# Patient Record
Sex: FEMALE | Race: WHITE | NOT HISPANIC OR LATINO | Employment: FULL TIME | URBAN - METROPOLITAN AREA
[De-identification: names, ages, dates, MRNs, and addresses within clinical notes are randomized per-mention and may not be internally consistent; named-entity substitution may affect disease eponyms.]

---

## 2017-01-27 ENCOUNTER — HOSPITAL ENCOUNTER (EMERGENCY)
Facility: MEDICAL CENTER | Age: 32
End: 2017-01-27
Attending: EMERGENCY MEDICINE
Payer: COMMERCIAL

## 2017-01-27 VITALS
WEIGHT: 190.7 LBS | HEART RATE: 80 BPM | BODY MASS INDEX: 28.9 KG/M2 | DIASTOLIC BLOOD PRESSURE: 112 MMHG | OXYGEN SATURATION: 97 % | HEIGHT: 68 IN | SYSTOLIC BLOOD PRESSURE: 157 MMHG | TEMPERATURE: 97 F | RESPIRATION RATE: 16 BRPM

## 2017-01-27 DIAGNOSIS — F98.8 ADD (ATTENTION DEFICIT DISORDER): ICD-10-CM

## 2017-01-27 DIAGNOSIS — R11.2 NON-INTRACTABLE VOMITING WITH NAUSEA, UNSPECIFIED VOMITING TYPE: ICD-10-CM

## 2017-01-27 LAB
ALBUMIN SERPL BCP-MCNC: 4.7 G/DL (ref 3.2–4.9)
ALBUMIN/GLOB SERPL: 1.2 G/DL
ALP SERPL-CCNC: 91 U/L (ref 30–99)
ALT SERPL-CCNC: 23 U/L (ref 2–50)
AMORPH CRY #/AREA URNS HPF: PRESENT /HPF
AMPHET UR QL SCN: POSITIVE
ANION GAP SERPL CALC-SCNC: 10 MMOL/L (ref 0–11.9)
APPEARANCE UR: ABNORMAL
AST SERPL-CCNC: 23 U/L (ref 12–45)
BACTERIA #/AREA URNS HPF: ABNORMAL /HPF
BARBITURATES UR QL SCN: NEGATIVE
BASOPHILS # BLD AUTO: 0.5 % (ref 0–1.8)
BASOPHILS # BLD: 0.04 K/UL (ref 0–0.12)
BENZODIAZ UR QL SCN: NEGATIVE
BILIRUB SERPL-MCNC: 0.4 MG/DL (ref 0.1–1.5)
BILIRUB UR QL STRIP.AUTO: NEGATIVE
BUN SERPL-MCNC: 13 MG/DL (ref 8–22)
BZE UR QL SCN: NEGATIVE
CALCIUM SERPL-MCNC: 10.9 MG/DL (ref 8.5–10.5)
CANNABINOIDS UR QL SCN: NEGATIVE
CHLORIDE SERPL-SCNC: 101 MMOL/L (ref 96–112)
CO2 SERPL-SCNC: 25 MMOL/L (ref 20–33)
COLOR UR: YELLOW
CREAT SERPL-MCNC: 0.82 MG/DL (ref 0.5–1.4)
CULTURE IF INDICATED INDCX: YES UA CULTURE
EOSINOPHIL # BLD AUTO: 0.05 K/UL (ref 0–0.51)
EOSINOPHIL NFR BLD: 0.6 % (ref 0–6.9)
EPI CELLS #/AREA URNS HPF: ABNORMAL /HPF
ERYTHROCYTE [DISTWIDTH] IN BLOOD BY AUTOMATED COUNT: 40.5 FL (ref 35.9–50)
GFR SERPL CREATININE-BSD FRML MDRD: >60 ML/MIN/1.73 M 2
GLOBULIN SER CALC-MCNC: 3.9 G/DL (ref 1.9–3.5)
GLUCOSE SERPL-MCNC: 101 MG/DL (ref 65–99)
GLUCOSE UR STRIP.AUTO-MCNC: NEGATIVE MG/DL
GRAN CASTS #/AREA URNS LPF: ABNORMAL /LPF
HCG SERPL QL: NEGATIVE
HCT VFR BLD AUTO: 38.1 % (ref 37–47)
HGB BLD-MCNC: 12.9 G/DL (ref 12–16)
KETONES UR STRIP.AUTO-MCNC: NEGATIVE MG/DL
LEUKOCYTE ESTERASE UR QL STRIP.AUTO: ABNORMAL
LIPASE SERPL-CCNC: 17 U/L (ref 11–82)
LYMPHOCYTES # BLD AUTO: 2.63 K/UL (ref 1–4.8)
LYMPHOCYTES NFR BLD: 30.8 % (ref 22–41)
MCH RBC QN AUTO: 26.2 PG (ref 27–33)
MCHC RBC AUTO-ENTMCNC: 33.9 G/DL (ref 33.6–35)
MCV RBC AUTO: 77.3 FL (ref 81.4–97.8)
MDMA UR QL SCN: NEGATIVE
METHADONE UR QL SCN: NEGATIVE
MICRO URNS: ABNORMAL
MONOCYTES # BLD AUTO: 0.77 K/UL (ref 0–0.85)
MONOCYTES NFR BLD AUTO: 9 % (ref 0–13.4)
MUCOUS THREADS #/AREA URNS HPF: ABNORMAL /HPF
NEUTROPHILS # BLD AUTO: 5.05 K/UL (ref 2–7.15)
NEUTROPHILS NFR BLD: 59.1 % (ref 44–72)
NITRITE UR QL STRIP.AUTO: NEGATIVE
OPIATES UR QL SCN: NEGATIVE
OXYCODONE UR QL SCN: NEGATIVE
PCP UR QL SCN: NEGATIVE
PH UR STRIP.AUTO: 7 [PH]
PLATELET # BLD AUTO: 318 K/UL (ref 164–446)
PMV BLD AUTO: 11.2 FL (ref 9–12.9)
POTASSIUM SERPL-SCNC: 3.8 MMOL/L (ref 3.6–5.5)
PROPOXYPH UR QL SCN: NEGATIVE
PROT SERPL-MCNC: 8.6 G/DL (ref 6–8.2)
PROT UR QL STRIP: NEGATIVE MG/DL
RBC # BLD AUTO: 4.93 M/UL (ref 4.2–5.4)
RBC # URNS HPF: ABNORMAL /HPF
RBC UR QL AUTO: ABNORMAL
SODIUM SERPL-SCNC: 136 MMOL/L (ref 135–145)
SP GR UR STRIP.AUTO: 1.02
WBC # BLD AUTO: 8.5 K/UL (ref 4.8–10.8)
WBC #/AREA URNS HPF: ABNORMAL /HPF

## 2017-01-27 PROCEDURE — 80053 COMPREHEN METABOLIC PANEL: CPT

## 2017-01-27 PROCEDURE — 700105 HCHG RX REV CODE 258: Performed by: EMERGENCY MEDICINE

## 2017-01-27 PROCEDURE — 700111 HCHG RX REV CODE 636 W/ 250 OVERRIDE (IP): Performed by: EMERGENCY MEDICINE

## 2017-01-27 PROCEDURE — 36415 COLL VENOUS BLD VENIPUNCTURE: CPT

## 2017-01-27 PROCEDURE — 99285 EMERGENCY DEPT VISIT HI MDM: CPT

## 2017-01-27 PROCEDURE — 85025 COMPLETE CBC W/AUTO DIFF WBC: CPT

## 2017-01-27 PROCEDURE — 80307 DRUG TEST PRSMV CHEM ANLYZR: CPT

## 2017-01-27 PROCEDURE — 83690 ASSAY OF LIPASE: CPT

## 2017-01-27 PROCEDURE — 96374 THER/PROPH/DIAG INJ IV PUSH: CPT

## 2017-01-27 PROCEDURE — 96361 HYDRATE IV INFUSION ADD-ON: CPT

## 2017-01-27 PROCEDURE — 81001 URINALYSIS AUTO W/SCOPE: CPT

## 2017-01-27 PROCEDURE — 87086 URINE CULTURE/COLONY COUNT: CPT

## 2017-01-27 PROCEDURE — 84703 CHORIONIC GONADOTROPIN ASSAY: CPT

## 2017-01-27 RX ORDER — ONDANSETRON 2 MG/ML
4 INJECTION INTRAMUSCULAR; INTRAVENOUS ONCE
Status: COMPLETED | OUTPATIENT
Start: 2017-01-27 | End: 2017-01-27

## 2017-01-27 RX ORDER — ONDANSETRON 4 MG/1
4 TABLET, FILM COATED ORAL EVERY 4 HOURS PRN
Qty: 10 TAB | Refills: 0 | Status: SHIPPED | OUTPATIENT
Start: 2017-01-27 | End: 2017-05-14

## 2017-01-27 RX ORDER — SODIUM CHLORIDE 9 MG/ML
1000 INJECTION, SOLUTION INTRAVENOUS ONCE
Status: COMPLETED | OUTPATIENT
Start: 2017-01-27 | End: 2017-01-27

## 2017-01-27 RX ADMIN — ONDANSETRON 4 MG: 2 INJECTION, SOLUTION INTRAMUSCULAR; INTRAVENOUS at 03:06

## 2017-01-27 RX ADMIN — SODIUM CHLORIDE 1000 ML: 9 INJECTION, SOLUTION INTRAVENOUS at 03:08

## 2017-01-27 ASSESSMENT — LIFESTYLE VARIABLES: DO YOU DRINK ALCOHOL: NO

## 2017-01-27 NOTE — ED PROVIDER NOTES
ED Provider Note    Scribed for Maude Romero M.D. by Basia Cool. 1/27/2017, 2:50 AM.    Primary care provider: DELMA Robles  Means of arrival: Walk-in  History obtained from: Patient  History limited by: None    CHIEF COMPLAINT  Chief Complaint   Patient presents with   • N/V     x2 days. multiple episodes of emesis   • Dizziness   • RUQ Pain     PCP is concerned about Gallstones.       HPI  Katerina Owen is a 31 y.o. female who presents to the Emergency Department with nausea and vomiting onset two days ago. The patient tolerated similar pain and symptoms for a year and a half to two years until the vomiting became more persistent and she again experienced vomiting. The patient reports that greasy foods exacerbate the vomiting. She also endorses abdominal pain that radiates into her back. She adds that she has been experiencing mild swelling in her legs. Her current physician is concerned for gall stones. The patient notes that several tests including ultrasounds and CT scans have been done before. She denies fever and chest pain. She is currently scheduled to undergo a HIDA scan, however due to radioactive tracer shortage this has been postponed.    REVIEW OF SYSTEMS  Pertinent positives include nausea, vomiting, chronic leg swelling unchanged today. Pertinent negatives include no fever, chest pain.  All other systems reviewed and negative.    PAST MEDICAL HISTORY   has a past medical history of Migraine; HTN (hypertension); Pre-eclampsia; Chlamydia (5/11/2011); and depression.    SURGICAL HISTORY   has past surgical history that includes septoturbinoplasty (4/21/2009); primary c section (Aug 7, 2007); repeat c section w tubal ligation (9/7/2011); lumpectomy; and breast biopsy (Left, 2/26/2016).    SOCIAL HISTORY  Social History   Substance Use Topics   • Smoking status: Former Smoker     Quit date: 12/26/2014   • Smokeless tobacco: Former User     Quit date: 10/30/2014      Comment:  "quit 8 yrs ago   • Alcohol Use: No      Comment: rarely      History   Drug Use No       FAMILY HISTORY  Family History   Problem Relation Age of Onset   • Heart Disease Father      heart failure smoker    • Hypertension Father    • Cancer Maternal Grandfather      pancreastic   • Heart Disease Paternal Grandfather      heart attack       CURRENT MEDICATIONS    No current facility-administered medications on file prior to encounter.     Current Outpatient Prescriptions on File Prior to Encounter   Medication Sig Dispense Refill   • omeprazole (PRILOSEC) 20 MG delayed-release capsule Take 1 Cap by mouth every day. Before breakfast 30 Cap 1   • amphetamine-dextroamphetamine (ADDERALL) 10 MG Tab Take 1 Tab by mouth 2 times a day. 56 Tab 0   • amphetamine-dextroamphetamine (ADDERALL) 10 MG Tab Take 1 Tab by mouth 2 times a day. 56 Tab 0   • amphetamine-dextroamphetamine (ADDERALL) 10 MG Tab Take 1 Tab by mouth 2 times a day. 56 Tab 0   • phentermine (ADIPEX-P) 37.5 MG tablet Take 1 Tab by mouth every morning before breakfast. 30 Tab 0         ALLERGIES  Allergies   Allergen Reactions   • Morphine Nausea     \"feel sick for a whole day after being given morphine\"  RXN=2/2016     • Vicodin [Hydrocodone-Acetaminophen] Vomiting and Nausea     BXP=3637       PHYSICAL EXAM  Vital Signs: /112 mmHg  Pulse 124  Temp(Src) 36.1 °C (97 °F)  Resp 16  Ht 1.727 m (5' 8\")  Wt 86.5 kg (190 lb 11.2 oz)  BMI 29.00 kg/m2  SpO2 99%  Constitutional: Alert, no acute distress  HENT: Normocephalic, atraumatic, moist mucus membranes  Eyes: Pupils equal and reactive, normal conjunctiva, non-icteric  Neck: Supple, normal range of motion, no stridor  Cardiovascular: Tachycardic, Normal peripheral perfusion, no cyanosis, Normal cardiac auscultation  Pulmonary: No respiratory distress, normal work of breathing, no accessory muscle usage, Clear to auscultation bilaterally  Abdomen: Mild epigastric discomfort on palpation. Soft, no " peritoneal signs, bowel sounds are present.   Skin: Warm, dry, no rashes or lesions  Back: No pain with active range of motion  Musculoskeletal: Normal range of motion in all extremities, no swelling or deformity noted  Neurologic: Alert, oriented, normal motor function, no speech deficits  Psychiatric: Normal and appropriate mood and affect      DIAGNOSTIC STUDIES/PROCEDURES:    LABS  Labs Reviewed   CBC WITH DIFFERENTIAL - Abnormal; Notable for the following:     MCV 77.3 (*)     MCH 26.2 (*)     All other components within normal limits   COMP METABOLIC PANEL - Abnormal; Notable for the following:     Glucose 101 (*)     Calcium 10.9 (*)     Total Protein 8.6 (*)     Globulin 3.9 (*)     All other components within normal limits   URINALYSIS,CULTURE IF INDICATED - Abnormal; Notable for the following:     Character Sl Cloudy (*)     Leukocyte Esterase Large (*)     Occult Blood Small (*)     All other components within normal limits   URINE DRUG SCREEN - Abnormal; Notable for the following:     Amphetamines Urine Positive (*)     All other components within normal limits   URINE MICROSCOPIC (W/UA) - Abnormal; Notable for the following:     WBC 10-20 (*)     RBC 5-10 (*)     Bacteria Few (*)     Epithelial Cells Moderate (*)     All other components within normal limits   LIPASE   HCG QUAL SERUM   ESTIMATED GFR   URINE CULTURE(NEW)     All labs reviewed by me.    Radiology results revealed:   No orders to display        COURSE & MEDICAL DECISION MAKING  Pertinent Labs & Imaging studies reviewed. (See chart for details)    Review of old medical records for continuity of care. 2/13/16 normal noncontrasted CT abdomen and pelvis, normal appendix visualized. 12/13/16 right upper quadrant ultrasound demonstrates normal gallbladder, no evidence of cholelithiasis, no pericholecystic fluid. Echogenic liver consistent with fatty infiltration present.    Differential diagnoses include but are not limited to: Chronic abdominal  pain, pancreatitis, gastroenteritis, peptic ulcer disease, cholecystitis, cholelithiasis    2:55 AM - Patient seen and examined at bedside. Patient will be treated with IV fluids, Zofran 4mg IV. Ordered CBC, CMP, Lipase, Urinalysis, HCG Qual Serum, Urine drug screen to evaluate her symptoms.     Decision Making:  This is a 31 y.o. year old female who presents with acute exacerbation of her chronic abdominal pain and vomiting. She has had no episodes of vomiting in the emergency department. Treated with Zofran and fluid bolus. She was tachycardic to heart rate of 124 which resolved with rest and fluids, on reassessment heart rate is 80. She remained normotensive without episodes of hypotension.     On laboratory evaluation urinalysis is negative for evidence of infection. Urine drug screen is positive for amphetamines, however she is taking ADHD medications that are likely the cause. Normal white blood count, no left shift. No electrolyte abnormalities, normal total bilirubin, normal liver enzymes without evidence of bile duct obstruction. Normal lipase without evidence of pancreatitis. HCG is negative ruling out pregnancy related complications.    Patient remains without any episodes of vomiting in the emergency department. Suspect this is an exacerbation of her chronic abdominal pain for which she is currently undergoing evaluation. While she is waiting for her HIDA scan, I have provided follow-up information for gastroenterology should she want a 2nd opinion. Return precautions given including worsening pain, fevers, inability to tolerate fluids, or any new or worsening symptoms.    Discharge home in stable condition    FINAL IMPRESSION  1. Non-intractable vomiting with nausea, unspecified vomiting type    2. ADD (attention deficit disorder)          Basia UNGER (Oh), am scribing for, and in the presence of, Maude Romero M.D..    Electronically signed by: Basia Cool (Oh), 1/27/2017    Maude UNGER  DONALD Romero. personally performed the services described in this documentation, as scribed by Basia Cool in my presence, and it is both accurate and complete.    The note accurately reflects work and decisions made by me.  Maude Romero  1/27/2017  6:24 AM

## 2017-01-27 NOTE — ED NOTES
Chief Complaint   Patient presents with   • N/V     x2 days. multiple episodes of emesis   • Dizziness   • RUQ Pain     PCP is concerned about Gallstones.   Pt ambulatory to triage with above complaint. Pt returned to lobby, educated on triage process, and to inform staff of any changes or concerns.

## 2017-01-27 NOTE — DISCHARGE INSTRUCTIONS
Please follow up with your primary care physician for complete recheck. You may also schedule an appointment with the gastroenterologist listed for follow-up. Please return to the emergency department if he develop any new or worsening symptoms including worsening abdominal pain, nausea, vomiting, or any further concerns.      Nausea and Vomiting  Nausea is a sick feeling that often comes before throwing up (vomiting). Vomiting is a reflex where stomach contents come out of your mouth. Vomiting can cause severe loss of body fluids (dehydration). Children and elderly adults can become dehydrated quickly, especially if they also have diarrhea. Nausea and vomiting are symptoms of a condition or disease. It is important to find the cause of your symptoms.  CAUSES   · Direct irritation of the stomach lining. This irritation can result from increased acid production (gastroesophageal reflux disease), infection, food poisoning, taking certain medicines (such as nonsteroidal anti-inflammatory drugs), alcohol use, or tobacco use.  · Signals from the brain. These signals could be caused by a headache, heat exposure, an inner ear disturbance, increased pressure in the brain from injury, infection, a tumor, or a concussion, pain, emotional stimulus, or metabolic problems.  · An obstruction in the gastrointestinal tract (bowel obstruction).  · Illnesses such as diabetes, hepatitis, gallbladder problems, appendicitis, kidney problems, cancer, sepsis, atypical symptoms of a heart attack, or eating disorders.  · Medical treatments such as chemotherapy and radiation.  · Receiving medicine that makes you sleep (general anesthetic) during surgery.  DIAGNOSIS  Your caregiver may ask for tests to be done if the problems do not improve after a few days. Tests may also be done if symptoms are severe or if the reason for the nausea and vomiting is not clear. Tests may include:  · Urine tests.  · Blood tests.  · Stool tests.  · Cultures (to  look for evidence of infection).  · X-rays or other imaging studies.  Test results can help your caregiver make decisions about treatment or the need for additional tests.  TREATMENT  You need to stay well hydrated. Drink frequently but in small amounts. You may wish to drink water, sports drinks, clear broth, or eat frozen ice pops or gelatin dessert to help stay hydrated. When you eat, eating slowly may help prevent nausea. There are also some antinausea medicines that may help prevent nausea.  HOME CARE INSTRUCTIONS   · Take all medicine as directed by your caregiver.  · If you do not have an appetite, do not force yourself to eat. However, you must continue to drink fluids.  · If you have an appetite, eat a normal diet unless your caregiver tells you differently.  ¨ Eat a variety of complex carbohydrates (rice, wheat, potatoes, bread), lean meats, yogurt, fruits, and vegetables.  ¨ Avoid high-fat foods because they are more difficult to digest.  · Drink enough water and fluids to keep your urine clear or pale yellow.  · If you are dehydrated, ask your caregiver for specific rehydration instructions. Signs of dehydration may include:  ¨ Severe thirst.  ¨ Dry lips and mouth.  ¨ Dizziness.  ¨ Dark urine.  ¨ Decreasing urine frequency and amount.  ¨ Confusion.  ¨ Rapid breathing or pulse.  SEEK IMMEDIATE MEDICAL CARE IF:   · You have blood or brown flecks (like coffee grounds) in your vomit.  · You have black or bloody stools.  · You have a severe headache or stiff neck.  · You are confused.  · You have severe abdominal pain.  · You have chest pain or trouble breathing.  · You do not urinate at least once every 8 hours.  · You develop cold or clammy skin.  · You continue to vomit for longer than 24 to 48 hours.  · You have a fever.  MAKE SURE YOU:   · Understand these instructions.  · Will watch your condition.  · Will get help right away if you are not doing well or get worse.     This information is not intended  to replace advice given to you by your health care provider. Make sure you discuss any questions you have with your health care provider.     Document Released: 12/18/2006 Document Revised: 03/11/2013 Document Reviewed: 05/16/2012  [employer] Interactive Patient Education ©2016 [employer]

## 2017-01-27 NOTE — ED AVS SNAPSHOT
1/27/2017          Katerina Murry [address] [place] [address]    Dear Katerina:    [facility] wants to ensure your discharge home is safe and you or your loved ones have had all your questions answered regarding your care after you leave the hospital.    You may receive a telephone call within two days of your discharge.  This call is to make certain you understand your discharge instructions as well as ensure we provided you with the best care possible during your stay with us.     The call will only last approximately 3-5 minutes and will be done by a nurse.    Once again, we want to ensure your discharge home is safe and that you have a clear understanding of any next steps in your care.  If you have any questions or concerns, please do not hesitate to contact us, we are here for you.  Thank you for choosing [facility] for your healthcare needs.    Sincerely,    Dwight Villarreal    [facility]

## 2017-01-27 NOTE — ED AVS SNAPSHOT
[employer] Access Code: Activation code not generated  Current [employer] Status: Active    [employer]  A secure, online tool to manage your health information     [employer]’s [employer]® is a secure, online tool that connects you to your personalized health information from the privacy of your home -- day or night - making it very easy for you to manage your healthcare. Once the activation process is completed, you can even access your medical information using the [employer] vadim, which is available for free in the Apple Vadim store or Google Play store.     [employer] provides the following levels of access (as shown below):   My Chart Features   [facility] Primary Care Doctor [facility]  Specialists [facility]  Urgent  Care Non-[facility]  Primary Care  Doctor   Email your healthcare team securely and privately 24/7 X X X X   Manage appointments: schedule your next appointment; view details of past/upcoming appointments X      Request prescription refills. X      View recent personal medical records, including lab and immunizations X X X X   View health record, including health history, allergies, medications X X X X   Read reports about your outpatient visits, procedures, consult and ER notes X X X X   See your discharge summary, which is a recap of your hospital and/or ER visit that includes your diagnosis, lab results, and care plan. X X       How to register for [employer]:  1. Go to  [email or web address].  2. Click on the Sign Up Now box, which takes you to the New Member Sign Up page. You will need to provide the following information:  a. Enter your [employer] Access Code exactly as it appears at the top of this page. (You will not need to use this code after you’ve completed the sign-up process. If you do not sign up before the expiration date, you must request a new code.)   b. Enter your date of birth.   c. Enter your home email address.   d. Click Submit, and follow the next screen’s instructions.  3. Create a [employer] ID. This will  be your [employer] login ID and cannot be changed, so think of one that is secure and easy to remember.  4. Create a [employer] password. You can change your password at any time.  5. Enter your Password Reset Question and Answer. This can be used at a later time if you forget your password.   6. Enter your e-mail address. This allows you to receive e-mail notifications when new information is available in [employer].  7. Click Sign Up. You can now view your health information.    For assistance activating your [employer] account, call [phone]

## 2017-01-27 NOTE — ED AVS SNAPSHOT
After Visit Summary                                                                                                                Katerina Owen   MRN: [id number]    Department:  [facility], Emergency Dept   Date of Visit:  1/27/2017            [facility], Emergency Dept    [address] [address]    Phone:  [phone]      You were seen by     Maude Romero M.D.      Your Diagnosis Was     Non-intractable vomiting with nausea, unspecified vomiting type     R11.2       These are the medications you received during your hospitalization from 01/27/2017 0143 to 01/27/2017 0536     Date/Time Order Dose Route Action    01/27/2017 0308 NS infusion 1,000 mL 1,000 mL Intravenous New Bag    01/27/2017 0306 ondansetron (ZOFRAN) syringe/vial injection 4 mg 4 mg Intravenous Given      Follow-up Information     1. Schedule an appointment as soon as possible for a visit with [facility].    Contact information    [facility] [address]  [phone]          2. Go to [facility], Emergency Dept.    Specialty:  Emergency Medicine    Why:  If symptoms worsen    Contact information    [address] [place] [id number] [phone]      Medication Information     Review all of your home medications and newly ordered medications with your primary doctor and/or pharmacist as soon as possible. Follow medication instructions as directed by your doctor and/or pharmacist.     Please keep your complete medication list with you and share with your physician. Update the information when medications are discontinued, doses are changed, or new medications (including over-the-counter products) are added; and carry medication information at all times in the event of emergency situations.               Medication List      START taking these medications        Instructions    ondansetron 4 MG Tabs tablet   Commonly known as:  ZOFRAN    Take 1 Tab  by mouth every four hours as needed for Nausea/Vomiting.   Dose:  4 mg         ASK your doctor about these medications        Instructions    * amphetamine-dextroamphetamine 10 MG Tabs   Commonly known as:  ADDERALL    Doctor's comments:  May fill 2/14/2017   Take 1 Tab by mouth 2 times a day.   Dose:  10 mg       * amphetamine-dextroamphetamine 10 MG Tabs   Commonly known as:  ADDERALL    Doctor's comments:  May fill 1/17/2017   Take 1 Tab by mouth 2 times a day.   Dose:  10 mg       * amphetamine-dextroamphetamine 10 MG Tabs   Commonly known as:  ADDERALL    Doctor's comments:  May fill 12/20/2016   Take 1 Tab by mouth 2 times a day.   Dose:  10 mg       omeprazole 20 MG delayed-release capsule   Commonly known as:  PRILOSEC    Take 1 Cap by mouth every day. Before breakfast   Dose:  20 mg       phentermine 37.5 MG tablet   Commonly known as:  ADIPEX-P    Take 1 Tab by mouth every morning before breakfast.   Dose:  37.5 mg       * Notice:  This list has 3 medication(s) that are the same as other medications prescribed for you. Read the directions carefully, and ask your doctor or other care provider to review them with you.            Procedures and tests performed during your visit     CBC WITH DIFFERENTIAL    COMP METABOLIC PANEL    ESTIMATED GFR    HCG QUAL SERUM    LIPASE    URINALYSIS CULTURE, IF INDICATED    URINE CULTURE(NEW)    URINE DRUG SCREEN    URINE MICROSCOPIC (W/UA)        Discharge Instructions       Please follow up with your primary care physician for complete recheck. You may also schedule an appointment with the gastroenterologist listed for follow-up. Please return to the emergency department if he develop any new or worsening symptoms including worsening abdominal pain, nausea, vomiting, or any further concerns.      Nausea and Vomiting  Nausea is a sick feeling that often comes before throwing up (vomiting). Vomiting is a reflex where stomach contents come out of your mouth. Vomiting can  cause severe loss of body fluids (dehydration). Children and elderly adults can become dehydrated quickly, especially if they also have diarrhea. Nausea and vomiting are symptoms of a condition or disease. It is important to find the cause of your symptoms.  CAUSES   · Direct irritation of the stomach lining. This irritation can result from increased acid production (gastroesophageal reflux disease), infection, food poisoning, taking certain medicines (such as nonsteroidal anti-inflammatory drugs), alcohol use, or tobacco use.  · Signals from the brain. These signals could be caused by a headache, heat exposure, an inner ear disturbance, increased pressure in the brain from injury, infection, a tumor, or a concussion, pain, emotional stimulus, or metabolic problems.  · An obstruction in the gastrointestinal tract (bowel obstruction).  · Illnesses such as diabetes, hepatitis, gallbladder problems, appendicitis, kidney problems, cancer, sepsis, atypical symptoms of a heart attack, or eating disorders.  · Medical treatments such as chemotherapy and radiation.  · Receiving medicine that makes you sleep (general anesthetic) during surgery.  DIAGNOSIS  Your caregiver may ask for tests to be done if the problems do not improve after a few days. Tests may also be done if symptoms are severe or if the reason for the nausea and vomiting is not clear. Tests may include:  · Urine tests.  · Blood tests.  · Stool tests.  · Cultures (to look for evidence of infection).  · X-rays or other imaging studies.  Test results can help your caregiver make decisions about treatment or the need for additional tests.  TREATMENT  You need to stay well hydrated. Drink frequently but in small amounts. You may wish to drink water, sports drinks, clear broth, or eat frozen ice pops or gelatin dessert to help stay hydrated. When you eat, eating slowly may help prevent nausea. There are also some antinausea medicines that may help prevent  nausea.  HOME CARE INSTRUCTIONS   · Take all medicine as directed by your caregiver.  · If you do not have an appetite, do not force yourself to eat. However, you must continue to drink fluids.  · If you have an appetite, eat a normal diet unless your caregiver tells you differently.  ¨ Eat a variety of complex carbohydrates (rice, wheat, potatoes, bread), lean meats, yogurt, fruits, and vegetables.  ¨ Avoid high-fat foods because they are more difficult to digest.  · Drink enough water and fluids to keep your urine clear or pale yellow.  · If you are dehydrated, ask your caregiver for specific rehydration instructions. Signs of dehydration may include:  ¨ Severe thirst.  ¨ Dry lips and mouth.  ¨ Dizziness.  ¨ Dark urine.  ¨ Decreasing urine frequency and amount.  ¨ Confusion.  ¨ Rapid breathing or pulse.  SEEK IMMEDIATE MEDICAL CARE IF:   · You have blood or brown flecks (like coffee grounds) in your vomit.  · You have black or bloody stools.  · You have a severe headache or stiff neck.  · You are confused.  · You have severe abdominal pain.  · You have chest pain or trouble breathing.  · You do not urinate at least once every 8 hours.  · You develop cold or clammy skin.  · You continue to vomit for longer than 24 to 48 hours.  · You have a fever.  MAKE SURE YOU:   · Understand these instructions.  · Will watch your condition.  · Will get help right away if you are not doing well or get worse.     This information is not intended to replace advice given to you by your health care provider. Make sure you discuss any questions you have with your health care provider.     Document Released: 12/18/2006 Document Revised: 03/11/2013 Document Reviewed: 05/16/2012  [employer] Interactive Patient Education ©2016 [employer]            Patient Information     Patient Information    Following emergency treatment: all patient requiring follow-up care must return either to a private physician or a clinic if your condition  worsens before you are able to obtain further medical attention, please return to the emergency room.     Billing Information    At [facility], we work to make the billing process streamlined for our patients.  Our Representatives are here to answer any questions you may have regarding your hospital bill.  If you have insurance coverage and have supplied your insurance information to us, we will submit a claim to your insurer on your behalf.  Should you have any questions regarding your bill, we can be reached online or by phone as follows:  Online: You are able pay your bills online or live chat with our representatives about any billing questions you may have. We are here to help Monday - Friday from 8:00am to 7:30pm and 9:00am - 12:00pm on Saturdays.  Please visit [email or web address]  for more information.   Phone:  [phone] or [phone]    Please note that your emergency physician, surgeon, pathologist, radiologist, anesthesiologist, and other specialists are not employed by [facility] and will therefore bill separately for their services.  Please contact them directly for any questions concerning their bills at the numbers below:     Emergency Physician Services:  [phone]  [facility]:  [phone]  [facility]:  [phone]  [facility]:  [phone]    1. Your final bill may vary from the amount quoted upon discharge if all procedures are not complete at that time, or if your doctor has additional procedures of which we are not aware. You will receive an additional bill if you return to the Emergency Department at [facility] for suture removal regardless of the facility of which the sutures were placed.     2. Please arrange for settlement of this account at the emergency registration.    3. All self-pay accounts are due in full at the time of treatment.  If you are unable to meet this obligation then payment  is expected within 4-5 days.     4. If you have had radiology studies (CT, X-ray, Ultrasound, MRI), you have received a preliminary result during your emergency department visit. Please contact the radiology department [phone] to receive a copy of your final result. Please discuss the Final result with your primary physician or with the follow up physician provided.     Crisis Hotline:  [employer] Crisis Hotline:  9-409-IDUTUBP or 1-995.115.1066  [place] Crisis Hotline:    1-981.809.4611 or 936-645-0803         ED Discharge Follow Up Questions    1. In order to provide you with very good care, we would like to follow up with a phone call in the next few days.  May we have your permission to contact you?     YES /  NO    2. What is the best phone number to call you? (       )_____-__________    3. What is the best time to call you?      Morning  /  Afternoon  /  Evening                   Patient Signature:  ____________________________________________________________    Date:  ____________________________________________________________

## 2017-01-29 LAB
BACTERIA UR CULT: NORMAL
SIGNIFICANT IND 70042: NORMAL
SITE SITE: NORMAL
SOURCE SOURCE: NORMAL

## 2017-03-15 ENCOUNTER — OFFICE VISIT (OUTPATIENT)
Dept: MEDICAL GROUP | Facility: LAB | Age: 32
End: 2017-03-15
Payer: COMMERCIAL

## 2017-03-15 VITALS
DIASTOLIC BLOOD PRESSURE: 80 MMHG | OXYGEN SATURATION: 97 % | WEIGHT: 194.67 LBS | SYSTOLIC BLOOD PRESSURE: 118 MMHG | HEIGHT: 68 IN | TEMPERATURE: 97.1 F | BODY MASS INDEX: 29.5 KG/M2 | HEART RATE: 100 BPM

## 2017-03-15 DIAGNOSIS — G89.29 CHRONIC ABDOMINAL PAIN: ICD-10-CM

## 2017-03-15 DIAGNOSIS — F41.9 ANXIETY: ICD-10-CM

## 2017-03-15 DIAGNOSIS — R11.2 NON-INTRACTABLE VOMITING WITH NAUSEA, UNSPECIFIED VOMITING TYPE: ICD-10-CM

## 2017-03-15 DIAGNOSIS — G47.09 OTHER INSOMNIA: ICD-10-CM

## 2017-03-15 DIAGNOSIS — R10.9 CHRONIC ABDOMINAL PAIN: ICD-10-CM

## 2017-03-15 DIAGNOSIS — F90.8 ATTENTION-DEFICIT HYPERACTIVITY DISORDER, OTHER TYPE: ICD-10-CM

## 2017-03-15 DIAGNOSIS — G25.81 RLS (RESTLESS LEGS SYNDROME): ICD-10-CM

## 2017-03-15 PROCEDURE — 99214 OFFICE O/P EST MOD 30 MIN: CPT | Performed by: NURSE PRACTITIONER

## 2017-03-15 RX ORDER — DEXTROAMPHETAMINE SACCHARATE, AMPHETAMINE ASPARTATE, DEXTROAMPHETAMINE SULFATE AND AMPHETAMINE SULFATE 2.5; 2.5; 2.5; 2.5 MG/1; MG/1; MG/1; MG/1
10 TABLET ORAL 2 TIMES DAILY
Qty: 56 TAB | Refills: 0 | Status: SHIPPED | OUTPATIENT
Start: 2017-03-15 | End: 2017-03-22

## 2017-03-15 RX ORDER — DEXTROAMPHETAMINE SACCHARATE, AMPHETAMINE ASPARTATE, DEXTROAMPHETAMINE SULFATE AND AMPHETAMINE SULFATE 2.5; 2.5; 2.5; 2.5 MG/1; MG/1; MG/1; MG/1
10 TABLET ORAL 2 TIMES DAILY
Qty: 56 TAB | Refills: 0 | Status: SHIPPED | OUTPATIENT
Start: 2017-03-15 | End: 2017-06-01 | Stop reason: SDUPTHER

## 2017-03-15 RX ORDER — TEMAZEPAM 7.5 MG/1
7.5 CAPSULE ORAL NIGHTLY PRN
Qty: 30 CAP | Refills: 1 | Status: SHIPPED
Start: 2017-03-15 | End: 2017-05-14

## 2017-03-15 ASSESSMENT — PATIENT HEALTH QUESTIONNAIRE - PHQ9: CLINICAL INTERPRETATION OF PHQ2 SCORE: 0

## 2017-03-15 ASSESSMENT — PAIN SCALES - GENERAL: PAINLEVEL: NO PAIN

## 2017-03-15 NOTE — MR AVS SNAPSHOT
"        Katerina Murry Brayden   3/15/2017 1:40 PM   Office Visit   MRN: [id number]    Department:  [facility]   Dept Phone:  [phone]    Description:  Female : 1985   Provider:  DELMA Robles           Allergies as of 3/15/2017     Allergen Noted Reactions    Morphine 2016   Nausea    \"feel sick for a whole day after being given morphine\"  RXN=2016      Vicodin [Hydrocodone-Acetaminophen] 2016   Vomiting, Nausea    TRT=0844      You were diagnosed with     Attention-deficit hyperactivity disorder, other type   [6973035]       RLS (restless legs syndrome)   [113321]       Anxiety   [828156]       Other insomnia   [412891]       Chronic abdominal pain   [370215]       Non-intractable vomiting with nausea, unspecified vomiting type   [2205118]         Vital Signs     Blood Pressure Pulse Temperature Height Weight Body Mass Index    118/80 mmHg 100 36.2 °C (97.1 °F) 1.727 m (5' 7.99\") 88.3 kg (194 lb 10.7 oz) 29.61 kg/m2    Oxygen Saturation Last Menstrual Period Breastfeeding? Smoking Status          97% 2017 No Former Smoker        Basic Information     Date Of Birth Sex Race Ethnicity Preferred Language    1985 Female White Non- English      Your appointments     Immanuel 15, 2017 10:40 AM   Established Patient with DELMA Robles   [facility] (--)    [address] [address] [id number] [phone]           You will be receiving a confirmation call a few days before your appointment from our automated call confirmation system.              Problem List              ICD-10-CM Priority Class Noted - Resolved    Migraine    Unknown - Present    HTN (hypertension) I10   Unknown - Present    Anxiety F41.9   2014 - Present    Depression F32.9   2014 - Present    PTSD (post-traumatic stress disorder) F43.10   2015 - Present    ADD (attention deficit disorder) F98.8   2016 - Present      "   Health Maintenance        Date Due Completion Dates    PAP SMEAR 5/5/2014 5/5/2011    IMM INFLUENZA (1) 9/1/2016 ---    IMM DTaP/Tdap/Td Vaccine (2 - Td) 9/9/2021 9/9/2011            Current Immunizations     Tdap Vaccine 9/9/2011  6:40 AM      Below and/or attached are the medications your provider expects you to take. Review all of your home medications and newly ordered medications with your provider and/or pharmacist. Follow medication instructions as directed by your provider and/or pharmacist. Please keep your medication list with you and share with your provider. Update the information when medications are discontinued, doses are changed, or new medications (including over-the-counter products) are added; and carry medication information at all times in the event of emergency situations     Allergies:  MORPHINE - Nausea     VICODIN - Vomiting,Nausea               Medications  Valid as of: March 15, 2017 -  2:27 PM    Generic Name Brand Name Tablet Size Instructions for use    Amphetamine-Dextroamphetamine (Tab) ADDERALL 10 MG Take 1 Tab by mouth 2 times a day.        Amphetamine-Dextroamphetamine (Tab) ADDERALL 10 MG Take 1 Tab by mouth 2 times a day.        Amphetamine-Dextroamphetamine (Tab) ADDERALL 10 MG Take 1 Tab by mouth 2 times a day.        Ondansetron HCl (Tab) ZOFRAN 4 MG Take 1 Tab by mouth every four hours as needed for Nausea/Vomiting.        Temazepam (Cap) RESTORIL 7.5 MG Take 1 Cap by mouth at bedtime as needed for Sleep.        .                 Medicines prescribed today were sent to:     [facility] [address] [place] [address] [place] - [address] [facility] [place] [address]    Phone: [phone] Fax: [phone]    Open 24 Hours?: No      Medication refill instructions:       If your prescription bottle indicates you have medication refills left, it is not necessary to call your provider’s office. Please contact your pharmacy and they will refill your medication.    If your prescription  bottle indicates you do not have any refills left, you may request refills at any time through one of the following ways: The online [employer] system (except Urgent Care), by calling your provider’s office, or by asking your pharmacy to contact your provider’s office with a refill request. Medication refills are processed only during regular business hours and may not be available until the next business day. Your provider may request additional information or to have a follow-up visit with you prior to refilling your medication.   *Please Note: Medication refills are assigned a new Rx number when refilled electronically. Your pharmacy may indicate that no refills were authorized even though a new prescription for the same medication is available at the pharmacy. Please request the medicine by name with the pharmacy before contacting your provider for a refill.        Other Notes About Your Plan     Seeing Diamond Chung - therapist           [employer] Access Code: Activation code not generated  Current [employer] Status: Active

## 2017-03-15 NOTE — PROGRESS NOTES
CC  f/u    HPI  31-year-old established female here with her  to follow-up on a few issues:  #1-attention deficit disorder: Stable. Doing well with Adderall 10 mg twice daily. Denies any negative side effects of Adderall. Feels that she is much more focused at home and job searching.  #2-anxiety: Chronic issue. Does not feel that Adderall has worsened her anxiety and in fact feels that her anxiety has improved very as she feels much less anxious and she is able to focus and accomplish activities of daily living in a timely manner. She is not taking any daily medications  #3-restless leg syndrome: She reports that almost every evening she has leg cramps and a sensation that she needs to constantly move her legs. She has never taken any medication for this at the exception of over-the-counter supplements and natural herbs. She has tried stretching, exercise and warm baths. She has a sensation that her legs need to constantly move and typically she has a charley horse in one of her calves. She's had her symptoms for several years.  #4-insomnia: Chronic issue. has difficulty falling and staying asleep. Her  reports that she moves around constantly at night. She reports that when she does wake up and hears any type of noise that she is unable to fall back asleep. She's had trouble sleeping for years and has tried natural routes such as melatonin, valerian root and certain types of tea without improvement.  #5-chronic abdominal pain with vomiting. She reports that she is continuously having issues with abdominal pain and vomiting after she eats any type of fatty food. She's had a normal gallbladder ultrasound but her HIDA scan did induce cramping and pain. She has not made a follow-up appointment with her surgeon, Dr. Delcid yet.    Past medical, surgical, family, and social history is reviewed and updated in Epic chart by me today.   Medications and allergies reviewed and updated in Epic chart by me today.  "    ROS:   As documented in history of present illness above    Exam:  Blood pressure 118/80, pulse 100, temperature 36.2 °C (97.1 °F), height 1.727 m (5' 7.99\"), weight 88.3 kg (194 lb 10.7 oz), last menstrual period 03/06/2017, SpO2 97 %, not currently breastfeeding.  Constitutional: Alert, no distress, plus 3 vital signs  Skin:  Warm, dry, no rashes invisible areas  Eye: Equal, round and reactive, conjunctiva clear  ENMT: Lips without lesions, good dentition, oropharynx clear    Neck: Trachea midline  Respiratory: Unlabored respiration, lungs clear to auscultation, no wheezes, no rhonchi  Cardiovascular: Normal rate and rhythm, no murmur, no edema  Psych: Alert, pleasant, well-groomed, normal affect    A/P:  1. Attention-deficit hyperactivity disorder, other type  -Symptoms improved with Adderall. Urine drug screen and contract are up-to-date. She'll follow up here in 3 months.  - amphetamine-dextroamphetamine (ADDERALL) 10 MG Tab; Take 1 Tab by mouth 2 times a day.  Dispense: 56 Tab; Refill: 0  - amphetamine-dextroamphetamine (ADDERALL) 10 MG Tab; Take 1 Tab by mouth 2 times a day.  Dispense: 56 Tab; Refill: 0  - amphetamine-dextroamphetamine (ADDERALL) 10 MG Tab; Take 1 Tab by mouth 2 times a day.  Dispense: 56 Tab; Refill: 0    2. RLS (restless legs syndrome)  -Discussed treatment options such as Requip, gabapentin, lorazepam. We'll try temazepam because of her multiple other issues such as anxiety, insomnia and her restless leg syndrome. Discussed potential chemical dependency nature of temazepam with her and she verbalized understanding. She understands that she should not drive a car take any alcohol within 6 hours of taking temazepam. Instructed her that she needs to allow at least 8 hours for sleep when taking temazepam. She will email me if this is not helpful. Discussed iron and magnesium supplementation.  - temazepam (RESTORIL) 7.5 MG capsule; Take 1 Cap by mouth at bedtime as needed for Sleep.  " Dispense: 30 Cap; Refill: 1    3. Anxiety  -Trial of nighttime temazepam to ease her anxiety when she tries to fall asleep.  - temazepam (RESTORIL) 7.5 MG capsule; Take 1 Cap by mouth at bedtime as needed for Sleep.  Dispense: 30 Cap; Refill: 1    4. Other insomnia  -As above.    5.  Chronic abdominal pain with vomiting: Strongly encouraged her to follow-up with Dr. Delcid because of her persistent symptoms and she states that she will.  Reviewed her ultrasound, HIDA scan and recent ER visit with her.

## 2017-03-22 ENCOUNTER — APPOINTMENT (OUTPATIENT)
Dept: RADIOLOGY | Facility: MEDICAL CENTER | Age: 32
End: 2017-03-22
Attending: EMERGENCY MEDICINE
Payer: COMMERCIAL

## 2017-03-22 ENCOUNTER — HOSPITAL ENCOUNTER (EMERGENCY)
Facility: MEDICAL CENTER | Age: 32
End: 2017-03-22
Attending: EMERGENCY MEDICINE
Payer: COMMERCIAL

## 2017-03-22 VITALS
BODY MASS INDEX: 29.74 KG/M2 | SYSTOLIC BLOOD PRESSURE: 167 MMHG | OXYGEN SATURATION: 94 % | DIASTOLIC BLOOD PRESSURE: 93 MMHG | HEART RATE: 88 BPM | WEIGHT: 196.21 LBS | HEIGHT: 68 IN | TEMPERATURE: 99.1 F | RESPIRATION RATE: 14 BRPM

## 2017-03-22 DIAGNOSIS — R51.9 NONINTRACTABLE HEADACHE, UNSPECIFIED CHRONICITY PATTERN, UNSPECIFIED HEADACHE TYPE: ICD-10-CM

## 2017-03-22 PROCEDURE — 96375 TX/PRO/DX INJ NEW DRUG ADDON: CPT

## 2017-03-22 PROCEDURE — 70450 CT HEAD/BRAIN W/O DYE: CPT

## 2017-03-22 PROCEDURE — 700111 HCHG RX REV CODE 636 W/ 250 OVERRIDE (IP): Performed by: EMERGENCY MEDICINE

## 2017-03-22 PROCEDURE — 700105 HCHG RX REV CODE 258: Performed by: EMERGENCY MEDICINE

## 2017-03-22 PROCEDURE — 99285 EMERGENCY DEPT VISIT HI MDM: CPT

## 2017-03-22 PROCEDURE — 96374 THER/PROPH/DIAG INJ IV PUSH: CPT

## 2017-03-22 RX ORDER — DEXAMETHASONE SODIUM PHOSPHATE 4 MG/ML
10 INJECTION, SOLUTION INTRA-ARTICULAR; INTRALESIONAL; INTRAMUSCULAR; INTRAVENOUS; SOFT TISSUE ONCE
Status: COMPLETED | OUTPATIENT
Start: 2017-03-22 | End: 2017-03-22

## 2017-03-22 RX ORDER — DIPHENHYDRAMINE HYDROCHLORIDE 50 MG/ML
25 INJECTION INTRAMUSCULAR; INTRAVENOUS ONCE
Status: COMPLETED | OUTPATIENT
Start: 2017-03-22 | End: 2017-03-22

## 2017-03-22 RX ORDER — SODIUM CHLORIDE 9 MG/ML
1000 INJECTION, SOLUTION INTRAVENOUS ONCE
Status: COMPLETED | OUTPATIENT
Start: 2017-03-22 | End: 2017-03-22

## 2017-03-22 RX ORDER — KETOROLAC TROMETHAMINE 30 MG/ML
15 INJECTION, SOLUTION INTRAMUSCULAR; INTRAVENOUS ONCE
Status: COMPLETED | OUTPATIENT
Start: 2017-03-22 | End: 2017-03-22

## 2017-03-22 RX ORDER — METOCLOPRAMIDE HYDROCHLORIDE 5 MG/ML
10 INJECTION INTRAMUSCULAR; INTRAVENOUS ONCE
Status: COMPLETED | OUTPATIENT
Start: 2017-03-22 | End: 2017-03-22

## 2017-03-22 RX ORDER — BUTALBITAL, ASPIRIN, AND CAFFEINE 325; 50; 40 MG/1; MG/1; MG/1
1 CAPSULE ORAL EVERY 4 HOURS PRN
Qty: 30 CAP | Refills: 0 | Status: SHIPPED | OUTPATIENT
Start: 2017-03-22 | End: 2017-12-19

## 2017-03-22 RX ADMIN — SODIUM CHLORIDE 1000 ML: 9 INJECTION, SOLUTION INTRAVENOUS at 21:17

## 2017-03-22 RX ADMIN — DIPHENHYDRAMINE HYDROCHLORIDE 25 MG: 50 INJECTION, SOLUTION INTRAMUSCULAR; INTRAVENOUS at 21:24

## 2017-03-22 RX ADMIN — DEXAMETHASONE SODIUM PHOSPHATE 10 MG: 4 INJECTION, SOLUTION INTRAMUSCULAR; INTRAVENOUS at 21:21

## 2017-03-22 RX ADMIN — METOCLOPRAMIDE 10 MG: 5 INJECTION, SOLUTION INTRAMUSCULAR; INTRAVENOUS at 21:18

## 2017-03-22 RX ADMIN — KETOROLAC TROMETHAMINE 15 MG: 30 INJECTION, SOLUTION INTRAMUSCULAR; INTRAVENOUS at 21:19

## 2017-03-22 ASSESSMENT — PAIN SCALES - GENERAL
PAINLEVEL_OUTOF10: 0
PAINLEVEL_OUTOF10: 7
PAINLEVEL_OUTOF10: 8

## 2017-03-22 NOTE — ED AVS SNAPSHOT
[employer] Access Code: Activation code not generated  Current [employer] Status: Active    VOY[employer]hart  A secure, online tool to manage your health information     [employer]’s [employer]® is a secure, online tool that connects you to your personalized health information from the privacy of your home -- day or night - making it very easy for you to manage your healthcare. Once the activation process is completed, you can even access your medical information using the [employer]ix vadim, which is available for free in the Apple Vadim store or Google Play store.     [employer]ix provides the following levels of access (as shown below):   My Chart Features   [facility] Primary Care Doctor [facility]  Specialists [facility]  Urgent  Care Non-[facility]  Primary Care  Doctor   Email your healthcare team securely and privately 24/7 X X X X   Manage appointments: schedule your next appointment; view details of past/upcoming appointments X      Request prescription refills. X      View recent personal medical records, including lab and immunizations X X X X   View health record, including health history, allergies, medications X X X X   Read reports about your outpatient visits, procedures, consult and ER notes X X X X   See your discharge summary, which is a recap of your hospital and/or ER visit that includes your diagnosis, lab results, and care plan. X X       How to register for [employer]:  1. Go to  [email or web address].  2. Click on the Sign Up Now box, which takes you to the New Member Sign Up page. You will need to provide the following information:  a. Enter your [employer] Access Code exactly as it appears at the top of this page. (You will not need to use this code after you’ve completed the sign-up process. If you do not sign up before the expiration date, you must request a new code.)   b. Enter your date of birth.   c. Enter your home email address.   d. Click Submit, and follow the next screen’s instructions.  3. Create a [employer]ix ID. This will  be your Dot [employer]N login ID and cannot be changed, so think of one that is secure and easy to remember.  4. Create a [employer]N password. You can change your password at any time.  5. Enter your Password Reset Question and Answer. This can be used at a later time if you forget your password.   6. Enter your e-mail address. This allows you to receive e-mail notifications when new information is available in Dot VN.  7. Click Sign Up. You can now view your health information.    For assistance activating your [employer] account, call [phone]

## 2017-03-22 NOTE — ED AVS SNAPSHOT
3/22/2017          Katerina Murry [address] [place] [address]    Dear Katerina:    [facility] wants to ensure your discharge home is safe and you or your loved ones have had all your questions answered regarding your care after you leave the hospital.    You may receive a telephone call within two days of your discharge.  This call is to make certain you understand your discharge instructions as well as ensure we provided you with the best care possible during your stay with us.     The call will only last approximately 3-5 minutes and will be done by a nurse.    Once again, we want to ensure your discharge home is safe and that you have a clear understanding of any next steps in your care.  If you have any questions or concerns, please do not hesitate to contact us, we are here for you.  Thank you for choosing [facility] for your healthcare needs.    Sincerely,    Dwight Villarreal    [facility]

## 2017-03-22 NOTE — ED AVS SNAPSHOT
Home Care Instructions                                                                                                                Katerina Owen   MRN: [id number]    Department:  [facility], Emergency Dept   Date of Visit:  3/22/2017            [facility], Emergency Dept    [address]    [place] [address]    Phone:  [phone]      You were seen by     Jeovanny Kerr M.D.      Your Diagnosis Was     Nonintractable headache, unspecified chronicity pattern, unspecified headache type     R51       These are the medications you received during your hospitalization from 03/22/2017 1948 to 03/22/2017 2233     Date/Time Order Dose Route Action    03/22/2017 2117 NS infusion 1,000 mL 1,000 mL Intravenous New Bag    03/22/2017 2118 metoclopramide (REGLAN) injection 10 mg 10 mg Intravenous Given    03/22/2017 2121 dexamethasone (DECADRON) injection 10 mg 10 mg Intravenous Given    03/22/2017 2119 ketorolac (TORADOL) injection 15 mg 15 mg Intravenous Given    03/22/2017 2124 diphenhydrAMINE (BENADRYL) injection 25 mg 25 mg Intravenous Given      Follow-up Information     1. Follow up with DELMA Robles In 3 days.    Specialty:  Family Medicine    Contact information    [id number] [place] [address]  [place] [address] [phone]        Medication Information     Review all of your home medications and newly ordered medications with your primary doctor and/or pharmacist as soon as possible. Follow medication instructions as directed by your doctor and/or pharmacist.     Please keep your complete medication list with you and share with your physician. Update the information when medications are discontinued, doses are changed, or new medications (including over-the-counter products) are added; and carry medication information at all times in the event of emergency situations.               Medication List      START taking these medications         Instructions    Morning Afternoon Evening Bedtime    ASPIRIN-CAFFEINE-BUTALBITAL -40 MG Caps   Commonly known as:  FIORINAL        Take 1 Cap by mouth every four hours as needed.   Dose:  1 Cap                          ASK your doctor about these medications        Instructions    Morning Afternoon Evening Bedtime    amphetamine-dextroamphetamine 10 MG Tabs   Commonly known as:  ADDERALL        Doctor's comments:  May fill 3/19/2017   Take 1 Tab by mouth 2 times a day.   Dose:  10 mg                        ondansetron 4 MG Tabs tablet   Commonly known as:  ZOFRAN        Take 1 Tab by mouth every four hours as needed for Nausea/Vomiting.   Dose:  4 mg                        temazepam 7.5 MG capsule   Commonly known as:  RESTORIL        Take 1 Cap by mouth at bedtime as needed for Sleep.   Dose:  7.5 mg                             Where to Get Your Medications      You can get these medications from any pharmacy     Bring a paper prescription for each of these medications    - ASPIRIN-CAFFEINE-BUTALBITAL -40 MG Caps            Procedures and tests performed during your visit     CT-HEAD W/O    IV Saline Lock        Discharge Instructions       Migraine Headache  A migraine headache is an intense, throbbing pain on one or both sides of your head. A migraine can last for 30 minutes to several hours.  CAUSES   The exact cause of a migraine headache is not always known. However, a migraine may be caused when nerves in the brain become irritated and release chemicals that cause inflammation. This causes pain.  Certain things may also trigger migraines, such as:  · Alcohol.  · Smoking.  · Stress.  · Menstruation.  · Aged cheeses.  · Foods or drinks that contain nitrates, glutamate, aspartame, or tyramine.  · Lack of sleep.  · Chocolate.  · Caffeine.  · Hunger.  · Physical exertion.  · Fatigue.  · Medicines used to treat chest pain (nitroglycerine), birth control pills, estrogen, and some blood pressure  medicines.  SIGNS AND SYMPTOMS  · Pain on one or both sides of your head.  · Pulsating or throbbing pain.  · Severe pain that prevents daily activities.  · Pain that is aggravated by any physical activity.  · Nausea, vomiting, or both.  · Dizziness.  · Pain with exposure to bright lights, loud noises, or activity.  · General sensitivity to bright lights, loud noises, or smells.  Before you get a migraine, you may get warning signs that a migraine is coming (aura). An aura may include:  · Seeing flashing lights.  · Seeing bright spots, halos, or zigzag lines.  · Having tunnel vision or blurred vision.  · Having feelings of numbness or tingling.  · Having trouble talking.  · Having muscle weakness.  DIAGNOSIS   A migraine headache is often diagnosed based on:  · Symptoms.  · Physical exam.  · A CT scan or MRI of your head. These imaging tests cannot diagnose migraines, but they can help rule out other causes of headaches.  TREATMENT  Medicines may be given for pain and nausea. Medicines can also be given to help prevent recurrent migraines.   HOME CARE INSTRUCTIONS  · Only take over-the-counter or prescription medicines for pain or discomfort as directed by your health care provider. The use of long-term narcotics is not recommended.  · Lie down in a dark, quiet room when you have a migraine.  · Keep a journal to find out what may trigger your migraine headaches. For example, write down:  ¨ What you eat and drink.  ¨ How much sleep you get.  ¨ Any change to your diet or medicines.  · Limit alcohol consumption.  · Quit smoking if you smoke.  · Get 7-9 hours of sleep, or as recommended by your health care provider.  · Limit stress.  · Keep lights dim if bright lights bother you and make your migraines worse.  SEEK IMMEDIATE MEDICAL CARE IF:   · Your migraine becomes severe.  · You have a fever.  · You have a stiff neck.  · You have vision loss.  · You have muscular weakness or loss of muscle control.  · You start losing  your balance or have trouble walking.  · You feel faint or pass out.  · You have severe symptoms that are different from your first symptoms.  MAKE SURE YOU:   · Understand these instructions.  · Will watch your condition.  · Will get help right away if you are not doing well or get worse.     This information is not intended to replace advice given to you by your health care provider. Make sure you discuss any questions you have with your health care provider.     Document Released: 12/18/2006 Document Revised: 01/08/2016 Document Reviewed: 08/25/2014  [employer] Interactive Patient Education ©2016 [employer]            Patient Information     Patient Information    Following emergency treatment: all patient requiring follow-up care must return either to a private physician or a clinic if your condition worsens before you are able to obtain further medical attention, please return to the emergency room.     Billing Information    At [facility], we work to make the billing process streamlined for our patients.  Our Representatives are here to answer any questions you may have regarding your hospital bill.  If you have insurance coverage and have supplied your insurance information to us, we will submit a claim to your insurer on your behalf.  Should you have any questions regarding your bill, we can be reached online or by phone as follows:  Online: You are able pay your bills online or live chat with our representatives about any billing questions you may have. We are here to help Monday - Friday from 8:00am to 7:30pm and 9:00am - 12:00pm on Saturdays.  Please visit [email or web address]  for more information.   Phone:  [phone] or [phone]    Please note that your emergency physician, surgeon, pathologist, radiologist, anesthesiologist, and other specialists are not employed by [facility] and will therefore bill separately for their services.  Please contact them directly  for any questions concerning their bills at the numbers below:     Emergency Physician Services:  [phone]  [facility]:  [phone]  [facility]:  [phone]  [facility]:  [phone]    1. Your final bill may vary from the amount quoted upon discharge if all procedures are not complete at that time, or if your doctor has additional procedures of which we are not aware. You will receive an additional bill if you return to the Emergency Department at [facility] for suture removal regardless of the facility of which the sutures were placed.     2. Please arrange for settlement of this account at the emergency registration.    3. All self-pay accounts are due in full at the time of treatment.  If you are unable to meet this obligation then payment is expected within 4-5 days.     4. If you have had radiology studies (CT, X-ray, Ultrasound, MRI), you have received a preliminary result during your emergency department visit. Please contact the radiology department [phone] to receive a copy of your final result. Please discuss the Final result with your primary physician or with the follow up physician provided.     Crisis Hotline:  [place] Crisis Hotline:  0-896-WTZUIIN or 1-871.459.7961  [place] Crisis Hotline:    1-568.461.2646 or 168-069-7891         ED Discharge Follow Up Questions    1. In order to provide you with very good care, we would like to follow up with a phone call in the next few days.  May we have your permission to contact you?     YES /  NO    2. What is the best phone number to call you? (       )_____-__________    3. What is the best time to call you?      Morning  /  Afternoon  /  Evening                   Patient Signature:  ____________________________________________________________    Date:  ____________________________________________________________      Your appointments     Immanuel 15, 2017 10:40 AM   Established Patient with  MARTITA Robles.   [facility] [place] [facility] (--)    [address] [place] [id number] [phone]           You will be receiving a confirmation call a few days before your appointment from our automated call confirmation system.

## 2017-03-23 ENCOUNTER — PATIENT OUTREACH (OUTPATIENT)
Dept: HEALTH INFORMATION MANAGEMENT | Facility: OTHER | Age: 32
End: 2017-03-23

## 2017-03-23 NOTE — ED NOTES
Pt amb to triage c/o migraine for 2 days which progressively got worse. Sensitivity to light and noise. C/o dizziness. Pt took imitrex without relief. Denies trauma to head. C/o blurred vision of L eye.

## 2017-03-23 NOTE — DISCHARGE INSTRUCTIONS
Migraine Headache  A migraine headache is an intense, throbbing pain on one or both sides of your head. A migraine can last for 30 minutes to several hours.  CAUSES   The exact cause of a migraine headache is not always known. However, a migraine may be caused when nerves in the brain become irritated and release chemicals that cause inflammation. This causes pain.  Certain things may also trigger migraines, such as:  · Alcohol.  · Smoking.  · Stress.  · Menstruation.  · Aged cheeses.  · Foods or drinks that contain nitrates, glutamate, aspartame, or tyramine.  · Lack of sleep.  · Chocolate.  · Caffeine.  · Hunger.  · Physical exertion.  · Fatigue.  · Medicines used to treat chest pain (nitroglycerine), birth control pills, estrogen, and some blood pressure medicines.  SIGNS AND SYMPTOMS  · Pain on one or both sides of your head.  · Pulsating or throbbing pain.  · Severe pain that prevents daily activities.  · Pain that is aggravated by any physical activity.  · Nausea, vomiting, or both.  · Dizziness.  · Pain with exposure to bright lights, loud noises, or activity.  · General sensitivity to bright lights, loud noises, or smells.  Before you get a migraine, you may get warning signs that a migraine is coming (aura). An aura may include:  · Seeing flashing lights.  · Seeing bright spots, halos, or zigzag lines.  · Having tunnel vision or blurred vision.  · Having feelings of numbness or tingling.  · Having trouble talking.  · Having muscle weakness.  DIAGNOSIS   A migraine headache is often diagnosed based on:  · Symptoms.  · Physical exam.  · A CT scan or MRI of your head. These imaging tests cannot diagnose migraines, but they can help rule out other causes of headaches.  TREATMENT  Medicines may be given for pain and nausea. Medicines can also be given to help prevent recurrent migraines.   HOME CARE INSTRUCTIONS  · Only take over-the-counter or prescription medicines for pain or discomfort as directed by your  health care provider. The use of long-term narcotics is not recommended.  · Lie down in a dark, quiet room when you have a migraine.  · Keep a journal to find out what may trigger your migraine headaches. For example, write down:  ¨ What you eat and drink.  ¨ How much sleep you get.  ¨ Any change to your diet or medicines.  · Limit alcohol consumption.  · Quit smoking if you smoke.  · Get 7-9 hours of sleep, or as recommended by your health care provider.  · Limit stress.  · Keep lights dim if bright lights bother you and make your migraines worse.  SEEK IMMEDIATE MEDICAL CARE IF:   · Your migraine becomes severe.  · You have a fever.  · You have a stiff neck.  · You have vision loss.  · You have muscular weakness or loss of muscle control.  · You start losing your balance or have trouble walking.  · You feel faint or pass out.  · You have severe symptoms that are different from your first symptoms.  MAKE SURE YOU:   · Understand these instructions.  · Will watch your condition.  · Will get help right away if you are not doing well or get worse.     This information is not intended to replace advice given to you by your health care provider. Make sure you discuss any questions you have with your health care provider.     Document Released: 12/18/2006 Document Revised: 01/08/2016 Document Reviewed: 08/25/2014  [employer] Interactive Patient Education ©2016 [employer]

## 2017-03-23 NOTE — PROGRESS NOTES
· Placed discharge outreach phone call to patient s/p ER discharge 3/22/17.  Left voicemail with my contact information and instructions to return my phone call.    · 3/26/17 at 9:09 AM--Second attempt at discharge outreach phone call to patient s/p ER discharge 3/22/17.  Left voicemail with my contact information and instructions to return my phone call.    · 3/27/17 at 9:49 AM--Third attempt at discharge outreach, spoke with pt, discharge outreach completed.

## 2017-03-23 NOTE — ED PROVIDER NOTES
"ED Provider Note    CHIEF COMPLAINT  Chief Complaint   Patient presents with   • Migraine       HPI  Katerina Owen is a 31 y.o. female who presents with headache. Patient has history of migraines although has not had one in a while and this does feel similar. It began 2 hours ago not 2 days is noted in triage, she tried her home Imitrex with no relief. She endorses some mild nausea but no vomiting. Some blurriness to her left eye, no other visual symptoms or eye pain. No focal weakness numbness or tingling. No neck pain or rash. No fevers or chills.    REVIEW OF SYSTEMS  See HPI for further details. All other systems are negative.     PAST MEDICAL HISTORY   has a past medical history of Migraine; HTN (hypertension); Pre-eclampsia; Chlamydia (5/11/2011); and depression.    SOCIAL HISTORY  Social History     Social History Main Topics   • Smoking status: Current Some Day Smoker     Last Attempt to Quit: 12/26/2014   • Smokeless tobacco: Former User     Quit date: 10/30/2014      Comment: quit 8 yrs ago   • Alcohol Use: No      Comment: rarely   • Drug Use: No   • Sexual Activity:     Partners: Male     Birth Control/ Protection: Surgical      Comment: ; three kids; wk: not working       SURGICAL HISTORY   has past surgical history that includes septoturbinoplasty (4/21/2009); lumpectomy; breast biopsy (Left, 2/26/2016); primary c section (Aug 7, 2007); and repeat c section w tubal ligation (9/7/2011).    CURRENT MEDICATIONS  Home Medications     Reviewed by Tonia Gomez R.N. (Registered Nurse) on 03/22/17 at 2019  Med List Status: Complete    Medication Last Dose Status    amphetamine-dextroamphetamine (ADDERALL) 10 MG Tab 3/22/2017 Active    ondansetron (ZOFRAN) 4 MG Tab tablet prn Active    temazepam (RESTORIL) 7.5 MG capsule 3/21/2017 Active                ALLERGIES  Allergies   Allergen Reactions   • Morphine Nausea     \"feel sick for a whole day after being given morphine\"  RXN=2/2016   " "  • Vicodin [Hydrocodone-Acetaminophen] Vomiting and Nausea     ISO=8811       PHYSICAL EXAM  VITAL SIGNS: /93 mmHg  Pulse 83  Temp(Src) 37.3 °C (99.1 °F)  Resp 14  Ht 1.727 m (5' 8\")  Wt 89 kg (196 lb 3.4 oz)  BMI 29.84 kg/m2  SpO2 97%  LMP 03/08/2017 (Approximate)  Pulse ox interpretation: I interpret this pulse ox as normal.  Constitutional: Alert in no apparent distress.  HENT: No signs of trauma, Bilateral external ears normal, Nose normal. Temples and sinuses nontender  Eyes: Pupils are equal and reactive, Conjunctiva normal, Non-icteric.   Neck: Normal range of motion, No tenderness, Supple, No stridor.   Lymphatic: No lymphadenopathy noted.   Cardiovascular: Regular rate and rhythm, no murmurs.   Thorax & Lungs: Normal breath sounds, No respiratory distress, No wheezing, No chest tenderness.   Abdomen: Bowel sounds normal, Soft, No tenderness, No masses, No pulsatile masses. No peritoneal signs.  Skin: Warm, Dry, No erythema, No rash.   Back: No bony tenderness, No CVA tenderness.   Extremities: Intact distal pulses, No edema, No tenderness, No cyanosis, Negative Mesha's sign.  Musculoskeletal: Good range of motion in all major joints. No tenderness to palpation or major deformities noted.   Neurologic: Alert, cranial nerves intact, speech is appropriate or not slurred, upper extremities bilaterally exhibit no drift, no dysmetria, 5 out of 5 strength with bilateral bicep/tricep/, sensation intact to light touch throughout upper extremities. Lower extremities strength 5 out of 5 thigh extension/flexion/abduction/adduction, knee extension/flexion, dorsiflexion plantar flexion. No clonus.  2+ patella reflexes.  sensation intact to light touch.  No focal deficits noted. Ambulates with steady gait, steady tandem gait  Psychiatric: Affect normal, Judgment normal, Mood normal.         DIAGNOSTIC STUDIES / PROCEDURES        RADIOLOGY  CT-HEAD W/O   Final Result         1.  No acute intracranial " abnormality.              COURSE & MEDICAL DECISION MAKING  Pertinent Labs & Imaging studies reviewed. (See chart for details)  Evaluated at bedside, planned for migraine cocktail, CT    9:50 PM  Patient reevaluated, headache has resolved. She is well appearing, visual symptoms as well as resolved.    10:17 PM  Reevaluated, still pain-free we will plan for discharge    31 y.o. female presenting with headache, likely migraine given exact similarity to past as well as  the lack of rapid onset and severity of the headache, in addition to the well appearance of the pt makes the dx of subarchnoid hemorrhage less likely. The normal vital signs, lack of a temperature and lack of meningeal signs (supple neck without pain with rom) makes the dx of meningitis less likely.   The patient has no neurologic signs, no fever, and is immunocompetent therefore the time course would be inconsistent with abscess and no CT findings. Toxic and metabolic causes of headache are also unlikely given no other signs or symptoms of such a disorder.  Other diagnoses I also considered but consider unlikely are temporal arteritis (no temple pain, no persistent vision change), glaucoma (no persistent vision change, perrla on exam), sinusitis (no sinus tenderness), pseudotumor cerebri, dural venous thrombosis, and cluster headache (headache duration and character are inconsistent with this diagnosis).    The patient is improved with normal vitals, a normal neurologic exam, normal gait, and is discharged home with pcp follow-up and return precautions.      . The patient will return for worsening symptoms and is stable at the time of discharge. The patient verbalizes understanding and will comply.    The patient is referred to a primary physician for blood pressure management, diabetic screening, and for all other preventative health concerns.     FINAL IMPRESSION  1. Nonintractable headache, unspecified chronicity pattern, unspecified headache type          Electronically signed by: Jeovanny Kerr, 3/22/2017 8:21 PM

## 2017-05-10 ENCOUNTER — TELEPHONE (OUTPATIENT)
Dept: MEDICAL GROUP | Facility: LAB | Age: 32
End: 2017-05-10

## 2017-05-10 NOTE — TELEPHONE ENCOUNTER
Patients pharmacy to let us know they are releasing patients Adderall two days early on 5/12/17 as on her last script they shorted her 2 tabs and only gave her 52 tabs for a 26 day supply.

## 2017-05-14 ENCOUNTER — HOSPITAL ENCOUNTER (EMERGENCY)
Facility: MEDICAL CENTER | Age: 32
End: 2017-05-14
Attending: EMERGENCY MEDICINE
Payer: COMMERCIAL

## 2017-05-14 VITALS
OXYGEN SATURATION: 98 % | HEIGHT: 68 IN | DIASTOLIC BLOOD PRESSURE: 98 MMHG | HEART RATE: 96 BPM | TEMPERATURE: 98 F | WEIGHT: 200.62 LBS | RESPIRATION RATE: 18 BRPM | BODY MASS INDEX: 30.41 KG/M2 | SYSTOLIC BLOOD PRESSURE: 143 MMHG
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
## 2017-05-14 DIAGNOSIS — S60.461A: ICD-10-CM

## 2017-05-14 PROCEDURE — 99283 EMERGENCY DEPT VISIT LOW MDM: CPT

## 2017-05-14 RX ORDER — CEPHALEXIN 500 MG/1
500 CAPSULE ORAL 4 TIMES DAILY
Qty: 40 CAP | Refills: 0 | Status: SHIPPED | OUTPATIENT
Start: 2017-05-14 | End: 2017-06-01

## 2017-05-14 RX ORDER — SULFAMETHOXAZOLE AND TRIMETHOPRIM 800; 160 MG/1; MG/1
1 TABLET ORAL 2 TIMES DAILY
Qty: 20 TAB | Refills: 0 | Status: SHIPPED | OUTPATIENT
Start: 2017-05-14 | End: 2017-06-01

## 2017-05-14 ASSESSMENT — PAIN SCALES - GENERAL: PAINLEVEL_OUTOF10: 5

## 2017-05-14 NOTE — ED AVS SNAPSHOT
Home Care Instructions                                                                                                                Katerina Owen   MRN: [id number]    Department:  [facility], Emergency Dept   Date of Visit:  5/14/2017            [facility], Emergency Dept    [address] [place] [address]    Phone:  [phone]      You were seen by     Maverick Bonilla D.O.      Your Diagnosis Was     Insect bite of left index finger     S60.461A, W57.XXXA       Follow-up Information     1. Follow up with COLIN Robles Call in 1 day.    Specialty:  Family Medicine    Contact information    [address]  [place] [address] [phone]          2. Follow up with [facility], Emergency Dept.    Specialty:  Emergency Medicine    Why:  If symptoms worsen    Contact information    [address] [place] [address] [phone]      Medication Information     Review all of your home medications and newly ordered medications with your primary doctor and/or pharmacist as soon as possible. Follow medication instructions as directed by your doctor and/or pharmacist.     Please keep your complete medication list with you and share with your physician. Update the information when medications are discontinued, doses are changed, or new medications (including over-the-counter products) are added; and carry medication information at all times in the event of emergency situations.               Medication List      START taking these medications        Instructions    Morning Afternoon Evening Bedtime    cephALEXin 500 MG Caps   Commonly known as:  KEFLEX        Take 1 Cap by mouth 4 times a day.   Dose:  500 mg                        sulfamethoxazole-trimethoprim 800-160 MG tablet   Commonly known as:  BACTRIM DS        Take 1 Tab by mouth 2 times a day.   Dose:  1 Tab                          ASK  your doctor about these medications        Instructions    Morning Afternoon Evening Bedtime    amphetamine-dextroamphetamine 10 MG Tabs   Commonly known as:  ADDERALL        Doctor's comments:  May fill 3/19/2017   Take 1 Tab by mouth 2 times a day.   Dose:  10 mg                        ASPIRIN-CAFFEINE-BUTALBITAL -40 MG Caps   Commonly known as:  FIORINAL        Take 1 Cap by mouth every four hours as needed.   Dose:  1 Cap                             Where to Get Your Medications      You can get these medications from any pharmacy     Bring a paper prescription for each of these medications    - cephALEXin 500 MG Caps  - sulfamethoxazole-trimethoprim 800-160 MG tablet              Discharge Instructions       Spider Bite  Spider bites are not common. Most spider bites do not cause serious problems. The elderly, very young children, and people with certain existing medical conditions are more likely to experience significant symptoms.  SYMPTOMS   Spider bites may not cause any pain at first. Within 1 or 2 days of the bite, there may be swelling, redness, and pain in the bite area. However, some spider bites can cause pain within the first hour.  TREATMENT   Your caregiver may prescribe antibiotic medicine if a bacterial infection develops in the bite. However, not all spider bites require antibiotics or prescription medicines.   HOME CARE INSTRUCTIONS  · Do not scratch the bite area.  · Keep the bite area clean and dry. Wash the area with soap and water as directed.  · Put ice or cool compresses on the bite area.  ¨ Put ice in a plastic bag.  ¨ Place a towel between your skin and the bag.  ¨ Leave the ice on for 20 minutes, 4 times a day for the first 2 to 3 days, or as directed.  · Keep the bite area elevated above the level of your heart. This helps reduce redness and swelling.  · Only take over-the-counter or prescription medicines as directed by your caregiver.  · If you are given antibiotics, take them  as directed. Finish them even if you start to feel better.  You may need a tetanus shot if:  · You cannot remember when you had your last tetanus shot.  · You have never had a tetanus shot.  · The injury broke your skin.  If you get a tetanus shot, your arm may swell, get red, and feel warm to the touch. This is common and not a problem. If you need a tetanus shot and you choose not to have one, there is a rare chance of getting tetanus. Sickness from tetanus can be serious.  SEEK MEDICAL CARE IF:  Your bite is not better after 3 days of treatment.  SEEK IMMEDIATE MEDICAL CARE IF:  · Your bite turns purple or develops increased swelling, pain, or redness.  · You develop shortness of breath or chest pain.  · You have muscle cramps or painful muscle spasms.  · You develop abdominal pain, nausea, or vomiting.  · You feel unusually tired or sleepy.  MAKE SURE YOU:  · Understand these instructions.  · Will watch your condition.  · Will get help right away if you are not doing well or get worse.     This information is not intended to replace advice given to you by your health care provider. Make sure you discuss any questions you have with your health care provider.     Document Released: 01/25/2006 Document Revised: 03/11/2013 Document Reviewed: 07/18/2012  [employer] Interactive Patient Education ©2016 [employer]      Discharge References/Attachments     ABSCESS (ENGLISH)    CELLULITIS (ENGLISH)            Patient Information     Patient Information    Following emergency treatment: all patient requiring follow-up care must return either to a private physician or a clinic if your condition worsens before you are able to obtain further medical attention, please return to the emergency room.     Billing Information    At [facility], we work to make the billing process streamlined for our patients.  Our Representatives are here to answer any questions you may have regarding your hospital bill.  If you have insurance  coverage and have supplied your insurance information to us, we will submit a claim to your insurer on your behalf.  Should you have any questions regarding your bill, we can be reached online or by phone as follows:  Online: You are able pay your bills online or live chat with our representatives about any billing questions you may have. We are here to help Monday - Friday from 8:00am to 7:30pm and 9:00am - 12:00pm on Saturdays.  Please visit [email or web address]  for more information.   Phone:  [phone] or [phone]    Please note that your emergency physician, surgeon, pathologist, radiologist, anesthesiologist, and other specialists are not employed by [facility] and will therefore bill separately for their services.  Please contact them directly for any questions concerning their bills at the numbers below:     Emergency Physician Services:  [phone]  [facility]:  [phone]  [facility]:  [phone]  [facility]:  [phone]    1. Your final bill may vary from the amount quoted upon discharge if all procedures are not complete at that time, or if your doctor has additional procedures of which we are not aware. You will receive an additional bill if you return to the Emergency Department at [facility] for suture removal regardless of the facility of which the sutures were placed.     2. Please arrange for settlement of this account at the emergency registration.    3. All self-pay accounts are due in full at the time of treatment.  If you are unable to meet this obligation then payment is expected within 4-5 days.     4. If you have had radiology studies (CT, X-ray, Ultrasound, MRI), you have received a preliminary result during your emergency department visit. Please contact the radiology department [phone] to receive a copy of your final result. Please discuss the Final result with your primary  physician or with the follow up physician provided.     Crisis Hotline:  [place] Crisis Hotline:  5-791-RJTYQUU or 1-622.331.2848  [place] Crisis Hotline:    1-671.700.7375 or 149-824-0085         ED Discharge Follow Up Questions    1. In order to provide you with very good care, we would like to follow up with a phone call in the next few days.  May we have your permission to contact you?     YES /  NO    2. What is the best phone number to call you? (       )_____-__________    3. What is the best time to call you?      Morning  /  Afternoon  /  Evening                   Patient Signature:  ____________________________________________________________    Date:  ____________________________________________________________      Your appointments     Immanuel 15, 2017 10:40 AM   Established Patient with DELMA Rboles   [facility] [place] (--)    [address] [address]  [place] [id number] [phone]           You will be receiving a confirmation call a few days before your appointment from our automated call confirmation system.

## 2017-05-14 NOTE — ED AVS SNAPSHOT
5/14/2017    Katerina Murry [address] [place] [address]    Dear Katerina:    [facility] wants to ensure your discharge home is safe and you or your loved ones have had all of your questions answered regarding your care after you leave the hospital.    Below is a list of resources and contact information should you have any questions regarding your hospital stay, follow-up instructions, or active medical symptoms.    Questions or Concerns Regarding… Contact   Medical Questions Related to Your Discharge  (7 days a week, 8am-5pm) Contact a Nurse Care Coordinator   [phone]   Medical Questions Not Related to Your Discharge  (24 hours a day / 7 days a week)  Contact the Nurse Health Line   [phone]    Medications or Discharge Instructions Refer to your discharge packet   or contact your [facility] Primary Care Provider   [phone]   Follow-up Appointment(s) Schedule your appointment via Vaybee   or contact Scheduling [phone]   Billing Review your statement via Vaybee  or contact Billing [phone]   Medical Records Review your records via Vaybee   or contact Medical Records [phone]     You may receive a telephone call within two days of discharge. This call is to make certain you understand your discharge instructions and have the opportunity to have any questions answered. You can also easily access your medical information, test results and upcoming appointments via the Vaybee free online health management tool. You can learn more and sign up at [employer]/Vaybee. For assistance setting up your Vaybee account, please call [phone].    Once again, we want to ensure your discharge home is safe and that you have a clear understanding of any next steps in your care. If you have any questions or concerns, please do not hesitate to contact us, we are here for you. Thank you for choosing [facility] for your healthcare needs.    Sincerely,    Your [facility] Healthcare Team

## 2017-05-14 NOTE — ED AVS SNAPSHOT
TurboHeads Access Code: Activation code not generated  Current TurboHeads Status: Active    [place]  A secure, online tool to manage your health information     [employer]’s TurboHeads® is a secure, online tool that connects you to your personalized health information from the privacy of your home -- day or night - making it very easy for you to manage your healthcare. Once the activation process is completed, you can even access your medical information using the TurboHeads vadim, which is available for free in the Apple Vadim store or Google Play store.     TurboHeads provides the following levels of access (as shown below):   My Chart Features   [facility] Primary Care Doctor [facility]  Specialists [facility]  Urgent  Care Non-[facility]  Primary Care  Doctor   Email your healthcare team securely and privately 24/7 X X X X   Manage appointments: schedule your next appointment; view details of past/upcoming appointments X      Request prescription refills. X      View recent personal medical records, including lab and immunizations X X X X   View health record, including health history, allergies, medications X X X X   Read reports about your outpatient visits, procedures, consult and ER notes X X X X   See your discharge summary, which is a recap of your hospital and/or ER visit that includes your diagnosis, lab results, and care plan. X X       How to register for TurboHeads:  1. Go to  [email or web address].  2. Click on the Sign Up Now box, which takes you to the New Member Sign Up page. You will need to provide the following information:  a. Enter your TurboHeads Access Code exactly as it appears at the top of this page. (You will not need to use this code after you’ve completed the sign-up process. If you do not sign up before the expiration date, you must request a new code.)   b. Enter your date of birth.   c. Enter your home email address.   d. Click Submit, and follow the next screen’s instructions.  3. Create a TurboHeads ID. This will  be your Spotie login ID and cannot be changed, so think of one that is secure and easy to remember.  4. Create a Spotie password. You can change your password at any time.  5. Enter your Password Reset Question and Answer. This can be used at a later time if you forget your password.   6. Enter your e-mail address. This allows you to receive e-mail notifications when new information is available in Spotie.  7. Click Sign Up. You can now view your health information.    For assistance activating your Spotie account, call [phone]

## 2017-05-15 NOTE — ED NOTES
"   Chief Complaint   Patient presents with   • Spider Bite     Left pointer finger, happened around 1500 today.     Pt states \"I popped it, yellow/purulent drainage.\"     /105 mmHg  Pulse 106  Temp(Src) 36.5 °C (97.7 °F)  Resp 18  Ht 1.727 m (5' 8\")  Wt 91 kg (200 lb 9.9 oz)  BMI 30.51 kg/m2  SpO2 98%    "

## 2017-05-15 NOTE — ED PROVIDER NOTES
"ED Provider Note    CHIEF COMPLAINT  Chief Complaint   Patient presents with   • Spider Bite     Left pointer finger, happened around 1500 today.        [facility]    Primary care provider: DELMA Robles   History obtained from: Patient  History limited by: None     Katerina Owen is a 31 y.o. female who presents to the ED complaining of a spider bite to the left index finger around 3 or 4 this afternoon while at work. Patient states that she saw a black spider on her left finger and noticed the swelling shortly afterward. She thinks it was a black  spider. She got the lesion to pop and it has been draining since then. She reports that otherwise \"I feel fine\" and did not want to come here but her work made her come here to get evaluated. She denies any possibility of pregnancy with a history of bilateral tubal ligation. She denies any fever/chills/shortness of breath/difficulty breathing/chest pain/abdominal pain/nausea/vomiting. She denies rash anywhere else.    REVIEW OF SYSTEMS  Please see HPI for pertinent positives/negatives.     PAST MEDICAL HISTORY  Past Medical History   Diagnosis Date   • Migraine    • HTN (hypertension)      related to pregnancy   • Pre-eclampsia          • Chlamydia 2011   • depression         SURGICAL HISTORY  Past Surgical History   Procedure Laterality Date   • Septoturbinoplasty  2009     Performed by CHRISTIAN YEN at SURGERY SAME DAY [facility]   • Lumpectomy       both breasts - Dr. Garsia - [facility] 10/2014   • Breast biopsy Left 2016     Procedure: BREAST BIOPSY Excisional;  Surgeon: Gisele Delcid M.D.;  Location: SURGERY [facility];  Service:    • Primary c section  Aug 7, 2007      preeclampsia   • Repeat c section w tubal ligation  2011     Performed by AYAAN MERCHANT at LABOR AND DELIVERY        SOCIAL HISTORY  Social History     Social History Main Topics   • Smoking status: Current Some Day Smoker     Last Attempt " "to Quit: 12/26/2014   • Smokeless tobacco: Former User     Quit date: 10/30/2014      Comment: quit 8 yrs ago   • Alcohol Use: No      Comment: vape   • Drug Use: No   • Sexual Activity:     Partners: Male     Birth Control/ Protection: Surgical      Comment: ; three kids; wk: not working        FAMILY HISTORY  Family History   Problem Relation Age of Onset   • Heart Disease Father      heart failure smoker    • Hypertension Father    • Cancer Maternal Grandfather      pancreastic   • Heart Disease Paternal Grandfather      heart attack        CURRENT MEDICATIONS  Home Medications     Reviewed by Fidelina Carreon R.N. (Registered Nurse) on 05/14/17 at 2104  Med List Status: Complete    Medication Last Dose Status    amphetamine-dextroamphetamine (ADDERALL) 10 MG Tab 5/14/2017 Active    ASPIRIN-CAFFEINE-BUTALBITAL (FIORINAL) -40 MG Cap  Active                 ALLERGIES  Allergies   Allergen Reactions   • Morphine Nausea     \"feel sick for a whole day after being given morphine\"  RXN=2/2016     • Vicodin [Hydrocodone-Acetaminophen] Vomiting and Nausea     EBU=7156        PHYSICAL EXAM  VITAL SIGNS: /105 mmHg  Pulse 106  Temp(Src) 36.5 °C (97.7 °F)  Resp 18  Ht 1.727 m (5' 8\")  Wt 91 kg (200 lb 9.9 oz)  BMI 30.51 kg/m2  SpO2 98%  LMP 05/01/2017 @HAMZAH[[id number]::@     Pulse ox interpretation: 98% I interpret this pulse ox as normal     Constitutional: Well developed, well nourished, alert in no apparent distress, nontoxic appearance    HENT: No external signs of trauma, normocephalic, bilateral external ears normal, oropharynx moist and clear, nose normal    Eyes: PERRL, conjunctiva without erythema, no discharge, no icterus    Neck: Soft and supple, trachea midline, no stridor, no tenderness, no LAD, no JVD, good ROM    Cardiovascular: Regular rate and rhythm, no murmurs/rubs/gallops, strong distal pulses and good perfusion    Thorax & Lungs: No respiratory distress, CTAB, no chest tenderness "    Abdomen: Soft, nontender, nondistended, no guarding, no rebound, normal BS    Back: No CVAT    Extremities: No clubbing, no cyanosis, no gross deformity, good ROM, approximately 1 cm circular papular lesion purplish in color with a central opening with a small amount of bloody drainage, minimal tenderness to palpation, no streaking/warmth/crepitus/fluctuance/petechiae/purpura/vesicles, intact distal pulses with brisk cap refill    Skin: Warm, dry, no pallor/cyanosis, no rash noted except as above on left index finger   Lymphatic: No lymphadenopathy noted    Neuro: A/O times 3, no focal deficits noted    Psychiatric: Cooperative, normal mood and affect, normal judgement, appropriate for clinical situation          DIAGNOSTIC STUDIES / PROCEDURES        LABS  All labs reviewed by me.     Results for orders placed or performed during the hospital encounter of 01/27/17   CBC WITH DIFFERENTIAL   Result Value Ref Range    WBC 8.5 4.8 - 10.8 K/uL    RBC 4.93 4.20 - 5.40 M/uL    Hemoglobin 12.9 12.0 - 16.0 g/dL    Hematocrit 38.1 37.0 - 47.0 %    MCV 77.3 (L) 81.4 - 97.8 fL    MCH 26.2 (L) 27.0 - 33.0 pg    MCHC 33.9 33.6 - 35.0 g/dL    RDW 40.5 35.9 - 50.0 fL    Platelet Count 318 164 - 446 K/uL    MPV 11.2 9.0 - 12.9 fL    Neutrophils-Polys 59.10 44.00 - 72.00 %    Lymphocytes 30.80 22.00 - 41.00 %    Monocytes 9.00 0.00 - 13.40 %    Eosinophils 0.60 0.00 - 6.90 %    Basophils 0.50 0.00 - 1.80 %    Neutrophils (Absolute) 5.05 2.00 - 7.15 K/uL    Lymphs (Absolute) 2.63 1.00 - 4.80 K/uL    Monos (Absolute) 0.77 0.00 - 0.85 K/uL    Eos (Absolute) 0.05 0.00 - 0.51 K/uL    Baso (Absolute) 0.04 0.00 - 0.12 K/uL   COMP METABOLIC PANEL   Result Value Ref Range    Sodium 136 135 - 145 mmol/L    Potassium 3.8 3.6 - 5.5 mmol/L    Chloride 101 96 - 112 mmol/L    Co2 25 20 - 33 mmol/L    Anion Gap 10.0 0.0 - 11.9    Glucose 101 (H) 65 - 99 mg/dL    Bun 13 8 - 22 mg/dL    Creatinine 0.82 0.50 - 1.40 mg/dL    Calcium 10.9 (H) 8.5 -  10.5 mg/dL    AST(SGOT) 23 12 - 45 U/L    ALT(SGPT) 23 2 - 50 U/L    Alkaline Phosphatase 91 30 - 99 U/L    Total Bilirubin 0.4 0.1 - 1.5 mg/dL    Albumin 4.7 3.2 - 4.9 g/dL    Total Protein 8.6 (H) 6.0 - 8.2 g/dL    Globulin 3.9 (H) 1.9 - 3.5 g/dL    A-G Ratio 1.2 g/dL   LIPASE   Result Value Ref Range    Lipase 17 11 - 82 U/L   URINALYSIS CULTURE, IF INDICATED   Result Value Ref Range    Micro Urine Req Microscopic     Color Yellow     Character Sl Cloudy (A)     Specific Gravity 1.016 <1.035    Ph 7.0 5.0-8.0    Glucose Negative Negative mg/dL    Ketones Negative Negative mg/dL    Protein Negative Negative mg/dL    Bilirubin Negative Negative    Nitrite Negative Negative    Leukocyte Esterase Large (A) Negative    Occult Blood Small (A) Negative    Culture Indicated Yes UA Culture   HCG QUAL SERUM   Result Value Ref Range    Beta-Hcg Qualitative Serum Negative Negative   URINE DRUG SCREEN   Result Value Ref Range    Amphetamines Urine Positive (A) Negative    Barbiturates Negative Negative    Benzodiazepines Negative Negative    Cocaine Metabolite Negative Negative    Methadone Negative Negative    Ecstasy Negative Negative    Opiates Negative Negative    Oxycodone Negative Negative    Phencyclidine -Pcp Negative Negative    Propoxyphene Negative Negative    Cannabinoid Metab Negative Negative   ESTIMATED GFR   Result Value Ref Range    GFR If African American >60 >60 mL/min/1.73 m 2    GFR If Non African American >60 >60 mL/min/1.73 m 2   URINE MICROSCOPIC (W/UA)   Result Value Ref Range    WBC 10-20 (A) /hpf    RBC 5-10 (A) /hpf    Bacteria Few (A) None /hpf    Epithelial Cells Moderate (A) /hpf    Mucous Threads Few /hpf    Amorphous Crystal Present /hpf    Granular Casts 0-2 /lpf   URINE CULTURE(NEW)   Result Value Ref Range    Significant Indicator NEG     Source UR     Site      Urine Culture Mixed skin suze ,000 cfu/mL         RADIOLOGY  The radiologist's interpretation of all radiological studies  have been reviewed by me.     No orders to display          COURSE & MEDICAL DECISION MAKING  Nursing notes, VS, PMSFHx reviewed in chart.     Differential diagnoses considered include but are not limited to: Allergic reaction, urticaria, erythema multiforme, drug rash, contact dermatitis, abscess, cellulitis, impetigo, eczema, psoriasis, insect bite/sting, meningococcemia, toxic shock syndrome, andrade mountain spotted fever, lyme disease, disseminated gonococcemia, syphilis, scarlet fever, chicken pox, herpes zoster, viral exanthem, pityriasis rosea, scabies     Patient presents to the ED with above complaint. She was noted to have a small circular lesion on the middle finger phalanx of the left index finger with a central opening that is draining a small amount of blood. Patient reports that she saw a black spider by her finger. She reports that otherwise she feels just fine. Patient will be prescribed Bactrim and Keflex for a possible infection. Patient will also like to have a note for work which was given. Discussed with patient monitoring for infection as well as systemic signs/symptoms of black  bite. Discussed with patient return to ED precautions. Patient verbalized understanding and agrees with Dr. Guan further questions or concerns.      The patient is referred to a primary physician for blood pressure management, diabetic screening, and for all other preventative health concerns.       FINAL IMPRESSION  1. Insect bite of left index finger           DISPOSITION  Patient will be discharged home in stable condition.       FOLLOW UP  DELMA Robles  [address] [address] [phone]    Call in 1 day      [facility] Emergency Dept  [address]  [facility] [address] [phone]    If symptoms worsen         OUTPATIENT MEDICATIONS  New Prescriptions    CEPHALEXIN (KEFLEX) 500 MG CAP    Take 1 Cap by mouth 4 times a day.     SULFAMETHOXAZOLE-TRIMETHOPRIM (BACTRIM DS) 800-160 MG TABLET    Take 1 Tab by mouth 2 times a day.          Electronically signed by: Maverick Bonilla, 5/14/2017 9:59 PM      Portions of this record were made with voice recognition software.  Despite my review, spelling/grammar/context errors may still remain.  Interpretation of this chart should be taken in this context.

## 2017-05-15 NOTE — DISCHARGE INSTRUCTIONS
Spider Bite  Spider bites are not common. Most spider bites do not cause serious problems. The elderly, very young children, and people with certain existing medical conditions are more likely to experience significant symptoms.  SYMPTOMS   Spider bites may not cause any pain at first. Within 1 or 2 days of the bite, there may be swelling, redness, and pain in the bite area. However, some spider bites can cause pain within the first hour.  TREATMENT   Your caregiver may prescribe antibiotic medicine if a bacterial infection develops in the bite. However, not all spider bites require antibiotics or prescription medicines.   HOME CARE INSTRUCTIONS  · Do not scratch the bite area.  · Keep the bite area clean and dry. Wash the area with soap and water as directed.  · Put ice or cool compresses on the bite area.  ¨ Put ice in a plastic bag.  ¨ Place a towel between your skin and the bag.  ¨ Leave the ice on for 20 minutes, 4 times a day for the first 2 to 3 days, or as directed.  · Keep the bite area elevated above the level of your heart. This helps reduce redness and swelling.  · Only take over-the-counter or prescription medicines as directed by your caregiver.  · If you are given antibiotics, take them as directed. Finish them even if you start to feel better.  You may need a tetanus shot if:  · You cannot remember when you had your last tetanus shot.  · You have never had a tetanus shot.  · The injury broke your skin.  If you get a tetanus shot, your arm may swell, get red, and feel warm to the touch. This is common and not a problem. If you need a tetanus shot and you choose not to have one, there is a rare chance of getting tetanus. Sickness from tetanus can be serious.  SEEK MEDICAL CARE IF:  Your bite is not better after 3 days of treatment.  SEEK IMMEDIATE MEDICAL CARE IF:  · Your bite turns purple or develops increased swelling, pain, or redness.  · You develop shortness of breath or chest pain.  · You have  muscle cramps or painful muscle spasms.  · You develop abdominal pain, nausea, or vomiting.  · You feel unusually tired or sleepy.  MAKE SURE YOU:  · Understand these instructions.  · Will watch your condition.  · Will get help right away if you are not doing well or get worse.     This information is not intended to replace advice given to you by your health care provider. Make sure you discuss any questions you have with your health care provider.     Document Released: 01/25/2006 Document Revised: 03/11/2013 Document Reviewed: 07/18/2012  [employer] Interactive Patient Education ©2016 [employer]

## 2017-06-01 ENCOUNTER — OFFICE VISIT (OUTPATIENT)
Dept: MEDICAL GROUP | Facility: LAB | Age: 32
End: 2017-06-01
Payer: COMMERCIAL

## 2017-06-01 VITALS
TEMPERATURE: 98.9 F | HEART RATE: 78 BPM | OXYGEN SATURATION: 98 % | DIASTOLIC BLOOD PRESSURE: 88 MMHG | HEIGHT: 68 IN | RESPIRATION RATE: 12 BRPM | SYSTOLIC BLOOD PRESSURE: 108 MMHG

## 2017-06-01 DIAGNOSIS — F98.8 ADD (ATTENTION DEFICIT DISORDER): ICD-10-CM

## 2017-06-01 PROCEDURE — 99213 OFFICE O/P EST LOW 20 MIN: CPT | Performed by: NURSE PRACTITIONER

## 2017-06-01 RX ORDER — DEXTROAMPHETAMINE SACCHARATE, AMPHETAMINE ASPARTATE, DEXTROAMPHETAMINE SULFATE AND AMPHETAMINE SULFATE 3.75; 3.75; 3.75; 3.75 MG/1; MG/1; MG/1; MG/1
15 TABLET ORAL 2 TIMES DAILY
Qty: 60 TAB | Refills: 0 | Status: SHIPPED | OUTPATIENT
Start: 2017-06-01 | End: 2017-07-07 | Stop reason: SDUPTHER

## 2017-06-01 NOTE — MR AVS SNAPSHOT
"        Katerina Murry Brayden   2017 7:40 AM   Office Visit   MRN: [id number]    [facility]   Dept Phone:  [phone]    Description:  Female : 1985   Provider:  DELMA Robles           Reason for Visit     Medication Problem pt states meds are not working as well       Allergies as of 2017     Allergen Noted Reactions    Morphine 2016   Nausea    \"feel sick for a whole day after being given morphine\"  RXN=2016      Vicodin [Hydrocodone-Acetaminophen] 2016   Vomiting, Nausea    DKE=5011      You were diagnosed with     ADD (attention deficit disorder)   [085325]         Vital Signs     Blood Pressure Pulse Temperature Respirations Height Oxygen Saturation    108/88 mmHg 78 37.2 °C (98.9 °F) 12 1.727 m (5' 7.99\") 98%    Last Menstrual Period Smoking Status                2017 Current Some Day Smoker          Basic Information     Date Of Birth Sex Race Ethnicity Preferred Language    1985 Female White Non- English      Your appointments     2017  9:00 AM   Non Provider 1 with [address] [facility]   [facility] (--)    [address] [facility] [address]  [place] [place] [address]   [phone]           You will be receiving a confirmation call a few days before your appointment from our automated call confirmation system.            2017  9:15 AM   Provider Consultation with [address] [facility]   [facility] [employer] [facility] (--)    [address] [facility] [address]  [place] [address]   [phone]            Immanuel 15, 2017 10:40 AM   Established Patient with DELMA Robles [facility] (--)    [address] [facility] [facility] [address] [facility] [address]  [place] [id number] [phone]           You will be receiving a confirmation call a few days before your appointment from our automated call confirmation system.            2017  8:40 AM   Established Patient with Lashonda Kermode, A.P.N.   [facility] [place] [facility] (--)    [address]  [place] [place] [address]   [phone]           You will be receiving a confirmation call a few days before your appointment from our automated call confirmation system.              Problem List              ICD-10-CM Priority Class Noted - Resolved    Migraine    Unknown - Present    HTN (hypertension) I10   Unknown - Present    Anxiety F41.9   1/16/2014 - Present    Depression F32.9   1/16/2014 - Present    PTSD (post-traumatic stress disorder) F43.10   11/30/2015 - Present    ADD (attention deficit disorder) F98.8   12/7/2016 - Present      Health Maintenance        Date Due Completion Dates    PAP SMEAR 5/5/2014 5/5/2011    IMM DTaP/Tdap/Td Vaccine (2 - Td) 9/9/2021 9/9/2011            Current Immunizations     Tdap Vaccine 9/9/2011  6:40 AM      Below and/or attached are the medications your provider expects you to take. Review all of your home medications and newly ordered medications with your provider and/or pharmacist. Follow medication instructions as directed by your provider and/or pharmacist. Please keep your medication list with you and share with your provider. Update the information when medications are discontinued, doses are changed, or new medications (including over-the-counter products) are added; and carry medication information at all times in the event of emergency situations     Allergies:  MORPHINE - Nausea     VICODIN - Vomiting,Nausea               Medications  Valid as of: June 01, 2017 -  7:53 AM    Generic Name Brand Name Tablet Size Instructions for use    Amphetamine-Dextroamphetamine (Tab) ADDERALL 15 MG Take 1 Tab by mouth 2 times a day.        Butalbital-Aspirin-Caffeine (Cap) FIORINAL -40 MG Take 1 Cap by mouth every four hours as needed.        .                 Medicines prescribed today were sent to:     [facility] - [place], [place] - [address]    [address] [place] [address]    Phone: [phone] Fax:  [phone]    Open 24 Hours?: No      Medication refill instructions:       If your prescription bottle indicates you have medication refills left, it is not necessary to call your provider’s office. Please contact your pharmacy and they will refill your medication.    If your prescription bottle indicates you do not have any refills left, you may request refills at any time through one of the following ways: The online [employer] system (except Urgent Care), by calling your provider’s office, or by asking your pharmacy to contact your provider’s office with a refill request. Medication refills are processed only during regular business hours and may not be available until the next business day. Your provider may request additional information or to have a follow-up visit with you prior to refilling your medication.   *Please Note: Medication refills are assigned a new Rx number when refilled electronically. Your pharmacy may indicate that no refills were authorized even though a new prescription for the same medication is available at the pharmacy. Please request the medicine by name with the pharmacy before contacting your provider for a refill.        Other Notes About Your Plan     Seeing Diamond Chung - therapist           MyChart Access Code: Activation code not generated  Current [employer] Status: Active          Quit Tobacco Information     Do you want to quit using tobacco?    Quitting tobacco decreases risks of cancer, heart and lung disease, increases life expectancy, improves sense of taste and smell, and increases spending money, among other benefits.    If you are thinking about quitting, we can help.  • [facility] Quit Tobacco Program: 831.164.6625  o Program occurs weekly for four weeks and includes pharmacist consultation on products to support quitting smoking or chewing tobacco. A provider referral is needed for pharmacist consultation.  • Tobacco Users Help Hotline: 4-764-QUIT-NOW (149-1812) or  https://[place].quitlogix.org/  o Free, confidential telephone and online coaching for [place] residents. Sessions are designed on a schedule that is convenient for you. Eligible clients receive free nicotine replacement therapy.  • Nationally: www.smokefree.gov  o Information and professional assistance to support both immediate and long-term needs as you become, and remain, a non-smoker. Smokefree.gov allows you to choose the help that best fits your needs.

## 2017-06-01 NOTE — PROGRESS NOTES
"Chief Complaint   Patient presents with   • Medication Problem     pt states meds are not working as well        HPI  Katerina is a 31-year-old established female here to follow-up on attention deficit disorder. Does not feel that 10 mg adderall bid is providing as much relief in her attention deficiet as it did in the beginning.  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  Thoughts are wondering.  She finds herself interrupting people frequently. Loosing items easier.  Remembering important events well.  Sleeping fine.  Appetite is good. Denies any negative side effects of Adderall. Urine drug screen is up to date from December.      Past medical, surgical, family, and social history is reviewed and updated in Epic chart by me today.   Medications and allergies reviewed and updated in Epic chart by me today.     ROS:   As documented in history of present illness above    Exam:  Blood pressure 108/88, pulse 78, temperature 37.2 °C (98.9 °F), resp. rate 12, height 1.727 m (5' 7.99\"), last menstrual period 05/01/2017, SpO2 98 %. Weight 195 lbs  Gen. appears healthy in no distress   Skin appropriate for sex and age   HEENT unremarkable   Neck no adenopathy, no nodules or masses palpable   Lungs clear   Heart regular   Extremities no edema   Neuro gait and station normal   Psych appropriate, calm and interactive. She does interrupts frequently and has difficulty sitting still on the exam table.      A/P:  1. ADD (attention deficit disorder)  -Discussed XL Adderall which she declined a trial of. States that she prefers to have more control over the Adderall by being able to take it twice daily. Recommend a follow-up in one month. Urine drug screen is up-to-date from last December.  - amphetamine-dextroamphetamine (ADDERALL) 15 MG tablet; Take 1 Tab by mouth 2 times a day.  Dispense: 60 Tab; Refill: 0    Following up here for a Pap in one month and to assess how she's doing on 15 mg Adderall " twice a day.

## 2017-06-09 ENCOUNTER — NON-PROVIDER VISIT (OUTPATIENT)
Dept: URGENT CARE | Facility: CLINIC | Age: 32
End: 2017-06-09

## 2017-06-09 ENCOUNTER — OFFICE VISIT (OUTPATIENT)
Dept: URGENT CARE | Facility: CLINIC | Age: 32
End: 2017-06-09

## 2017-06-09 DIAGNOSIS — Z29.89 NEED FOR ISOLATION: ICD-10-CM

## 2017-06-09 DIAGNOSIS — Z01.89 RESPIRATORY CLEARANCE EXAMINATION, ENCOUNTER FOR: ICD-10-CM

## 2017-06-09 PROCEDURE — 94010 BREATHING CAPACITY TEST: CPT | Performed by: NURSE PRACTITIONER

## 2017-06-09 PROCEDURE — 94375 RESPIRATORY FLOW VOLUME LOOP: CPT | Performed by: NURSE PRACTITIONER

## 2017-06-10 NOTE — PROGRESS NOTES
Katerina Owen is a 31 y.o. female here for a non-provider visit for Mask Fit/ Respiratory Clearance/ Spirometry    If abnormal was an in office provider notified today (if so, indicate provider)? Yes  Routed to PCP? No

## 2017-07-06 ENCOUNTER — APPOINTMENT (OUTPATIENT)
Dept: MEDICAL GROUP | Facility: LAB | Age: 32
End: 2017-07-06

## 2017-07-07 DIAGNOSIS — F98.8 ADD (ATTENTION DEFICIT DISORDER): ICD-10-CM

## 2017-07-07 RX ORDER — DEXTROAMPHETAMINE SACCHARATE, AMPHETAMINE ASPARTATE, DEXTROAMPHETAMINE SULFATE AND AMPHETAMINE SULFATE 3.75; 3.75; 3.75; 3.75 MG/1; MG/1; MG/1; MG/1
15 TABLET ORAL 2 TIMES DAILY
Qty: 60 TAB | Refills: 0 | Status: SHIPPED | OUTPATIENT
Start: 2017-07-07 | End: 2017-08-03 | Stop reason: SDUPTHER

## 2017-07-07 NOTE — TELEPHONE ENCOUNTER
Was the patient seen in the last year in this department? Yes     Does patient have an active prescription for medications requested? No     Received Request Via: Pharmacy     Last visit:6/1/17    Last  fill:6/5/17

## 2017-07-17 ENCOUNTER — APPOINTMENT (OUTPATIENT)
Dept: RADIOLOGY | Facility: MEDICAL CENTER | Age: 32
End: 2017-07-17
Attending: EMERGENCY MEDICINE
Payer: COMMERCIAL

## 2017-07-17 ENCOUNTER — HOSPITAL ENCOUNTER (EMERGENCY)
Facility: MEDICAL CENTER | Age: 32
End: 2017-07-17
Attending: EMERGENCY MEDICINE
Payer: COMMERCIAL

## 2017-07-17 VITALS
TEMPERATURE: 98.6 F | HEIGHT: 68 IN | HEART RATE: 68 BPM | BODY MASS INDEX: 28.9 KG/M2 | OXYGEN SATURATION: 98 % | WEIGHT: 190.7 LBS | RESPIRATION RATE: 19 BRPM

## 2017-07-17 DIAGNOSIS — R42 VERTIGO: ICD-10-CM

## 2017-07-17 LAB
ALBUMIN SERPL BCP-MCNC: 4.3 G/DL (ref 3.2–4.9)
ALBUMIN/GLOB SERPL: 1.4 G/DL
ALP SERPL-CCNC: 82 U/L (ref 30–99)
ALT SERPL-CCNC: 33 U/L (ref 2–50)
ANION GAP SERPL CALC-SCNC: 9 MMOL/L (ref 0–11.9)
APPEARANCE UR: CLEAR
AST SERPL-CCNC: 28 U/L (ref 12–45)
BASOPHILS # BLD AUTO: 0.7 % (ref 0–1.8)
BASOPHILS # BLD: 0.05 K/UL (ref 0–0.12)
BILIRUB SERPL-MCNC: 0.4 MG/DL (ref 0.1–1.5)
BILIRUB UR QL STRIP.AUTO: NEGATIVE
BUN SERPL-MCNC: 11 MG/DL (ref 8–22)
CALCIUM SERPL-MCNC: 9 MG/DL (ref 8.4–10.2)
CHLORIDE SERPL-SCNC: 103 MMOL/L (ref 96–112)
CO2 SERPL-SCNC: 26 MMOL/L (ref 20–33)
COLOR UR: YELLOW
CREAT SERPL-MCNC: 0.75 MG/DL (ref 0.5–1.4)
CULTURE IF INDICATED INDCX: NO UA CULTURE
EOSINOPHIL # BLD AUTO: 0.07 K/UL (ref 0–0.51)
EOSINOPHIL NFR BLD: 1 % (ref 0–6.9)
EPI CELLS #/AREA URNS HPF: ABNORMAL /HPF
ERYTHROCYTE [DISTWIDTH] IN BLOOD BY AUTOMATED COUNT: 43.6 FL (ref 35.9–50)
GFR SERPL CREATININE-BSD FRML MDRD: >60 ML/MIN/1.73 M 2
GLOBULIN SER CALC-MCNC: 3 G/DL (ref 1.9–3.5)
GLUCOSE SERPL-MCNC: 82 MG/DL (ref 65–99)
GLUCOSE UR STRIP.AUTO-MCNC: NEGATIVE MG/DL
HCT VFR BLD AUTO: 39.9 % (ref 37–47)
HGB BLD-MCNC: 13 G/DL (ref 12–16)
IMM GRANULOCYTES # BLD AUTO: 0.01 K/UL (ref 0–0.11)
IMM GRANULOCYTES NFR BLD AUTO: 0.1 % (ref 0–0.9)
KETONES UR STRIP.AUTO-MCNC: NEGATIVE MG/DL
LEUKOCYTE ESTERASE UR QL STRIP.AUTO: NEGATIVE
LYMPHOCYTES # BLD AUTO: 2.97 K/UL (ref 1–4.8)
LYMPHOCYTES NFR BLD: 41.1 % (ref 22–41)
MCH RBC QN AUTO: 26.9 PG (ref 27–33)
MCHC RBC AUTO-ENTMCNC: 32.6 G/DL (ref 33.6–35)
MCV RBC AUTO: 82.6 FL (ref 81.4–97.8)
MICRO URNS: ABNORMAL
MONOCYTES # BLD AUTO: 0.75 K/UL (ref 0–0.85)
MONOCYTES NFR BLD AUTO: 10.4 % (ref 0–13.4)
MUCOUS THREADS #/AREA URNS HPF: ABNORMAL /HPF
NEUTROPHILS # BLD AUTO: 3.38 K/UL (ref 2–7.15)
NEUTROPHILS NFR BLD: 46.7 % (ref 44–72)
NITRITE UR QL STRIP.AUTO: NEGATIVE
NRBC # BLD AUTO: 0 K/UL
NRBC BLD AUTO-RTO: 0 /100 WBC
PH UR STRIP.AUTO: 7 [PH]
PLATELET # BLD AUTO: 290 K/UL (ref 164–446)
PMV BLD AUTO: 10.8 FL (ref 9–12.9)
POTASSIUM SERPL-SCNC: 3.9 MMOL/L (ref 3.6–5.5)
PROT SERPL-MCNC: 7.3 G/DL (ref 6–8.2)
PROT UR QL STRIP: NEGATIVE MG/DL
RBC # BLD AUTO: 4.83 M/UL (ref 4.2–5.4)
RBC # URNS HPF: ABNORMAL /HPF
RBC UR QL AUTO: ABNORMAL
SODIUM SERPL-SCNC: 138 MMOL/L (ref 135–145)
SP GR UR STRIP.AUTO: 1.01
T4 FREE SERPL-MCNC: 1.08 NG/DL (ref 0.58–1.64)
TROPONIN I SERPL-MCNC: <0.02 NG/ML (ref 0–0.04)
TSH SERPL DL<=0.005 MIU/L-ACNC: 0.78 UIU/ML (ref 0.35–5.5)
WBC # BLD AUTO: 7.2 K/UL (ref 4.8–10.8)
WBC #/AREA URNS HPF: ABNORMAL /HPF

## 2017-07-17 PROCEDURE — 84484 ASSAY OF TROPONIN QUANT: CPT

## 2017-07-17 PROCEDURE — 81001 URINALYSIS AUTO W/SCOPE: CPT

## 2017-07-17 PROCEDURE — 80053 COMPREHEN METABOLIC PANEL: CPT

## 2017-07-17 PROCEDURE — 84439 ASSAY OF FREE THYROXINE: CPT

## 2017-07-17 PROCEDURE — 99283 EMERGENCY DEPT VISIT LOW MDM: CPT

## 2017-07-17 PROCEDURE — 84443 ASSAY THYROID STIM HORMONE: CPT

## 2017-07-17 PROCEDURE — 700117 HCHG RX CONTRAST REV CODE 255: Performed by: EMERGENCY MEDICINE

## 2017-07-17 PROCEDURE — 36415 COLL VENOUS BLD VENIPUNCTURE: CPT

## 2017-07-17 PROCEDURE — 70450 CT HEAD/BRAIN W/O DYE: CPT

## 2017-07-17 PROCEDURE — 70496 CT ANGIOGRAPHY HEAD: CPT

## 2017-07-17 PROCEDURE — 85025 COMPLETE CBC W/AUTO DIFF WBC: CPT

## 2017-07-17 RX ORDER — MECLIZINE HYDROCHLORIDE 25 MG/1
25 TABLET ORAL 3 TIMES DAILY PRN
Qty: 21 TAB | Refills: 0 | Status: SHIPPED | OUTPATIENT
Start: 2017-07-17 | End: 2017-10-28

## 2017-07-17 RX ADMIN — IOHEXOL 100 ML: 350 INJECTION, SOLUTION INTRAVENOUS at 21:45

## 2017-07-17 ASSESSMENT — PAIN SCALES - GENERAL: PAINLEVEL_OUTOF10: 2

## 2017-07-17 NOTE — ED AVS SNAPSHOT
ascentify Access Code: Activation code not generated  Current ascentify Status: Active    [employer]  A secure, online tool to manage your health information     [employer]s ascentify® is a secure, online tool that connects you to your personalized health information from the privacy of your home -- day or night - making it very easy for you to manage your healthcare. Once the activation process is completed, you can even access your medical information using the ascentify vadim, which is available for free in the Apple Vadim store or Google Play store.     ascentify provides the following levels of access (as shown below):   My Chart Features   [facility] Primary Care Doctor [facility]  Specialists [facility]  Urgent  Care Non-[facility]  Primary Care  Doctor   Email your healthcare team securely and privately 24/7 X X X X   Manage appointments: schedule your next appointment; view details of past/upcoming appointments X      Request prescription refills. X      View recent personal medical records, including lab and immunizations X X X X   View health record, including health history, allergies, medications X X X X   Read reports about your outpatient visits, procedures, consult and ER notes X X X X   See your discharge summary, which is a recap of your hospital and/or ER visit that includes your diagnosis, lab results, and care plan. X X       How to register for ascentify:  1. Go to  [email or web address].  2. Click on the Sign Up Now box, which takes you to the New Member Sign Up page. You will need to provide the following information:  a. Enter your ascentify Access Code exactly as it appears at the top of this page. (You will not need to use this code after you’ve completed the sign-up process. If you do not sign up before the expiration date, you must request a new code.)   b. Enter your date of birth.   c. Enter your home email address.   d. Click Submit, and follow the next screen’s instructions.  3. Create a ascentify ID. This will  be your [employer] login ID and cannot be changed, so think of one that is secure and easy to remember.  4. Create a [employer] password. You can change your password at any time.  5. Enter your Password Reset Question and Answer. This can be used at a later time if you forget your password.   6. Enter your e-mail address. This allows you to receive e-mail notifications when new information is available in [employer].  7. Click Sign Up. You can now view your health information.    For assistance activating your [employer] account, call [phone]

## 2017-07-17 NOTE — ED AVS SNAPSHOT
7/17/2017    Katerina Murry [address] [place] [address]    Dear Katerina:    [facility] wants to ensure your discharge home is safe and you or your loved ones have had all of your questions answered regarding your care after you leave the hospital.    Below is a list of resources and contact information should you have any questions regarding your hospital stay, follow-up instructions, or active medical symptoms.    Questions or Concerns Regarding… Contact   Medical Questions Related to Your Discharge  (7 days a week, 8am-5pm) Contact a Nurse Care Coordinator   [phone]   Medical Questions Not Related to Your Discharge  (24 hours a day / 7 days a week)  Contact the Nurse Health Line   [phone]    Medications or Discharge Instructions Refer to your discharge packet   or contact your [facility] Primary Care Provider   [phone]   Follow-up Appointment(s) Schedule your appointment via [employer]   or contact Scheduling [phone]   Billing Review your statement via [employer]  or contact Billing [phone]   Medical Records Review your records via [employer]   or contact Medical Records [phone]     You may receive a telephone call within two days of discharge. This call is to make certain you understand your discharge instructions and have the opportunity to have any questions answered. You can also easily access your medical information, test results and upcoming appointments via the [employer] free online health management tool. You can learn more and sign up at FFWD/Media[employer]rix. For assistance setting up your [employer] account, please call [phone].    Once again, we want to ensure your discharge home is safe and that you have a clear understanding of any next steps in your care. If you have any questions or concerns, please do not hesitate to contact us, we are here for you. Thank you for choosing [facility] for your healthcare needs.    Sincerely,    Your [facility] Healthcare Team

## 2017-07-17 NOTE — ED AVS SNAPSHOT
Home Care Instructions                                                                                                                Katerina Owen   MRN: [id number]    Department:  [facility], Emergency Dept   Date of Visit:  7/17/2017            [facility], Emergency Dept    [address]    [place] [address]    Phone:  [phone]      You were seen by     Jr Lang M.D.      Your Diagnosis Was     Vertigo     R42       These are the medications you received during your hospitalization from 07/17/2017 1707 to 07/17/2017 2240     Date/Time Order Dose Route Action    07/17/2017 2145 iohexol (OMNIPAQUE) 350 mg/mL 100 mL Intravenous Given      Follow-up Information     1. Follow up with DELMA Robles In 1 day.    Specialty:  Family Medicine    Contact information    [address] [place] [address] [address]  [place] [address] [phone]        Medication Information     Review all of your home medications and newly ordered medications with your primary doctor and/or pharmacist as soon as possible. Follow medication instructions as directed by your doctor and/or pharmacist.     Please keep your complete medication list with you and share with your physician. Update the information when medications are discontinued, doses are changed, or new medications (including over-the-counter products) are added; and carry medication information at all times in the event of emergency situations.               Medication List      START taking these medications        Instructions    Morning Afternoon Evening Bedtime    meclizine 25 MG Tabs   Commonly known as:  ANTIVERT        Take 1 Tab by mouth 3 times a day as needed.   Dose:  25 mg                          ASK your doctor about these medications        Instructions    Morning Afternoon Evening Bedtime    amphetamine-dextroamphetamine 15 MG tablet   Commonly known as:  ADDERALL        Doctor's comments:   May fill 7/7/2017   Take 1 Tab by mouth 2 times a day.   Dose:  15 mg                        ASPIRIN-CAFFEINE-BUTALBITAL -40 MG Caps   Commonly known as:  FIORINAL        Take 1 Cap by mouth every four hours as needed.   Dose:  1 Cap                             Where to Get Your Medications      You can get these medications from any pharmacy     Bring a paper prescription for each of these medications    - meclizine 25 MG Tabs            Procedures and tests performed during your visit     CBC WITH DIFFERENTIAL    COMP METABOLIC PANEL    CONSENT FOR CONTRAST INJECTION    CT-CTA HEAD WITH & W/O-POST PROCESS    CT-HEAD W/O    ESTIMATED GFR    FREE THYROXINE    TROPONIN    TSH    URINALYSIS CULTURE, IF INDICATED    URINE MICROSCOPIC (W/UA)        Discharge Instructions       Dizziness  Dizziness is a common problem. It is a feeling of unsteadiness or light-headedness. You may feel like you are about to faint. Dizziness can lead to injury if you stumble or fall. Anyone can become dizzy, but dizziness is more common in older adults. This condition can be caused by a number of things, including medicines, dehydration, or illness.  HOME CARE INSTRUCTIONS  Taking these steps may help with your condition:  Eating and Drinking  · Drink enough fluid to keep your urine clear or pale yellow. This helps to keep you from becoming dehydrated. Try to drink more clear fluids, such as water.  · Do not drink alcohol.  · Limit your caffeine intake if directed by your health care provider.  · Limit your salt intake if directed by your health care provider.  Activity  · Avoid making quick movements.  ¨ Rise slowly from chairs and steady yourself until you feel okay.  ¨ In the morning, first sit up on the side of the bed. When you feel okay, stand slowly while you hold onto something until you know that your balance is fine.  · Move your legs often if you need to  one place for a long time. Tighten and relax your muscles in  your legs while you are standing.  · Do not drive or operate heavy machinery if you feel dizzy.  · Avoid bending down if you feel dizzy. Place items in your home so that they are easy for you to reach without leaning over.  Lifestyle  · Do not use any tobacco products, including cigarettes, chewing tobacco, or electronic cigarettes. If you need help quitting, ask your health care provider.  · Try to reduce your stress level, such as with yoga or meditation. Talk with your health care provider if you need help.  General Instructions  · Watch your dizziness for any changes.  · Take medicines only as directed by your health care provider. Talk with your health care provider if you think that your dizziness is caused by a medicine that you are taking.  · Tell a friend or a family member that you are feeling dizzy. If he or she notices any changes in your behavior, have this person call your health care provider.  · Keep all follow-up visits as directed by your health care provider. This is important.  SEEK MEDICAL CARE IF:  · Your dizziness does not go away.  · Your dizziness or light-headedness gets worse.  · You feel nauseous.  · You have reduced hearing.  · You have new symptoms.  · You are unsteady on your feet or you feel like the room is spinning.  SEEK IMMEDIATE MEDICAL CARE IF:  · You vomit or have diarrhea and are unable to eat or drink anything.  · You have problems talking, walking, swallowing, or using your arms, hands, or legs.  · You feel generally weak.  · You are not thinking clearly or you have trouble forming sentences. It may take a friend or family member to notice this.  · You have chest pain, abdominal pain, shortness of breath, or sweating.  · Your vision changes.  · You notice any bleeding.  · You have a headache.  · You have neck pain or a stiff neck.  · You have a fever.     This information is not intended to replace advice given to you by your health care provider. Make sure you discuss any  questions you have with your health care provider.     Document Released: 06/13/2002 Document Revised: 05/03/2016 Document Reviewed: 12/14/2015  [employer]app[employer] Interactive Patient Education ©2016 [employer]            Patient Information     Patient Information    Following emergency treatment: all patient requiring follow-up care must return either to a private physician or a clinic if your condition worsens before you are able to obtain further medical attention, please return to the emergency room.     Billing Information    At [facility], we work to make the billing process streamlined for our patients.  Our Representatives are here to answer any questions you may have regarding your hospital bill.  If you have insurance coverage and have supplied your insurance information to us, we will submit a claim to your insurer on your behalf.  Should you have any questions regarding your bill, we can be reached online or by phone as follows:  Online: You are able pay your bills online or live chat with our representatives about any billing questions you may have. We are here to help Monday - Friday from 8:00am to 7:30pm and 9:00am - 12:00pm on Saturdays.  Please visit [email or web address]  for more information.   Phone:  [phone] or [phone]    Please note that your emergency physician, surgeon, pathologist, radiologist, anesthesiologist, and other specialists are not employed by [facility] and will therefore bill separately for their services.  Please contact them directly for any questions concerning their bills at the numbers below:     Emergency Physician Services:  [phone]  [facility]:  [phone]  [facility]:  [phone]  [facility]:  [phone]    1. Your final bill may vary from the amount quoted upon discharge if all procedures are not complete at that time, or if your doctor has additional procedures of which  we are not aware. You will receive an additional bill if you return to the Emergency Department at [facility] for suture removal regardless of the facility of which the sutures were placed.     2. Please arrange for settlement of this account at the emergency registration.    3. All self-pay accounts are due in full at the time of treatment.  If you are unable to meet this obligation then payment is expected within 4-5 days.     4. If you have had radiology studies (CT, X-ray, Ultrasound, MRI), you have received a preliminary result during your emergency department visit. Please contact the radiology department [phone] to receive a copy of your final result. Please discuss the Final result with your primary physician or with the follow up physician provided.     Crisis Hotline:  [place] Crisis Hotline:  6-134-CEANXXG or 1-128.905.8353  [place] Crisis Hotline:    1-641.853.9122 or 090-184-3413         ED Discharge Follow Up Questions    1. In order to provide you with very good care, we would like to follow up with a phone call in the next few days.  May we have your permission to contact you?     YES /  NO    2. What is the best phone number to call you? (       )_____-__________    3. What is the best time to call you?      Morning  /  Afternoon  /  Evening                   Patient Signature:  ____________________________________________________________    Date:  ____________________________________________________________      Your appointments     Aug 03, 2017  9:00 AM   ANNUAL EXAM PREVENTATIVE with DELMA Robles   [facility] (--)    [address] [facility] [address]  [facility] [id number] [phone]

## 2017-07-18 NOTE — ED NOTES
Dc instructions, prescription and note for work provided per pt request. To f/u with pcp, return for worsening s/s

## 2017-07-18 NOTE — DISCHARGE INSTRUCTIONS
Dizziness  Dizziness is a common problem. It is a feeling of unsteadiness or light-headedness. You may feel like you are about to faint. Dizziness can lead to injury if you stumble or fall. Anyone can become dizzy, but dizziness is more common in older adults. This condition can be caused by a number of things, including medicines, dehydration, or illness.  HOME CARE INSTRUCTIONS  Taking these steps may help with your condition:  Eating and Drinking  · Drink enough fluid to keep your urine clear or pale yellow. This helps to keep you from becoming dehydrated. Try to drink more clear fluids, such as water.  · Do not drink alcohol.  · Limit your caffeine intake if directed by your health care provider.  · Limit your salt intake if directed by your health care provider.  Activity  · Avoid making quick movements.  ¨ Rise slowly from chairs and steady yourself until you feel okay.  ¨ In the morning, first sit up on the side of the bed. When you feel okay, stand slowly while you hold onto something until you know that your balance is fine.  · Move your legs often if you need to  one place for a long time. Tighten and relax your muscles in your legs while you are standing.  · Do not drive or operate heavy machinery if you feel dizzy.  · Avoid bending down if you feel dizzy. Place items in your home so that they are easy for you to reach without leaning over.  Lifestyle  · Do not use any tobacco products, including cigarettes, chewing tobacco, or electronic cigarettes. If you need help quitting, ask your health care provider.  · Try to reduce your stress level, such as with yoga or meditation. Talk with your health care provider if you need help.  General Instructions  · Watch your dizziness for any changes.  · Take medicines only as directed by your health care provider. Talk with your health care provider if you think that your dizziness is caused by a medicine that you are taking.  · Tell a friend or a family  member that you are feeling dizzy. If he or she notices any changes in your behavior, have this person call your health care provider.  · Keep all follow-up visits as directed by your health care provider. This is important.  SEEK MEDICAL CARE IF:  · Your dizziness does not go away.  · Your dizziness or light-headedness gets worse.  · You feel nauseous.  · You have reduced hearing.  · You have new symptoms.  · You are unsteady on your feet or you feel like the room is spinning.  SEEK IMMEDIATE MEDICAL CARE IF:  · You vomit or have diarrhea and are unable to eat or drink anything.  · You have problems talking, walking, swallowing, or using your arms, hands, or legs.  · You feel generally weak.  · You are not thinking clearly or you have trouble forming sentences. It may take a friend or family member to notice this.  · You have chest pain, abdominal pain, shortness of breath, or sweating.  · Your vision changes.  · You notice any bleeding.  · You have a headache.  · You have neck pain or a stiff neck.  · You have a fever.     This information is not intended to replace advice given to you by your health care provider. Make sure you discuss any questions you have with your health care provider.     Document Released: 06/13/2002 Document Revised: 05/03/2016 Document Reviewed: 12/14/2015  [employer] Interactive Patient Education ©2016 [employer]

## 2017-07-18 NOTE — ED PROVIDER NOTES
ED Provider Note    CHIEF COMPLAINT  Chief Complaint   Patient presents with   • Dizziness       HPI  Katerina Owen is a 31 y.o. female who presents dizziness for 3 days. The patient states that she developed sort of a dizzy feeling and just not quite right for 3 days.. Feels off balance since then. Also at times feeling like she can't concentrate very well. She had some sweating initially went on and is feeling tired in general. Nothing really makes her that her except holding still. Moving around makes the dizziness somewhat worse. She had an increase in her Adderall couple of months ago but now just takes it once a day. No other medication changes. No over-the-counter medications. No trauma.  Also very mild headache. Patient states she had this a couple of times before. About every 6 months she gets up and gets better. She's not seen a doctor in the past for this.      REVIEW OF SYSTEMS  CONSTITUTIONAL:  Denies fever, chills, weight gain or weight loss. She does feels generally weak and then off balance.  EYES:  Denies photophobia or discharge   ENT:  Denies sore throat, nose or ear pain  CARDIOVASCULAR:  Denies chest pain, palpitations or swelling  RESPIRATORY:  Denies cough or shortness of breath or difficulty breathing  GI:  Denies abdominal pain,vomiting, or diarrhea. Positive nausea  MUSCULOSKELETAL:  Denies weakness joint swelling or back pain  SKIN:  No rash  ALLERGIC: No itchy rashes.  NEUROLOGIC: Slight headache right side. But no focal weakness or numbness certainly not the worst headache of her life or different than usual. She does get frequent headaches.  ENDOCRINE:  Denies polyuria or polydypsia  LYMPHATIC:  Denies swollen lymph nodes  PSYCHIATRIC:  Denies depression    PAST MEDICAL HISTORY  Past Medical History   Diagnosis Date   • Migraine    • HTN (hypertension)      related to pregnancy   • Pre-eclampsia      2007    • Chlamydia 5/11/2011   • depression        FAMILY HISTORY  Family  "History   Problem Relation Age of Onset   • Heart Disease Father      heart failure smoker    • Hypertension Father    • Cancer Maternal Grandfather      pancreastic   • Heart Disease Paternal Grandfather      heart attack       SOCIAL HISTORY   reports that she has been smoking.  She quit smokeless tobacco use about 2 years ago. She reports that she does not drink alcohol or use illicit drugs.    SURGICAL HISTORY  Past Surgical History   Procedure Laterality Date   • Septoturbinoplasty  2009     Performed by CHRISTIAN YEN at SURGERY SAME DAY [facility] ORS   • Lumpectomy       both breasts - Dr. Garsia - [facility] 10/2014   • Breast biopsy Left 2016     Procedure: BREAST BIOPSY Excisional;  Surgeon: Gisele Delcid M.D.;  Location: SURGERY [facility];  Service:    • Primary c section  Aug 7, 2007      preeclampsia   • Repeat c section w tubal ligation  2011     Performed by AYAAN MERCHANT at LABOR AND DELIVERY       CURRENT MEDICATIONS  No current facility-administered medications for this encounter.    Current outpatient prescriptions:   •  meclizine (ANTIVERT) 25 MG Tab, Take 1 Tab by mouth 3 times a day as needed., Disp: 21 Tab, Rfl: 0  •  amphetamine-dextroamphetamine (ADDERALL) 15 MG tablet, Take 1 Tab by mouth 2 times a day., Disp: 60 Tab, Rfl: 0  •  ASPIRIN-CAFFEINE-BUTALBITAL (FIORINAL) -40 MG Cap, Take 1 Cap by mouth every four hours as needed., Disp: 30 Cap, Rfl: 0      ALLERGIES  Allergies   Allergen Reactions   • Morphine Nausea     \"feel sick for a whole day after being given morphine\"  RXN=2016     • Vicodin [Hydrocodone-Acetaminophen] Vomiting and Nausea     GGB=[id number]       PHYSICAL EXAM  VITAL SIGNS: Pulse 77  Temp(Src) 37 °C (98.6 °F)  Resp 19  Ht 1.727 m (5' 8\")  Wt 86.5 kg (190 lb 11.2 oz)  BMI 29.00 kg/m2  SpO2 96%     Constitutional: Patient is awake alert person place and time. No acute respiratory distress Well developed, Well nourished, Non-toxic " appearance.   HENT: Normocephalic, Atraumatic,  Bilateral external ears normal, Oropharynx pink and dry with no exudates, Nose patent.   Eyes: PERRLA, EOMI, Sclera and conjunctiva clear, No discharge. With having her look laterally to the right she seemed to get more vertigo. Looking through the left lateral leg she has some nystagmus.  Neck:  Supple no nuchal rigidity, no thyromegaly or mass. Non-tender  Lymphatic: No supraclavicular,   Cardiovascular: Heart is regular rate and rhythm no murmur, rub or thrill.   Thorax & Lungs: Chest is symmetrical, with good breath sounds. No wheezing or crackles. No respiratory distress, No chest tenderness.   Abdomen:  Soft, No tenderness no hepatosplenomegaly there is no guarding or rebound, No masses, No pulsatile masses. Bowel sounds are present   Skin: Warm, Dry, no petechia, purpura or rash.   Back: Non tender with palpation, No CVA tenderness.   Extremities: No  edema.  Non tender.   Musculoskeletal: Good range of motion wrists, elbows, shoulders, hips, knees, ankles pulses 2+ radially and femorally. No gross deformities noted.   Neurologic: Alert & oriented to person, place, and time.  Strength is 5 over 5 , bicep triceps, quadriceps, plantar flexion and extension. Sensory is intact to light touch face, arms and legs. DTRs are symmetrical biceps brachioradialis, patella and Achilles.   INH stroke scale 0    Psychiatric:  Cooperative friendly      LABS:  Lab Results   Component Value Date    WBC 7.2 07/17/2017    HEMOGLOBIN 13.0 07/17/2017    HEMATOCRIT 39.9 07/17/2017    PLATELETCT 290 07/17/2017    SODIUM 138 07/17/2017    POTASSIUM 3.9 07/17/2017    CHLORIDE 103 07/17/2017    CO2 26 07/17/2017    BUN 11 07/17/2017    GLUCOSE 82 07/17/2017    ALTSGPT 33 07/17/2017    ASTSGOT 28 07/17/2017    HBA1C 5.6 01/08/2016       RADIOLOGY/PROCEDURES  CT-CTA HEAD WITH & W/O-POST PROCESS   Final Result      No intracranial aneurysm, focal stenosis or abrupt large vessel cut off.       CT-HEAD W/O   Final Result      No acute intracranial abnormality.            COURSE & MEDICAL DECISION MAKING  Pertinent Labs & Imaging studies reviewed. (See chart for details)  Differential diagnosis includes but not limited to stroke, atypical migraine, cardiac dysrhythmia, likely imbalances, medication side effects, dehydration, infections.    2225. Recheck. Resting comfortably.    Patient has had 3 days of vertiginous symptoms for mild headache being off balance the little sluggish tired. Certainly there is a family history of aneurysms. I did a CAT scan to rule out a stroke which did not show. Also did a CTA of her brain that showed no aneurysms. Her blood work all as normal. This period of time with a normal CT scan and normal CTA with symptoms for 3 days and an INH stroke scale of 0I feel a place her on Antivert and have her follow-up with her primary care doctors at outpatient. Turns out she's had this at least a couple times before she thinks every 6 months but never seen a doctor for before. I explained to her importance for her to get close follow-up for continued workup and evaluation by her primary care doctors for these symptoms.    Stable for discharge    FINAL IMPRESSION  1. Vertigo  2. Cephalgia       PLAN  1. Follow up with her primary care doctor tomorrow  2. Antivert  3. Vertigo information sheets  4. Return to the emergency department for increased pains, fevers, vomiting or change in condition.  Electronically signed by: Jr Lang, 7/17/2017 8:23 PM

## 2017-08-03 ENCOUNTER — HOSPITAL ENCOUNTER (OUTPATIENT)
Facility: MEDICAL CENTER | Age: 32
End: 2017-08-03
Attending: NURSE PRACTITIONER
Payer: COMMERCIAL

## 2017-08-03 ENCOUNTER — OFFICE VISIT (OUTPATIENT)
Dept: MEDICAL GROUP | Facility: LAB | Age: 32
End: 2017-08-03
Payer: COMMERCIAL

## 2017-08-03 VITALS
RESPIRATION RATE: 12 BRPM | OXYGEN SATURATION: 97 % | BODY MASS INDEX: 28.98 KG/M2 | DIASTOLIC BLOOD PRESSURE: 98 MMHG | TEMPERATURE: 97.3 F | WEIGHT: 191.2 LBS | HEART RATE: 87 BPM | SYSTOLIC BLOOD PRESSURE: 130 MMHG | HEIGHT: 68 IN

## 2017-08-03 DIAGNOSIS — Z12.4 SCREENING FOR MALIGNANT NEOPLASM OF CERVIX: ICD-10-CM

## 2017-08-03 DIAGNOSIS — G43.009 MIGRAINE WITHOUT AURA AND WITHOUT STATUS MIGRAINOSUS, NOT INTRACTABLE: ICD-10-CM

## 2017-08-03 DIAGNOSIS — Z98.890 STATUS POST LEFT BREAST LUMPECTOMY: ICD-10-CM

## 2017-08-03 DIAGNOSIS — F98.8 ADD (ATTENTION DEFICIT DISORDER): ICD-10-CM

## 2017-08-03 DIAGNOSIS — Z01.419 ENCOUNTER FOR GYNECOLOGICAL EXAMINATION: ICD-10-CM

## 2017-08-03 DIAGNOSIS — K40.90 RIGHT INGUINAL HERNIA: ICD-10-CM

## 2017-08-03 LAB — CYTOLOGY REG CYTOL: NORMAL

## 2017-08-03 PROCEDURE — 88175 CYTOPATH C/V AUTO FLUID REDO: CPT

## 2017-08-03 PROCEDURE — 99395 PREV VISIT EST AGE 18-39: CPT | Performed by: NURSE PRACTITIONER

## 2017-08-03 RX ORDER — SUMATRIPTAN 50 MG/1
50 TABLET, FILM COATED ORAL
Qty: 9 TAB | Refills: 3 | Status: SHIPPED | OUTPATIENT
Start: 2017-08-03 | End: 2017-12-19

## 2017-08-03 RX ORDER — DEXTROAMPHETAMINE SACCHARATE, AMPHETAMINE ASPARTATE, DEXTROAMPHETAMINE SULFATE AND AMPHETAMINE SULFATE 3.75; 3.75; 3.75; 3.75 MG/1; MG/1; MG/1; MG/1
15 TABLET ORAL 2 TIMES DAILY
Qty: 56 TAB | Refills: 0 | Status: SHIPPED | OUTPATIENT
Start: 2017-08-03 | End: 2017-10-24 | Stop reason: SDUPTHER

## 2017-08-03 RX ORDER — DEXTROAMPHETAMINE SACCHARATE, AMPHETAMINE ASPARTATE, DEXTROAMPHETAMINE SULFATE AND AMPHETAMINE SULFATE 3.75; 3.75; 3.75; 3.75 MG/1; MG/1; MG/1; MG/1
15 TABLET ORAL 2 TIMES DAILY
Qty: 56 TAB | Refills: 0 | Status: SHIPPED | OUTPATIENT
Start: 2017-08-03 | End: 2017-10-23 | Stop reason: SDUPTHER

## 2017-08-03 NOTE — PROGRESS NOTES
CC  Gynecological exam    HPI:  Katerina is a 31 y.o.  female who presents for annual exam. Generally the patient is feeling good. She has no complaints or concerns. She is doing well with her current Adderall dose, taking 15 mg twice daily. Denies any negative side effects of Adderall. She did have an ER visit for atypical migraine about a week ago, migraine has resolved.   Regarding her health maintenance:   Last pap: 4-5 years  Abnormal Pap hx: none  Periods: monthly  Contraception:  Tubal ligation  Last Mammo:1 yr ago  Last colonoscopy:not applicable   Bone density test:N\A   Last Lab: just done in ER - went for migraine - resolved.   Last Td:current   Influenza vaccination:current   Pneumococcal vaccination:not applicable   Zostavax:not applicable   Hx STD''s: no   Regular exercise: yes    meds:   Current Outpatient Prescriptions   Medication Sig Dispense Refill   • meclizine (ANTIVERT) 25 MG Tab Take 1 Tab by mouth 3 times a day as needed. 21 Tab 0   • amphetamine-dextroamphetamine (ADDERALL) 15 MG tablet Take 1 Tab by mouth 2 times a day. 60 Tab 0   • ASPIRIN-CAFFEINE-BUTALBITAL (FIORINAL) -40 MG Cap Take 1 Cap by mouth every four hours as needed. 30 Cap 0     No current facility-administered medications for this visit.       Allergies: No Known Allergies    family:   Family History   Problem Relation Age of Onset   • Heart Disease Father      heart failure smoker    • Hypertension Father    • Cancer Maternal Grandfather      pancreastic   • Heart Disease Paternal Grandfather      heart attack       social hx:   Social History     Social History   • Marital Status:      Spouse Name: N/A   • Number of Children: N/A   • Years of Education: N/A     Occupational History   • Not on file.     Social History Main Topics   • Smoking status: Current Some Day Smoker     Last Attempt to Quit: 12/26/2014   • Smokeless tobacco: Former User     Quit date: 10/30/2014      Comment: quit 8 yrs ago   • Alcohol Use:  "No      Comment: vape   • Drug Use: No   • Sexual Activity:     Partners: Male     Birth Control/ Protection: Surgical      Comment: ; three kids; wk: not working     Other Topics Concern   • Not on file     Social History Narrative       ROS:  No fever, chills, sweats.   No polydipsia, polyuria, temperature intolerance, significant weight changes   No visual changes, blurred vision.  No chest pain, palpitations, peripheral swelling   No chronic cough, shortness of breath, dyspnea with exertion.   No dysphagia, odynophagia, black or bloody stools.   No abdominal pain, nausea, persistent diarrhea, constipation   No dysuria, hematuria, incontinence. Denies nocturia  No rash, pruritis, pigment changes.   No focal weakness, syncope, headache, confusion, persistent numbness.   All other systems are reviewed and negative.    PHYSICAL EXAMINATION:  Blood pressure 130/98, pulse 87, temperature 36.3 °C (97.3 °F), resp. rate 12, height 1.727 m (5' 8\"), weight 86.728 kg (191 lb 3.2 oz), SpO2 97 %.  General appearance:healthy, well developed, well nourished  Psych: alert, no distress, cooperative  Eyes: EOM's normal, pupils equal, round, reactive to light  ENT: Ears: external ears normal to inspection and palpation, TM's clear, Nose/Sinuses: nose shows no deformity, asymmetry, or inflammation  Neck: no asymmetry, masses, or scars, no adenopathy, thyroid normal to palpation  Lungs:chest symmetric with normal A/P diameter, no chest deformities noted, normal respiratory rate and rhythm  Cardiovascular:regular rate and rhythm, S1 normal  Breasts: Patient declines breast exam, stating that she has a follow-up coming with Dr. Delcid in the near future and breast exams have become somewhat traumatic/stressful for her since she had difficulty healing from a biopsy last year.  Abdomen: umbilicus normal, no masses palpable, no organomegaly. She may have a right inguinal hernia, this area is tender and it is difficult because of " her weight  Musculoskeletal:ROM of all joints is normal, no evidence of joint instability  Lymphatic: None significantly enlarged  Skin: no rash, no edema  Neuro: mental status intact, cranial nerves 2-12 intact  Pelvic: external genitalia normal, cervix normal in appearance, bimanual exam reveals normal uterus, adnexa without masses or tenderness, vaginal mucosa normal      ASSESSMENT/PLAN:  1.annual physical exam: HCM:  Pap done.  Encourage monthly self breast exam  Encourage daily exercise for at least 30 minutes  Recommend yearly f/u with Dr. Delcid, referral was placed  Recommend 1500 mg Calcium with 600 units vit d daily.    Follow up every 2-3 years for annual PAP  2.  ADD- refilled meds. Stable .  3.  Migraines:  Retrial of imitrex.  F/u as needed for more than 1 migraine per month

## 2017-08-03 NOTE — MR AVS SNAPSHOT
"        Katerina Vandaelaine Brayden   8/3/2017 9:00 AM   Office Visit   MRN: [id number]    Department:  [facility]   Dept Phone:  [phone]    Description:  Female : 1985   Provider:  DELMA Robles           Reason for Visit     Gynecologic Exam           Allergies as of 8/3/2017     Allergen Noted Reactions    Morphine 2016   Nausea    \"feel sick for a whole day after being given morphine\"  RXN=2016      Vicodin [Hydrocodone-Acetaminophen] 2016   Vomiting, Nausea    WAZ=1122      You were diagnosed with     Encounter for gynecological examination   [0346712]       Screening for malignant neoplasm of cervix   [002216]       Migraine without aura and without status migrainosus, not intractable   [557726]       ADD (attention deficit disorder)   [652452]       Status post left breast lumpectomy   [416562]       Right inguinal hernia   [006519]         Vital Signs     Blood Pressure Pulse Temperature Respirations Height Weight    130/98 mmHg 87 36.3 °C (97.3 °F) 12 1.727 m (5' 8\") 86.728 kg (191 lb 3.2 oz)    Body Mass Index Oxygen Saturation Smoking Status             29.08 kg/m2 97% Current Some Day Smoker         Basic Information     Date Of Birth Sex Race Ethnicity Preferred Language    1985 Female White Non- English      Problem List              ICD-10-CM Priority Class Noted - Resolved    HTN (hypertension) I10   Unknown - Present    Anxiety F41.9   2014 - Present    Depression F32.9   2014 - Present    PTSD (post-traumatic stress disorder) F43.10   2015 - Present    ADD (attention deficit disorder) F98.8   2016 - Present      Health Maintenance        Date Due Completion Dates    PAP SMEAR 2014    IMM INFLUENZA (1) 2017 ---    IMM DTaP/Tdap/Td Vaccine (2 - Td) 2021            Current Immunizations     Tdap Vaccine 2011  6:40 AM      Below and/or attached are the medications your provider expects you to " take. Review all of your home medications and newly ordered medications with your provider and/or pharmacist. Follow medication instructions as directed by your provider and/or pharmacist. Please keep your medication list with you and share with your provider. Update the information when medications are discontinued, doses are changed, or new medications (including over-the-counter products) are added; and carry medication information at all times in the event of emergency situations     Allergies:  MORPHINE - Nausea     VICODIN - Vomiting,Nausea               Medications  Valid as of: August 03, 2017 - 10:13 AM    Generic Name Brand Name Tablet Size Instructions for use    Amphetamine-Dextroamphetamine (Tab) ADDERALL 15 MG Take 1 Tab by mouth 2 times a day.        Amphetamine-Dextroamphetamine (Tab) ADDERALL 15 MG Take 1 Tab by mouth 2 times a day.        Amphetamine-Dextroamphetamine (Tab) ADDERALL 15 MG Take 1 Tab by mouth 2 times a day.        Butalbital-Aspirin-Caffeine (Cap) FIORINAL -40 MG Take 1 Cap by mouth every four hours as needed.        Meclizine HCl (Tab) ANTIVERT 25 MG Take 1 Tab by mouth 3 times a day as needed.        SUMAtriptan Succinate (Tab) IMITREX 50 MG Take 1 Tab by mouth Once PRN for Migraine for up to 1 dose.        .                 Medicines prescribed today were sent to:     [facility] [address] [place] [address] [address]     [address] [place] [address]    Phone: [phone] Fax: [phone]    Open 24 Hours?: No      Medication refill instructions:       If your prescription bottle indicates you have medication refills left, it is not necessary to call your provider’s office. Please contact your pharmacy and they will refill your medication.    If your prescription bottle indicates you do not have any refills left, you may request refills at any time through one of the following ways: The online [employer] system (except Urgent Care), by calling your provider’s office, or by  asking your pharmacy to contact your provider’s office with a refill request. Medication refills are processed only during regular business hours and may not be available until the next business day. Your provider may request additional information or to have a follow-up visit with you prior to refilling your medication.   *Please Note: Medication refills are assigned a new Rx number when refilled electronically. Your pharmacy may indicate that no refills were authorized even though a new prescription for the same medication is available at the pharmacy. Please request the medicine by name with the pharmacy before contacting your provider for a refill.        Your To Do List     Future Labs/Procedures Complete By Expires    THINPREP PAP,REFLEX HPV ON ASC-US ONLY  As directed 8/4/2018      Referral     A referral request has been sent to our patient care coordination department. Please allow 3-5 business days for us to process this request and contact you either by phone or mail. If you do not hear from us by the 5th business day, please call us at [phone].        Other Notes About Your Plan     Seeing Diamond Chung - therapist           MyChart Access Code: Activation code not generated  Current MyChart Status: Active          Quit Tobacco Information     Do you want to quit using tobacco?    Quitting tobacco decreases risks of cancer, heart and lung disease, increases life expectancy, improves sense of taste and smell, and increases spending money, among other benefits.    If you are thinking about quitting, we can help.  • [facility] Quit Tobacco Program: 142.477.6672  o Program occurs weekly for four weeks and includes pharmacist consultation on products to support quitting smoking or chewing tobacco. A provider referral is needed for pharmacist consultation.  • Tobacco Users Help Hotline: 6-800-QUIT-NOW (098-8528) or https://[place].quitlogix.org/  o Free, confidential telephone and online coaching for [place]  residents. Sessions are designed on a schedule that is convenient for you. Eligible clients receive free nicotine replacement therapy.  • Nationally: www.smokefree.gov  o Information and professional assistance to support both immediate and long-term needs as you become, and remain, a non-smoker. Smokefree.gov allows you to choose the help that best fits your needs.

## 2017-08-23 ENCOUNTER — APPOINTMENT (OUTPATIENT)
Dept: RADIOLOGY | Facility: MEDICAL CENTER | Age: 32
End: 2017-08-23
Attending: EMERGENCY MEDICINE
Payer: COMMERCIAL

## 2017-08-23 ENCOUNTER — HOSPITAL ENCOUNTER (EMERGENCY)
Facility: MEDICAL CENTER | Age: 32
End: 2017-08-23
Attending: EMERGENCY MEDICINE
Payer: COMMERCIAL

## 2017-08-23 VITALS
OXYGEN SATURATION: 98 % | SYSTOLIC BLOOD PRESSURE: 168 MMHG | DIASTOLIC BLOOD PRESSURE: 120 MMHG | RESPIRATION RATE: 18 BRPM | HEART RATE: 84 BPM | TEMPERATURE: 97.7 F | WEIGHT: 187.39 LBS | HEIGHT: 68 IN | BODY MASS INDEX: 28.4 KG/M2

## 2017-08-23 DIAGNOSIS — S90.32XA CONTUSION OF LEFT FOOT, INITIAL ENCOUNTER: Primary | ICD-10-CM

## 2017-08-23 PROCEDURE — 73630 X-RAY EXAM OF FOOT: CPT | Mod: LT

## 2017-08-23 PROCEDURE — 99283 EMERGENCY DEPT VISIT LOW MDM: CPT

## 2017-08-23 ASSESSMENT — ENCOUNTER SYMPTOMS
ROS SKIN COMMENTS: NO WOUNDS
TINGLING: 1

## 2017-08-23 NOTE — LETTER
"  FORM C-4:  EMPLOYEE’S CLAIM FOR COMPENSATION/ REPORT OF INITIAL TREATMENT  EMPLOYEE’S CLAIM - PROVIDE ALL INFORMATION REQUESTED   First Name  Katerina Last Name  Brayden Birthdate             Age  1985 31 y.o. Sex  female Claim Number   Home Employee Address  [address]  [facility]                                     Zip  [address] Height  1.727 m (5' 7.99\") Weight  85 kg (187 lb 6.3 oz) [facility]  [id number]   Mailing Employee Address                           [address]   [place] [facility] [facility]               Zip  [address] Telephone  [phone] (home)  Primary Language Spoken  ENGLISH   Insurer  *** Third Party   N/A Employee's Occupation (Job Title) When Injury or Occupational Disease Occurred     Employer's Name   Telephone      Employer Address   City   State   Zip     Date of Injury  8/22/2017       Hour of Injury  10:30 PM Date Employer Notified  8/22/2017 Last Day of Work after Injury or Occupational Disease  8/22/2017 Supervisor to Whom Injury Reported  Tristin Alex   Address or Location of Accident (if applicable)     What were you doing at the time of accident? (if applicable)  Pulling Pallet Garry    How did this injury or occupational disease occur? Be specific and answer in detail. Use additional sheet if necessary)  I was walking backwards kalen a pallet Garry when I tripped over 2 empty pallets, then the pallet hit my left ankle.   If you believe that you have an occupational disease, when did you first have knowledge of the disability and it relationship to your employment?  na Witnesses to the Accident  na     Nature of Injury or Occupational Disease  Contusion  Part(s) of Body Injured or Affected  Foot (L), N/A, N/A    I certify that the above is true and correct to the best of my knowledge and that I have provided this information in order to obtain the benefits of [place] " inclusive or [place]).  I hereby authorize any physician, chiropractor, surgeon, practitioner, or other person, any hospital, including [facility], any medical service organization, any insurance company, or other institution or organization to release to each other, any medical or other information, including benefits paid or payable, pertinent to this injury or disease, except information relative to diagnosis, treatment and/or counseling for AIDS, psychological conditions, alcohol or controlled substances, for which I must give specific authorization.  A Photostat of this authorization shall be as valid as the original.   Date Place   Employee’s Signature   THIS REPORT MUST BE COMPLETED AND MAILED WITHIN 3 WORKING DAYS OF TREATMENT   Place  [facility], EMERGENCY DEPT  Name of Facility   [facility]   Date  8/23/2017 Diagnosis  (S90.32XA) Contusion of left foot, initial encounter  (primary encounter diagnosis) Is there evidence the injured employee was under the influence of alcohol and/or another controlled substance at the time of accident?   Hour  4:24 AM Description of Injury or Disease  Contusion of left foot, initial encounter No   Treatment  Xray, ice  Have you advised the patient to remain off work five days or more?         No   X-Ray Findings  Negative   If Yes   From Date    To Date      From information given by the employee, together with medical evidence, can you directly connect this injury or occupational disease as job incurred?  Yes If No, is the employee capable of: Full Duty  Yes Modified Duty      Is additional medical care by a physician indicated?  No If Modified Duty, Specify any Limitations / Restrictions        Do you know of any previous injury or disease contributing to this condition or occupational disease?  No   Date  8/23/2017 Print Doctor’s Name  Artemio Cuenca Ii I certify the  "employer’s copy of this form was mailed on:   Address  [address] [place] [id number] [phone] Insurer’s Use Only   [facility] Zip  [address]    Provider’s Tax ID Number  [id number] Telephone  Dept: [phone]    Doctor’s Signature  e-SERGEY Galvez II, M.D. Degree       Original - TREATING PHYSICIAN OR CHIROPRACTOR   Pg 2-Insurer/TPA   Pg 3-Employer   Pg 4-Employee                                                                                                  Form C-4 (rev01/03)     BRIEF DESCRIPTION OF RIGHTS AND BENEFITS  ([place]ursuant to [place] 616[place])    Notice of Injury or Occupational Disease (Incident Report Form C-1): If an injury or occupational disease (OD) arises out of and in the course of employment, you must provide written notice to your employer as soon as practicable, but no later than 7 days after the accident or OD. Your employer shall maintain a sufficient supply of the required forms.    Claim for Compensation (Form C-4): If medical treatment is sought, the form C-4 is available at the place of initial treatment. A completed \"Claim for Compensation\" (Form C-4) must be filed within 90 days after an accident or OD. The treating physician or chiropractor must, within 3 working days after treatment, complete and mail to the employer, the employer's insurer and third-party , the Claim for Compensation.    Medical Treatment: If you require medical treatment for your on-the-job injury or OD, you may be required to select a physician or chiropractor from a list provided by your workers’ compensation insurer, if it has contracted with an Organization for Managed Care (MCO) or Preferred Provider Organization (PPO) or providers of health care. If your employer has not entered into a contract with an MCO or PPO, you may select a physician or chiropractor from the Panel of Physicians and Chiropractors. Any medical costs related to your industrial injury or OD " will be paid by your insurer.    Temporary Total Disability (TTD): If your doctor has certified that you are unable to work for a period of at least 5 consecutive days, or 5 cumulative days in a 20-day period, or places restrictions on you that your employer does not accommodate, you may be entitled to TTD compensation.    Temporary Partial Disability (TPD): If the wage you receive upon reemployment is less than the compensation for TTD to which you are entitled, the insurer may be required to pay you TPD compensation to make up the difference. TPD can only be paid for a maximum of 24 months.    Permanent Partial Disability (PPD): When your medical condition is stable and there is an indication of a PPD as a result of your injury or OD, within 30 days, your insurer must arrange for an evaluation by a rating physician or chiropractor to determine the degree of your PPD. The amount of your PPD award depends on the date of injury, the results of the PPD evaluation and your age and wage.    Permanent Total Disability (PTD): If you are medically certified by a treating physician or chiropractor as permanently and totally disabled and have been granted a PTD status by your insurer, you are entitled to receive monthly benefits not to exceed 66 2/3% of your average monthly wage. The amount of your PTD payments is subject to reduction if you previously received a PPD award.    Vocational Rehabilitation Services: You may be eligible for vocational rehabilitation services if you are unable to return to the job due to a permanent physical impairment or permanent restrictions as a result of your injury or occupational disease.    Transportation and Per Mack Reimbursement: You may be eligible for travel expenses and per mack associated with medical treatment.  Reopening: You may be able to reopen your claim if your condition worsens after claim closure.    Appeal Process: If you disagree with a written determination issued by the  insurer or the insurer does not respond to your request, you may appeal to the Department of Administration, , by following the instructions contained in your determination letter. You must appeal the determination within 70 days from the date of the determination letter at [address], [address], [place], [place] [address], or [address], [place], [place] [address]. If you disagree with the  decision, you may appeal to the Department of Administration, . You must file your appeal within 30 days from the date of the  decision letter at [address], [address], [place], [place] [address], or [address], [place], [place] [address]. If you disagree with a decision of an , you may file a petition for judicial review with the District Court. You must do so within 30 days of the Appeal Officer’s decision. You may be represented by an  at your own expense or you may contact the [facility] for possible representation.    [place]  for Injured Workers (NAIW): If you disagree with a  decision, you may request that NAIW represent you without charge at an  Hearing. For information regarding denial of benefits, you may contact the [facility] at: [address], [address], [place] [address], [phone], or [address], [place] [address], [phone]    To File a Complaint with the Division: If you wish to file a complaint with the  of the Division of Industrial Relations (DIR), please contact the Workers’ Compensation Section, [address], [address], [place], [place] [address], telephone [phone], or [address], [place] [address], telephone [phone].    For assistance with Workers’ Compensation Issues: you may contact the Office of the Governor Consumer Health  Assistance, [address], [address], [place], [place] [address], Toll Free 1-438.513.3323, Web site: [email or web address] license of [email or web address]., E-mail [email or web address] of [email or web address].                                                                                                                                                                               __________________________________________________________________                                    _________________            Employee Name / Signature                                                                                                                            Date                                       D-2 (rev. 10/07)

## 2017-08-23 NOTE — ED AVS SNAPSHOT
Home Care Instructions                                                                                                                Katerina Owen   MRN: [id number]    Department:  [facility], Emergency Dept   Date of Visit:  8/23/2017            [facility], Emergency Dept    [address] [place] [address]    Phone:  [phone]      You were seen by     Artemio Cuenca II, M.D.      Your Diagnosis Was     Contusion of left foot, initial encounter     S90.32XA       Follow-up Information     1. Follow up with COLIN Robles Call in 1 day.    Specialty:  Family Medicine    Why:  For follow up from ER visit, if persistent pain over 1 week     Contact information    [address]  [place] [address] [phone]          2. Go to [facility], Emergency Dept.    Specialty:  Emergency Medicine    Why:  color changes to feet, weakness, worsening pain    Contact information    [address] [place] [address] [phone]      Medication Information     Review all of your home medications and newly ordered medications with your primary doctor and/or pharmacist as soon as possible. Follow medication instructions as directed by your doctor and/or pharmacist.     Please keep your complete medication list with you and share with your physician. Update the information when medications are discontinued, doses are changed, or new medications (including over-the-counter products) are added; and carry medication information at all times in the event of emergency situations.               Medication List      ASK your doctor about these medications        Instructions    Morning Afternoon Evening Bedtime    * amphetamine-dextroamphetamine 15 MG tablet   Commonly known as:  ADDERALL        Doctor's comments:  May fill 8/6/2017   Take 1 Tab by mouth 2 times a day.   Dose:  15 mg                        * amphetamine-dextroamphetamine 15 MG tablet   Commonly known as:  ADDERALL        Doctor's comments:  May fill 9/3/2017   Take 1 Tab by mouth 2 times a day.   Dose:  15 mg                        * amphetamine-dextroamphetamine 15 MG tablet   Commonly known as:  ADDERALL        Doctor's comments:  May fill 10/1/2017   Take 1 Tab by mouth 2 times a day.   Dose:  15 mg                        ASPIRIN-CAFFEINE-BUTALBITAL -40 MG Caps   Commonly known as:  FIORINAL        Take 1 Cap by mouth every four hours as needed.   Dose:  1 Cap                        meclizine 25 MG Tabs   Commonly known as:  ANTIVERT        Take 1 Tab by mouth 3 times a day as needed.   Dose:  25 mg                        sumatriptan 50 MG Tabs   Commonly known as:  IMITREX        Take 1 Tab by mouth Once PRN for Migraine for up to 1 dose.   Dose:  50 mg                        * Notice:  This list has 3 medication(s) that are the same as other medications prescribed for you. Read the directions carefully, and ask your doctor or other care provider to review them with you.            Procedures and tests performed during your visit     DX-FOOT-COMPLETE 3+ LEFT        Discharge Instructions       Contusion  A contusion is a deep bruise. Contusions are the result of an injury that caused bleeding under the skin. The contusion may turn blue, purple, or yellow. Minor injuries will give you a painless contusion, but more severe contusions may stay painful and swollen for a few weeks.   CAUSES   A contusion is usually caused by a blow, trauma, or direct force to an area of the body.  SYMPTOMS   · Swelling and redness of the injured area.  · Bruising of the injured area.  · Tenderness and soreness of the injured area.  · Pain.  DIAGNOSIS   The diagnosis can be made by taking a history and physical exam. An X-ray, CT scan, or MRI may be needed to determine if there were any associated injuries, such as fractures.  TREATMENT   Specific treatment  will depend on what area of the body was injured. In general, the best treatment for a contusion is resting, icing, elevating, and applying cold compresses to the injured area. Over-the-counter medicines may also be recommended for pain control. Ask your caregiver what the best treatment is for your contusion.  HOME CARE INSTRUCTIONS   · Put ice on the injured area.  ¨ Put ice in a plastic bag.  ¨ Place a towel between your skin and the bag.  ¨ Leave the ice on for 15-20 minutes, 3-4 times a day, or as directed by your health care provider.  · Only take over-the-counter or prescription medicines for pain, discomfort, or fever as directed by your caregiver. Your caregiver may recommend avoiding anti-inflammatory medicines (aspirin, ibuprofen, and naproxen) for 48 hours because these medicines may increase bruising.  · Rest the injured area.  · If possible, elevate the injured area to reduce swelling.  SEEK IMMEDIATE MEDICAL CARE IF:   · You have increased bruising or swelling.  · You have pain that is getting worse.  · Your swelling or pain is not relieved with medicines.  MAKE SURE YOU:   · Understand these instructions.  · Will watch your condition.  · Will get help right away if you are not doing well or get worse.     This information is not intended to replace advice given to you by your health care provider. Make sure you discuss any questions you have with your health care provider.     Document Released: 09/27/2006 Document Revised: 12/23/2014 Document Reviewed: 10/22/2012  [employer] Interactive Patient Education ©2016 [employer]    Foot Contusion  A foot contusion is a deep bruise to the foot. Contusions are the result of an injury that caused bleeding under the skin. The contusion may turn blue, purple, or yellow. Minor injuries will give you a painless contusion, but more severe contusions may stay painful and swollen for a few weeks.  CAUSES   A foot contusion comes from a direct blow to that area, such  as a heavy object falling on the foot.  SYMPTOMS   · Swelling of the foot.  · Discoloration of the foot.  · Tenderness or soreness of the foot.  DIAGNOSIS   You will have a physical exam and will be asked about your history. You may need an X-ray of your foot to look for a broken bone (fracture).   TREATMENT   An elastic wrap may be recommended to support your foot. Resting, elevating, and applying cold compresses to your foot are often the best treatments for a foot contusion. Over-the-counter medicines may also be recommended for pain control.  HOME CARE INSTRUCTIONS   · Put ice on the injured area.  ¨ Put ice in a plastic bag.  ¨ Place a towel between your skin and the bag.  ¨ Leave the ice on for 15-20 minutes, 03-04 times a day.  · Only take over-the-counter or prescription medicines for pain, discomfort, or fever as directed by your caregiver.  · If told, use an elastic wrap as directed. This can help reduce swelling. You may remove the wrap for sleeping, showering, and bathing. If your toes become numb, cold, or blue, take the wrap off and reapply it more loosely.  · Elevate your foot with pillows to reduce swelling.  · Try to avoid standing or walking while the foot is painful. Do not resume use until instructed by your caregiver. Then, begin use gradually. If pain develops, decrease use. Gradually increase activities that do not cause discomfort until you have normal use of your foot.  · See your caregiver as directed. It is very important to keep all follow-up appointments in order to avoid any lasting problems with your foot, including long-term (chronic) pain.  SEEK IMMEDIATE MEDICAL CARE IF:   · You have increased redness, swelling, or pain in your foot.  · Your swelling or pain is not relieved with medicines.  · You have loss of feeling in your foot or are unable to move your toes.  · Your foot turns cold or blue.  · You have pain when you move your toes.  · Your foot becomes warm to the touch.  · Your  contusion does not improve in 2 days.  MAKE SURE YOU:   · Understand these instructions.  · Will watch your condition.  · Will get help right away if you are not doing well or get worse.     This information is not intended to replace advice given to you by your health care provider. Make sure you discuss any questions you have with your health care provider.     Document Released: 10/09/2007 Document Revised: 06/18/2013 Document Reviewed: 11/20/2012  [employer] Interactive Patient Education ©2016 [employer]            Patient Information     Patient Information    Following emergency treatment: all patient requiring follow-up care must return either to a private physician or a clinic if your condition worsens before you are able to obtain further medical attention, please return to the emergency room.     Billing Information    At [facility], we work to make the billing process streamlined for our patients.  Our Representatives are here to answer any questions you may have regarding your hospital bill.  If you have insurance coverage and have supplied your insurance information to us, we will submit a claim to your insurer on your behalf.  Should you have any questions regarding your bill, we can be reached online or by phone as follows:  Online: You are able pay your bills online or live chat with our representatives about any billing questions you may have. We are here to help Monday - Friday from 8:00am to 7:30pm and 9:00am - 12:00pm on Saturdays.  Please visit [email or web address]  for more information.   Phone:  [phone] or [phone]    Please note that your emergency physician, surgeon, pathologist, radiologist, anesthesiologist, and other specialists are not employed by [facility] and will therefore bill separately for their services.  Please contact them directly for any questions concerning their bills at the numbers below:     Emergency Physician Services:   [phone]  [facility]:  [phone]  [facility]:  [phone]  [facility]:  [phone]    1. Your final bill may vary from the amount quoted upon discharge if all procedures are not complete at that time, or if your doctor has additional procedures of which we are not aware. You will receive an additional bill if you return to the Emergency Department at [facility] for suture removal regardless of the facility of which the sutures were placed.     2. Please arrange for settlement of this account at the emergency registration.    3. All self-pay accounts are due in full at the time of treatment.  If you are unable to meet this obligation then payment is expected within 4-5 days.     4. If you have had radiology studies (CT, X-ray, Ultrasound, MRI), you have received a preliminary result during your emergency department visit. Please contact the radiology department [phone] to receive a copy of your final result. Please discuss the Final result with your primary physician or with the follow up physician provided.     Crisis Hotline:  [place] Crisis Hotline:  4-273-VVOKRXT or 1-928.619.3983  [place] Crisis Hotline:    1-441.834.1698 or 688-296-8367         ED Discharge Follow Up Questions    1. In order to provide you with very good care, we would like to follow up with a phone call in the next few days.  May we have your permission to contact you?     YES /  NO    2. What is the best phone number to call you? (       )_____-__________    3. What is the best time to call you?      Morning  /  Afternoon  /  Evening                   Patient Signature:  ____________________________________________________________    Date:  ____________________________________________________________

## 2017-08-23 NOTE — ED PROVIDER NOTES
"ED Provider Note    CHIEF COMPLAINT  No chief complaint on file.  Left foot pain    HPI  Katerina Owen is a 31 y.o. female who presents with left foot pain. She says she was at work when a 700 pound object fell directly onto her left foot. This happened approximately 5 hours ago. She has been ambulatory. She reports hearing cracking sound. Pain is throbbing and not improving over the past few hours. No ankle pain. No prior injuries to the foot. Worse with walking. She has not taken anything for the pain.    REVIEW OF SYSTEMS  Review of Systems   Musculoskeletal:        See hpi   Skin:        No wounds   Neurological: Positive for tingling (around area of injury).   All other systems reviewed and are negative.    See HPI for further details. All other systems are negative.     PAST MEDICAL HISTORY   has a past medical history of Migraine; HTN (hypertension); Pre-eclampsia; Chlamydia (5/11/2011); and depression.    SOCIAL HISTORY  Social History     Social History Main Topics   • Smoking status: Current Some Day Smoker     Last Attempt to Quit: 12/26/2014   • Smokeless tobacco: Former User     Quit date: 10/30/2014      Comment: quit 8 yrs ago   • Alcohol Use: No      Comment: vape   • Drug Use: No   • Sexual Activity:     Partners: Male     Birth Control/ Protection: Surgical      Comment: ; three kids; wk: not working       SURGICAL HISTORY   has past surgical history that includes septoturbinoplasty (4/21/2009); lumpectomy; breast biopsy (Left, 2/26/2016); primary c section (Aug 7, 2007); and repeat c section w tubal ligation (9/7/2011).    CURRENT MEDICATIONS  Home Medications     **Home medications have not yet been reviewed for this encounter**          ALLERGIES  Allergies   Allergen Reactions   • Morphine Nausea     \"feel sick for a whole day after being given morphine\"  RXN=2/2016     • Vicodin [Hydrocodone-Acetaminophen] Vomiting and Nausea     GBP=1773       PHYSICAL EXAM  VITAL SIGNS: BP " "168/120 mmHg  Pulse 84  Temp(Src) 36.5 °C (97.7 °F)  Resp 18  Ht 1.727 m (5' 7.99\")  Wt 85 kg (187 lb 6.3 oz)  BMI 28.50 kg/m2  SpO2 98%   Pulse ox interpretation: I interpret this pulse ox as normal.  Constitutional: Alert in no apparent distress.  HENT: Normocephalic, Atraumatic, Bilateral external ears normal. Nose normal.   Eyes: Pupils are equal and reactive. Conjunctiva normal, non-icteric.   Heart: Regular rate. Intact DP pulse. Brisk capillary refill in left foot toes.   Lungs: Regular rate.   Skin: Warm, Dry, No erythema, No rash.   Extremity:  Left foot with faint ecchymosis after proximal dorsal surface of toe.  No ankle tenderness.   Neurologic: Alert, Grossly non-focal. There is subjective numbness to right great toe. 5/5 strength at all toes.   Psychiatric: Affect normal, Judgment normal, Mood normal, Appears appropriate and not intoxicated.       COURSE & MEDICAL DECISION MAKING  Pertinent Labs & Imaging studies reviewed. (See chart for details)    This is an emergent evaluation of a 31-year-old woman presenting to the ED with left foot pain after 700 pound object fell onto her foot. She is able ambulate. Differential diagnosis includes crush injury, contusion, fracture, dislocation.  X-ray was done that did not reveal any bony abnormalities. Her foot is well perfused. There is some subjective numbness around the great toe but strength is normal. Recommended she elevate foot, ice, take NSAIDs. If any worsening severe pain to return. If any significant color changes to foot to return.    The patient will not drink alcohol nor drive with prescribed medications. The patient will return for worsening symptoms and is stable at the time of discharge. The patient verbalizes understanding and will comply.    FINAL IMPRESSION  1. Contusion to left foot.  2.   3.         Electronically signed by: Artemio Cuenca II, 8/23/2017 3:55 AM      "

## 2017-08-23 NOTE — LETTER
"  FORM C-4:  EMPLOYEE’S CLAIM FOR COMPENSATION/ REPORT OF INITIAL TREATMENT  EMPLOYEE’S CLAIM - PROVIDE ALL INFORMATION REQUESTED   First Name  Katerina Last Name  Brayden Birthdate             Age  1985 31 y.o. 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 (if applicable)  Pulling Pallet Garry    How did this injury or occupational disease occur? Be specific and answer in detail. Use additional sheet if necessary)  I was walking backwards kalen a pallet Garry when I tripped over 2 empty pallets, then the pallet hit my left ankle.   If you believe that you have an occupational disease, when did you first have knowledge of the disability and it relationship to your employment?  na Witnesses to the Accident  na     Nature of Injury or Occupational Disease  Contusion  Part(s) of Body Injured or Affected  Foot (L), N/A, N/A    I certify that the above is true and correct to the best of my knowledge and that I have provided this " information in order to obtain the benefits of [place]  I hereby authorize any physician, chiropractor, surgeon, practitioner, or other person, any hospital, including [facility] or [facility], any medical service organization, any insurance company, or other institution or organization to release to each other, any medical or other information, including benefits paid or payable, pertinent to this injury or disease, except information relative to diagnosis, treatment and/or counseling for AIDS, psychological conditions, alcohol or controlled substances, for which I must give specific authorization.  A Photostat of this authorization shall be as valid as the original.   Date 08/23/2017 Place  [facility] Employee’s Signature   THIS REPORT MUST BE COMPLETED AND MAILED WITHIN 3 WORKING DAYS OF TREATMENT   Place  [facility], EMERGENCY DEPT  Name of Facility   [facility]   Date  8/23/2017 Diagnosis  (S90.32XA) Contusion of left foot, initial encounter  (primary encounter diagnosis) Is there evidence the injured employee was under the influence of alcohol and/or another controlled substance at the time of accident?   Hour  4:25 AM Description of Injury or Disease  Contusion of left foot, initial encounter No   Treatment  Xray, ice  Have you advised the patient to remain off work five days or more?         No   X-Ray Findings  Negative   If Yes   From Date    To Date      From information given by the employee, together with medical evidence, can you directly connect this injury or occupational disease as job incurred?  Yes If No, is the employee capable of: Full Duty  Yes Modified Duty      Is additional medical care by a physician indicated?  No If Modified Duty, Specify any Limitations / Restrictions        Do you know of any previous  "injury or disease contributing to this condition or occupational disease?  No   Date  8/23/2017 Print Doctor’s Name  Artemio Cuenca Ii certify the employer’s copy of this form was mailed on:   Address  [address] [address] [phone] Insurer’s Use Only   [facility]  [address]    Provider’s Tax ID Number  [id number] Telephone  Dept: [phone]    Doctor’s Signature  e-ARTEMIO Galvez II, M.D. Degree   MD    Original - TREATING PHYSICIAN OR CHIROPRACTOR   Pg 2-Insurer/TPA   Pg 3-Employer   Pg 4-Employee                                                                                                  Form C-4 (rev01/03)     BRIEF DESCRIPTION OF RIGHTS AND BENEFITS  (Pursuant to [place] 616C.[place])    Notice of Injury or Occupational Disease (Incident Report Form C-1): If an injury or occupational disease (OD) arises out of and in the course of employment, you must provide written notice to your employer as soon as practicable, but no later than 7 days after the accident or OD. Your employer shall maintain a sufficient supply of the required forms.    Claim for Compensation (Form C-4): If medical treatment is sought, the form C-4 is available at the place of initial treatment. A completed \"Claim for Compensation\" (Form C-4) must be filed within 90 days after an accident or OD. The treating physician or chiropractor must, within 3 working days after treatment, complete and mail to the employer, the employer's insurer and third-party , the Claim for Compensation.    Medical Treatment: If you require medical treatment for your on-the-job injury or OD, you may be required to select a physician or chiropractor from a list provided by your workers’ compensation insurer, if it has contracted with an Organization for Managed Care (MCO) or Preferred Provider Organization (PPO) or providers of health care. If your employer has not entered into a contract with an MCO " or PPO, you may select a physician or chiropractor from the Panel of Physicians and Chiropractors. Any medical costs related to your industrial injury or OD will be paid by your insurer.    Temporary Total Disability (TTD): If your doctor has certified that you are unable to work for a period of at least 5 consecutive days, or 5 cumulative days in a 20-day period, or places restrictions on you that your employer does not accommodate, you may be entitled to TTD compensation.    Temporary Partial Disability (TPD): If the wage you receive upon reemployment is less than the compensation for TTD to which you are entitled, the insurer may be required to pay you TPD compensation to make up the difference. TPD can only be paid for a maximum of 24 months.    Permanent Partial Disability (PPD): When your medical condition is stable and there is an indication of a PPD as a result of your injury or OD, within 30 days, your insurer must arrange for an evaluation by a rating physician or chiropractor to determine the degree of your PPD. The amount of your PPD award depends on the date of injury, the results of the PPD evaluation and your age and wage.    Permanent Total Disability (PTD): If you are medically certified by a treating physician or chiropractor as permanently and totally disabled and have been granted a PTD status by your insurer, you are entitled to receive monthly benefits not to exceed 66 2/3% of your average monthly wage. The amount of your PTD payments is subject to reduction if you previously received a PPD award.    Vocational Rehabilitation Services: You may be eligible for vocational rehabilitation services if you are unable to return to the job due to a permanent physical impairment or permanent restrictions as a result of your injury or occupational disease.    Transportation and Per Mack Reimbursement: You may be eligible for travel expenses and per mack associated with medical treatment.  Reopening: You  may be able to reopen your claim if your condition worsens after claim closure.    Appeal Process: If you disagree with a written determination issued by the insurer or the insurer does not respond to your request, you may appeal to the Department of Administration, , by following the instructions contained in your determination letter. You must appeal the determination within 70 days from the date of the determination letter at [address], [address], [place], [place] [address], or [address], [place], [place] [address]. If you disagree with the  decision, you may appeal to the Department of Administration, . You must file your appeal within 30 days from the date of the  decision letter at [address], [address], [place], [place] [address], or [address], [address], [place], [place] [address]. If you disagree with a decision of an , you may file a petition for judicial review with the District Court. You must do so within 30 days of the Appeal Officer’s decision. You may be represented by an  at your own expense or you may contact the [facility] for possible representation.    [place]  for Injured Workers (NAIW): If you disagree with a  decision, you may request that NAIW represent you without charge at an  Hearing. For information regarding denial of benefits, you may contact the [facility] at: [address], [address], [place] [address], [phone], or [address], [place] [address], [phone]    To File a Complaint with the Division: If you wish to file a complaint with the  of the Division of Industrial Relations (DIR), please contact the Workers’ Compensation Section, [address], [address], [place], [place] [address], telephone [phone], or [address] [facility],  [place], [place] [address], telephone [phone].    For assistance with Workers’ Compensation Issues: you may contact the Office of the Governor Consumer Health Assistance, [address] [place] [address], [facility] [address], [place], [place] [address], Toll Free 1-977.735.2514, Web site: http://[employer].[facility].nv., E-mail [email or web address].                                                                                                                                                                                         08/23/2017            __________________________________________________________________                                    _________________            Employee Name / Signature                                                                                                                            Date                                       D-2 (rev. 10/07)

## 2017-08-23 NOTE — ED AVS SNAPSHOT
8/23/2017    Katerina Murry [address] [place] [address]    Dear Katerina:    [facility] wants to ensure your discharge home is safe and you or your loved ones have had all of your questions answered regarding your care after you leave the hospital.    Below is a list of resources and contact information should you have any questions regarding your hospital stay, follow-up instructions, or active medical symptoms.    Questions or Concerns Regarding… Contact   Medical Questions Related to Your Discharge  (7 days a week, 8am-5pm) Contact a Nurse Care Coordinator   [phone]   Medical Questions Not Related to Your Discharge  (24 hours a day / 7 days a week)  Contact the Nurse Health Line   [phone]    Medications or Discharge Instructions Refer to your discharge packet   or contact your [facility] [place] [facility] Primary Care Provider   [phone]   Follow-up Appointment(s) Schedule your appointment via [employer]   or contact Scheduling [phone]   Billing Review your statement via [employer]  or contact Billing [phone]   Medical Records Review your records via [employer]   or contact Medical Records [phone]     You may receive a telephone call within two days of discharge. This call is to make certain you understand your discharge instructions and have the opportunity to have any questions answered. You can also easily access your medical information, test results and upcoming appointments via the [employer] free online health management tool. You can learn more and sign up at [email or web address]. For assistance setting up your [employer] account, please call [phone].    Once again, we want to ensure your discharge home is safe and that you have a clear understanding of any next steps in your care. If you have any questions or concerns, please do not hesitate to contact us, we are here for you. Thank you for choosing [facility] [place] [facility] for your healthcare needs.    Sincerely,    Your [facility] [place] [facility] Healthcare Team

## 2017-08-23 NOTE — DISCHARGE INSTRUCTIONS
Contusion  A contusion is a deep bruise. Contusions are the result of an injury that caused bleeding under the skin. The contusion may turn blue, purple, or yellow. Minor injuries will give you a painless contusion, but more severe contusions may stay painful and swollen for a few weeks.   CAUSES   A contusion is usually caused by a blow, trauma, or direct force to an area of the body.  SYMPTOMS   · Swelling and redness of the injured area.  · Bruising of the injured area.  · Tenderness and soreness of the injured area.  · Pain.  DIAGNOSIS   The diagnosis can be made by taking a history and physical exam. An X-ray, CT scan, or MRI may be needed to determine if there were any associated injuries, such as fractures.  TREATMENT   Specific treatment will depend on what area of the body was injured. In general, the best treatment for a contusion is resting, icing, elevating, and applying cold compresses to the injured area. Over-the-counter medicines may also be recommended for pain control. Ask your caregiver what the best treatment is for your contusion.  HOME CARE INSTRUCTIONS   · Put ice on the injured area.  ¨ Put ice in a plastic bag.  ¨ Place a towel between your skin and the bag.  ¨ Leave the ice on for 15-20 minutes, 3-4 times a day, or as directed by your health care provider.  · Only take over-the-counter or prescription medicines for pain, discomfort, or fever as directed by your caregiver. Your caregiver may recommend avoiding anti-inflammatory medicines (aspirin, ibuprofen, and naproxen) for 48 hours because these medicines may increase bruising.  · Rest the injured area.  · If possible, elevate the injured area to reduce swelling.  SEEK IMMEDIATE MEDICAL CARE IF:   · You have increased bruising or swelling.  · You have pain that is getting worse.  · Your swelling or pain is not relieved with medicines.  MAKE SURE YOU:   · Understand these instructions.  · Will watch your condition.  · Will get help right  away if you are not doing well or get worse.     This information is not intended to replace advice given to you by your health care provider. Make sure you discuss any questions you have with your health care provider.     Document Released: 09/27/2006 Document Revised: 12/23/2014 Document Reviewed: 10/22/2012  [employer] Interactive Patient Education ©2016 [employer]    Foot Contusion  A foot contusion is a deep bruise to the foot. Contusions are the result of an injury that caused bleeding under the skin. The contusion may turn blue, purple, or yellow. Minor injuries will give you a painless contusion, but more severe contusions may stay painful and swollen for a few weeks.  CAUSES   A foot contusion comes from a direct blow to that area, such as a heavy object falling on the foot.  SYMPTOMS   · Swelling of the foot.  · Discoloration of the foot.  · Tenderness or soreness of the foot.  DIAGNOSIS   You will have a physical exam and will be asked about your history. You may need an X-ray of your foot to look for a broken bone (fracture).   TREATMENT   An elastic wrap may be recommended to support your foot. Resting, elevating, and applying cold compresses to your foot are often the best treatments for a foot contusion. Over-the-counter medicines may also be recommended for pain control.  HOME CARE INSTRUCTIONS   · Put ice on the injured area.  ¨ Put ice in a plastic bag.  ¨ Place a towel between your skin and the bag.  ¨ Leave the ice on for 15-20 minutes, 03-04 times a day.  · Only take over-the-counter or prescription medicines for pain, discomfort, or fever as directed by your caregiver.  · If told, use an elastic wrap as directed. This can help reduce swelling. You may remove the wrap for sleeping, showering, and bathing. If your toes become numb, cold, or blue, take the wrap off and reapply it more loosely.  · Elevate your foot with pillows to reduce swelling.  · Try to avoid standing or walking while the  foot is painful. Do not resume use until instructed by your caregiver. Then, begin use gradually. If pain develops, decrease use. Gradually increase activities that do not cause discomfort until you have normal use of your foot.  · See your caregiver as directed. It is very important to keep all follow-up appointments in order to avoid any lasting problems with your foot, including long-term (chronic) pain.  SEEK IMMEDIATE MEDICAL CARE IF:   · You have increased redness, swelling, or pain in your foot.  · Your swelling or pain is not relieved with medicines.  · You have loss of feeling in your foot or are unable to move your toes.  · Your foot turns cold or blue.  · You have pain when you move your toes.  · Your foot becomes warm to the touch.  · Your contusion does not improve in 2 days.  MAKE SURE YOU:   · Understand these instructions.  · Will watch your condition.  · Will get help right away if you are not doing well or get worse.     This information is not intended to replace advice given to you by your health care provider. Make sure you discuss any questions you have with your health care provider.     Document Released: 10/09/2007 Document Revised: 06/18/2013 Document Reviewed: 11/20/2012  [employer] Interactive Patient Education ©2016 [employer]

## 2017-08-23 NOTE — ED AVS SNAPSHOT
Aegis Access Code: Activation code not generated  Current Aegis Status: Active    ClearRiskhart  A secure, online tool to manage your health information     [employer]’s Aegis® is a secure, online tool that connects you to your personalized health information from the privacy of your home -- day or night - making it very easy for you to manage your healthcare. Once the activation process is completed, you can even access your medical information using the Aegis vadim, which is available for free in the Apple Vadim store or Google Play store.     Aegis provides the following levels of access (as shown below):   My Chart Features   [facility] Primary Care Doctor [facility]  Specialists [facility]  Urgent  Care Non-[facility]  Primary Care  Doctor   Email your healthcare team securely and privately 24/7 X X X X   Manage appointments: schedule your next appointment; view details of past/upcoming appointments X      Request prescription refills. X      View recent personal medical records, including lab and immunizations X X X X   View health record, including health history, allergies, medications X X X X   Read reports about your outpatient visits, procedures, consult and ER notes X X X X   See your discharge summary, which is a recap of your hospital and/or ER visit that includes your diagnosis, lab results, and care plan. X X       How to register for Aegis:  1. Go to  [email or web address].  2. Click on the Sign Up Now box, which takes you to the New Member Sign Up page. You will need to provide the following information:  a. Enter your Aegis Access Code exactly as it appears at the top of this page. (You will not need to use this code after you’ve completed the sign-up process. If you do not sign up before the expiration date, you must request a new code.)   b. Enter your date of birth.   c. Enter your home email address.   d. Click Submit, and follow the next screen’s instructions.  3. Create a Aegis ID. This will  be your [employer] login ID and cannot be changed, so think of one that is secure and easy to remember.  4. Create a [employer]less password. You can change your password at any time.  5. Enter your Password Reset Question and Answer. This can be used at a later time if you forget your password.   6. Enter your e-mail address. This allows you to receive e-mail notifications when new information is available in Xercise[employer]less.  7. Click Sign Up. You can now view your health information.    For assistance activating your [employer] account, call [phone]

## 2017-08-31 ENCOUNTER — TELEPHONE (OUTPATIENT)
Dept: MEDICAL GROUP | Facility: LAB | Age: 32
End: 2017-08-31

## 2017-08-31 NOTE — TELEPHONE ENCOUNTER
Patient is going out of town on tomorrow and her adderall is due for refill on the 3rd patient will not be back till the middle of next week. Patient would like to see if it would be alright to fill on the 1st

## 2017-10-02 DIAGNOSIS — R10.31 INTERMITTENT RIGHT LOWER QUADRANT ABDOMINAL PAIN: ICD-10-CM

## 2017-10-20 ENCOUNTER — HOSPITAL ENCOUNTER (OUTPATIENT)
Dept: RADIOLOGY | Facility: MEDICAL CENTER | Age: 32
End: 2017-10-20
Attending: NURSE PRACTITIONER
Payer: COMMERCIAL

## 2017-10-20 DIAGNOSIS — R10.31 INTERMITTENT RIGHT LOWER QUADRANT ABDOMINAL PAIN: ICD-10-CM

## 2017-10-20 PROCEDURE — 76857 US EXAM PELVIC LIMITED: CPT

## 2017-10-24 DIAGNOSIS — F90.2 ATTENTION DEFICIT HYPERACTIVITY DISORDER (ADHD), COMBINED TYPE: ICD-10-CM

## 2017-10-24 DIAGNOSIS — F98.8 ATTENTION DEFICIT DISORDER, UNSPECIFIED HYPERACTIVITY PRESENCE: ICD-10-CM

## 2017-10-24 RX ORDER — DEXTROAMPHETAMINE SACCHARATE, AMPHETAMINE ASPARTATE, DEXTROAMPHETAMINE SULFATE AND AMPHETAMINE SULFATE 3.75; 3.75; 3.75; 3.75 MG/1; MG/1; MG/1; MG/1
15 TABLET ORAL 2 TIMES DAILY
Qty: 56 TAB | Refills: 0 | Status: SHIPPED | OUTPATIENT
Start: 2017-10-24 | End: 2017-12-19

## 2017-10-25 NOTE — TELEPHONE ENCOUNTER
Was the patient seen in the last year in this department? Yes  refill 10/1/17    Does patient have an active prescription for medications requested? No     Received Request Via: Patient

## 2017-10-28 ENCOUNTER — APPOINTMENT (OUTPATIENT)
Dept: RADIOLOGY | Facility: MEDICAL CENTER | Age: 32
End: 2017-10-28
Attending: EMERGENCY MEDICINE
Payer: COMMERCIAL

## 2017-10-28 ENCOUNTER — HOSPITAL ENCOUNTER (EMERGENCY)
Facility: MEDICAL CENTER | Age: 32
End: 2017-10-29
Attending: EMERGENCY MEDICINE
Payer: COMMERCIAL

## 2017-10-28 ENCOUNTER — NON-PROVIDER VISIT (OUTPATIENT)
Dept: OCCUPATIONAL MEDICINE | Facility: CLINIC | Age: 32
End: 2017-10-28

## 2017-10-28 DIAGNOSIS — R10.31 RIGHT LOWER QUADRANT ABDOMINAL PAIN: ICD-10-CM

## 2017-10-28 DIAGNOSIS — K76.0 FATTY LIVER: ICD-10-CM

## 2017-10-28 DIAGNOSIS — Z02.1 PRE-EMPLOYMENT DRUG SCREENING: ICD-10-CM

## 2017-10-28 DIAGNOSIS — Z02.83 ENCOUNTER FOR DRUG SCREENING: ICD-10-CM

## 2017-10-28 LAB
ALBUMIN SERPL BCP-MCNC: 4.2 G/DL (ref 3.2–4.9)
ALBUMIN/GLOB SERPL: 1.3 G/DL
ALP SERPL-CCNC: 80 U/L (ref 30–99)
ALT SERPL-CCNC: 32 U/L (ref 2–50)
ANION GAP SERPL CALC-SCNC: 9 MMOL/L (ref 0–11.9)
APPEARANCE UR: CLEAR
AST SERPL-CCNC: 26 U/L (ref 12–45)
BASOPHILS # BLD AUTO: 0.4 % (ref 0–1.8)
BASOPHILS # BLD: 0.05 K/UL (ref 0–0.12)
BILIRUB SERPL-MCNC: 0.6 MG/DL (ref 0.1–1.5)
BILIRUB UR QL STRIP.AUTO: NEGATIVE
BUN SERPL-MCNC: 8 MG/DL (ref 8–22)
CALCIUM SERPL-MCNC: 8.9 MG/DL (ref 8.4–10.2)
CHLORIDE SERPL-SCNC: 100 MMOL/L (ref 96–112)
CO2 SERPL-SCNC: 26 MMOL/L (ref 20–33)
COLOR UR: YELLOW
CREAT SERPL-MCNC: 0.66 MG/DL (ref 0.5–1.4)
CULTURE IF INDICATED INDCX: NO UA CULTURE
EOSINOPHIL # BLD AUTO: 0.12 K/UL (ref 0–0.51)
EOSINOPHIL NFR BLD: 1.1 % (ref 0–6.9)
EPI CELLS #/AREA URNS HPF: NORMAL /HPF
ERYTHROCYTE [DISTWIDTH] IN BLOOD BY AUTOMATED COUNT: 42.2 FL (ref 35.9–50)
GFR SERPL CREATININE-BSD FRML MDRD: >60 ML/MIN/1.73 M 2
GLOBULIN SER CALC-MCNC: 3.3 G/DL (ref 1.9–3.5)
GLUCOSE SERPL-MCNC: 85 MG/DL (ref 65–99)
GLUCOSE UR STRIP.AUTO-MCNC: NEGATIVE MG/DL
HCG UR QL: NEGATIVE
HCT VFR BLD AUTO: 39.9 % (ref 37–47)
HGB BLD-MCNC: 13.1 G/DL (ref 12–16)
IMM GRANULOCYTES # BLD AUTO: 0.03 K/UL (ref 0–0.11)
IMM GRANULOCYTES NFR BLD AUTO: 0.3 % (ref 0–0.9)
KETONES UR STRIP.AUTO-MCNC: NEGATIVE MG/DL
LEUKOCYTE ESTERASE UR QL STRIP.AUTO: NEGATIVE
LYMPHOCYTES # BLD AUTO: 3.5 K/UL (ref 1–4.8)
LYMPHOCYTES NFR BLD: 31.5 % (ref 22–41)
MCH RBC QN AUTO: 27.1 PG (ref 27–33)
MCHC RBC AUTO-ENTMCNC: 32.8 G/DL (ref 33.6–35)
MCV RBC AUTO: 82.4 FL (ref 81.4–97.8)
MICRO URNS: ABNORMAL
MONOCYTES # BLD AUTO: 0.79 K/UL (ref 0–0.85)
MONOCYTES NFR BLD AUTO: 7.1 % (ref 0–13.4)
NEUTROPHILS # BLD AUTO: 6.63 K/UL (ref 2–7.15)
NEUTROPHILS NFR BLD: 59.6 % (ref 44–72)
NITRITE UR QL STRIP.AUTO: NEGATIVE
NRBC # BLD AUTO: 0 K/UL
NRBC BLD AUTO-RTO: 0 /100 WBC
PH UR STRIP.AUTO: 6 [PH]
PLATELET # BLD AUTO: 325 K/UL (ref 164–446)
PMV BLD AUTO: 10 FL (ref 9–12.9)
POTASSIUM SERPL-SCNC: 3.3 MMOL/L (ref 3.6–5.5)
PROT SERPL-MCNC: 7.5 G/DL (ref 6–8.2)
PROT UR QL STRIP: NEGATIVE MG/DL
RBC # BLD AUTO: 4.84 M/UL (ref 4.2–5.4)
RBC # URNS HPF: NORMAL /HPF
RBC UR QL AUTO: ABNORMAL
SODIUM SERPL-SCNC: 135 MMOL/L (ref 135–145)
SP GR UR STRIP.AUTO: 1.01
WBC # BLD AUTO: 11.1 K/UL (ref 4.8–10.8)
WBC #/AREA URNS HPF: NORMAL /HPF

## 2017-10-28 PROCEDURE — 36415 COLL VENOUS BLD VENIPUNCTURE: CPT

## 2017-10-28 PROCEDURE — 80305 DRUG TEST PRSMV DIR OPT OBS: CPT | Performed by: INTERNAL MEDICINE

## 2017-10-28 PROCEDURE — 82075 ASSAY OF BREATH ETHANOL: CPT | Performed by: INTERNAL MEDICINE

## 2017-10-28 PROCEDURE — 80053 COMPREHEN METABOLIC PANEL: CPT

## 2017-10-28 PROCEDURE — 8911 PR MRO FEE: Performed by: INTERNAL MEDICINE

## 2017-10-28 PROCEDURE — A9270 NON-COVERED ITEM OR SERVICE: HCPCS | Performed by: EMERGENCY MEDICINE

## 2017-10-28 PROCEDURE — 81025 URINE PREGNANCY TEST: CPT

## 2017-10-28 PROCEDURE — 81001 URINALYSIS AUTO W/SCOPE: CPT

## 2017-10-28 PROCEDURE — 85025 COMPLETE CBC W/AUTO DIFF WBC: CPT

## 2017-10-28 PROCEDURE — 99284 EMERGENCY DEPT VISIT MOD MDM: CPT

## 2017-10-28 PROCEDURE — 74177 CT ABD & PELVIS W/CONTRAST: CPT

## 2017-10-28 PROCEDURE — 700102 HCHG RX REV CODE 250 W/ 637 OVERRIDE(OP): Performed by: EMERGENCY MEDICINE

## 2017-10-28 PROCEDURE — 8899 PR URINE 11 PANEL - AFTER HOURS: Performed by: INTERNAL MEDICINE

## 2017-10-28 RX ORDER — ACETAMINOPHEN 325 MG/1
650 TABLET ORAL ONCE
Status: COMPLETED | OUTPATIENT
Start: 2017-10-28 | End: 2017-10-28

## 2017-10-28 RX ADMIN — ACETAMINOPHEN 650 MG: 325 TABLET, FILM COATED ORAL at 23:28

## 2017-10-28 ASSESSMENT — PAIN SCALES - GENERAL
PAINLEVEL_OUTOF10: 5
PAINLEVEL_OUTOF10: 5

## 2017-10-28 NOTE — LETTER
"  FORM C-4:  EMPLOYEE’S CLAIM FOR COMPENSATION/ REPORT OF INITIAL TREATMENT  EMPLOYEE’S CLAIM - PROVIDE ALL INFORMATION REQUESTED   First Name  Katerina Last Name  Brayden Birthdate             Age  1985 32 y.o. Sex  female Claim Number   Home Employee Address  [address]  [facility]                                     Zip  [address] Height  1.727 m (5' 8\") Weight  86 kg (189 lb 9.5 oz) [facility]     Mailing Employee Address                           [address]   [facility]               Zip  [address] Telephone  [phone] (home)  Primary Language Spoken  ENGLISH   Insurer  [employer]   [employer] Employee's Occupation (Job Title) When Injury or Occupational Disease Occurred      Employer's Name  [employer] Telephone  [phone]    Employer Address  [address] [place] [address]] Zip  [address]   Date of Injury  10/28/2017       Hour of Injury  8:05 PM Date Employer Notified  10/28/2017 Last Day of Work after Injury or Occupational Disease  10/28/2017 Supervisor to Whom Injury Reported  Tristin Alex / Mauro Payne   Address or Location of Accident (if applicable)  [address]   What were you doing at the time of accident? (if applicable)  Lifting    How did this injury or occupational disease occur? Be specific and answer in detail. Use additional sheet if necessary)  I was lifting a 50LB Sack of Powder when i felt a Pop in my left side. It stated to burn as well   If you believe that you have an occupational disease, when did you first have knowledge of the disability and it relationship to your employment?   Witnesses to the Fannie Short     Nature of Injury or Occupational Disease  Workers' Compensation  Part(s) of Body Injured or Affected  Abdomen Including Groin, N/A, N/A    I certify that the above is true and correct to the best of my " knowledge and that I have provided this information in order to obtain the benefits of [place] or [place]).  I hereby authorize any physician, chiropractor, surgeon, practitioner, or other person, any hospital, including [facility], any medical service organization, any insurance company, or other institution or organization to release to each other, any medical or other information, including benefits paid or payable, pertinent to this injury or disease, except information relative to diagnosis, treatment and/or counseling for AIDS, psychological conditions, alcohol or controlled substances, for which I must give specific authorization.  A Photostat of this authorization shall be as valid as the original.   Date 10/29/2017 [facility]  Employee’s Signature   THIS REPORT MUST BE COMPLETED AND MAILED WITHIN 3 WORKING DAYS OF TREATMENT   Place  [facility], EMERGENCY DEPT  Name of Facility   [facility]   Date  10/28/2017 Diagnosis  (R10.31) Right lower quadrant abdominal pain  (K76.0) Fatty liver Is there evidence the injured employee was under the influence of alcohol and/or another controlled substance at the time of accident?   Hour  12:38 AM Description of Injury or Disease  Right lower quadrant abdominal pain  Fatty liver No   Treatment  CT, labs, pain medication  Have you advised the patient to remain off work five days or more?         No   X-Ray Findings  Negative   If Yes   From Date    To Date      From information given by the employee, together with medical evidence, can you directly connect this injury or occupational disease as job incurred?  No If No, is the employee capable of: Full Duty  No Modified Duty  Yes   Is additional medical care by a physician indicated?  Yes If Modified Duty, Specify any Limitations /  "Restrictions  No lifting over 10 lbs     Do you know of any previous injury or disease contributing to this condition or occupational disease?  Yes  Comments:Patient has a history of    Date  10/29/2017 Print Doctor’s Name  Kay Osborne certify the employer’s copy of this form was mailed on:   Address  [address] [id number] [phone] Insurer’s Use Only   [facility]  [address]    Provider’s Tax ID Number  [id number] Telephone  Dept: [phone]    Doctor’s Signature  e-SignKAY OSBORNE D.O. Degree  D.O.    Original - TREATING PHYSICIAN OR CHIROPRACTOR   Pg 2-Insurer/TPA   Pg 3-Employer   Pg 4-Employee                                                                                                  Form C-4 (rev)     BRIEF DESCRIPTION OF RIGHTS AND BENEFITS  (Pursuant to [place]RS 616C.050)    Notice of Injury or Occupational Disease (Incident Report Form C-1): If an injury or occupational disease (OD) arises out of and in the course of employment, you must provide written notice to your employer as soon as practicable, but no later than 7 days after the accident or OD. Your employer shall maintain a sufficient supply of the required forms.    Claim for Compensation (Form C-4): If medical treatment is sought, the form C-4 is available at the place of initial treatment. A completed \"Claim for Compensation\" (Form C-4) must be filed within 90 days after an accident or OD. The treating physician or chiropractor must, within 3 working days after treatment, complete and mail to the employer, the employer's insurer and third-party , the Claim for Compensation.    Medical Treatment: If you require medical treatment for your on-the-job injury or OD, you may be required to select a physician or chiropractor from a list provided by your workers’ compensation insurer, if it has contracted with an Organization for Managed Care (MCO) or Preferred Provider Organization " (PPO) or providers of health care. If your employer has not entered into a contract with an MCO or PPO, you may select a physician or chiropractor from the Panel of Physicians and Chiropractors. Any medical costs related to your industrial injury or OD will be paid by your insurer.    Temporary Total Disability (TTD): If your doctor has certified that you are unable to work for a period of at least 5 consecutive days, or 5 cumulative days in a 20-day period, or places restrictions on you that your employer does not accommodate, you may be entitled to TTD compensation.    Temporary Partial Disability (TPD): If the wage you receive upon reemployment is less than the compensation for TTD to which you are entitled, the insurer may be required to pay you TPD compensation to make up the difference. TPD can only be paid for a maximum of 24 months.    Permanent Partial Disability (PPD): When your medical condition is stable and there is an indication of a PPD as a result of your injury or OD, within 30 days, your insurer must arrange for an evaluation by a rating physician or chiropractor to determine the degree of your PPD. The amount of your PPD award depends on the date of injury, the results of the PPD evaluation and your age and wage.    Permanent Total Disability (PTD): If you are medically certified by a treating physician or chiropractor as permanently and totally disabled and have been granted a PTD status by your insurer, you are entitled to receive monthly benefits not to exceed 66 2/3% of your average monthly wage. The amount of your PTD payments is subject to reduction if you previously received a PPD award.    Vocational Rehabilitation Services: You may be eligible for vocational rehabilitation services if you are unable to return to the job due to a permanent physical impairment or permanent restrictions as a result of your injury or occupational disease.    Transportation and Per Odalys Reimbursement: You may  be eligible for travel expenses and per mack associated with medical treatment.  Reopening: You may be able to reopen your claim if your condition worsens after claim closure.    Appeal Process: If you disagree with a written determination issued by the insurer or the insurer does not respond to your request, you may appeal to the Department of Administration, , by following the instructions contained in your determination letter. You must appeal the determination within 70 days from the date of the determination letter at [address], [address], [place], [place] [address], or [address], [place], [place] [address]. If you disagree with the  decision, you may appeal to the Department of Administration, . You must file your appeal within 30 days from the date of the  decision letter at [address], [address], [place], [place] [address], or [address], [place], [place] [address]. If you disagree with a decision of an , you may file a petition for judicial review with the District Court. You must do so within 30 days of the Appeal Officer’s decision. You may be represented by an  at your own expense or you may contact the [facility] for possible representation.    [place]  for Injured Workers (NAIW): If you disagree with a  decision, you may request that NAIW represent you without charge at an  Hearing. For information regarding denial of benefits, you may contact the [facility] at: [address], [address], [place] [address], [phone], or [address], [address], [place] [address], [phone]    To File a Complaint with the Division: If you wish to file a complaint with the  of the Division of Industrial Relations (DIR), please contact the Workers’ Compensation Section, [address], [place], [place] [address], telephone [phone], or [address], [address], [place], [place] [address], telephone [phone].    For assistance with Workers’ Compensation Issues: you may contact the Office of the Governor Consumer Health Assistance, [address] [place] [address], [address], [place], [place] [address], Toll Free 1-982.810.2814, Web site: [email or web address].[email or web address]., E-mail [email or web address].                                                                                                                                                                               __________________________________________________________________                                    10/28/2017            Employee Name / Signature                                                                                                                            Date                                       D-2 (rev. 10/07)

## 2017-10-29 VITALS
DIASTOLIC BLOOD PRESSURE: 91 MMHG | HEART RATE: 87 BPM | RESPIRATION RATE: 16 BRPM | WEIGHT: 189.6 LBS | BODY MASS INDEX: 28.73 KG/M2 | OXYGEN SATURATION: 96 % | TEMPERATURE: 98.6 F | HEIGHT: 68 IN | SYSTOLIC BLOOD PRESSURE: 120 MMHG

## 2017-10-29 PROCEDURE — 700117 HCHG RX CONTRAST REV CODE 255: Performed by: EMERGENCY MEDICINE

## 2017-10-29 RX ADMIN — IOHEXOL 100 ML: 350 INJECTION, SOLUTION INTRAVENOUS at 00:00

## 2017-10-29 NOTE — ED NOTES
"Pt presents with a Hx of inguinal hernia.  She C/O acute onset of abdominal pain tonight while lifting an heavy \"sac at work.\"   "

## 2017-10-29 NOTE — ED NOTES
Pt given written and oral discharge instructions. Pt verbalized understanding of all instructions given. All questions answered. Pt VSS. Pt ambulating to lobby independently.

## 2017-10-29 NOTE — ED PROVIDER NOTES
CHIEF COMPLAINT  Abdominal pain    HPI  Katerina Owen is a 32 y.o. female who presents to the emergency department for evaluation of abdominal pain. The patient states that she has a history of incisional hernia from her C-sections in the past. She states that she was at work today and lifting a 50 pound sack when she had the sudden onset of burning pain in her right lower quadrant near the old  incision. She states that she is unsure if she can feel a bulge or not. She states that the pain is constant since that time. It does occasionally radiate to the left lower quadrant. She has not had any vomiting or diarrhea. She has not had any fever with this. She states that she was planning on getting the hernia fixed at some point. She denies any urinary symptoms. She states that she has otherwise been well with no recent illnesses.    REVIEW OF SYSTEMS  See HPI for further details. All other systems are negative.     PAST MEDICAL HISTORY   has a past medical history of Chlamydia (2011); depression; HTN (hypertension); Migraine; and Pre-eclampsia.    SOCIAL HISTORY  Social History     Social History Main Topics   • Smoking status: Current Some Day Smoker     Last attempt to quit: 2014   • Smokeless tobacco: Former User     Quit date: 10/30/2014      Comment: quit 8 yrs ago   • Alcohol use No   • Drug use: No   • Sexual activity: Not Currently     Partners: Male     Birth control/ protection: Surgical      Comment: ; three kids; wk: not working       SURGICAL HISTORY   has a past surgical history that includes septoturbinoplasty (2009); lumpectomy; breast biopsy (Left, 2016); primary c section (Aug 7, 2007); and repeat c section w tubal ligation (2011).    CURRENT MEDICATIONS  Home Medications     Reviewed by Harmeet Kellogg R.N. (Registered Nurse) on 10/28/17 at 7929  Med List Status: Complete   Medication Last Dose Status   amphetamine-dextroamphetamine (ADDERALL) 15 MG  "tablet 10/28/2017 Active   amphetamine-dextroamphetamine (ADDERALL) 15 MG tablet  Active   amphetamine-dextroamphetamine (ADDERALL) 15 MG tablet  Active   ASPIRIN-CAFFEINE-BUTALBITAL (FIORINAL) -40 MG Cap  Active   sumatriptan (IMITREX) 50 MG Tab  Active                ALLERGIES  Allergies   Allergen Reactions   • Morphine Nausea     \"feel sick for a whole day after being given morphine\"  RXN=2016     • Vicodin [Hydrocodone-Acetaminophen] Vomiting and Nausea     EAF=8278       PHYSICAL EXAM  VITAL SIGNS: /95   Pulse 84   Temp 37 °C (98.6 °F)   Resp 18   Ht 1.727 m (5' 8\") Comment: Stated  Wt 86 kg (189 lb 9.5 oz)   LMP 10/28/2017   SpO2 98%   BMI 28.83 kg/m²    Constitutional: Alert and in no apparent distress. Obese female  HENT: Normocephalic atraumatic. Bilateral external ears normal. Nose normal. Mucous membranes are moist.  Eyes: Pupils are equal and reactive. Conjunctiva normal. Non-icteric sclera.   Neck: Normal range of motion without tenderness. Supple. No meningeal signs.  Cardiovascular: Regular rate and rhythm. No murmurs, gallops or rubs.  Thorax & Lungs: Breath sounds are clear to auscultation bilaterally. No wheezing, rhonchi or rales.  Abdomen: Abdominal exam is somewhat limited secondary to obese abdomen. A well-healed old  scar is noted. No obvious defects or masses are noted to palpation. There is mild tenderness to palpation in the right lower quadrant.  Skin: Warm and dry. No rashes are noted.  Back: No bony tenderness, No CVA tenderness.   Extremities: 2+ peripheral pulses. Cap refill is less than 2 seconds. No edema, cyanosis, or clubbing.  Musculoskeletal: Good range of motion in all major joints. No tenderness to palpation or major deformities noted.   Neurologic: Alert and oriented ×3. The patient moves all 4 extremities and follows commands.  Psychiatric: Affect is normal. Judgment appears to be intact.      DIAGNOSTIC STUDIES / PROCEDURES    LABS  Results " for orders placed or performed during the hospital encounter of 10/28/17   CBC WITH DIFFERENTIAL   Result Value Ref Range    WBC 11.1 (H) 4.8 - 10.8 K/uL    RBC 4.84 4.20 - 5.40 M/uL    Hemoglobin 13.1 12.0 - 16.0 g/dL    Hematocrit 39.9 37.0 - 47.0 %    MCV 82.4 81.4 - 97.8 fL    MCH 27.1 27.0 - 33.0 pg    MCHC 32.8 (L) 33.6 - 35.0 g/dL    RDW 42.2 35.9 - 50.0 fL    Platelet Count 325 164 - 446 K/uL    MPV 10.0 9.0 - 12.9 fL    Neutrophils-Polys 59.60 44.00 - 72.00 %    Lymphocytes 31.50 22.00 - 41.00 %    Monocytes 7.10 0.00 - 13.40 %    Eosinophils 1.10 0.00 - 6.90 %    Basophils 0.40 0.00 - 1.80 %    Immature Granulocytes 0.30 0.00 - 0.90 %    Nucleated RBC 0.00 /100 WBC    Neutrophils (Absolute) 6.63 2.00 - 7.15 K/uL    Lymphs (Absolute) 3.50 1.00 - 4.80 K/uL    Monos (Absolute) 0.79 0.00 - 0.85 K/uL    Eos (Absolute) 0.12 0.00 - 0.51 K/uL    Baso (Absolute) 0.05 0.00 - 0.12 K/uL    Immature Granulocytes (abs) 0.03 0.00 - 0.11 K/uL    NRBC (Absolute) 0.00 K/uL   COMP METABOLIC PANEL   Result Value Ref Range    Sodium 135 135 - 145 mmol/L    Potassium 3.3 (L) 3.6 - 5.5 mmol/L    Chloride 100 96 - 112 mmol/L    Co2 26 20 - 33 mmol/L    Anion Gap 9.0 0.0 - 11.9    Glucose 85 65 - 99 mg/dL    Bun 8 8 - 22 mg/dL    Creatinine 0.66 0.50 - 1.40 mg/dL    Calcium 8.9 8.4 - 10.2 mg/dL    AST(SGOT) 26 12 - 45 U/L    ALT(SGPT) 32 2 - 50 U/L    Total Bilirubin 0.6 0.1 - 1.5 mg/dL    Albumin 4.2 3.2 - 4.9 g/dL    Total Protein 7.5 6.0 - 8.2 g/dL    Globulin 3.3 1.9 - 3.5 g/dL    A-G Ratio 1.3 g/dL    Alkaline Phosphatase 80 30 - 99 U/L   URINALYSIS CULTURE, IF INDICATED   Result Value Ref Range    Color Yellow     Character Clear     Specific Gravity 1.010 <1.035    Ph 6.0 5.0 - 8.0    Glucose Negative Negative mg/dL    Ketones Negative Negative mg/dL    Protein Negative Negative mg/dL    Bilirubin Negative Negative    Nitrite Negative Negative    Leukocyte Esterase Negative Negative    Occult Blood Trace (A) Negative     Micro Urine Req Microscopic     Culture Indicated No UA Culture   URINE MICROSCOPIC (W/UA)   Result Value Ref Range    WBC Rare /hpf    RBC 0-2 /hpf    Epithelial Cells Rare Few /hpf   ESTIMATED GFR   Result Value Ref Range    GFR If African American >60 >60 mL/min/1.73 m 2    GFR If Non African American >60 >60 mL/min/1.73 m 2   POC URINE PREGNANCY   Result Value Ref Range    POC Urine Pregnancy Test Negative          RADIOLOGY  CT-ABDOMEN-PELVIS WITH   Final Result      1.  No acute abnormality within the abdomen or pelvis      2.  Fatty infiltration of the liver          COURSE & MEDICAL DECISION MAKING  Pertinent Labs & Imaging studies reviewed. (See chart for details)  This is a 32-year-old female presenting to the ED for evaluation of abdominal pain. On initial evaluation, the patient did well in no acute distress. Vital signs are stable. Patient had mild tenderness palpation the right lower quadrant but no obvious defect of the abdominal wall was noted. Given the patient's body habitus, exam was somewhat limited. A CT of the abdomen and pelvis was performed and did not reveal any acute abnormalities within the abdomen or pelvis. An incidental finding of fatty infiltration of the liver was noted. I did discuss the findings of this CT with the patient. CBC, CMP, and urinalysis was unremarkable. Pregnancy test was negative. At this time, I think the patient's critical presentation may be consistent with abdominal muscle strain. She was treated with Tylenol and upon reassessment, her pain had nearly resolved. Her repeat abdominal exam was benign with no peritoneal signs. She is agreeable with discharge at this time and will follow up with her PCP. Return to the ED with any worsening signs or symptoms. Patient was advised to not lift anything heavier than 10 pounds.    The patient presents with abdominal pain without signs of peritonitis or other life-threatening or serious etiology. The patient appears stable for  discharge and has been instructed to return immediately if the symptoms worsen in any way, or in 8-12hr if not improved for re-evaluation. The patient has been instructed to return if the symptoms worsen or change in any way.     FINAL IMPRESSION  1. Right lower quadrant abdominal pain    2. Fatty liver        PRESCRIPTIONS  New Prescriptions    No medications on file       FOLLOW UP  Lashonda Chow, EMILYPNenitaNNenita  [address]  [facility] [address]  [phone]    Schedule an appointment as soon as possible for a visit in 3 days      [facility], Emergency Dept  [address]  [facility] [address]  [phone]    visit emergency department if symptoms worsen        -DISCHARGE-      Electronically signed by: Kay Bridges, 10/28/2017 11:06 PM

## 2017-10-30 ENCOUNTER — PATIENT OUTREACH (OUTPATIENT)
Dept: HEALTH INFORMATION MANAGEMENT | Facility: OTHER | Age: 32
End: 2017-10-30

## 2017-10-31 ENCOUNTER — OCCUPATIONAL MEDICINE (OUTPATIENT)
Dept: OCCUPATIONAL MEDICINE | Facility: CLINIC | Age: 32
End: 2017-10-31
Payer: COMMERCIAL

## 2017-10-31 VITALS
DIASTOLIC BLOOD PRESSURE: 88 MMHG | BODY MASS INDEX: 28.64 KG/M2 | RESPIRATION RATE: 16 BRPM | HEIGHT: 68 IN | HEART RATE: 60 BPM | OXYGEN SATURATION: 97 % | SYSTOLIC BLOOD PRESSURE: 130 MMHG | WEIGHT: 189 LBS | TEMPERATURE: 97.4 F

## 2017-10-31 DIAGNOSIS — K43.2 INCISIONAL HERNIA, WITHOUT OBSTRUCTION OR GANGRENE: ICD-10-CM

## 2017-10-31 PROCEDURE — 99202 OFFICE O/P NEW SF 15 MIN: CPT | Performed by: PREVENTIVE MEDICINE

## 2017-10-31 ASSESSMENT — PAIN SCALES - GENERAL: PAINLEVEL: 7=MODERATE-SEVERE PAIN

## 2017-10-31 NOTE — LETTER
[address],   [address] [address]  Phone:  [phone] - Fax:  [phone]   [facility] Progress Report and Disability Certification  Date of Service: 10/31/2017   No Show:  No  Date / Time of Next Visit: Follow-up personal physician    Claim Information   Patient Name: Katerina Owen  Claim Number:     Employer: [employer]  Date of Injury: 10/28/2017     Insurer / TPA: [employer]  ID / SSN:     Occupation:   Diagnosis: The encounter diagnosis was Incisional hernia, without obstruction or gangrene.    Medical Information   Related to Industrial Injury? No  Comments:this is a pre-existing nonindustrial condition, worker should  follow-up with her personal physician    Subjective Complaints:  Date of incident 10/28/2017. Incident-burning pain in right side of abdomen after lifting 50 lbs. 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 She was seen in emergency department where labs and CT scan were negative. She is here for follow-up and reports some improvement but continues to have difficulty with any amount of lifting.   Objective Findings: Appearance: Well-developed, well-nourished.   Mental Status: Mood and Affect normal. Pleasant. Cooperative. Appropriate.   Abdomen: Mild tenderness right hypogastric area over surgical scar.     Pre-Existing Condition(s):     Assessment:   Condition Same    Status: Additional Care Required  Permanent Disability:No    Plan:      Diagnostics:      Comments:       Disability Information   Status: Released to Restricted Duty    From:  10/31/2017  Through: 11/15/2017 Restrictions are: Temporary   Physical Restrictions   Sitting:    Standing:    Stooping:    Bending:      Squatting:    Walking:    Climbing:    Pushing:       Pulling:    Other:    Reaching Above Shoulder (L):   Reaching Above Shoulder (R):       Reaching Below Shoulder (L):    Reaching Below Shoulder (R):      Not to exceed Weight Limits   Carrying(hrs):   Weight Limit(lb):   Lifting(hrs):   Weight  Limit(lb): < or = to 10 pounds   Comments:      Repetitive Actions   Hands: i.e. Fine Manipulations from Grasping:     Feet: i.e. Operating Foot Controls:     Driving / Operate Machinery:     Physician Name: Vinicio Baldwin M.D. Physician Signature: VINICIO Islas M.D. e-Signature: Dr. Derrek Markham, Medical Director   Clinic Name / Location: [address],   [address] [place] [address] Clinic Phone Number: Dept: [phone]   Appointment Time: 11:15 Am Visit Start Time: 11:27 AM   Check-In Time:  11:22 Am Visit Discharge Time: @11:56PM    Original-Treating Physician or Chiropractor    Page 2-Insurer/TPA    Page 3-Employer    Page 4-Employee

## 2017-10-31 NOTE — PROGRESS NOTES
"Subjective:      Katerina Owen is a 32 y.o. female who presents with Follow-Up ( DOI 10/28/2017 - Abd - Better - Pro Room 1)      Date of incident 10/28/2017. Incident-burning pain in right side of abdomen after lifting 50 lbs. 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 She was seen in emergency department where labs and CT scan were negative. She is here for follow-up and reports some improvement but continues to have difficulty with any amount of lifting.     HPI    ROS  Comprehensive medical history form reviewed. Pertinent positives and negatives included in HPI.    PFSH: reviewed in Epic    PMH:  has a past medical history of Chlamydia (5/11/2011); depression; HTN (hypertension); Migraine; and Pre-eclampsia. She also has no past medical history of Muscle disorder; OSTEOPOROSIS; or Pituitary disease (CMS-HCC).  MEDS:   Current Outpatient Prescriptions:   •  amphetamine-dextroamphetamine (ADDERALL) 15 MG tablet, Take 1 Tab by mouth 2 times a day., Disp: 56 Tab, Rfl: 0  •  amphetamine-dextroamphetamine (ADDERALL) 15 MG tablet, Take 1 Tab by mouth 2 times a day., Disp: 56 Tab, Rfl: 0  •  amphetamine-dextroamphetamine (ADDERALL) 15 MG tablet, Take 1 Tab by mouth 2 times a day., Disp: 56 Tab, Rfl: 0  •  sumatriptan (IMITREX) 50 MG Tab, Take 1 Tab by mouth Once PRN for Migraine for up to 1 dose., Disp: 9 Tab, Rfl: 3  •  ASPIRIN-CAFFEINE-BUTALBITAL (FIORINAL) -40 MG Cap, Take 1 Cap by mouth every four hours as needed., Disp: 30 Cap, Rfl: 0  ALLERGIES:   Allergies   Allergen Reactions   • Morphine Nausea     \"feel sick for a whole day after being given morphine\"  RXN=2/2016     • Vicodin [Hydrocodone-Acetaminophen] Vomiting and Nausea     HHB=8836     SURGHX:   Past Surgical History:   Procedure Laterality Date   • BREAST BIOPSY Left 2/26/2016    Procedure: BREAST BIOPSY Excisional;  " "Surgeon: Gisele Delcid M.D.;  Location: SURGERY [facility];  Service:    • REPEAT C SECTION W TUBAL LIGATION  2011    Performed by AYAAN MERCHANT at LABOR AND DELIVERY   • SEPTOTURBINOPLASTY  2009    Performed by CHRISTIAN YEN at SURGERY SAME DAY [facility] ORS   • PRIMARY C SECTION  Aug 7, 2007     preeclampsia   • LUMPECTOMY      both breasts - Dr. Garsia - [facility] 10/2014     SOCHX:  reports that she has been smoking.  She quit smokeless tobacco use about 3 years ago. She reports that she does not drink alcohol or use drugs.  Work Status: Employer and Job Title reviewed per [place] C4 form  FH: No pertinent hereditary disorders.        Objective:     /88   Pulse 60   Temp 36.3 °C (97.4 °F)   Resp 16   Ht 1.727 m (5' 8\")   Wt 85.7 kg (189 lb)   LMP 10/28/2017   SpO2 97%   BMI 28.74 kg/m²      Physical Exam    Appearance: Well-developed, well-nourished.   Mental Status: Mood and Affect normal. Pleasant. Cooperative. Appropriate.   Abdomen: Mild tenderness right hypogastric area over surgical scar.         Assessment/Plan:     1. Incisional hernia, without obstruction or gangrene  Condition not industrial  Follow-up personal physician  No lifting greater than 10 pounds until seen by personal physician      "

## 2017-11-06 ENCOUNTER — TELEPHONE (OUTPATIENT)
Dept: MEDICAL GROUP | Facility: LAB | Age: 32
End: 2017-11-06

## 2017-11-06 DIAGNOSIS — F90.2 ATTENTION DEFICIT HYPERACTIVITY DISORDER (ADHD), COMBINED TYPE: ICD-10-CM

## 2017-11-06 NOTE — TELEPHONE ENCOUNTER
----- Message from Katerina Owen sent at 11/6/2017  2:50 PM PST -----  Regarding: RE: Non-Urgent Medical Question  Contact: [phone]  No just the prescription for adderall if that's ok? I guess whatever is easiest. I would rather only give them what they're asking for. The only thing I tested positive for it was in amphetamine so I don't think they need anything other than a prescription for adderall.  Thank you.  ----- Message -----  From: Ronald Hinson Ass't  Sent: 11/6/2017  2:43 PM PST  To: Katerina D. Brayden  Subject: RE: Non-Urgent Medical Question  Would you like us to fax your Prescription Monitoring to them? It will have the dates of all your controlled substances on it.    ----- Message -----     From: Katerina Owen     Sent: 11/3/2017  8:32 AM PDT       To: DELMA Viens  Subject: RE: Non-Urgent Medical Question    Is there anyway your office could fax proof that I had my adderall prescription prior to 10- 7 to [phone]? It's for my work. Thank you.  ----- Message -----  From: DELMA Vines  Sent: 10/30/2017  5:46 PM PDT  To: Katerina Owen  Subject: RE: Non-Urgent Medical Question  Let me know if you definitely do need a note and who to fax it to.       ----- Message -----     From: Katerina Owen     Sent: 10/30/2017 12:27 PM PDT       To: DELMA Vines  Subject: RE: Non-Urgent Medical Question    I'm on administrative leave because I tested positive for Adderall after I work related injury, so I might need a note or for your office to verify. Also I have to go through workmans comp now so i have to see a occupational health doctor and erika if I can even use the surgeon I have an appointment with now. It just got extremely complicated. Thank you for the quick reply.  ----- Message -----  From: DELMA Vines  Sent: 10/30/2017 12:18 PM PDT  To: Katerina Owen  Subject: RE: Non-Urgent Medical Question  This email can  suffice - the surgeon is the priority at this point.  Let me know if you need anything from me,  Lashonda      ----- Message -----     From: Katerina Owen     Sent: 10/30/2017 11:38 AM PDT       To: DELMA Vines  Subject: Non-Urgent Medical Question    So I was at work when my hernia started acting up, I lifted a bag 50lb bag of powder and I felt it pop, I ask to go home but since it happened a work I needed to report it to sylvia and I ended up going to the ER for it. The  There said I couldn't lift more than ten lbs.  I have an appointment with my surgeon tomorrow but I'm still supposed to follow up with you or would that suffice?

## 2017-11-07 LAB
AMP AMPHETAMINE: NORMAL
BAR BARBITURATES: NORMAL
BREATH ALCOHOL COMMENT: NORMAL
BZO BENZODIAZEPINES: NORMAL
COC COCAINE: NORMAL
INT CON NEG: NORMAL
INT CON POS: NORMAL
MDMA ECSTASY: NORMAL
MET METHAMPHETAMINES: NORMAL
MTD METHADONE: NORMAL
OPI OPIATES: NORMAL
OXY OXYCODONE: NORMAL
PCP PHENCYCLIDINE: NORMAL
POC BREATHALIZER: 0 PERCENT (ref 0–0.01)
POC URINE DRUG SCREEN OCDRS: NORMAL
THC: NORMAL

## 2017-11-07 NOTE — PROGRESS NOTES
Na S was called out after business hours to PRATIMA Rice for a post-accident UDS and BAT on 10/28/17 for [employer].    UDS collected at 10:37 pm.  BAT collected at 10:29 pm.

## 2017-12-19 ENCOUNTER — HOSPITAL ENCOUNTER (EMERGENCY)
Facility: MEDICAL CENTER | Age: 32
End: 2017-12-19
Attending: EMERGENCY MEDICINE
Payer: COMMERCIAL

## 2017-12-19 ENCOUNTER — APPOINTMENT (OUTPATIENT)
Dept: RADIOLOGY | Facility: MEDICAL CENTER | Age: 32
End: 2017-12-19
Attending: EMERGENCY MEDICINE
Payer: COMMERCIAL

## 2017-12-19 VITALS
DIASTOLIC BLOOD PRESSURE: 98 MMHG | TEMPERATURE: 97.9 F | SYSTOLIC BLOOD PRESSURE: 165 MMHG | HEIGHT: 68 IN | HEART RATE: 79 BPM | RESPIRATION RATE: 16 BRPM | WEIGHT: 191.14 LBS | BODY MASS INDEX: 28.97 KG/M2 | OXYGEN SATURATION: 98 %

## 2017-12-19 DIAGNOSIS — K43.2 INCISIONAL HERNIA, WITHOUT OBSTRUCTION OR GANGRENE: ICD-10-CM

## 2017-12-19 DIAGNOSIS — R10.9 ABDOMINAL PAIN, UNSPECIFIED ABDOMINAL LOCATION: ICD-10-CM

## 2017-12-19 LAB
ALBUMIN SERPL BCP-MCNC: 4.9 G/DL (ref 3.2–4.9)
ALBUMIN/GLOB SERPL: 1.1 G/DL
ALP SERPL-CCNC: 109 U/L (ref 30–99)
ALT SERPL-CCNC: 43 U/L (ref 2–50)
ANION GAP SERPL CALC-SCNC: 9 MMOL/L (ref 0–11.9)
APPEARANCE UR: CLEAR
AST SERPL-CCNC: 37 U/L (ref 12–45)
BASOPHILS # BLD AUTO: 0.6 % (ref 0–1.8)
BASOPHILS # BLD: 0.07 K/UL (ref 0–0.12)
BILIRUB SERPL-MCNC: 0.6 MG/DL (ref 0.1–1.5)
BILIRUB UR QL STRIP.AUTO: NEGATIVE
BUN SERPL-MCNC: 5 MG/DL (ref 8–22)
CALCIUM SERPL-MCNC: 9.8 MG/DL (ref 8.4–10.2)
CHLORIDE SERPL-SCNC: 102 MMOL/L (ref 96–112)
CO2 SERPL-SCNC: 24 MMOL/L (ref 20–33)
COLOR UR: YELLOW
CREAT SERPL-MCNC: 0.73 MG/DL (ref 0.5–1.4)
CULTURE IF INDICATED INDCX: NO UA CULTURE
EOSINOPHIL # BLD AUTO: 0.05 K/UL (ref 0–0.51)
EOSINOPHIL NFR BLD: 0.4 % (ref 0–6.9)
ERYTHROCYTE [DISTWIDTH] IN BLOOD BY AUTOMATED COUNT: 41.2 FL (ref 35.9–50)
GFR SERPL CREATININE-BSD FRML MDRD: >60 ML/MIN/1.73 M 2
GLOBULIN SER CALC-MCNC: 4.5 G/DL (ref 1.9–3.5)
GLUCOSE SERPL-MCNC: 90 MG/DL (ref 65–99)
GLUCOSE UR STRIP.AUTO-MCNC: NEGATIVE MG/DL
HCG SERPL QL: NEGATIVE
HCT VFR BLD AUTO: 44.2 % (ref 37–47)
HGB BLD-MCNC: 15 G/DL (ref 12–16)
IMM GRANULOCYTES # BLD AUTO: 0.02 K/UL (ref 0–0.11)
IMM GRANULOCYTES NFR BLD AUTO: 0.2 % (ref 0–0.9)
KETONES UR STRIP.AUTO-MCNC: NEGATIVE MG/DL
LEUKOCYTE ESTERASE UR QL STRIP.AUTO: NEGATIVE
LIPASE SERPL-CCNC: 20 U/L (ref 7–58)
LYMPHOCYTES # BLD AUTO: 2.85 K/UL (ref 1–4.8)
LYMPHOCYTES NFR BLD: 25.5 % (ref 22–41)
MCH RBC QN AUTO: 27.6 PG (ref 27–33)
MCHC RBC AUTO-ENTMCNC: 33.9 G/DL (ref 33.6–35)
MCV RBC AUTO: 81.4 FL (ref 81.4–97.8)
MICRO URNS: NORMAL
MONOCYTES # BLD AUTO: 0.79 K/UL (ref 0–0.85)
MONOCYTES NFR BLD AUTO: 7.1 % (ref 0–13.4)
NEUTROPHILS # BLD AUTO: 7.41 K/UL (ref 2–7.15)
NEUTROPHILS NFR BLD: 66.2 % (ref 44–72)
NITRITE UR QL STRIP.AUTO: NEGATIVE
NRBC # BLD AUTO: 0 K/UL
NRBC BLD-RTO: 0 /100 WBC
PH UR STRIP.AUTO: 6 [PH]
PLATELET # BLD AUTO: 369 K/UL (ref 164–446)
PMV BLD AUTO: 10.4 FL (ref 9–12.9)
POTASSIUM SERPL-SCNC: 3.6 MMOL/L (ref 3.6–5.5)
PROT SERPL-MCNC: 9.4 G/DL (ref 6–8.2)
PROT UR QL STRIP: NEGATIVE MG/DL
RBC # BLD AUTO: 5.43 M/UL (ref 4.2–5.4)
RBC UR QL AUTO: NEGATIVE
SODIUM SERPL-SCNC: 135 MMOL/L (ref 135–145)
SP GR UR STRIP.AUTO: <=1.005
WBC # BLD AUTO: 11.2 K/UL (ref 4.8–10.8)

## 2017-12-19 PROCEDURE — 700111 HCHG RX REV CODE 636 W/ 250 OVERRIDE (IP): Performed by: EMERGENCY MEDICINE

## 2017-12-19 PROCEDURE — 96375 TX/PRO/DX INJ NEW DRUG ADDON: CPT

## 2017-12-19 PROCEDURE — 36415 COLL VENOUS BLD VENIPUNCTURE: CPT

## 2017-12-19 PROCEDURE — 81003 URINALYSIS AUTO W/O SCOPE: CPT

## 2017-12-19 PROCEDURE — 83690 ASSAY OF LIPASE: CPT

## 2017-12-19 PROCEDURE — 84703 CHORIONIC GONADOTROPIN ASSAY: CPT

## 2017-12-19 PROCEDURE — 96374 THER/PROPH/DIAG INJ IV PUSH: CPT

## 2017-12-19 PROCEDURE — 74022 RADEX COMPL AQT ABD SERIES: CPT

## 2017-12-19 PROCEDURE — 85025 COMPLETE CBC W/AUTO DIFF WBC: CPT

## 2017-12-19 PROCEDURE — 99285 EMERGENCY DEPT VISIT HI MDM: CPT

## 2017-12-19 PROCEDURE — 80053 COMPREHEN METABOLIC PANEL: CPT

## 2017-12-19 RX ORDER — CHLORAL HYDRATE 500 MG
1 CAPSULE ORAL DAILY
Status: SHIPPED | COMMUNITY
End: 2018-02-24

## 2017-12-19 RX ORDER — OXYCODONE HYDROCHLORIDE AND ACETAMINOPHEN 5; 325 MG/1; MG/1
1-2 TABLET ORAL EVERY 4 HOURS PRN
Qty: 8 TAB | Refills: 0 | Status: SHIPPED | OUTPATIENT
Start: 2017-12-19 | End: 2017-12-24

## 2017-12-19 RX ORDER — VITAMIN E 268 MG
400 CAPSULE ORAL DAILY
Status: SHIPPED | COMMUNITY
End: 2018-02-24

## 2017-12-19 RX ORDER — ONDANSETRON 2 MG/ML
4 INJECTION INTRAMUSCULAR; INTRAVENOUS ONCE
Status: COMPLETED | OUTPATIENT
Start: 2017-12-19 | End: 2017-12-19

## 2017-12-19 RX ADMIN — HYDROMORPHONE HYDROCHLORIDE 0.5 MG: 1 INJECTION, SOLUTION INTRAMUSCULAR; INTRAVENOUS; SUBCUTANEOUS at 14:40

## 2017-12-19 RX ADMIN — ONDANSETRON 4 MG: 2 INJECTION INTRAMUSCULAR; INTRAVENOUS at 14:40

## 2017-12-19 ASSESSMENT — PAIN SCALES - GENERAL
PAINLEVEL_OUTOF10: 8
PAINLEVEL_OUTOF10: 4

## 2017-12-19 NOTE — ED PROVIDER NOTES
ED Provider Note    CHIEF COMPLAINT  Chief Complaint   Patient presents with   • Abdominal Pain       HPI  Katerina Owen is a 32 y.o. female who presents withAbdominal pain. She has a history of abdominal pain currently has an incisional hernia from  and is to get a repair by Dr. Lundberg. She was picking something up heavy yesterday and felt a pop in the right lower quadrant she developed pain it's worse and comes in for evaluation she's had some vomiting no hematemesis some diarrhea no melena or hematochezia and no dysuria no fever no chills. She has had this pain recently. All other systems are negative    REVIEW OF SYSTEMS  See HPI for further details    PAST MEDICAL HISTORY  Past Medical History:   Diagnosis Date   • Chlamydia 2011   • depression    • HTN (hypertension)     related to pregnancy   • Migraine    • Pre-eclampsia             FAMILY HISTORY  Family History   Problem Relation Age of Onset   • Heart Disease Father      heart failure smoker    • Hypertension Father    • Cancer Maternal Grandfather      pancreastic   • Heart Disease Paternal Grandfather      heart attack       SOCIAL HISTORY  Social History     Social History   • Marital status:      Spouse name: N/A   • Number of children: N/A   • Years of education: N/A     Social History Main Topics   • Smoking status: Current Some Day Smoker     Last attempt to quit: 2014   • Smokeless tobacco: Former User     Quit date: 10/30/2014      Comment: quit 8 yrs ago   • Alcohol use No   • Drug use: No   • Sexual activity: Not Currently     Partners: Male     Birth control/ protection: Surgical      Comment: ; three kids; wk: not working     Other Topics Concern   • Not on file     Social History Narrative   • No narrative on file       SURGICAL HISTORY  Past Surgical History:   Procedure Laterality Date   • BREAST BIOPSY Left 2016    Procedure: BREAST BIOPSY Excisional;  Surgeon: Gisele Delcid M.D.;   "Location: SURGERY [facility] ORS;  Service:    • REPEAT C SECTION W TUBAL LIGATION  2011    Performed by AYAAN MERCHANT at LABOR AND DELIVERY   • SEPTOTURBINOPLASTY  2009    Performed by CHRISTIAN YEN at SURGERY SAME DAY [facility] ORS   • PRIMARY C SECTION  Aug 7, 2007     preeclampsia   • LUMPECTOMY      both breasts - Dr. Garsia - [facility] 10/2014       CURRENT MEDICATIONS  Home Medications    **Home medications have not yet been reviewed for this encounter**         ALLERGIES  Allergies   Allergen Reactions   • Morphine Nausea     \"feel sick for a whole day after being given morphine\"  RXN=2016     • Vicodin [Hydrocodone-Acetaminophen] Vomiting and Nausea     DXV=5480       PHYSICAL EXAM  VITAL SIGNS: /113   Pulse (!) 129   Temp 36.6 °C (97.9 °F)   Resp 16   Ht 1.727 m (5' 8\")   Wt 86.7 kg (191 lb 2.2 oz)   SpO2 97%   BMI 29.06 kg/m²     Constitutional: Patient is alert and oriented x3 inModerate distress   HENT: Moist mucous membranes  Eyes: No conjunctivitis or icterus  Neck: Trachea is midline  Lymphatic: Negative anterior cervical adenopathy  Cardiovascular: Normal heart rate   Thorax & Lungs: Clear to auscultation  Abdomen: Exquisite right lower quadrant tenderness bowel sounds present no other abdominal tenderness no peritoneal signs  Neurologic: Normal motor sensation  Extremities: No edema  Psychiatric: Affect normal, Judgment normal, Mood normal.       Results for orders placed or performed during the hospital encounter of 17   CBC WITH DIFFERENTIAL   Result Value Ref Range    WBC 11.2 (H) 4.8 - 10.8 K/uL    RBC 5.43 (H) 4.20 - 5.40 M/uL    Hemoglobin 15.0 12.0 - 16.0 g/dL    Hematocrit 44.2 37.0 - 47.0 %    MCV 81.4 81.4 - 97.8 fL    MCH 27.6 27.0 - 33.0 pg    MCHC 33.9 33.6 - 35.0 g/dL    RDW 41.2 35.9 - 50.0 fL    Platelet Count 369 164 - 446 K/uL    MPV 10.4 9.0 - 12.9 fL    Neutrophils-Polys 66.20 44.00 - 72.00 %    Lymphocytes 25.50 22.00 - 41.00 %    " Monocytes 7.10 0.00 - 13.40 %    Eosinophils 0.40 0.00 - 6.90 %    Basophils 0.60 0.00 - 1.80 %    Immature Granulocytes 0.20 0.00 - 0.90 %    Nucleated RBC 0.00 /100 WBC    Neutrophils (Absolute) 7.41 (H) 2.00 - 7.15 K/uL    Lymphs (Absolute) 2.85 1.00 - 4.80 K/uL    Monos (Absolute) 0.79 0.00 - 0.85 K/uL    Eos (Absolute) 0.05 0.00 - 0.51 K/uL    Baso (Absolute) 0.07 0.00 - 0.12 K/uL    Immature Granulocytes (abs) 0.02 0.00 - 0.11 K/uL    NRBC (Absolute) 0.00 K/uL   COMP METABOLIC PANEL   Result Value Ref Range    Sodium 135 135 - 145 mmol/L    Potassium 3.6 3.6 - 5.5 mmol/L    Chloride 102 96 - 112 mmol/L    Co2 24 20 - 33 mmol/L    Anion Gap 9.0 0.0 - 11.9    Glucose 90 65 - 99 mg/dL    Bun 5 (L) 8 - 22 mg/dL    Creatinine 0.73 0.50 - 1.40 mg/dL    Calcium 9.8 8.4 - 10.2 mg/dL    AST(SGOT) 37 12 - 45 U/L    ALT(SGPT) 43 2 - 50 U/L    Alkaline Phosphatase 109 (H) 30 - 99 U/L    Total Bilirubin 0.6 0.1 - 1.5 mg/dL    Albumin 4.9 3.2 - 4.9 g/dL    Total Protein 9.4 (H) 6.0 - 8.2 g/dL    Globulin 4.5 (H) 1.9 - 3.5 g/dL    A-G Ratio 1.1 g/dL   LIPASE   Result Value Ref Range    Lipase 20 7 - 58 U/L   URINALYSIS CULTURE, IF INDICATED   Result Value Ref Range    Color Yellow     Character Clear     Specific Gravity <=1.005 <1.035    Ph 6.0 5.0 - 8.0    Glucose Negative Negative mg/dL    Ketones Negative Negative mg/dL    Protein Negative Negative mg/dL    Bilirubin Negative Negative    Nitrite Negative Negative    Leukocyte Esterase Negative Negative    Occult Blood Negative Negative    Micro Urine Req see below     Culture Indicated No UA Culture   HCG QUAL SERUM   Result Value Ref Range    Beta-Hcg Qualitative Serum Negative Negative   ESTIMATED GFR   Result Value Ref Range    GFR If African American >60 >60 mL/min/1.73 m 2    GFR If Non African American >60 >60 mL/min/1.73 m 2        DX-ABDOMEN COMPLETE WITH AP OR PA CXR   Final Result         1. No specific finding to suggest small bowel obstruction.               COURSE & MEDICAL DECISION MAKING  Pertinent Labs & Imaging studies reviewed. (See chart for details)  Patient has reproducible lower quadrant abdominal wall pain no peritoneal findings. Her lab work is normal and x-ray shows no sign of bowel obstruction. She's had a recent CT. It does not appear to be appendicitis at this time. She had acute onset with a pop. I've contacted Dr. Davila he's in the OR he'll see the patient next week she is to call Monday. I am trying to hold off on CTs since she has had a recent one but she does understand return if she has any persistent worsening pain or worsening symptoms. She will be discharged with 8 Percocet for pain    FINAL IMPRESSION  1.   1. Abdominal pain, unspecified abdominal location    2. Incisional hernia, without obstruction or gangrene        2.   3.         Electronically signed by: Lopez Kennedy, 12/19/2017 2:14 PM

## 2017-12-19 NOTE — ED NOTES
RLQ and mid abd pain,with 2 bouts of vomiting, she felt something pop when she lifted some yanna last night. She will be having an abd hernia repair done in the future.

## 2017-12-20 NOTE — ED NOTES
Discharged home in good condition with prescriptions and follow up instructions, pt verbalizes understanding of all, ambulates out with steady gait accompanied by self to lobby where  is waiting for ride home.  Pt instructed not to drive while taking narcotics, verbalizes understanding.

## 2017-12-20 NOTE — DISCHARGE INSTRUCTIONS
Abdominal Pain, Adult  At this point appears here abdominal wall incisional hernia his source of pain most likely however if things worsen return for further evaluation. Contact Dr. Davila of surgery on Monday for follow-up  Many things can cause abdominal pain. Usually, abdominal pain is not caused by a disease and will improve without treatment. It can often be observed and treated at home. Your health care provider will do a physical exam and possibly order blood tests and X-rays to help determine the seriousness of your pain. However, in many cases, more time must pass before a clear cause of the pain can be found. Before that point, your health care provider may not know if you need more testing or further treatment.  HOME CARE INSTRUCTIONS   Monitor your abdominal pain for any changes. The following actions may help to alleviate any discomfort you are experiencing:  · Only take over-the-counter or prescription medicines as directed by your health care provider.  · Do not take laxatives unless directed to do so by your health care provider.  · Try a clear liquid diet (broth, tea, or water) as directed by your health care provider. Slowly move to a bland diet as tolerated.  SEEK MEDICAL CARE IF:  · You have unexplained abdominal pain.  · You have abdominal pain associated with nausea or diarrhea.  · You have pain when you urinate or have a bowel movement.  · You experience abdominal pain that wakes you in the night.  · You have abdominal pain that is worsened or improved by eating food.  · You have abdominal pain that is worsened with eating fatty foods.  · You have a fever.  SEEK IMMEDIATE MEDICAL CARE IF:   · Your pain does not go away within 2 hours.  · You keep throwing up (vomiting).  · Your pain is felt only in portions of the abdomen, such as the right side or the left lower portion of the abdomen.  · You pass bloody or black tarry stools.  MAKE SURE YOU:  · Understand these instructions.    · Will watch  your condition.    · Will get help right away if you are not doing well or get worse.       This information is not intended to replace advice given to you by your health care provider. Make sure you discuss any questions you have with your health care provider.     Document Released: 09/27/2006 Document Revised: 01/08/2016 Document Reviewed: 08/27/2014  [employer] Interactive Patient Education ©2016 [employer]

## 2018-01-04 ENCOUNTER — APPOINTMENT (OUTPATIENT)
Dept: ADMISSIONS | Facility: MEDICAL CENTER | Age: 33
End: 2018-01-04
Attending: SURGERY
Payer: COMMERCIAL

## 2018-01-04 RX ORDER — SUMATRIPTAN 50 MG/1
50 TABLET, FILM COATED ORAL
COMMUNITY
End: 2018-02-05

## 2018-01-04 RX ORDER — ACETAMINOPHEN 500 MG
1000 TABLET ORAL EVERY 6 HOURS PRN
COMMUNITY
End: 2018-02-24

## 2018-01-04 NOTE — OR NURSING
"Preadmit appointment: \" Preparing for your Procedure information\" sheet given to patient with verbal and written instructions. Patient instructed to continue prescribed medications through the day before surgery, instructed to take the following medications the day of surgery per anesthesia protocol:none, pt advised to hold Adderall morning of surgery.  "

## 2018-01-10 ENCOUNTER — HOSPITAL ENCOUNTER (OUTPATIENT)
Facility: MEDICAL CENTER | Age: 33
End: 2018-01-10
Attending: SURGERY | Admitting: SURGERY
Payer: COMMERCIAL

## 2018-01-10 VITALS
BODY MASS INDEX: 29.04 KG/M2 | SYSTOLIC BLOOD PRESSURE: 140 MMHG | HEIGHT: 68 IN | RESPIRATION RATE: 16 BRPM | OXYGEN SATURATION: 97 % | HEART RATE: 80 BPM | TEMPERATURE: 97.3 F | DIASTOLIC BLOOD PRESSURE: 78 MMHG | WEIGHT: 191.58 LBS

## 2018-01-10 DIAGNOSIS — G89.18 PAIN FOLLOWING SURGERY OR PROCEDURE: ICD-10-CM

## 2018-01-10 LAB
B-HCG FREE SERPL-ACNC: <5 MIU/ML
IHCGL IHCGL: NEGATIVE MIU/ML

## 2018-01-10 PROCEDURE — 160046 HCHG PACU - 1ST 60 MINS PHASE II: Performed by: SURGERY

## 2018-01-10 PROCEDURE — 160048 HCHG OR STATISTICAL LEVEL 1-5: Performed by: SURGERY

## 2018-01-10 PROCEDURE — 700102 HCHG RX REV CODE 250 W/ 637 OVERRIDE(OP)

## 2018-01-10 PROCEDURE — 501838 HCHG SUTURE GENERAL: Performed by: SURGERY

## 2018-01-10 PROCEDURE — 160027 HCHG SURGERY MINUTES - 1ST 30 MINS LEVEL 2: Performed by: SURGERY

## 2018-01-10 PROCEDURE — 84702 CHORIONIC GONADOTROPIN TEST: CPT

## 2018-01-10 PROCEDURE — 160025 RECOVERY II MINUTES (STATS): Performed by: SURGERY

## 2018-01-10 PROCEDURE — A6402 STERILE GAUZE <= 16 SQ IN: HCPCS | Performed by: SURGERY

## 2018-01-10 PROCEDURE — C1781 MESH (IMPLANTABLE): HCPCS | Performed by: SURGERY

## 2018-01-10 PROCEDURE — 700111 HCHG RX REV CODE 636 W/ 250 OVERRIDE (IP)

## 2018-01-10 PROCEDURE — A9270 NON-COVERED ITEM OR SERVICE: HCPCS

## 2018-01-10 PROCEDURE — 160009 HCHG ANES TIME/MIN: Performed by: SURGERY

## 2018-01-10 PROCEDURE — 700105 HCHG RX REV CODE 258: Performed by: SURGERY

## 2018-01-10 PROCEDURE — 160036 HCHG PACU - EA ADDL 30 MINS PHASE I: Performed by: SURGERY

## 2018-01-10 PROCEDURE — 700101 HCHG RX REV CODE 250

## 2018-01-10 PROCEDURE — 160002 HCHG RECOVERY MINUTES (STAT): Performed by: SURGERY

## 2018-01-10 PROCEDURE — 160035 HCHG PACU - 1ST 60 MINS PHASE I: Performed by: SURGERY

## 2018-01-10 DEVICE — MESH VENTRALEX ST W/STRAP - 6.4CM MEDIUM (1EA/CA): Type: IMPLANTABLE DEVICE | Site: GROIN | Status: FUNCTIONAL

## 2018-01-10 RX ORDER — SODIUM CHLORIDE, SODIUM LACTATE, POTASSIUM CHLORIDE, CALCIUM CHLORIDE 600; 310; 30; 20 MG/100ML; MG/100ML; MG/100ML; MG/100ML
INJECTION, SOLUTION INTRAVENOUS
Status: DISCONTINUED | OUTPATIENT
Start: 2018-01-10 | End: 2018-01-10 | Stop reason: HOSPADM

## 2018-01-10 RX ORDER — OXYCODONE HCL 5 MG/5 ML
SOLUTION, ORAL ORAL
Status: COMPLETED
Start: 2018-01-10 | End: 2018-01-10

## 2018-01-10 RX ORDER — MIDAZOLAM HYDROCHLORIDE 1 MG/ML
INJECTION INTRAMUSCULAR; INTRAVENOUS
Status: DISCONTINUED
Start: 2018-01-10 | End: 2018-01-10 | Stop reason: HOSPADM

## 2018-01-10 RX ORDER — BUPIVACAINE HYDROCHLORIDE AND EPINEPHRINE 5; 5 MG/ML; UG/ML
INJECTION, SOLUTION EPIDURAL; INTRACAUDAL; PERINEURAL
Status: DISCONTINUED | OUTPATIENT
Start: 2018-01-10 | End: 2018-01-10 | Stop reason: HOSPADM

## 2018-01-10 RX ORDER — OXYCODONE HYDROCHLORIDE AND ACETAMINOPHEN 5; 325 MG/1; MG/1
1-2 TABLET ORAL EVERY 4 HOURS PRN
Qty: 30 TAB | Refills: 0 | Status: SHIPPED | OUTPATIENT
Start: 2018-01-10 | End: 2018-01-17

## 2018-01-10 RX ADMIN — SODIUM CHLORIDE, POTASSIUM CHLORIDE, SODIUM LACTATE AND CALCIUM CHLORIDE: 600; 310; 30; 20 INJECTION, SOLUTION INTRAVENOUS at 10:18

## 2018-01-10 RX ADMIN — FENTANYL CITRATE 50 MCG: 50 INJECTION, SOLUTION INTRAMUSCULAR; INTRAVENOUS at 11:21

## 2018-01-10 RX ADMIN — FENTANYL CITRATE 25 MCG: 50 INJECTION, SOLUTION INTRAMUSCULAR; INTRAVENOUS at 12:00

## 2018-01-10 RX ADMIN — FENTANYL CITRATE 25 MCG: 50 INJECTION, SOLUTION INTRAMUSCULAR; INTRAVENOUS at 11:48

## 2018-01-10 RX ADMIN — OXYCODONE HYDROCHLORIDE 10 MG: 5 SOLUTION ORAL at 11:25

## 2018-01-10 ASSESSMENT — PAIN SCALES - GENERAL
PAINLEVEL_OUTOF10: 4
PAINLEVEL_OUTOF10: 4
PAINLEVEL_OUTOF10: 6
PAINLEVEL_OUTOF10: 6
PAINLEVEL_OUTOF10: 4
PAINLEVEL_OUTOF10: 0
PAINLEVEL_OUTOF10: 2
PAINLEVEL_OUTOF10: 6

## 2018-01-10 NOTE — OR NURSING
1125- report received from Naida OVERTON.  VSS. See med given for pain on MAR.  Lower abd dressing cdi.  1200- Pt states pain becoming worse, untolerable, see mar.  Rates pain at 5-6/10.   1220- Pt states pain back down to 4-5/10.  Meets stage 2 criteria, report to Sabine OVERTON.

## 2018-01-10 NOTE — OP REPORT
Operative Report     Date: 1/10/2018    Surgeon: Gisele Delcid M.D.     Assistant: DARRELL Echavarria    Pre-operative Diagnosis: K43.2 Incisional hernia without obstruction or gangrene     Post-operative Diagnosis: same     Procedure: 03549 Repair initial incisional or ventral hernia; reducible    Procedure in detail: The patient was identified in the pre-operative holding area and brought to the operating room. Correct side and site were identified. Pre-operative antibiotics of ancef were administered prior to the procedure. GETA was smoothly induced. The patient was prepped and draped in the usual sterile fashion.     We made an incision over the right aspect of the old  incision with the knife, and opened subcutaneous tissues with cautery until the hernia defect was encountered. The defect was dissected out circumferentially. The defect measured 3 cm by 3 cm. A 6 cm ventralex mesh was sewn to repair the defect in an underlay fashion with 0 ethibond. I then closed the fascia over the mesh with interrupted 0 ethibond. The skin was closed in two layers with vicryl and the skin ran closed with subcuticular Monocryl.     The patient was awakened from general anesthetic, and was taken to the recovery room in stable condition.     Sponge and needle counts were correct at the end of the case.     Specimen: none     EBL: minimal     Dispo: stable, extubated, to PACU     Gisele Delcid M.D.   [facility]   [phone]

## 2018-01-10 NOTE — PROGRESS NOTES
Hernia Repair Discharge Instructions:    1. ACTIVITIES: Upon discharge from the hospital, the day of surgery it is requested that you do no significant physical activity and limit mental activities, as you have had sedation. The day after surgery, you may resume activities of daily living, but for four weeks, it is recommended that you do no strenuous activities or heavy lifting (greater than 15 pounds).     2. DRIVING: You may drive whenever you are off pain medications and are able to perform the activities needed to drive, i.e. turning, bending, twisting, etc.     3. WOUND: It is not unusual for patients to experience swelling and even bruising at the hernia repair site.  This will resolve over the next few days.     4. ICE: please use ice on the wound to decrease the swelling for the first 24 hours and then as needed for pain and swelling.     5. BATHING: The dressing can be removed two days after surgery and the wound can then be wetted in a shower as normal, but avoid submersion in water (tub bath) for at least a week.    6. PAIN MEDICATION: You will be given a prescription for pain medication at discharge. Please take these as directed. It is important to remember not to take medications on an empty stomach as this may cause nausea.      It is also helpful to take other non-narcotic over the counter medications to help with pain control and to decrease the need for narcotic medications. I recommend ibuprofen, taken every 8 hours, dosing as directed on the medication bottle.     It is useful to slowly decrease the number of narcotic pain medications you take starting the first day after surgery, as you are able. If you need a refill, Dr. Delcid's office will provide you with one refill at your post-operative visit. After this, no more narcotic pain medications will be given.     7. BOWEL FUNCTION: After hernia repair, and with the use of narcotic pain medications, it is not uncommon for patients to experience  constipation. This is due to decreasing activity levels as well as pain medications. You may wish to use a stool softener beginning immediately after surgery, and you may or may not need to use a laxative (Milk of Magnesia, Ex-lax; Senokot, etc.) as well.            8 .CALL IF YOU HAVE: (1) Fevers to more than 1010 F, (2) Unusual chest or leg  pain, (3) Drainage or fluid from incision that may be foul smelling, increased  tenderness or soreness at the wound or the wound edges are no longer  together,redness or swelling at the incision site. Please do not hesitate to call  with any other questions.     9. APPOINTMENT: Contact our office at [phone] for a follow-up appointment in 1 to 2 weeks following your procedure.     If you have any additional questions, please do not hesitate to call the office and speak to either myself or the physician on call.     Office address:  [facility] [place] [address]      Gisele Delcid M.D.  [facility]  [phone]

## 2018-01-10 NOTE — PROGRESS NOTES
An extensive informed consent was undertaken with the patient, in regard to the risks and benefits of narcotics for post-operative pain. The patient was counseled regarding the benefits of narcotics for post-operative pain in the short term period. The patient was made aware of the alternatives, including heating pads, ice, and NSAID medication The risks of addiction, overdose, respiratory depression, risks during pregnancy (if applicable), were discussed.  We discussed taking the medications as prescribed. We discussed other mediations the patient is taking, and how these can interact. The patient was notified not to share the medications with others. We discussed warning signs of addiction.    The NarcRx  program was utilized, and the patient deemed not to be at high risk for abuse, and had no concurrent prescriptions.    The patient understands that these medications are intended to be used in the short term for post-operative pain only.    The patient was given a chance to ask questions, and all her questions were answered. Discussion was undertaken with Layman's terms. The patient demonstrated adequate understanding. Consent was given in clear state of mind.  Consent to be signed.     Gisele Delcid M.D.  [facility]  [phone]

## 2018-01-10 NOTE — DISCHARGE INSTRUCTIONS
ACTIVITY: Rest and take it easy for the first 24 hours.  A responsible adult is recommended to remain with you during that time.  It is normal to feel sleepy.  We encourage you to not do anything that requires balance, judgment or coordination.    MILD FLU-LIKE SYMPTOMS ARE NORMAL. YOU MAY EXPERIENCE GENERALIZED MUSCLE ACHES, THROAT IRRITATION, HEADACHE AND/OR SOME NAUSEA.    FOR 24 HOURS DO NOT:  Drive, operate machinery or run household appliances.  Drink beer or alcoholic beverages.   Make important decisions or sign legal documents.    SPECIAL INSTRUCTIONS:     Hernia Repair Discharge Instructions:     1. ACTIVITIES: Upon discharge from the hospital, the day of surgery it is requested that you do no significant physical activity and limit mental activities, as you have had sedation. The day after surgery, you may resume activities of daily living, but for four weeks, it is recommended that you do no strenuous activities or heavy lifting (greater than 15 pounds).      2. DRIVING: You may drive whenever you are off pain medications and are able to perform the activities needed to drive, i.e. turning, bending, twisting, etc.      3. WOUND: It is not unusual for patients to experience swelling and even bruising at the hernia repair site.  This will resolve over the next few days.      4. ICE: please use ice on the wound to decrease the swelling for the first 24 hours and then as needed for pain and swelling.      5. BATHING: The dressing can be removed two days after surgery and the wound can then be wetted in a shower as normal, but avoid submersion in water (tub bath) for at least a week.     6. PAIN MEDICATION: You will be given a prescription for pain medication at discharge. Please take these as directed. It is important to remember not to take medications on an empty stomach as this may cause nausea.            It is also helpful to take other non-narcotic over the counter medications to help with pain  control and to decrease the need for narcotic medications. I recommend ibuprofen, taken every 8 hours, dosing as directed on the medication bottle.           It is useful to slowly decrease the number of narcotic pain medications you take starting the first day after surgery, as you are able. If you need a refill, Dr. Delcid's office will provide you with one refill at your post-operative visit. After this, no more narcotic pain medications will be given.      7. BOWEL FUNCTION: After hernia repair, and with the use of narcotic pain medications, it is not uncommon for patients to experience constipation. This is due to decreasing activity levels as well as pain medications. You may wish to use a stool softener beginning immediately after surgery, and you may or may not need to use a laxative (Milk of Magnesia, Ex-lax; Senokot, etc.) as well.             8 .CALL IF YOU HAVE: (1) Fevers to more than 1010 F, (2) Unusual chest or leg     pain, (3) Drainage or fluid from incision that may be foul smelling, increased      tenderness or soreness at the wound or the wound edges are no longer   together,redness or swelling at the incision site. Please do not hesitate to call     with any other questions.      9. APPOINTMENT: Contact our office at [phone] for a follow-up appointment in 1 to 2 weeks following your procedure.      If you have any additional questions, please do not hesitate to call the office and speak to either myself or the physician on call.      Office address:  [address] [place] [id number]        Gisele Delcid M.D.  [facility]  [phone]       DIET: To avoid nausea, slowly advance diet as tolerated, avoiding spicy or greasy foods for the first day.  Add more substantial food to your diet according to your physician's instructions.  Babies can be fed formula or breast milk as soon as they are hungry.  INCREASE FLUIDS AND FIBER TO AVOID CONSTIPATION.        You should CALL YOUR  PHYSICIAN if you develop:  Fever greater than 101 degrees F.  Pain not relieved by medication, or persistent nausea or vomiting.  Excessive bleeding (blood soaking through dressing) or unexpected drainage from the wound.  Extreme redness or swelling around the incision site, drainage of pus or foul smelling drainage.  Inability to urinate or empty your bladder within 8 hours.  Problems with breathing or chest pain.    You should call 911 if you develop problems with breathing or chest pain.  If you are unable to contact your doctor or surgical center, you should go to the nearest emergency room or urgent care center.  Physician's telephone #: [phone] Dr Delcid    If any questions arise, call your doctor.  If your doctor is not available, please feel free to call the Surgical Center at [phone].  The Center is open Monday through Friday from 7AM to 7PM.  You can also call the [employer] HOTLINE open 24 hours/day, 7 days/week and speak to a nurse at [phone], or toll free at [phone].    A registered nurse may call you a few days after your surgery to see how you are doing after your procedure.    MEDICATIONS: Resume taking daily medication.  Take prescribed pain medication with food.  If no medication is prescribed, you may take non-aspirin pain medication if needed.  PAIN MEDICATION CAN BE VERY CONSTIPATING.  Take a stool softener or laxative such as senokot, pericolace, or milk of magnesia if needed.    Prescription given for Percocer.  Last pain medication given at 11:25am oxycodone 10mg.    If your physician has prescribed pain medication that includes Acetaminophen (Tylenol), do not take additional Acetaminophen (Tylenol) while taking the prescribed medication.    Depression / Suicide Risk    As you are discharged from this [facility] facility, it is important to learn how to keep safe from harming yourself.    Recognize the warning signs:  · Abrupt changes in personality, positive or negative-  including increase in energy   · Giving away possessions  · Change in eating patterns- significant weight changes-  positive or negative  · Change in sleeping patterns- unable to sleep or sleeping all the time   · Unwillingness or inability to communicate  · Depression  · Unusual sadness, discouragement and loneliness  · Talk of wanting to die  · Neglect of personal appearance   · Rebelliousness- reckless behavior  · Withdrawal from people/activities they love  · Confusion- inability to concentrate     If you or a loved one observes any of these behaviors or has concerns about self-harm, here's what you can do:  · Talk about it- your feelings and reasons for harming yourself  · Remove any means that you might use to hurt yourself (examples: pills, rope, extension cords, firearm)  · Get professional help from the community (Mental Health, Substance Abuse, psychological counseling)  · Do not be alone:Call your Safe Contact- someone whom you trust who will be there for you.  · Call your local CRISIS HOTLINE 795-8744 or 821-788-5591  · Call your local Children's Mobile Crisis Response Team [place] (238) 159-5815 or www.[employer]  · Call the toll free National Suicide Prevention Hotlines   · National Suicide Prevention Lifeline 434-140-JESS (4430)  · National Hope Line Network 800-SUICIDE (908-3150)

## 2018-01-10 NOTE — OR NURSING
Pt to pre-op. Name, birthday, allergies, NPO status, last void, medication rec, surgical site and procedure verified with patient.  Pt belongings collected and secured in locker.  Pt verbalizes understanding of pain scale, expected plan of care and course of stay.  IV established. Sequentials placed.  Family at bedside.

## 2018-01-10 NOTE — OR NURSING
1109: To PACU from OR via gurney, respirations spontaneous and non-labored. Inspected lower abdominal surgical site, it has c/d/i  surgical dressing.  1115: Pain is tolerable, no nausea. Sleepy, but arouses spontaneously and to voice. Pt has a wet cough, no Hx of smoking cigarettes, but does VAPE.  1120: Pain increased, medication given per MAR, no nausea. Able to turn supplemental O2 down.  1125: Report given to TIAN Nair and she assumed care.

## 2018-02-01 ENCOUNTER — OFFICE VISIT (OUTPATIENT)
Dept: MEDICAL GROUP | Facility: LAB | Age: 33
End: 2018-02-01
Payer: COMMERCIAL

## 2018-02-01 VITALS
HEART RATE: 96 BPM | RESPIRATION RATE: 12 BRPM | HEIGHT: 68 IN | WEIGHT: 194 LBS | SYSTOLIC BLOOD PRESSURE: 128 MMHG | TEMPERATURE: 98.4 F | BODY MASS INDEX: 29.4 KG/M2 | DIASTOLIC BLOOD PRESSURE: 88 MMHG | OXYGEN SATURATION: 96 %

## 2018-02-01 DIAGNOSIS — Z98.890 S/P HERNIA REPAIR: ICD-10-CM

## 2018-02-01 DIAGNOSIS — E66.9 OBESITY WITHOUT SERIOUS COMORBIDITY, UNSPECIFIED CLASSIFICATION, UNSPECIFIED OBESITY TYPE: ICD-10-CM

## 2018-02-01 DIAGNOSIS — Z87.19 S/P HERNIA REPAIR: ICD-10-CM

## 2018-02-01 DIAGNOSIS — F98.8 ATTENTION DEFICIT DISORDER, UNSPECIFIED HYPERACTIVITY PRESENCE: ICD-10-CM

## 2018-02-01 PROCEDURE — 99214 OFFICE O/P EST MOD 30 MIN: CPT | Performed by: NURSE PRACTITIONER

## 2018-02-01 RX ORDER — DEXTROAMPHETAMINE SACCHARATE, AMPHETAMINE ASPARTATE, DEXTROAMPHETAMINE SULFATE AND AMPHETAMINE SULFATE 3.75; 3.75; 3.75; 3.75 MG/1; MG/1; MG/1; MG/1
15 TABLET ORAL 2 TIMES DAILY
Qty: 56 TAB | Refills: 0 | Status: CANCELLED | OUTPATIENT
Start: 2018-02-01 | End: 2018-03-01

## 2018-02-01 RX ORDER — PHENTERMINE HYDROCHLORIDE 37.5 MG/1
37.5 TABLET ORAL
Qty: 30 TAB | Refills: 2 | Status: SHIPPED
Start: 2018-02-01 | End: 2018-03-03

## 2018-02-01 NOTE — LETTER
February 1, 2018       Patient: Katerina Owen   YOB: 1985   Date of Visit: 2/1/2018         To Whom It May Concern:    Katerina Owen was unable to lift over 10 lbs from 10/31/2017 until 2/2/2018 - prohibiting her from performing the duties of her normal job title.  She had surgery 1/10/2018 and is released to return to work by her surgeon, Dr Gisele Delcid on 2/2/2018.      If you have any questions or concerns, please don't hesitate to call [phone]          Sincerely,          Lashonda Chow A.P.N.  Electronically Signed

## 2018-02-01 NOTE — PROGRESS NOTES
"CC  f/u    HPI  Katerina is a 32-year-old established female here to follow-up on a couple of things:  #1-she had a hernia repair done on January 10 with Dr. Delcid and feels this went really well. She'll be returning to work tomorrow. She has been unable to lift over 10 pounds since October 31 when this was recommended by occupational health. Her bowels are moving well and she denies any problems with nausea, vomiting or diarrhea.  #2-attention deficit disorder: Chronic issue. Typically does really well in terms of focus when she takes Adderall. Denies any negative side effects from Adderall. Took several days off of Adderall after her hernia repair. Last refill of Adderall was December 27.  #3-obesity: She has a lot of difficulty losing weight. She states that she really is a small eater. Often only eating breakfast, skipping lunch and dinner. She does drink milk frequently throughout the day. She is physically active when she is performing her job duties. She has taken phentermine in the past which she feels works really well for her in terms of weight loss and energy improvement.    Past medical, surgical, family, and social history is reviewed and updated in Epic chart by me today.   Medications and allergies reviewed and updated in Epic chart by me today.     ROS:   As documented in history of present illness above    Exam:  Blood pressure 128/88, pulse 96, temperature 36.9 °C (98.4 °F), resp. rate 12, height 1.727 m (5' 7.99\"), weight 88 kg (194 lb), SpO2 96 %.    Constitutional: Alert, no distress, plus 3 vital signs  Skin:  Warm, dry, no rashes invisible areas  Eye: Equal, round and reactive, conjunctiva clear  ENMT: Lips without lesions, good dentition, oropharynx clear    Neck: Trachea midline  Respiratory: Unlabored respiration, lungs clear to auscultation, no wheezes, no rhonchi  Cardiovascular: Normal rate and rhythm  Psych: Alert, pleasant, well-groomed, normal affect    A/P:  1. Attention deficit " disorder, unspecified hyperactivity presence  -She requested a refill of phentermine. Discussed that she may not take phentermine and Adderall together which she was familiar with. She chose phentermine for 3 months to help her jumpstart her weight loss after her hernia repair and several months of inactivity. Reminded her of potential side effects of phentermine such as insomnia, heart palpitations, hypertension and anxiety, none of which she states that she has experienced in the past. She'll follow up here in 3 months. Discussed healthier eating with small, frequent meals containing avocado, nuts, vegetables, lean protein cutting down on cow milk and trying almond milk for less sugar.  - phentermine (ADIPEX-P) 37.5 MG tablet; Take 1 Tab by mouth every morning before breakfast for 30 days.  Dispense: 30 Tab; Refill: 2    2. Obesity without serious comorbidity, unspecified classification, unspecified obesity type  -As above  - phentermine (ADIPEX-P) 37.5 MG tablet; Take 1 Tab by mouth every morning before breakfast for 30 days.  Dispense: 30 Tab; Refill: 2    3. S/P hernia repair  -Doing well. She was given a work note stating that she was told that she should refrain from lifting greater than 10 pounds from October 31 until tomorrow. Reviewed her occupational health note from October 31.

## 2018-02-05 ENCOUNTER — APPOINTMENT (OUTPATIENT)
Dept: RADIOLOGY | Facility: MEDICAL CENTER | Age: 33
End: 2018-02-05
Attending: EMERGENCY MEDICINE
Payer: COMMERCIAL

## 2018-02-05 ENCOUNTER — HOSPITAL ENCOUNTER (EMERGENCY)
Facility: MEDICAL CENTER | Age: 33
End: 2018-02-05
Attending: EMERGENCY MEDICINE
Payer: COMMERCIAL

## 2018-02-05 VITALS
RESPIRATION RATE: 18 BRPM | BODY MASS INDEX: 28.79 KG/M2 | OXYGEN SATURATION: 98 % | WEIGHT: 190 LBS | DIASTOLIC BLOOD PRESSURE: 89 MMHG | HEIGHT: 68 IN | TEMPERATURE: 99.3 F | SYSTOLIC BLOOD PRESSURE: 144 MMHG | HEART RATE: 89 BPM

## 2018-02-05 DIAGNOSIS — J06.9 UPPER RESPIRATORY TRACT INFECTION, UNSPECIFIED TYPE: ICD-10-CM

## 2018-02-05 DIAGNOSIS — T81.30XA WOUND DEHISCENCE: ICD-10-CM

## 2018-02-05 LAB
FLUAV+FLUBV AG SPEC QL IA: NORMAL
SIGNIFICANT IND 70042: NORMAL
SITE SITE: NORMAL
SOURCE SOURCE: NORMAL

## 2018-02-05 PROCEDURE — 71045 X-RAY EXAM CHEST 1 VIEW: CPT

## 2018-02-05 PROCEDURE — 99283 EMERGENCY DEPT VISIT LOW MDM: CPT

## 2018-02-05 PROCEDURE — 87400 INFLUENZA A/B EACH AG IA: CPT

## 2018-02-05 NOTE — ED NOTES
Discharged to home with instructions to follow up with her doctor or return here for worsening symptoms Patient advised not to soak wound under water and to watch for infection and apply dressing for drainage - all with good understanding

## 2018-02-05 NOTE — ED PROVIDER NOTES
ED Provider Note  CHIEF COMPLAINT  Chief Complaint   Patient presents with   • Wound Check     hernia repair 1-10  had recheck with surgeon on 1-23 and was ok  wound site popped open on saturday       HPI  Katerina Owen is a 32 y.o. female who presents to the ED secondary to some wound dehiscence. The patient had a lower abdominal ventral hernia repair done by Dr. Delcid approximate month ago, she noticed a small area of the wound opened up approximately 2-3 days ago. It is slightly draining, the patient did develop a fever last night, slight runny nose, muscle aches, cough. She did not get the flu shot. No abdominal pains, nausea vomiting.    REVIEW OF SYSTEMS  See HPI for further details.      PAST MEDICAL HISTORY  Past Medical History:   Diagnosis Date   • ADHD    • Arthritis     back   • Bowel habit changes     constipation   • Chlamydia 5/11/2011   • depression    • Heart burn    • HTN (hypertension)     related to pregnancy   • Migraine    • Pain     incisional hernia pain; back (arthritis)   • Pre-eclampsia     2007    • PTSD (post-traumatic stress disorder)    • Staph infection 2011    after C section       FAMILY HISTORY  Family History   Problem Relation Age of Onset   • Heart Disease Father      heart failure smoker    • Hypertension Father    • Cancer Maternal Grandfather      pancreastic   • Heart Disease Paternal Grandfather      heart attack       SOCIAL HISTORY  Social History     Social History   • Marital status:      Spouse name: N/A   • Number of children: N/A   • Years of education: N/A     Social History Main Topics   • Smoking status: Former Smoker     Packs/day: 0.25     Years: 6.00     Quit date: 12/26/2014   • Smokeless tobacco: Current User     Last attempt to quit: 10/30/2014      Comment: 1/4/2017 currently Vape   • Alcohol use No   • Drug use: No   • Sexual activity: Not Currently     Partners: Male     Birth control/ protection: Surgical      Comment: ; three  "kids; wk: not working     Other Topics Concern   • Not on file     Social History Narrative   • No narrative on file       SURGICAL HISTORY  Past Surgical History:   Procedure Laterality Date   • VENTRAL HERNIA REPAIR Right 1/10/2018    Procedure: VENTRAL HERNIA REPAIR- FOR INCISIONAL HERNIA REDUCIBLE  WITH MESH;  Surgeon: Gisele Delcid M.D.;  Location: SURGERY [facility];  Service: General   • BREAST BIOPSY Left 2016    Procedure: BREAST BIOPSY Excisional;  Surgeon: Gisele Delcid M.D.;  Location: SURGERY [facility];  Service:    • LUMPECTOMY  2014    both breasts - Dr. Garsia - [facility] 10/2014   • REPEAT C SECTION W TUBAL LIGATION  2011    Performed by AYAAN MERCHANT at LABOR AND DELIVERY   • SEPTOTURBINOPLASTY  2009    Performed by CHRISTIAN YEN at SURGERY SAME DAY [facility]   • PRIMARY C SECTION  Aug 7, 2007     preeclampsia       CURRENT MEDICATIONS  Home Medications     Reviewed by Deion Ng (Pharmacy Tech) on 18 at 0824  Med List Status: Complete   Medication Last Dose Status   acetaminophen (TYLENOL) 500 MG Tab 2018 Active   amphetamine-dextroamphetamine (ADDERALL) 15 MG tablet 2018 Active   asa/apap/caffeine (EXCEDRIN) 250-250-65 MG Tab 2018 Active   Multiple Vitamins-Minerals (MULTIVITAMIN ADULT PO) > 2 days Active   Omega-3 1000 MG Cap > 2 weeks Active   phentermine (ADIPEX-P) 37.5 MG tablet > 2 days Active   vitamin e (VITAMIN E) 400 UNIT Cap > 2 weeks Active                ALLERGIES  Allergies   Allergen Reactions   • Morphine Nausea     \"feel sick for a whole day after being given morphine\"  RXN=2016     • Vicodin [Hydrocodone-Acetaminophen] Vomiting and Nausea     TNN=3775   • Tuna        PHYSICAL EXAM  VITAL SIGNS: /89   Pulse 89   Temp 37.4 °C (99.3 °F)   Resp 18   Ht 1.727 m (5' 8\")   Wt 86.2 kg (190 lb)   LMP 2018 (Approximate)   SpO2 98%   BMI 28.89 kg/m²   Constitutional :  Well developed, Well " nourished, no distress, Non-toxic appearance.   HENT: Atraumatic, normocephalic, oropharynx is clear  Eyes: EOMI, PERRLA  Neck: Normal range of motion, No tenderness, Supple, No stridor.   Lymphatic: No anterior cervical lymphadenopathy.   Cardiovascular: Normal heart rate, Normal rhythm.   Thorax & Lungs: Clear to auscultation bilaterally  Abdomen: Soft, Non-tender, no rebound  Skin: Lower old  scar with less than 1 cm wound dehisced since seems superficial without any erythema, minimal drainage, no exudate.  Extremities: Intact distal pulses, No edema, No tenderness.       RADIOLOGY/PROCEDURES  DX-CHEST-PORTABLE (1 VIEW)   Final Result         1. No acute cardiopulmonary abnormalities are identified.            COURSE & MEDICAL DECISION MAKING  Pertinent Labs & Imaging studies reviewed. (See chart for details)  Patient was a small amount of wound dehiscence, does not appear to be infected, is less than 1 cm, we'll have the patient follow up with Dr. Delcid as an outpatient, use antibiotic ointment and keeping it covered. The patient is also having a fever last night, low-grade temperature here, runny nose, cough, we will get a chest x-ray, check the patient for the flu    Chest x-ray is negative, influenza is negative, we'll have the patient follow with Dr. Delcid, supportive care for her URI, the patient return with any other concerns.    FINAL IMPRESSION  1. Wound dehiscence    2. Upper respiratory tract infection, unspecified type        Patient referred to primary care provider for blood pressure management    This dictation was created using voice recognition software. The accuracy of the dictation is limited to the abilities of the software. I expect there may be some errors of grammar and possibly content. The nursing notes were reviewed and certain aspects of this information were incorporated into this note.    Electronically signed by: Chinmay Haung, 2018

## 2018-02-05 NOTE — ED NOTES
"Med rec updated and complete  Allergies reviewed  Pt states \"No antibiotics in the last 30 days\".  Pt states \"I have been switching  between my ADDERALL 15MG and PHENTERMINE 37.5MG, every 2 days I take my PHENTERMINE 37.5MG, it gives me a headache\".    "

## 2018-02-05 NOTE — DISCHARGE INSTRUCTIONS
Please follow-up with your primary care provider for blood pressure management.        Wound Dehiscence  Wound dehiscence is when a surgical cut (incision) breaks open and does not heal properly after surgery. It usually happens 7-10 days after surgery. This can be a serious condition. It is important to identify and treat this condition early.   CAUSES   Some common causes of wound dehiscence include:  · Stretching of the wound area. This may be caused by lifting, vomiting, violent coughing, or straining during bowel movements.  · Wound infection.  · Early stitch (suture) removal.  RISK FACTORS  Various things can increase your risk of developing wound dehiscence, including:  · Obesity.  · Lung disease.  · Smoking.  · Poor nutrition.  · Contamination during surgery.  SIGNS AND SYMPTOMS  · Bleeding from the wound.  · Pain.  · Fever.  · Wound starts breaking open.  DIAGNOSIS   · Your health care provider may diagnose wound dehiscence by monitoring the incision and noting any changes in the wound. These changes can include an increase in drainage or pain. The health care provider may also ask you if you have noticed any stretching or tearing of the wound.  · Wound cultures may be taken to determine if there is an infection.   · Imaging studies, such as an MRI scan or CT scan, may be done to determine if there is a collection of pus or fluid in the wound area.  TREATMENT  Treatment may include:  · Wound care.  · Surgical repair.  · Antibiotic medicine to treat or prevent infection.  · Medicines to reduce pain and swelling.  HOME CARE INSTRUCTIONS   · Only take over-the-counter or prescription medicines for pain, discomfort, or fever as directed by your health care provider. Taking pain medicine 30 minutes before changing a bandage (dressing) can help relieve pain.  · Take your antibiotics as directed. Finish them even if you start to feel better.  · Gently wash the area with mild soap and water 2 times a day, or as  directed. Rinse off the soap. Pat the area dry with a clean towel. Do not rub the wound. This may cause bleeding.  · Follow your health care provider's instructions for how often you need to change the dressing and packing inside. Wash your hands well before and after changing your dressing. Apply a dressing to the wound as directed.  · Take showers. Do not soak the wound, bathe, swim, or use a hot tub until directed by your health care provider.  · Avoid exercises that make you sweat heavily.  · Use anti-itch medicine as directed by your health care provider. The wound may itch when it is healing. Do not pick or scratch at the wound.  · Do not lift more than 10 pounds (4.5 kg) until the wound is healed, or as directed by your health care provider.  · Keep all follow-up appointments as directed.  SEEK MEDICAL CARE IF:  · You have excessive bleeding from your surgical wound.  · Your wound does not seem to be healing properly.  · You have a fever.  SEEK IMMEDIATE MEDICAL CARE IF:   · You have increased swelling or redness around the wound.  · You have increasing pain in the wound.  · You have an increasing amount of pus coming from the wound.  · Your wound breaks open farther.  MAKE SURE YOU:   · Understand these instructions.  · Will watch your condition.  · Will get help right away if you are not doing well or get worse.     This information is not intended to replace advice given to you by your health care provider. Make sure you discuss any questions you have with your health care provider.     Document Released: 03/09/2005 Document Revised: 12/23/2014 Document Reviewed: 08/25/2014  [employer] Interactive Patient Education ©2016 [employer]      Upper Respiratory Infection, Adult  Most upper respiratory infections (URIs) are a viral infection of the air passages leading to the lungs. A URI affects the nose, throat, and upper air passages. The most common type of URI is nasopharyngitis and is typically referred to  "as \"the common cold.\"  URIs run their course and usually go away on their own. Most of the time, a URI does not require medical attention, but sometimes a bacterial infection in the upper airways can follow a viral infection. This is called a secondary infection. Sinus and middle ear infections are common types of secondary upper respiratory infections.  Bacterial pneumonia can also complicate a URI. A URI can worsen asthma and chronic obstructive pulmonary disease (COPD). Sometimes, these complications can require emergency medical care and may be life threatening.   CAUSES  Almost all URIs are caused by viruses. A virus is a type of germ and can spread from one person to another.   RISKS FACTORS  You may be at risk for a URI if:   · You smoke.    · You have chronic heart or lung disease.  · You have a weakened defense (immune) system.    · You are very young or very old.    · You have nasal allergies or asthma.  · You work in crowded or poorly ventilated areas.  · You work in health care facilities or schools.  SIGNS AND SYMPTOMS   Symptoms typically develop 2-3 days after you come in contact with a cold virus. Most viral URIs last 7-10 days. However, viral URIs from the influenza virus (flu virus) can last 14-18 days and are typically more severe. Symptoms may include:   · Runny or stuffy (congested) nose.    · Sneezing.    · Cough.    · Sore throat.    · Headache.    · Fatigue.    · Fever.    · Loss of appetite.    · Pain in your forehead, behind your eyes, and over your cheekbones (sinus pain).  · Muscle aches.    DIAGNOSIS   Your health care provider may diagnose a URI by:  · Physical exam.  · Tests to check that your symptoms are not due to another condition such as:  ¨ Strep throat.  ¨ Sinusitis.  ¨ Pneumonia.  ¨ Asthma.  TREATMENT   A URI goes away on its own with time. It cannot be cured with medicines, but medicines may be prescribed or recommended to relieve symptoms. Medicines may help:  · Reduce your " fever.  · Reduce your cough.  · Relieve nasal congestion.  HOME CARE INSTRUCTIONS   · Take medicines only as directed by your health care provider.    · Gargle warm saltwater or take cough drops to comfort your throat as directed by your health care provider.  · Use a warm mist humidifier or inhale steam from a shower to increase air moisture. This may make it easier to breathe.  · Drink enough fluid to keep your urine clear or pale yellow.    · Eat soups and other clear broths and maintain good nutrition.    · Rest as needed.    · Return to work when your temperature has returned to normal or as your health care provider advises. You may need to stay home longer to avoid infecting others. You can also use a face mask and careful hand washing to prevent spread of the virus.  · Increase the usage of your inhaler if you have asthma.    · Do not use any tobacco products, including cigarettes, chewing tobacco, or electronic cigarettes. If you need help quitting, ask your health care provider.  PREVENTION   The best way to protect yourself from getting a cold is to practice good hygiene.   · Avoid oral or hand contact with people with cold symptoms.    · Wash your hands often if contact occurs.    There is no clear evidence that vitamin C, vitamin E, echinacea, or exercise reduces the chance of developing a cold. However, it is always recommended to get plenty of rest, exercise, and practice good nutrition.   SEEK MEDICAL CARE IF:   · You are getting worse rather than better.    · Your symptoms are not controlled by medicine.    · You have chills.  · You have worsening shortness of breath.  · You have brown or red mucus.  · You have yellow or brown nasal discharge.  · You have pain in your face, especially when you bend forward.  · You have a fever.  · You have swollen neck glands.  · You have pain while swallowing.  · You have white areas in the back of your throat.  SEEK IMMEDIATE MEDICAL CARE IF:   · You have severe  or persistent:  ¨ Headache.  ¨ Ear pain.  ¨ Sinus pain.  ¨ Chest pain.  · You have chronic lung disease and any of the following:  ¨ Wheezing.  ¨ Prolonged cough.  ¨ Coughing up blood.  ¨ A change in your usual mucus.  · You have a stiff neck.  · You have changes in your:  ¨ Vision.  ¨ Hearing.  ¨ Thinking.  ¨ Mood.  MAKE SURE YOU:   · Understand these instructions.  · Will watch your condition.  · Will get help right away if you are not doing well or get worse.     This information is not intended to replace advice given to you by your health care provider. Make sure you discuss any questions you have with your health care provider.     Document Released: 06/13/2002 Document Revised: 05/03/2016 Document Reviewed: 03/25/2015  [employer] Interactive Patient Education ©2016 [employer]

## 2018-02-07 ENCOUNTER — OFFICE VISIT (OUTPATIENT)
Dept: MEDICAL GROUP | Facility: LAB | Age: 33
End: 2018-02-07
Payer: COMMERCIAL

## 2018-02-07 VITALS
HEART RATE: 106 BPM | OXYGEN SATURATION: 97 % | BODY MASS INDEX: 29.4 KG/M2 | SYSTOLIC BLOOD PRESSURE: 118 MMHG | TEMPERATURE: 97.8 F | HEIGHT: 68 IN | RESPIRATION RATE: 12 BRPM | WEIGHT: 194 LBS | DIASTOLIC BLOOD PRESSURE: 88 MMHG

## 2018-02-07 DIAGNOSIS — Z98.890 S/P HERNIA REPAIR: ICD-10-CM

## 2018-02-07 DIAGNOSIS — Z87.19 S/P HERNIA REPAIR: ICD-10-CM

## 2018-02-07 DIAGNOSIS — T81.30XA WOUND DEHISCENCE: ICD-10-CM

## 2018-02-07 DIAGNOSIS — N63.20 LEFT BREAST LUMP: ICD-10-CM

## 2018-02-07 DIAGNOSIS — J06.9 ACUTE URI: ICD-10-CM

## 2018-02-07 PROCEDURE — 99214 OFFICE O/P EST MOD 30 MIN: CPT | Performed by: NURSE PRACTITIONER

## 2018-02-07 RX ORDER — ALBUTEROL SULFATE 90 UG/1
1-2 AEROSOL, METERED RESPIRATORY (INHALATION) EVERY 4 HOURS PRN
Qty: 1 INHALER | Refills: 0 | Status: SHIPPED | OUTPATIENT
Start: 2018-02-07 | End: 2018-06-05

## 2018-02-07 RX ORDER — ALBUTEROL SULFATE 90 UG/1
1-2 AEROSOL, METERED RESPIRATORY (INHALATION) EVERY 4 HOURS PRN
Qty: 1 INHALER | Refills: 0 | Status: SHIPPED | OUTPATIENT
Start: 2018-02-07 | End: 2018-02-07 | Stop reason: SDUPTHER

## 2018-02-07 NOTE — LETTER
February 7, 2018       Patient: Katerina Owen   YOB: 1985   Date of Visit: 2/7/2018         To Whom It May Concern:    Please excuse Katerina Owen from work 2/3/2018 - 2/11/2018 due to illness.      If you have any questions or concerns, please don't hesitate to call [phone]          Sincerely,          PABLO Vines.P.RUBI.  Electronically Signed

## 2018-02-07 NOTE — NON-PROVIDER
"Chief Complaint   Patient presents with   • Cough     pt states she has dry cough, fever, fatigue, she did test negative for flu at ER, onset 3 days      HPI    Keven is a 32-year-old female established patient seen today for several issues:    1-acute uri symptoms: new issue. complaint of cough x 6 days - persistent today. Patient states that she went to the ER on Saturday because she had a temperature of 99.3 and was feeling lethargic and achy. She had a hernia repair on January 10th and thought that her symptoms were related to surgery. She was tested for the flu, however, results came back negative. Patient states that she does not feel any sinus pressure or have any nasal drainage, however she feels that her cough is worsening. Patient states that she still feels weak, dizzy and has shortness of breath. She reports that she been extremely drowsy this past week and has slept 12 hours + per night. Patient states that she has not taken any over-the-counter medications for cough. Pt does vape.  Cough is dry.     2- Cystic breast tissue- chronic issue for patient. Patient feels a lump in her left breast and would like a referral for mammogram - Dr. Delcid, her surgeon from 2016 surgery recommend mammo every 2 yrs. Patient states that she has an aunt with breast cancer and that she has had several cysts removed from her breasts.    3- S/P hernia repair on January 10- Was seen at the ER this past Saturday for a wound dehiscence. States that the wound is healing well and has not had any oozing, malodorous fluid coming out. No pain or tenderness at this time.     ROS: As documented in history of present illness above    Exam:  Blood pressure 118/88, pulse (!) 106, temperature 36.6 °C (97.8 °F), resp. rate 12, height 1.727 m (5' 8\"), weight 88 kg (194 lb), last menstrual period 01/11/2018, SpO2 97 %.    Gen. appears healthy in no distress   Skin: appropriate for sex and age.   HEENT: NINI, no maxillary sinus pressure or " nasal drainage. Throat and buccal mucosa pink and moist. Ears are without drainage; TM visible and clear.   Neck no adenopathy, no nodules or masses palpable   Lungs slight fine rhonchi noted in RLL that cleared with cough. No increased WOB.  Speaking in full sentences without difficulty.  Heart RRR  Abd:  Wound to RLQ - medial aspect with several mm opening - no discharge or tenderness.  No surrounding erythema.  Neuro gait and station normal   Psych appropriate        1. Acute URI  Discussed with patient the need to continue to rest as this most likely is caused by a virus. No antibiotics at this time. Discussed prescribing albuterol in order to open up patient's airway and help her shortness of breath. If symptoms do not improve by the end of the weekend, patient instructed to contact us.   albuterol 108 (90 Base) MCG/ACT Aero Soln inhalation aerosol; Inhale 1-2 Puffs by mouth every four hours as needed for Shortness of Breath.  Dispense: 1 Inhaler; Refill: 0    2. Left breast lump  Discussed with patient the need for below testing due to c/o left breast lump - not appropriate for screening mammogram.    - MA-DIAGNOSTIC MAMMO W/CAD-BILAT; Future  - US-BREAST BILAT-COMPLETE; Future    3.  S/p hernia repair:   Wound slightly open but pt reports it is shrinking . No pain.  Continue to monitor.  F/u as needed.  Discussed wound care .

## 2018-02-08 NOTE — PROGRESS NOTES
"This patient  & examined with MAURICIO Jo student.  I have thoroughly reviewed note with student and agree with HPI  MAURICIO Robles    ROS: As documented in history of present illness above     Exam:  Blood pressure 118/88, pulse (!) 106, temperature 36.6 °C (97.8 °F), resp. rate 12, height 1.727 m (5' 8\"), weight 88 kg (194 lb), last menstrual period 01/11/2018, SpO2 97 %.     Gen. appears healthy in no distress   Skin: appropriate for sex and age.   HEENT: NINI, no maxillary sinus pressure or nasal drainage. Throat and buccal mucosa pink and moist. Ears are without drainage; TM visible and clear.   Neck no adenopathy, no nodules or masses palpable   Lungs slight fine rhonchi noted in RLL that cleared with cough. No increased WOB.  Speaking in full sentences without difficulty.  Heart RRR  Abd:  Wound to RLQ - medial aspect with several mm opening - no discharge or tenderness.  No surrounding erythema.  Neuro gait and station normal   Psych appropriate           1. Acute URI  Discussed with patient the need to continue to rest as this most likely is caused by a virus. No antibiotics at this time. Discussed prescribing albuterol in order to open up patient's airway and help her shortness of breath. If symptoms do not improve by the end of the weekend, patient instructed to contact us.   albuterol 108 (90 Base) MCG/ACT Aero Soln inhalation aerosol; Inhale 1-2 Puffs by mouth every four hours as needed for Shortness of Breath.  Dispense: 1 Inhaler; Refill: 0     2. Left breast lump  Discussed with patient the need for below testing due to c/o left breast lump - not appropriate for screening mammogram.    - MA-DIAGNOSTIC MAMMO W/CAD-BILAT; Future  - US-BREAST BILAT-COMPLETE; Future     3.  S/p hernia repair:   Wound slightly open but pt reports it is shrinking . No pain.  Continue to monitor.  F/u as needed.  Discussed wound care .  "

## 2018-02-20 DIAGNOSIS — R42 DIZZINESS: ICD-10-CM

## 2018-02-21 ENCOUNTER — HOSPITAL ENCOUNTER (OUTPATIENT)
Dept: LAB | Facility: MEDICAL CENTER | Age: 33
End: 2018-02-21
Attending: NURSE PRACTITIONER
Payer: COMMERCIAL

## 2018-02-21 DIAGNOSIS — R42 DIZZINESS: ICD-10-CM

## 2018-02-21 LAB
ALBUMIN SERPL BCP-MCNC: 4.4 G/DL (ref 3.2–4.9)
ALBUMIN/GLOB SERPL: 1.5 G/DL
ALP SERPL-CCNC: 84 U/L (ref 30–99)
ALT SERPL-CCNC: 38 U/L (ref 2–50)
ANION GAP SERPL CALC-SCNC: 10 MMOL/L (ref 0–11.9)
AST SERPL-CCNC: 26 U/L (ref 12–45)
BASOPHILS # BLD AUTO: 0.5 % (ref 0–1.8)
BASOPHILS # BLD: 0.05 K/UL (ref 0–0.12)
BILIRUB SERPL-MCNC: 0.3 MG/DL (ref 0.1–1.5)
BUN SERPL-MCNC: 12 MG/DL (ref 8–22)
CALCIUM SERPL-MCNC: 9.2 MG/DL (ref 8.5–10.5)
CHLORIDE SERPL-SCNC: 102 MMOL/L (ref 96–112)
CO2 SERPL-SCNC: 26 MMOL/L (ref 20–33)
CREAT SERPL-MCNC: 1.03 MG/DL (ref 0.5–1.4)
EOSINOPHIL # BLD AUTO: 0.07 K/UL (ref 0–0.51)
EOSINOPHIL NFR BLD: 0.7 % (ref 0–6.9)
ERYTHROCYTE [DISTWIDTH] IN BLOOD BY AUTOMATED COUNT: 40.8 FL (ref 35.9–50)
GLOBULIN SER CALC-MCNC: 2.9 G/DL (ref 1.9–3.5)
GLUCOSE SERPL-MCNC: 89 MG/DL (ref 65–99)
HCT VFR BLD AUTO: 39.8 % (ref 37–47)
HGB BLD-MCNC: 13.2 G/DL (ref 12–16)
IMM GRANULOCYTES # BLD AUTO: 0.02 K/UL (ref 0–0.11)
IMM GRANULOCYTES NFR BLD AUTO: 0.2 % (ref 0–0.9)
LYMPHOCYTES # BLD AUTO: 2.63 K/UL (ref 1–4.8)
LYMPHOCYTES NFR BLD: 27.5 % (ref 22–41)
MCH RBC QN AUTO: 28.2 PG (ref 27–33)
MCHC RBC AUTO-ENTMCNC: 33.2 G/DL (ref 33.6–35)
MCV RBC AUTO: 85 FL (ref 81.4–97.8)
MONOCYTES # BLD AUTO: 0.77 K/UL (ref 0–0.85)
MONOCYTES NFR BLD AUTO: 8 % (ref 0–13.4)
NEUTROPHILS # BLD AUTO: 6.04 K/UL (ref 2–7.15)
NEUTROPHILS NFR BLD: 63.1 % (ref 44–72)
NRBC # BLD AUTO: 0 K/UL
NRBC BLD-RTO: 0 /100 WBC
PLATELET # BLD AUTO: 341 K/UL (ref 164–446)
PMV BLD AUTO: 10.5 FL (ref 9–12.9)
POTASSIUM SERPL-SCNC: 4.3 MMOL/L (ref 3.6–5.5)
PROT SERPL-MCNC: 7.3 G/DL (ref 6–8.2)
RBC # BLD AUTO: 4.68 M/UL (ref 4.2–5.4)
SODIUM SERPL-SCNC: 138 MMOL/L (ref 135–145)
WBC # BLD AUTO: 9.6 K/UL (ref 4.8–10.8)

## 2018-02-21 PROCEDURE — 85025 COMPLETE CBC W/AUTO DIFF WBC: CPT

## 2018-02-21 PROCEDURE — 80053 COMPREHEN METABOLIC PANEL: CPT

## 2018-02-21 PROCEDURE — 36415 COLL VENOUS BLD VENIPUNCTURE: CPT

## 2018-02-24 ENCOUNTER — APPOINTMENT (OUTPATIENT)
Dept: RADIOLOGY | Facility: MEDICAL CENTER | Age: 33
End: 2018-02-24
Attending: EMERGENCY MEDICINE
Payer: COMMERCIAL

## 2018-02-24 ENCOUNTER — RESOLUTE PROFESSIONAL BILLING HOSPITAL PROF FEE (OUTPATIENT)
Dept: HOSPITALIST | Facility: MEDICAL CENTER | Age: 33
End: 2018-02-24
Payer: COMMERCIAL

## 2018-02-24 ENCOUNTER — HOSPITAL ENCOUNTER (OUTPATIENT)
Facility: MEDICAL CENTER | Age: 33
End: 2018-02-26
Attending: EMERGENCY MEDICINE | Admitting: HOSPITALIST
Payer: COMMERCIAL

## 2018-02-24 DIAGNOSIS — R55 SYNCOPE, UNSPECIFIED SYNCOPE TYPE: ICD-10-CM

## 2018-02-24 PROBLEM — E87.1 HYPONATREMIA: Status: ACTIVE | Noted: 2018-02-24

## 2018-02-24 PROBLEM — E87.6 HYPOKALEMIA: Status: ACTIVE | Noted: 2018-02-24

## 2018-02-24 LAB
ALBUMIN SERPL BCP-MCNC: 4 G/DL (ref 3.2–4.9)
ALBUMIN/GLOB SERPL: 1.2 G/DL
ALP SERPL-CCNC: 72 U/L (ref 30–99)
ALT SERPL-CCNC: 39 U/L (ref 2–50)
ANION GAP SERPL CALC-SCNC: 4 MMOL/L (ref 0–11.9)
APTT PPP: 28.1 SEC (ref 24.7–36)
AST SERPL-CCNC: 31 U/L (ref 12–45)
BASOPHILS # BLD AUTO: 0.7 % (ref 0–1.8)
BASOPHILS # BLD: 0.05 K/UL (ref 0–0.12)
BILIRUB SERPL-MCNC: 0.7 MG/DL (ref 0.1–1.5)
BNP SERPL-MCNC: 27 PG/ML (ref 0–100)
BUN SERPL-MCNC: 9 MG/DL (ref 8–22)
CALCIUM SERPL-MCNC: 8.9 MG/DL (ref 8.4–10.2)
CHLORIDE SERPL-SCNC: 104 MMOL/L (ref 96–112)
CO2 SERPL-SCNC: 26 MMOL/L (ref 20–33)
CREAT SERPL-MCNC: 0.77 MG/DL (ref 0.5–1.4)
EKG IMPRESSION: NORMAL
EOSINOPHIL # BLD AUTO: 0.04 K/UL (ref 0–0.51)
EOSINOPHIL NFR BLD: 0.5 % (ref 0–6.9)
ERYTHROCYTE [DISTWIDTH] IN BLOOD BY AUTOMATED COUNT: 39.1 FL (ref 35.9–50)
GLOBULIN SER CALC-MCNC: 3.4 G/DL (ref 1.9–3.5)
GLUCOSE SERPL-MCNC: 97 MG/DL (ref 65–99)
HCG SERPL QL: NEGATIVE
HCT VFR BLD AUTO: 38 % (ref 37–47)
HGB BLD-MCNC: 12.8 G/DL (ref 12–16)
IMM GRANULOCYTES # BLD AUTO: 0.03 K/UL (ref 0–0.11)
IMM GRANULOCYTES NFR BLD AUTO: 0.4 % (ref 0–0.9)
INR PPP: 1.02 (ref 0.87–1.13)
LYMPHOCYTES # BLD AUTO: 2.24 K/UL (ref 1–4.8)
LYMPHOCYTES NFR BLD: 29.9 % (ref 22–41)
MAGNESIUM SERPL-MCNC: 2.2 MG/DL (ref 1.5–2.5)
MCH RBC QN AUTO: 28 PG (ref 27–33)
MCHC RBC AUTO-ENTMCNC: 33.7 G/DL (ref 33.6–35)
MCV RBC AUTO: 83.2 FL (ref 81.4–97.8)
MONOCYTES # BLD AUTO: 0.64 K/UL (ref 0–0.85)
MONOCYTES NFR BLD AUTO: 8.6 % (ref 0–13.4)
NEUTROPHILS # BLD AUTO: 4.48 K/UL (ref 2–7.15)
NEUTROPHILS NFR BLD: 59.9 % (ref 44–72)
NRBC # BLD AUTO: 0 K/UL
NRBC BLD-RTO: 0 /100 WBC
PLATELET # BLD AUTO: 300 K/UL (ref 164–446)
PMV BLD AUTO: 10 FL (ref 9–12.9)
POTASSIUM SERPL-SCNC: 3.5 MMOL/L (ref 3.6–5.5)
PROT SERPL-MCNC: 7.4 G/DL (ref 6–8.2)
PROTHROMBIN TIME: 13 SEC (ref 12–14.6)
RBC # BLD AUTO: 4.57 M/UL (ref 4.2–5.4)
SODIUM SERPL-SCNC: 134 MMOL/L (ref 135–145)
TROPONIN I SERPL-MCNC: 0.02 NG/ML (ref 0–0.04)
TSH SERPL DL<=0.005 MIU/L-ACNC: 1 UIU/ML (ref 0.38–5.33)
WBC # BLD AUTO: 7.5 K/UL (ref 4.8–10.8)

## 2018-02-24 PROCEDURE — 96360 HYDRATION IV INFUSION INIT: CPT

## 2018-02-24 PROCEDURE — 80053 COMPREHEN METABOLIC PANEL: CPT

## 2018-02-24 PROCEDURE — 72125 CT NECK SPINE W/O DYE: CPT

## 2018-02-24 PROCEDURE — 70498 CT ANGIOGRAPHY NECK: CPT

## 2018-02-24 PROCEDURE — 84484 ASSAY OF TROPONIN QUANT: CPT

## 2018-02-24 PROCEDURE — 70496 CT ANGIOGRAPHY HEAD: CPT

## 2018-02-24 PROCEDURE — 85730 THROMBOPLASTIN TIME PARTIAL: CPT

## 2018-02-24 PROCEDURE — 85025 COMPLETE CBC W/AUTO DIFF WBC: CPT

## 2018-02-24 PROCEDURE — 700111 HCHG RX REV CODE 636 W/ 250 OVERRIDE (IP): Performed by: HOSPITALIST

## 2018-02-24 PROCEDURE — 700105 HCHG RX REV CODE 258: Performed by: HOSPITALIST

## 2018-02-24 PROCEDURE — 700102 HCHG RX REV CODE 250 W/ 637 OVERRIDE(OP): Performed by: HOSPITALIST

## 2018-02-24 PROCEDURE — 71045 X-RAY EXAM CHEST 1 VIEW: CPT

## 2018-02-24 PROCEDURE — 36415 COLL VENOUS BLD VENIPUNCTURE: CPT

## 2018-02-24 PROCEDURE — 99220 PR INITIAL OBSERVATION CARE,LEVL III: CPT | Performed by: HOSPITALIST

## 2018-02-24 PROCEDURE — 93005 ELECTROCARDIOGRAM TRACING: CPT | Performed by: EMERGENCY MEDICINE

## 2018-02-24 PROCEDURE — 700117 HCHG RX CONTRAST REV CODE 255: Performed by: EMERGENCY MEDICINE

## 2018-02-24 PROCEDURE — 96361 HYDRATE IV INFUSION ADD-ON: CPT

## 2018-02-24 PROCEDURE — G0378 HOSPITAL OBSERVATION PER HR: HCPCS

## 2018-02-24 PROCEDURE — 83880 ASSAY OF NATRIURETIC PEPTIDE: CPT

## 2018-02-24 PROCEDURE — 99285 EMERGENCY DEPT VISIT HI MDM: CPT

## 2018-02-24 PROCEDURE — 83735 ASSAY OF MAGNESIUM: CPT

## 2018-02-24 PROCEDURE — 84443 ASSAY THYROID STIM HORMONE: CPT

## 2018-02-24 PROCEDURE — 85610 PROTHROMBIN TIME: CPT

## 2018-02-24 PROCEDURE — 84703 CHORIONIC GONADOTROPIN ASSAY: CPT

## 2018-02-24 PROCEDURE — 700105 HCHG RX REV CODE 258: Performed by: EMERGENCY MEDICINE

## 2018-02-24 PROCEDURE — A9270 NON-COVERED ITEM OR SERVICE: HCPCS | Performed by: HOSPITALIST

## 2018-02-24 RX ORDER — POTASSIUM CHLORIDE 20 MEQ/1
20 TABLET, EXTENDED RELEASE ORAL 3 TIMES DAILY
Status: DISCONTINUED | OUTPATIENT
Start: 2018-02-24 | End: 2018-02-26 | Stop reason: HOSPADM

## 2018-02-24 RX ORDER — SODIUM CHLORIDE 9 MG/ML
INJECTION, SOLUTION INTRAVENOUS CONTINUOUS
Status: ACTIVE | OUTPATIENT
Start: 2018-02-24 | End: 2018-02-25

## 2018-02-24 RX ORDER — ONDANSETRON 2 MG/ML
4 INJECTION INTRAMUSCULAR; INTRAVENOUS EVERY 4 HOURS PRN
Status: DISCONTINUED | OUTPATIENT
Start: 2018-02-24 | End: 2018-02-26 | Stop reason: HOSPADM

## 2018-02-24 RX ORDER — AMOXICILLIN 250 MG
2 CAPSULE ORAL 2 TIMES DAILY
Status: DISCONTINUED | OUTPATIENT
Start: 2018-02-24 | End: 2018-02-26 | Stop reason: HOSPADM

## 2018-02-24 RX ORDER — BISACODYL 10 MG
10 SUPPOSITORY, RECTAL RECTAL
Status: DISCONTINUED | OUTPATIENT
Start: 2018-02-24 | End: 2018-02-26 | Stop reason: HOSPADM

## 2018-02-24 RX ORDER — PROMETHAZINE HYDROCHLORIDE 25 MG/1
12.5-25 SUPPOSITORY RECTAL EVERY 4 HOURS PRN
Status: DISCONTINUED | OUTPATIENT
Start: 2018-02-24 | End: 2018-02-26 | Stop reason: HOSPADM

## 2018-02-24 RX ORDER — SODIUM CHLORIDE 9 MG/ML
1000 INJECTION, SOLUTION INTRAVENOUS ONCE
Status: COMPLETED | OUTPATIENT
Start: 2018-02-24 | End: 2018-02-24

## 2018-02-24 RX ORDER — POLYETHYLENE GLYCOL 3350 17 G/17G
1 POWDER, FOR SOLUTION ORAL
Status: DISCONTINUED | OUTPATIENT
Start: 2018-02-24 | End: 2018-02-26 | Stop reason: HOSPADM

## 2018-02-24 RX ORDER — LABETALOL HYDROCHLORIDE 5 MG/ML
10 INJECTION, SOLUTION INTRAVENOUS EVERY 4 HOURS PRN
Status: DISCONTINUED | OUTPATIENT
Start: 2018-02-24 | End: 2018-02-26 | Stop reason: HOSPADM

## 2018-02-24 RX ORDER — ONDANSETRON 2 MG/ML
4 INJECTION INTRAMUSCULAR; INTRAVENOUS ONCE
Status: DISCONTINUED | OUTPATIENT
Start: 2018-02-24 | End: 2018-02-24

## 2018-02-24 RX ORDER — PROMETHAZINE HYDROCHLORIDE 25 MG/1
12.5-25 TABLET ORAL EVERY 4 HOURS PRN
Status: DISCONTINUED | OUTPATIENT
Start: 2018-02-24 | End: 2018-02-26 | Stop reason: HOSPADM

## 2018-02-24 RX ORDER — ONDANSETRON 4 MG/1
4 TABLET, ORALLY DISINTEGRATING ORAL EVERY 4 HOURS PRN
Status: DISCONTINUED | OUTPATIENT
Start: 2018-02-24 | End: 2018-02-26 | Stop reason: HOSPADM

## 2018-02-24 RX ORDER — ACETAMINOPHEN 325 MG/1
650 TABLET ORAL EVERY 6 HOURS PRN
Status: DISCONTINUED | OUTPATIENT
Start: 2018-02-24 | End: 2018-02-26 | Stop reason: HOSPADM

## 2018-02-24 RX ORDER — LORAZEPAM 1 MG/1
0.5 TABLET ORAL EVERY 6 HOURS PRN
Status: DISCONTINUED | OUTPATIENT
Start: 2018-02-24 | End: 2018-02-26 | Stop reason: HOSPADM

## 2018-02-24 RX ORDER — LORAZEPAM 2 MG/ML
0.5 INJECTION INTRAMUSCULAR EVERY 6 HOURS PRN
Status: DISCONTINUED | OUTPATIENT
Start: 2018-02-24 | End: 2018-02-26 | Stop reason: HOSPADM

## 2018-02-24 RX ORDER — ZOLPIDEM TARTRATE 5 MG/1
5 TABLET ORAL
Status: DISCONTINUED | OUTPATIENT
Start: 2018-02-24 | End: 2018-02-26 | Stop reason: HOSPADM

## 2018-02-24 RX ADMIN — LORAZEPAM 0.5 MG: 1 TABLET ORAL at 22:16

## 2018-02-24 RX ADMIN — LORAZEPAM 0.5 MG: 1 TABLET ORAL at 21:07

## 2018-02-24 RX ADMIN — IOHEXOL 100 ML: 350 INJECTION, SOLUTION INTRAVENOUS at 17:16

## 2018-02-24 RX ADMIN — SODIUM CHLORIDE 1000 ML: 9 INJECTION, SOLUTION INTRAVENOUS at 18:06

## 2018-02-24 RX ADMIN — SODIUM CHLORIDE: 9 INJECTION, SOLUTION INTRAVENOUS at 20:35

## 2018-02-24 RX ADMIN — SODIUM CHLORIDE 1000 ML: 9 INJECTION, SOLUTION INTRAVENOUS at 16:13

## 2018-02-24 RX ADMIN — ACETAMINOPHEN 650 MG: 325 TABLET, FILM COATED ORAL at 20:34

## 2018-02-24 RX ADMIN — DOCUSATE SODIUM AND SENNOSIDES 2 TABLET: 8.6; 5 TABLET, FILM COATED ORAL at 20:34

## 2018-02-24 RX ADMIN — ENOXAPARIN SODIUM 40 MG: 100 INJECTION SUBCUTANEOUS at 20:34

## 2018-02-24 RX ADMIN — POTASSIUM CHLORIDE 20 MEQ: 1500 TABLET, EXTENDED RELEASE ORAL at 20:34

## 2018-02-24 ASSESSMENT — PAIN SCALES - GENERAL
PAINLEVEL_OUTOF10: 2
PAINLEVEL_OUTOF10: 4

## 2018-02-24 ASSESSMENT — ENCOUNTER SYMPTOMS
DIZZINESS: 1
CARDIOVASCULAR NEGATIVE: 1
GASTROINTESTINAL NEGATIVE: 1
DEPRESSION: 0
NECK PAIN: 1
HEADACHES: 1
SEIZURES: 0
HEARTBURN: 0
RESPIRATORY NEGATIVE: 1
BRUISES/BLEEDS EASILY: 0
WEAKNESS: 1
ABDOMINAL PAIN: 0
INSOMNIA: 1
CHILLS: 0
FOCAL WEAKNESS: 0
NERVOUS/ANXIOUS: 1
EYES NEGATIVE: 1
HEMOPTYSIS: 0
NAUSEA: 0
FEVER: 0
BLOOD IN STOOL: 0
DIARRHEA: 0
PALPITATIONS: 0
COUGH: 0
DOUBLE VISION: 0
CONSTIPATION: 0
DIAPHORESIS: 0
VOMITING: 0
WHEEZING: 0
LOSS OF CONSCIOUSNESS: 1

## 2018-02-24 ASSESSMENT — PATIENT HEALTH QUESTIONNAIRE - PHQ9
1. LITTLE INTEREST OR PLEASURE IN DOING THINGS: NOT AT ALL
SUM OF ALL RESPONSES TO PHQ9 QUESTIONS 1 AND 2: 0
SUM OF ALL RESPONSES TO PHQ QUESTIONS 1-9: 0
2. FEELING DOWN, DEPRESSED, IRRITABLE, OR HOPELESS: NOT AT ALL

## 2018-02-24 ASSESSMENT — COPD QUESTIONNAIRES
COPD SCREENING SCORE: 0
DO YOU EVER COUGH UP ANY MUCUS OR PHLEGM?: NO/ONLY WITH OCCASIONAL COLDS OR INFECTIONS
HAVE YOU SMOKED AT LEAST 100 CIGARETTES IN YOUR ENTIRE LIFE: NO/DON'T KNOW
DURING THE PAST 4 WEEKS HOW MUCH DID YOU FEEL SHORT OF BREATH: NONE/LITTLE OF THE TIME

## 2018-02-24 ASSESSMENT — LIFESTYLE VARIABLES
EVER_SMOKED: YES
DO YOU DRINK ALCOHOL: NO

## 2018-02-24 NOTE — ED NOTES
"Chief Complaint   Patient presents with   • Syncope     Pt states she \"passed out\" in her bedroom this morning.  Pt has had dizziness for a couple of weeks and saw PMD who did bloodwork.     /107   Pulse (!) 108   Temp 37 °C (98.6 °F)   Resp 18   Ht 1.727 m (5' 8\")   Wt 87.4 kg (192 lb 10.9 oz)   SpO2 100%   BMI 29.30 kg/m²     "

## 2018-02-25 ENCOUNTER — APPOINTMENT (OUTPATIENT)
Dept: RADIOLOGY | Facility: MEDICAL CENTER | Age: 33
End: 2018-02-25
Attending: HOSPITALIST
Payer: COMMERCIAL

## 2018-02-25 ENCOUNTER — PATIENT OUTREACH (OUTPATIENT)
Dept: HEALTH INFORMATION MANAGEMENT | Facility: OTHER | Age: 33
End: 2018-02-25

## 2018-02-25 LAB
AMPHET UR QL SCN: NEGATIVE
BARBITURATES UR QL SCN: NEGATIVE
BENZODIAZ UR QL SCN: NEGATIVE
BZE UR QL SCN: NEGATIVE
CANNABINOIDS UR QL SCN: NEGATIVE
CHOLEST SERPL-MCNC: 160 MG/DL (ref 100–199)
CORTIS SERPL-MCNC: 0.7 UG/DL (ref 0–23)
HDLC SERPL-MCNC: 32 MG/DL
LDLC SERPL CALC-MCNC: 93 MG/DL
LV EJECT FRACT  99904: 65
LV EJECT FRACT MOD 2C 99903: 56.18
LV EJECT FRACT MOD 4C 99902: 70.22
LV EJECT FRACT MOD BP 99901: 63.53
METHADONE UR QL SCN: NEGATIVE
OPIATES UR QL SCN: NEGATIVE
OXYCODONE UR QL SCN: NEGATIVE
PCP UR QL SCN: NEGATIVE
PROPOXYPH UR QL SCN: NEGATIVE
TRIGL SERPL-MCNC: 177 MG/DL (ref 0–149)
TROPONIN I SERPL-MCNC: 0.02 NG/ML (ref 0–0.04)
TROPONIN I SERPL-MCNC: <0.02 NG/ML (ref 0–0.04)

## 2018-02-25 PROCEDURE — 90471 IMMUNIZATION ADMIN: CPT

## 2018-02-25 PROCEDURE — 93306 TTE W/DOPPLER COMPLETE: CPT

## 2018-02-25 PROCEDURE — 700111 HCHG RX REV CODE 636 W/ 250 OVERRIDE (IP): Performed by: HOSPITALIST

## 2018-02-25 PROCEDURE — G0378 HOSPITAL OBSERVATION PER HR: HCPCS

## 2018-02-25 PROCEDURE — 80061 LIPID PANEL: CPT

## 2018-02-25 PROCEDURE — 90686 IIV4 VACC NO PRSV 0.5 ML IM: CPT | Performed by: HOSPITALIST

## 2018-02-25 PROCEDURE — 70544 MR ANGIOGRAPHY HEAD W/O DYE: CPT

## 2018-02-25 PROCEDURE — 700102 HCHG RX REV CODE 250 W/ 637 OVERRIDE(OP): Performed by: HOSPITALIST

## 2018-02-25 PROCEDURE — 84484 ASSAY OF TROPONIN QUANT: CPT

## 2018-02-25 PROCEDURE — 90732 PPSV23 VACC 2 YRS+ SUBQ/IM: CPT | Performed by: HOSPITALIST

## 2018-02-25 PROCEDURE — 93306 TTE W/DOPPLER COMPLETE: CPT | Mod: 26 | Performed by: INTERNAL MEDICINE

## 2018-02-25 PROCEDURE — 82533 TOTAL CORTISOL: CPT

## 2018-02-25 PROCEDURE — A9270 NON-COVERED ITEM OR SERVICE: HCPCS | Performed by: HOSPITALIST

## 2018-02-25 PROCEDURE — 99226 PR SUBSEQUENT OBSERVATION CARE,LEVEL III: CPT | Performed by: HOSPITALIST

## 2018-02-25 PROCEDURE — 80307 DRUG TEST PRSMV CHEM ANLYZR: CPT

## 2018-02-25 RX ADMIN — POTASSIUM CHLORIDE 20 MEQ: 1500 TABLET, EXTENDED RELEASE ORAL at 15:40

## 2018-02-25 RX ADMIN — INFLUENZA A VIRUS A/MICHIGAN/45/2015 X-275 (H1N1) ANTIGEN (FORMALDEHYDE INACTIVATED), INFLUENZA A VIRUS A/HONG KONG/4801/2014 X-263B (H3N2) ANTIGEN (FORMALDEHYDE INACTIVATED), INFLUENZA B VIRUS B/PHUKET/3073/2013 ANTIGEN (FORMALDEHYDE INACTIVATED), AND INFLUENZA B VIRUS B/BRISBANE/60/2008 ANTIGEN (FORMALDEHYDE INACTIVATED) 0.5 ML: 15; 15; 15; 15 INJECTION, SUSPENSION INTRAMUSCULAR at 09:55

## 2018-02-25 RX ADMIN — DOCUSATE SODIUM AND SENNOSIDES 2 TABLET: 8.6; 5 TABLET, FILM COATED ORAL at 09:52

## 2018-02-25 RX ADMIN — PNEUMOCOCCAL VACCINE POLYVALENT 25 MCG
25; 25; 25; 25; 25; 25; 25; 25; 25; 25; 25; 25; 25; 25; 25; 25; 25; 25; 25; 25; 25; 25; 25 INJECTION, SOLUTION INTRAMUSCULAR; SUBCUTANEOUS at 09:53

## 2018-02-25 RX ADMIN — POTASSIUM CHLORIDE 20 MEQ: 1500 TABLET, EXTENDED RELEASE ORAL at 20:03

## 2018-02-25 RX ADMIN — POTASSIUM CHLORIDE 20 MEQ: 1500 TABLET, EXTENDED RELEASE ORAL at 09:52

## 2018-02-25 RX ADMIN — ZOLPIDEM TARTRATE 5 MG: 5 TABLET ORAL at 22:30

## 2018-02-25 ASSESSMENT — ENCOUNTER SYMPTOMS
FEVER: 0
SEIZURES: 0
RESPIRATORY NEGATIVE: 1
CONSTITUTIONAL NEGATIVE: 1
BACK PAIN: 0
GASTROINTESTINAL NEGATIVE: 1
BRUISES/BLEEDS EASILY: 0
ORTHOPNEA: 0
PND: 0
WEIGHT LOSS: 0
NECK PAIN: 1
SENSORY CHANGE: 0
DEPRESSION: 0
COUGH: 0
VOMITING: 0
CHILLS: 0
PSYCHIATRIC NEGATIVE: 1
ABDOMINAL PAIN: 0
HEADACHES: 1
WEAKNESS: 0
FOCAL WEAKNESS: 0
DIZZINESS: 1
CLAUDICATION: 0
SPUTUM PRODUCTION: 0
NERVOUS/ANXIOUS: 0
TINGLING: 0
NAUSEA: 0
SHORTNESS OF BREATH: 0
CARDIOVASCULAR NEGATIVE: 1
PALPITATIONS: 0
SPEECH CHANGE: 0
FLANK PAIN: 0
HEMOPTYSIS: 0
LOSS OF CONSCIOUSNESS: 0
TREMORS: 0
WHEEZING: 0
MYALGIAS: 0

## 2018-02-25 ASSESSMENT — PAIN SCALES - GENERAL: PAINLEVEL_OUTOF10: 0

## 2018-02-25 NOTE — ED PROVIDER NOTES
"ED Provider Note    CHIEF COMPLAINT  Chief Complaint   Patient presents with   • Syncope     Pt states she \"passed out\" in her bedroom this morning.  Pt has had dizziness for a couple of weeks and saw PMD who did bloodwork.       HPI  Katerina Owen is a 32 y.o. female who presents after having a witnessed syncopal episode today. The patient has been complaining of headaches, and worsening dizziness over the past several weeks, and saw her primary care provider several days ago.  She had blood work performed which was unremarkable. The patient is here with her  who says that yesterday while they were in the kitchen she complained of feeling lightheaded and dizzy,  then sat on the floor and had a syncopal episode. He responded immediately, and said she awakened after a few seconds.  Today the patient was getting ready for a presentation, when she had another syncopal episode.  The patient says she does not recall having any blindness or dizziness, and apparently just passed out she was in the bedroom. Her  who was in the kitchen, did not hear a fall, but when he went to check on her, he found her laying on the floor in the bedroom. He had last seen her about 20 minutes prior. He says the patient was unresponsive for about 10-15 minutes, and then slowly woke up. The patient says she does not know what happened, but did not bite her tongue or have any incontinence of bowel or bladder. She complains of a headache and neck pain.  She has had no chest pain, shortness breath, back pain, or abdominal pain. She has had no recent fever, cough, nausea, vomiting, or diarrhea. The patient says the dizziness is worse when she is standing. She also feels at times that the room is spinning around.     REVIEW OF SYSTEMS  See HPI for further details. All other systems are negative.     PAST MEDICAL HISTORY  Past Medical History:   Diagnosis Date   • ADHD    • Arthritis     back   • Bowel habit changes     " constipation   • Chlamydia 5/11/2011   • depression    • Heart burn    • HTN (hypertension)     related to pregnancy   • Migraine    • Pain     incisional hernia pain; back (arthritis)   • Pre-eclampsia     2007    • PTSD (post-traumatic stress disorder)    • Staph infection 2011    after C section       FAMILY HISTORY  Family History   Problem Relation Age of Onset   • Heart Disease Father      heart failure smoker    • Hypertension Father    • Cancer Maternal Grandfather      pancreastic   • Heart Disease Paternal Grandfather      heart attack       SOCIAL HISTORY  Social History     Social History   • Marital status:      Spouse name: N/A   • Number of children: N/A   • Years of education: N/A     Social History Main Topics   • Smoking status: Former Smoker     Packs/day: 0.25     Years: 6.00     Quit date: 12/26/2014   • Smokeless tobacco: Current User     Last attempt to quit: 10/30/2014      Comment: 1/4/2017 currently Vape   • Alcohol use No   • Drug use: No   • Sexual activity: Not Currently     Partners: Male     Birth control/ protection: Surgical      Comment: ; three kids; wk: not working     Other Topics Concern   • Not on file     Social History Narrative   • No narrative on file       SURGICAL HISTORY  Past Surgical History:   Procedure Laterality Date   • VENTRAL HERNIA REPAIR Right 1/10/2018    Procedure: VENTRAL HERNIA REPAIR- FOR INCISIONAL HERNIA REDUCIBLE  WITH MESH;  Surgeon: Gisele Delcid M.D.;  Location: SURGERY [facility]S;  Service: General   • BREAST BIOPSY Left 2/26/2016    Procedure: BREAST BIOPSY Excisional;  Surgeon: Gisele Delcid M.D.;  Location: SURGERY [facility];  Service:    • LUMPECTOMY  2014    both breasts - Dr. Garsia - [facility] 10/2014   • REPEAT C SECTION W TUBAL LIGATION  9/7/2011    Performed by AYAAN MERCHANT at LABOR AND DELIVERY   • SEPTOTURBINOPLASTY  4/21/2009    Performed by CHRISTIAN YEN at SURGERY [facility] ORS   • PRIMARY C  "SECTION  Aug 7, 2007     preeclampsia       CURRENT MEDICATIONS  Home Medications     Reviewed by Farrah Mcgee R.N. (Registered Nurse) on 18 at 2046  Med List Status: Complete   Medication Last Dose Status   albuterol 108 (90 Base) MCG/ACT Aero Soln inhalation aerosol 2018 Active   amphetamine-dextroamphetamine (ADDERALL) 15 MG tablet 2018 Active   asa/apap/caffeine (EXCEDRIN) 250-250-65 MG Tab 1 WEEK Active   Multiple Vitamins-Minerals (MULTIVITAMIN ADULT PO) 4 DAYS Active   phentermine (ADIPEX-P) 37.5 MG tablet 3 DAYS Active                ALLERGIES  Allergies   Allergen Reactions   • Morphine Nausea     \"feel sick for a whole day after being given morphine\"  RXN=2016     • Vicodin [Hydrocodone-Acetaminophen] Vomiting and Nausea     VEQ=[id number]       PHYSICAL EXAM  VITAL SIGNS: /83   Pulse 76   Temp 36.3 °C (97.3 °F)   Resp 18   Ht 1.727 m (5' 8\")   Wt 89.3 kg (196 lb 13.9 oz)   LMP 02/10/2018   SpO2 98%   Breastfeeding? No   BMI 29.93 kg/m²   Constitutional: Awake, alert, in no acute distress, Non-toxic appearance.   HENT: Atraumatic. Bilateral external ears normal, mucous membranes dry, throat nonerythematous without exudates, nose is normal.  Eyes: PERRL, EOMI, conjunctiva moist, noninjected. No nystagmus or photophobia.  Neck: Nontender, Normal range of motion, No nuchal rigidity, No stridor.   Lymphatic: No lymphadenopathy noted.   Cardiovascular: Regular rate and rhythm, no murmurs, rubs, gallops.  Thorax & Lungs:  Good breath sounds bilaterally, no wheezes, rales, or retractions.  No chest tenderness.  Abdomen: Bowel sounds normal, Soft, nontender, nondistended, no rebound, guarding, masses.  Back: No CVA or spinal tenderness.  Extremities: Intact distal pulses, No edema, No tenderness.   Skin: Warm, Dry, No rashes.   Musculoskeletal: No joint swelling or tenderness.  Neurologic: Alert & oriented x 3, cranial nerves II through XII intact, speech is fluent, no " facial asymmetry, temperature is midline, sensory intact to light touch, and motor function shows 5/5 strength in upper and lower extremities, no focal deficits.  Psychiatric: Affect normal, Judgment normal, Mood normal.     EKG  12-lead EKG shows a normal sinus rhythm, rate of 94, normal intervals, normal axis, no acute ST-T wave elevations or ST depressions, no pathologic Q waves, no evidence of any acute injury or ischemic pattern on my reading, in comparison to previous EKG from October, 2012, there are no acute changes.        RADIOLOGY/PROCEDURES  CT-CSPINE WITHOUT PLUS RECONS   Final Result      No evidence of cervical spine fracture.      CT-CTA HEAD WITH & W/O-POST PROCESS   Final Result      1.  No acute intracranial findings.      2.  No acute thrombus or aneurysm identified.      CT-CTA NECK WITH & W/O-POST PROCESSING   Final Result      Patent carotid and vertebral arteries. No evidence of occlusion or dissection.      DX-CHEST-PORTABLE (1 VIEW)   Final Result      No evidence of acute cardiopulmonary process.      Echocardiogram Comp W/O Cont    (Results Pending)   MR-VENOGRAM (MRV) HEAD    (Results Pending)         COURSE & MEDICAL DECISION MAKING  Pertinent Labs & Imaging studies reviewed. (See chart for details)  The patient presents after having a witnessed episode yesterday, and another syncopal episode today. Clinically she appears dehydrated with dry mucous membranes. She also says she has had a poor appetite has not had anything to eat or drink today. IV is placed, she was given a bolus of normal saline.  EKG shows a normal sinus rhythm.  CT angiogram of the head was negative for any acute injury or findings, no acute thrombus or aneurysm. CT scan of the neck were negative for any acute occlusion, dissection, or cervical spine fracture. Blood work shows a normal CBC, chemistry shows a sodium 134, potassium 3.5, otherwise normal, troponin 0.02, BNP 27, INR 1.02. Findings were discussed the  patient and her . Given her symptoms and single episodes, patient will be admitted. Case was discussed with Dr. Conley.    FINAL IMPRESSION  1. Syncope  2.   3.         Electronically signed by: Travon Leiva, 2/24/2018 11:39 PM

## 2018-02-25 NOTE — PROGRESS NOTES
0700 Report [facility] from [facility] nurseFarrah, at bedside. Pt is resting in bed.    0740 Pt is alert & oriented x 4, denies dizziness, N/V at this time. Respirations are regular and unlabored. Skin intact, IV patent & SL. Ambulates to BR, gait steady. Pt is informed about MRI @0800.    0930 Monitored heart rhythm is SR (0.16/ 0.08/ 0.38), rate 80-90's.    1515 ECHO done at bedside.    1700 Denies any need at this time, resting in bed.    1900 Report given to NOC nurseElida, at bedside.

## 2018-02-25 NOTE — PROGRESS NOTES
Pt resting in bed in poc--resprs even & unlabored, NAD noted. No needs identified. Will allow for rest and cont to monitor.

## 2018-02-25 NOTE — CARE PLAN
Problem: Venous Thromboembolism (VTW)/Deep Vein Thrombosis (DVT) Prevention:  Goal: Patient will participate in Venous Thrombosis (VTE)/Deep Vein Thrombosis (DVT)Prevention Measures  Outcome: PROGRESSING AS EXPECTED  Lovenox administered as ordered. Encouraged ambulation.

## 2018-02-25 NOTE — CARE PLAN
Problem: Safety  Goal: Will remain free from falls  Outcome: PROGRESSING AS EXPECTED  Due to recent fall and fall risk, labeled door sign and applied appropriate wristband. Educated pt on fall risk and instructed her to use call light with all activity. Non-skid footwear. Clutter-free environment. Hourly rounding. Call light and belongings within reach.

## 2018-02-25 NOTE — H&P
" Hospital Medicine History and Physical    Date of Service  2/24/2018    Chief Complaint  Chief Complaint   Patient presents with   • Syncope     Pt states she \"passed out\" in her bedroom this morning.  Pt has had dizziness for a couple of weeks and saw PMD who did bloodwork.       History of Presenting Illness  32 y.o. female who presented 2/24/2018 with Syncope. The patient tells me that she passed out yesterday and then again today. The syncopal event lasted for about 10 seconds. The patient does not remember the event. The spouse did see the event. She did not have any seizure activity with the syncope. Patient had initial workup in the emergency room with a CT of the head and neck and these do not show any aneurysms or occlusions. Patient at this point will be admitted to the telemetric unit under telemetric monitoring will perform an MRV of the head as well as an MRI of the head. Patient will be given at this point pain management for headache. The patient at this point will need to telemetric monitoring a stress test and an echocardiogram as well because of the syncopal event. Will monitor at this point telemetric Norm as well as get serial cardiac markers.       Primary Care Physician  DELMA Vines    Consultants  None    Code Status  Full code    Review of Systems  Review of Systems   Constitutional: Negative for chills, diaphoresis and fever.   HENT: Negative.    Eyes: Negative.  Negative for double vision.   Respiratory: Negative.  Negative for cough, hemoptysis and wheezing.    Cardiovascular: Negative.  Negative for chest pain, palpitations and leg swelling.   Gastrointestinal: Negative.  Negative for abdominal pain, blood in stool, constipation, diarrhea, heartburn, nausea and vomiting.   Genitourinary: Negative.  Negative for frequency, hematuria and urgency.   Musculoskeletal: Positive for neck pain. Negative for joint pain.   Skin: Negative.  Negative for itching and rash. "   Neurological: Positive for dizziness, loss of consciousness, weakness and headaches. Negative for focal weakness and seizures.   Endo/Heme/Allergies: Negative.  Does not bruise/bleed easily.   Psychiatric/Behavioral: Negative for depression and suicidal ideas. The patient is nervous/anxious and has insomnia.         Past Medical History  Past Medical History:   Diagnosis Date   • Chlamydia 2011   • Staph infection     after C section   • ADHD    • Arthritis     back   • Bowel habit changes     constipation   • depression    • Heart burn    • HTN (hypertension)     related to pregnancy   • Migraine    • Pain     incisional hernia pain; back (arthritis)   • Pre-eclampsia         • PTSD (post-traumatic stress disorder)        Surgical History  Past Surgical History:   Procedure Laterality Date   • VENTRAL HERNIA REPAIR Right 1/10/2018    Procedure: VENTRAL HERNIA REPAIR- FOR INCISIONAL HERNIA REDUCIBLE  WITH MESH;  Surgeon: Gisele Delcid M.D.;  Location: SURGERY [facility];  Service: General   • BREAST BIOPSY Left 2016    Procedure: BREAST BIOPSY Excisional;  Surgeon: Gisele Delcid M.D.;  Location: SURGERY [facility];  Service:    • LUMPECTOMY  2014    both breasts - Dr. Garsia - [facility] 10/2014   • REPEAT C SECTION W TUBAL LIGATION  2011    Performed by AYAAN MERCHANT at LABOR AND DELIVERY   • SEPTOTURBINOPLASTY  2009    Performed by CHRISTIAN YEN at SURGERY SAME DAY [facility]   • PRIMARY C SECTION  Aug 7, 2007     preeclampsia       Medications  No current facility-administered medications on file prior to encounter.      Current Outpatient Prescriptions on File Prior to Encounter   Medication Sig Dispense Refill   • albuterol 108 (90 Base) MCG/ACT Aero Soln inhalation aerosol Inhale 1-2 Puffs by mouth every four hours as needed for Shortness of Breath. 1 Inhaler 0   • asa/apap/caffeine (EXCEDRIN) 250-250-65 MG Tab Take 2 Tabs by mouth every 6 hours as  "needed for Headache.     • phentermine (ADIPEX-P) 37.5 MG tablet Take 1 Tab by mouth every morning before breakfast for 30 days. 30 Tab 2   • Multiple Vitamins-Minerals (MULTIVITAMIN ADULT PO) Take 1 Tab by mouth every day.     • amphetamine-dextroamphetamine (ADDERALL) 15 MG tablet Take 1 Tab by mouth 2 times a day. 56 Tab 0       Family History  Family History   Problem Relation Age of Onset   • Heart Disease Father      heart failure smoker    • Hypertension Father    • Cancer Maternal Grandfather      pancreastic   • Heart Disease Paternal Grandfather      heart attack       Social History  Social History   Substance Use Topics   • Smoking status: Former Smoker     Packs/day: 0.25     Years: 6.00     Quit date: 2014   • Smokeless tobacco: Current User     Last attempt to quit: 10/30/2014      Comment: 2017 currently Vape   • Alcohol use No       Allergies  Allergies   Allergen Reactions   • Morphine Nausea     \"feel sick for a whole day after being given morphine\"  RXN=2016     • Vicodin [Hydrocodone-Acetaminophen] Vomiting and Nausea     TRA=4952        Physical Exam  Laboratory   Hemodynamics  Temp (24hrs), Av °C (98.6 °F), Min:37 °C (98.6 °F), Max:37 °C (98.6 °F)   Temperature: 37 °C (98.6 °F)  Pulse  Av.8  Min: 76  Max: 108 Heart Rate (Monitored): 81  Blood Pressure: 140/81, NIBP: 140/76      Respiratory      Respiration: 19, Pulse Oximetry: 98 %             Physical Exam   Constitutional: She is oriented to person, place, and time. She appears well-developed and well-nourished.   HENT:   Head: Normocephalic and atraumatic.   Right Ear: External ear normal.   Left Ear: External ear normal.   Nose: Nose normal.   Mouth/Throat: Oropharynx is clear and moist.   Eyes: Conjunctivae and EOM are normal. Pupils are equal, round, and reactive to light.   Neck: Normal range of motion. Neck supple. No JVD present. No thyromegaly present.   Cardiovascular: Normal rate and regular rhythm.    No " murmur heard.  Pulmonary/Chest: Effort normal and breath sounds normal. She has no wheezes. She has no rales. She exhibits no tenderness.   Abdominal: Soft. Bowel sounds are normal. She exhibits no distension and no mass. There is no tenderness. There is no rebound and no guarding.   Musculoskeletal: Normal range of motion. She exhibits no edema or tenderness.   Lymphadenopathy:     She has no cervical adenopathy.   Neurological: She is alert and oriented to person, place, and time. She has normal reflexes. No cranial nerve deficit. GCS eye subscore is 4. GCS verbal subscore is 5. GCS motor subscore is 6.   Skin: Skin is warm and dry. No rash noted. No erythema.   Psychiatric: She has a normal mood and affect. Her behavior is normal. Judgment and thought content normal.   Nursing note and vitals reviewed.      Recent Labs      02/24/18   1600   WBC  7.5   RBC  4.57   HEMOGLOBIN  12.8   HEMATOCRIT  38.0   MCV  83.2   MCH  28.0   MCHC  33.7   RDW  39.1   PLATELETCT  300   MPV  10.0     Recent Labs      02/24/18   1600   SODIUM  134*   POTASSIUM  3.5*   CHLORIDE  104   CO2  26   GLUCOSE  97   BUN  9   CREATININE  0.77   CALCIUM  8.9     Recent Labs      02/24/18   1600   ALTSGPT  39   ASTSGOT  31   ALKPHOSPHAT  72   TBILIRUBIN  0.7   GLUCOSE  97     Recent Labs      02/24/18   1600   APTT  28.1   INR  1.02     Recent Labs      02/24/18   1600   BNPBTYPENAT  27         Lab Results   Component Value Date    TROPONINI 0.02 02/24/2018     Urinalysis:    Lab Results  Component Value Date/Time   SPECGRAVITY <=1.005 12/19/2017 1445   GLUCOSEUR Negative 12/19/2017 1445   KETONES Negative 12/19/2017 1445   NITRITE Negative 12/19/2017 1445   WBCURINE Rare 10/28/2017 2333   RBCURINE 0-2 10/28/2017 2333   BACTERIA Few (A) 01/27/2017 0342   EPITHELCELL Rare 10/28/2017 2333        Imaging  CT-CSPINE WITHOUT PLUS RECONS   Final Result      No evidence of cervical spine fracture.      CT-CTA HEAD WITH & W/O-POST PROCESS   Final Result       1.  No acute intracranial findings.      2.  No acute thrombus or aneurysm identified.      CT-CTA NECK WITH & W/O-POST PROCESSING   Final Result      Patent carotid and vertebral arteries. No evidence of occlusion or dissection.      DX-CHEST-PORTABLE (1 VIEW)   Final Result      No evidence of acute cardiopulmonary process.      Echocardiogram Comp W/O Cont    (Results Pending)   MR-VENOGRAM (MRV) HEAD    (Results Pending)      Assessment/Plan     I anticipate this patient is appropriate for observation status at this time.    * Syncope and collapse- (present on admission)   Assessment & Plan    Patient reports that she is been getting dizzy over the past 2 weeks. The dizziness is getting worse and worse and she has had to stay home from work because she could not tolerate the dizziness. The dizziness is accompanied with a headache she says the headache is getting worse and worse and it's lasting more frequently as well as longer and longer. She describes the headache as a generalized headache throbbing and 7 out of 10 in intensity. The patient says is not accompanied by chest pain but it is accompanied by neck pain.  The patient reports that in her family there is extensive history of aneurysms including in her grandmother mother and aunts. The patient did have a CT of the head and vascular structures in the emergency room which are negative for aneurysm.  The patient at this point will be evaluated with an MRV to make sure that there is no cavernous venous thrombosis. MRI of the head will be also done to evaluate for possible structural abnormalities not seen on the CTA.        Hyponatremia- (present on admission)   Assessment & Plan    Sodium level is low from dehydration patient will be given fluid resuscitation with normal saline at 100 mL/h for 10 hours. We will reassess after that to see if the patient needs any additional fluid resuscitation.        Hypokalemia- (present on admission)   Assessment &  Plan    Potassium is low at 3.5 and going to give her potassium supplementation monitor levels and correct the therapeutic range between 3.6 and 5.        HTN (hypertension)- (present on admission)   Assessment & Plan    Blood pressure at this point has been going up so antihypertensive measures will be utilized to keep systolic blood pressure 140 diastolic under 90.        Anxiety- (present on admission)   Assessment & Plan    Patient at home is on a multitude of medications including Adderall, Adipex, and Excedrin Migraine. For now I'm going to hold these as sometimes medical interactions can cause headaches and dizziness as well.        Depression- (present on admission)   Assessment & Plan    Patient's depression at this point seems stable she is currently not medicated for clinically she is not depressed.            VTE prophylaxis:Lovenox .

## 2018-02-25 NOTE — CARE PLAN
Problem: Pain Management  Goal: Pain level will decrease to patient's comfort goal  Outcome: PROGRESSING AS EXPECTED  Pt thinks head and neck pain might be from fall earlier. Tylenol administered as ordered--see eMAR. Offered pt hot or cold therapy if she wants it. Discussed other non-pharmacological options. Will reassess as appropriate.

## 2018-02-25 NOTE — ASSESSMENT & PLAN NOTE
Patient reports that she is been getting dizzy over the past 2 weeks. The dizziness is getting worse and worse and she has had to stay home from work because she could not tolerate the dizziness.   Currently not dizzy.  MRI is negative, echocardiogram is pending.  She does have a headache but this is improving.  Continue to monitor on telemetry.

## 2018-02-25 NOTE — ASSESSMENT & PLAN NOTE
Patient at home is on a multitude of medications including Adderall, Adipex, and Excedrin Migraine  Continue to hold all of these during workup. Patient states she is not taking any of these medications simultaneously.

## 2018-02-25 NOTE — CARE PLAN
Problem: Bowel/Gastric:  Goal: Normal bowel function is maintained or improved  Outcome: PROGRESSING AS EXPECTED  Senna administered as ordered. Encouraged ambulation and fluids.

## 2018-02-25 NOTE — PROGRESS NOTES
Telemetry Summary    Rhythm: Sinus Rhythm  Ectopy: None  Rate: 79  Pr: 0.14   QRS: 0.08   Qt: 0.36

## 2018-02-25 NOTE — PROGRESS NOTES
Pt received 0.5 mg Ativan at 2107. At this time pt states she feels no change to her anxiety. Additional 0.5 mg dose administered at this time. No further needs stated or noted. Will cont to monitor.

## 2018-02-25 NOTE — PROGRESS NOTES
Pt cont to rest in poc. NAD noted. No needs identified. Fall precautions in place. Will cont to monitor.

## 2018-02-25 NOTE — PROGRESS NOTES
Pt arrives to unit via rney, accompanied by Rn, S.O., and son. Pt amb from gurney to bed--gait steady. Pt into bed to poc. Introduced self to pt and discussed poc. Tele applied, VS obtained and assessment done. IVF set up to infuse as ordered. Other meds administered as ordered--see eMAR. Oriented pt to room, bed, call light, safety precautions, unit routines, meals, tv, and instructed her to use call light with all activity d/t fall risk. She v/u. Admit info obtained. Snack provided. No further needs stated or noted. Call light and belongings within reach. Will cont to monitor.

## 2018-02-25 NOTE — PROGRESS NOTES
Phone report received from TIAN Rivas in [place]. All questions addressed. Will assume cares when pt arrives to unit.

## 2018-02-25 NOTE — PROGRESS NOTES
Pt resting quietly in poc--resprs even & unlabored, NAD noted. She awakens to name and denies pain, N, V, dizziness at this time. No needs stated or noted. Will report to oncoming shift.

## 2018-02-25 NOTE — PROGRESS NOTES
Pt resting in bed in poc. Eyes closed, resprs even & unlabored, NAD noted. No needs identified. Tele monitor in place. IVF infusing w/o complications. Call light and belongings w/in reach. Will cont to monitor.

## 2018-02-26 VITALS
OXYGEN SATURATION: 95 % | BODY MASS INDEX: 29.84 KG/M2 | RESPIRATION RATE: 18 BRPM | WEIGHT: 196.87 LBS | DIASTOLIC BLOOD PRESSURE: 88 MMHG | TEMPERATURE: 98.4 F | SYSTOLIC BLOOD PRESSURE: 134 MMHG | HEIGHT: 68 IN | HEART RATE: 101 BPM

## 2018-02-26 LAB
ANION GAP SERPL CALC-SCNC: 7 MMOL/L (ref 0–11.9)
BUN SERPL-MCNC: <5 MG/DL (ref 8–22)
CALCIUM SERPL-MCNC: 8.9 MG/DL (ref 8.4–10.2)
CHLORIDE SERPL-SCNC: 108 MMOL/L (ref 96–112)
CO2 SERPL-SCNC: 20 MMOL/L (ref 20–33)
CREAT SERPL-MCNC: 0.8 MG/DL (ref 0.5–1.4)
GLUCOSE SERPL-MCNC: 101 MG/DL (ref 65–99)
POTASSIUM SERPL-SCNC: 4 MMOL/L (ref 3.6–5.5)
SODIUM SERPL-SCNC: 135 MMOL/L (ref 135–145)

## 2018-02-26 PROCEDURE — A9270 NON-COVERED ITEM OR SERVICE: HCPCS | Performed by: HOSPITALIST

## 2018-02-26 PROCEDURE — 99406 BEHAV CHNG SMOKING 3-10 MIN: CPT

## 2018-02-26 PROCEDURE — G0378 HOSPITAL OBSERVATION PER HR: HCPCS

## 2018-02-26 PROCEDURE — 99217 PR OBSERVATION CARE DISCHARGE: CPT | Performed by: HOSPITALIST

## 2018-02-26 PROCEDURE — 80048 BASIC METABOLIC PNL TOTAL CA: CPT

## 2018-02-26 PROCEDURE — 700102 HCHG RX REV CODE 250 W/ 637 OVERRIDE(OP): Performed by: HOSPITALIST

## 2018-02-26 RX ADMIN — POTASSIUM CHLORIDE 20 MEQ: 1500 TABLET, EXTENDED RELEASE ORAL at 09:09

## 2018-02-26 ASSESSMENT — PAIN SCALES - GENERAL: PAINLEVEL_OUTOF10: 0

## 2018-02-26 NOTE — PROGRESS NOTES
Pt sitting up in bed watching TV with her . VSS, pt is SR on the monitor. Pt denies any pain, reports that headache has resolved. Pt intermittently experiencing dizziness but not as severe as before. Plan of care reviewed. Needs met at this time.

## 2018-02-26 NOTE — PROGRESS NOTES
[facility] Hospitalist Progress Note    Date of Service: 2018    Chief Complaint  32 y.o. female admitted 2018 with dizziness and syncope.    Interval Problem Update  Patient states that she has not taken Adderall for several days and does not ever take phentermine and Adderall the same time. She has been complaining of headaches along with the dizziness her headache currently is improving. She states that she does have a history of migraines.    Consultants/Specialty  None    Disposition  Home when medically clear        Review of Systems   Constitutional: Negative.  Negative for chills, fever, malaise/fatigue and weight loss.   HENT: Negative.    Respiratory: Negative.  Negative for cough, hemoptysis, sputum production, shortness of breath and wheezing.    Cardiovascular: Negative.  Negative for chest pain, palpitations, orthopnea, claudication, leg swelling and PND.   Gastrointestinal: Negative.  Negative for abdominal pain, nausea and vomiting.   Genitourinary: Negative.  Negative for dysuria and flank pain.   Musculoskeletal: Positive for neck pain. Negative for back pain and myalgias.   Neurological: Positive for dizziness and headaches. Negative for tingling, tremors, sensory change, speech change, focal weakness, seizures, loss of consciousness and weakness.   Endo/Heme/Allergies: Negative.  Does not bruise/bleed easily.   Psychiatric/Behavioral: Negative.  Negative for depression. The patient is not nervous/anxious.    All other systems reviewed and are negative.     Physical Exam  Laboratory/Imaging   Hemodynamics  Temp (24hrs), Av.6 °C (97.8 °F), Min:36.3 °C (97.3 °F), Max:36.9 °C (98.4 °F)   Temperature: 36.3 °C (97.3 °F)  Pulse  Av.4  Min: 70  Max: 121 Heart Rate (Monitored): 84  Blood Pressure: 107/90, NIBP: (!) 165/87      Respiratory      Respiration: 18, Pulse Oximetry: 93 %             Fluids    Intake/Output Summary (Last 24 hours) at 18 1611  Last data filed at 18  0547   Gross per 24 hour   Intake              906 ml   Output                0 ml   Net              906 ml       Nutrition  Orders Placed This Encounter   Procedures   • Diet Order     Standing Status:   Standing     Number of Occurrences:   1     Order Specific Question:   Diet:     Answer:   Regular [1]     Physical Exam   Constitutional: She is oriented to person, place, and time. She appears well-developed and well-nourished. No distress.   HENT:   Nose: Nose normal.   Mouth/Throat: Oropharynx is clear and moist. No oropharyngeal exudate.   Eyes: Conjunctivae are normal. Right eye exhibits no discharge. Left eye exhibits no discharge. No scleral icterus.   Neck: No JVD present. No tracheal deviation present.   Cardiovascular: Normal rate, regular rhythm and normal heart sounds.    Pulmonary/Chest: Effort normal and breath sounds normal. No stridor. No respiratory distress. She has no wheezes. She has no rales. She exhibits no tenderness.   Abdominal: Soft. Bowel sounds are normal. She exhibits no distension. There is no tenderness.   Musculoskeletal: She exhibits no edema or tenderness.   Neurological: She is alert and oriented to person, place, and time. She has normal reflexes. She displays normal reflexes. No cranial nerve deficit. She exhibits normal muscle tone. Coordination normal.   Skin: Skin is warm and dry. She is not diaphoretic. No pallor.   Psychiatric: She has a normal mood and affect. Her behavior is normal. Judgment and thought content normal.   Nursing note and vitals reviewed.      Recent Labs      02/24/18   1600   WBC  7.5   RBC  4.57   HEMOGLOBIN  12.8   HEMATOCRIT  38.0   MCV  83.2   MCH  28.0   MCHC  33.7   RDW  39.1   PLATELETCT  300   MPV  10.0     Recent Labs      02/24/18   1600   SODIUM  134*   POTASSIUM  3.5*   CHLORIDE  104   CO2  26   GLUCOSE  97   BUN  9   CREATININE  0.77   CALCIUM  8.9     Recent Labs      02/24/18   1600   APTT  28.1   INR  1.02     Recent Labs       02/24/18   1600   BNPBTYPENAT  27     Recent Labs      02/25/18   0243   TRIGLYCERIDE  177*   HDL  32*   LDL  93          Assessment/Plan     * Syncope and collapse- (present on admission)   Assessment & Plan    Patient reports that she is been getting dizzy over the past 2 weeks. The dizziness is getting worse and worse and she has had to stay home from work because she could not tolerate the dizziness.   Currently not dizzy.  MRI is negative, echocardiogram is pending.  She does have a headache but this is improving.  Continue to monitor on telemetry.        Hyponatremia- (present on admission)   Assessment & Plan    Sodium level is low from dehydration replaced.        Hypokalemia- (present on admission)   Assessment & Plan    Potassium replaced.        HTN (hypertension)- (present on admission)   Assessment & Plan    Blood pressure stable since admission..        Anxiety- (present on admission)   Assessment & Plan    Patient at home is on a multitude of medications including Adderall, Adipex, and Excedrin Migraine  Continue to hold all of these during workup. Patient states she is not taking any of these medications simultaneously.        Depression- (present on admission)   Assessment & Plan    Patient's depression stable currently not depressed.          Quality-Core Measures

## 2018-02-26 NOTE — DISCHARGE INSTRUCTIONS
Discharge Instructions    Discharged to home by car with relative. Discharged via walking, hospital escort: Yes.  Special equipment needed: Not Applicable    Be sure to schedule a follow-up appointment with your primary care doctor or any specialists as instructed.     Discharge Plan:   Diet Plan: Discussed  Activity Level: Discussed  Smoking Cessation Offered: Patient Refused  Confirmed Follow up Appointment: Appointment Scheduled  Confirmed Symptoms Management: Discussed  Medication Reconciliation Updated: Yes  Pneumococcal Vaccine Given - only chart on this line when given: Given (See MAR)  Influenza Vaccine Indication: Indicated: 9 to 64 years of age  Influenza Vaccine Given - only chart on this line when given: Influenza Vaccine Given (See MAR)    I understand that a diet low in cholesterol, fat, and sodium is recommended for good health. Unless I have been given specific instructions below for another diet, I accept this instruction as my diet prescription.   Other diet: Regular    Special Instructions: None    · Is patient discharged on Warfarin / Coumadin?   No       Syncope  Syncope is a medical term for fainting or passing out. This means you lose consciousness and drop to the ground. People are generally unconscious for less than 5 minutes. You may have some muscle twitches for up to 15 seconds before waking up and returning to normal. Syncope occurs more often in older adults, but it can happen to anyone. While most causes of syncope are not dangerous, syncope can be a sign of a serious medical problem. It is important to seek medical care.   CAUSES   Syncope is caused by a sudden drop in blood flow to the brain. The specific cause is often not determined. Factors that can bring on syncope include:  · Taking medicines that lower blood pressure.  · Sudden changes in posture, such as standing up quickly.  · Taking more medicine than prescribed.  · Standing in one place for too long.  · Seizure  disorders.  · Dehydration and excessive exposure to heat.  · Low blood sugar (hypoglycemia).  · Straining to have a bowel movement.  · Heart disease, irregular heartbeat, or other circulatory problems.  · Fear, emotional distress, seeing blood, or severe pain.  SYMPTOMS   Right before fainting, you may:  · Feel dizzy or light-headed.  · Feel nauseous.  · See all white or all black in your field of vision.  · Have cold, clammy skin.  DIAGNOSIS   Your health care provider will ask about your symptoms, perform a physical exam, and perform an electrocardiogram (ECG) to record the electrical activity of your heart. Your health care provider may also perform other heart or blood tests to determine the cause of your syncope which may include:  · Transthoracic echocardiogram (TTE). During echocardiography, sound waves are used to evaluate how blood flows through your heart.  · Transesophageal echocardiogram (MAGAN).  · Cardiac monitoring. This allows your health care provider to monitor your heart rate and rhythm in real time.  · Holter monitor. This is a portable device that records your heartbeat and can help diagnose heart arrhythmias. It allows your health care provider to track your heart activity for several days, if needed.  · Stress tests by exercise or by giving medicine that makes the heart beat faster.  TREATMENT   In most cases, no treatment is needed. Depending on the cause of your syncope, your health care provider may recommend changing or stopping some of your medicines.  HOME CARE INSTRUCTIONS  · Have someone stay with you until you feel stable.  · Do not drive, use machinery, or play sports until your health care provider says it is okay.  · Keep all follow-up appointments as directed by your health care provider.  · Lie down right away if you start feeling like you might faint. Breathe deeply and steadily. Wait until all the symptoms have passed.  · Drink enough fluids to keep your urine clear or pale  yellow.  · If you are taking blood pressure or heart medicine, get up slowly and take several minutes to sit and then stand. This can reduce dizziness.  SEEK IMMEDIATE MEDICAL CARE IF:   · You have a severe headache.  · You have unusual pain in the chest, abdomen, or back.  · You are bleeding from your mouth or rectum, or you have black or tarry stool.  · You have an irregular or very fast heartbeat.  · You have pain with breathing.  · You have repeated fainting or seizure-like jerking during an episode.  · You faint when sitting or lying down.  · You have confusion.  · You have trouble walking.  · You have severe weakness.  · You have vision problems.  If you fainted, call your local emergency services (911 in [place] Do not drive yourself to the hospital.      This information is not intended to replace advice given to you by your health care provider. Make sure you discuss any questions you have with your health care provider.     Document Released: 12/18/2006 Document Revised: 05/03/2016 Document Reviewed: 02/15/2013  [employer] Patient Education ©2016 [employer]    Hypokalemia  Hypokalemia means a low potassium level in the blood. Symptoms may include muscle weakness and cramping, fatigue, abdominal pain, vomiting, constipation, or irregularities of the heartbeat. Sometimes hypokalemia is discovered by your caregiver if you are taking certain medicines for high blood pressure or kidney disease.   Potassium is an electrolyte that helps regulate the amount of fluid in the body. It also stimulates muscle contraction and maintains a stable acid-base balance. If potassium levels go too low or too high, your health may be in danger. You are at risk for developing shock, heart, and lung problems. Hypokalemia can occur if you have excessive diarrhea, vomiting, or sweating. Potassium can be lost through your kidneys in the urine. Certain common medicines can also cause potassium loss, especially water pills  (diuretics). The same is possible with cortisone medications or certain types of antibiotics. Low potassium can be dangerous if you are taking certain heart medicines. In diabetes, your potassium may fall after you take insulin, especially if your diabetes had been out of control for a while. In rare cases, potassium may be low because you are not getting enough in your diet.   In adults, a potassium level below 3.5 mEq/L is usually considered low.  Hypokalemia can be treated with potassium supplements taken by mouth and a diet that is high in potassium. Foods with high potassium content are:  · Peas, lentils, lima beans, nuts, and dried fruit.   · Whole grain and bran cereals and breads.   · Fresh fruit and vegetables. Examples include:   · Bananas.   · Cantaloupe.   · Grapefruit.   · Oranges.   · Tomatoes.   · Honeydew melons.   · Potatoes.   · Peaches.   · Orange and tomato juices.   · Meats.   See your caregiver as instructed for a follow-up blood test to be sure your potassium is back to normal.  SEEK MEDICAL CARE IF:   · You have nausea, vomiting, constipation, or abdominal pain.   · You have palpitations or irregular heartbeats, chest pain or shortness of breath.   · You have muscle cramps or weakness or fatigue.   · You have lethargy.   SEEK IMMEDIATE MEDICAL CARE IF:   · You have paralysis.   · You have confusion or other mental status changes.   Document Released: 12/18/2006 Document Revised: 03/11/2013 Document Reviewed: 04/13/2011  [employer]® Patient Information ©2013 [employer].      Depression / Suicide Risk    As you are discharged from this [facility] facility, it is important to learn how to keep safe from harming yourself.    Recognize the warning signs:  · Abrupt changes in personality, positive or negative- including increase in energy   · Giving away possessions  · Change in eating patterns- significant weight changes-  positive or negative  · Change in sleeping patterns- unable to sleep or  sleeping all the time   · Unwillingness or inability to communicate  · Depression  · Unusual sadness, discouragement and loneliness  · Talk of wanting to die  · Neglect of personal appearance   · Rebelliousness- reckless behavior  · Withdrawal from people/activities they love  · Confusion- inability to concentrate     If you or a loved one observes any of these behaviors or has concerns about self-harm, here's what you can do:  · Talk about it- your feelings and reasons for harming yourself  · Remove any means that you might use to hurt yourself (examples: pills, rope, extension cords, firearm)  · Get professional help from the community (Mental Health, Substance Abuse, psychological counseling)  · Do not be alone:Call your Safe Contact- someone whom you trust who will be there for you.  · Call your local CRISIS HOTLINE 038-6705 or 481-214-2873  · Call your local Children's Mobile Crisis Response Team [place] (791) 998-8153 or www.Hitwise  · Call the toll free National Suicide Prevention Hotlines   · National Suicide Prevention Lifeline 322-511-XRMR (6362)  · National Hope Line Network 800-SUICIDE (095-0507)

## 2018-02-26 NOTE — RESPIRATORY CARE
"COPD EDUCATION by COPD CLINICAL EDUCATOR  2/26/2018 at 11:40 AM by Zoya Bear    Patient interviewed by COPD education team.  Patient refused full COPD program at this time, but agreed to short intervention.  A comprehensive packet including information about smoking cessation given.  Smoking Cessation Intervention 3 -10 minutes completed.  Provided smoking cessation packet with \"Tips to Quit\" and flyer for \"Free Smoking Cessation Classes\". Provided \"Quit Card\" with the phone number to [employer] [facility] for free nicotine replacement therapy.   "

## 2018-02-26 NOTE — PROGRESS NOTES
Clinical coordinator spoke with the pt and her  regarding paperwork. Pt under the impression that she would be discharged so she removed her IV her cardiac monitor. This RN was asked to page the hospitalist to see if a discharge order could be obtained. Dr Conley declined to discharge the pt. Pt updated. Cardiac monitoring resumed.

## 2018-02-26 NOTE — CARE PLAN
Problem: Communication  Goal: The ability to communicate needs accurately and effectively will improve  Outcome: PROGRESSING AS EXPECTED  POC discussed, questions/concerns addressed. Pt originally needing to d/c due to court date today, but decided to allow MD to review results with her first before D/C. Pt verbalizes understanding.    Problem: Safety  Goal: Will remain free from injury  Outcome: PROGRESSING AS EXPECTED  Pt up to bathroom without assistance. Calls for help appropriately. Pt has steady gait. Non skid socks on pt. Belongings and call light in reach at all times. Pt verbalizes understanding of safety.

## 2018-02-26 NOTE — PROGRESS NOTES
Assessment completed. Due meds given. Pt AAOx4, no complaints of pain, n/v or numbness/tingling. Pt states dizziness is improved. Pt up to bathroom without assistance, steady gait. Anticipating d/c today after MD discusses echo results with pt. Will monitor.

## 2018-02-26 NOTE — PROGRESS NOTES
Pt d/c'd with all belongings. Armband and tele box removed. D/c instructions explained to pt including appts. Pt verbalizes understanding. Pt ambulated out with steady gait with spouse.    Tele strip at 0733 shows SR w/ HR of 87  Measurements: .14/.08/.34    Tele Shift Summary:  Rhythm: SR  Rate: 70's-80;s  Ectopy: Per CCT Rachel, pt had no ectopy    Telemetry monitoring strips placed in pt chart.

## 2018-02-27 NOTE — DISCHARGE SUMMARY
"CHIEF COMPLAINT ON ADMISSION  Chief Complaint   Patient presents with   • Syncope     Pt states she \"passed out\" in her bedroom this morning.  Pt has had dizziness for a couple of weeks and saw PMD who did bloodwork.       CODE STATUS  Full code.    HPI & HOSPITAL COURSE  This is a 32 y.o. female here with dizziness and syncope. She says she's been having dizziness for a couple of weeks. She had a workup with stress test, echocardiogram and lab work which did not show any abnormality. Her blood pressures have remained normal during her hospital stay and she has had nothing but sinus rhythm on the telemetry monitor. I advised the patient to continue to avoid Adderall and phentermine she states that she has never taken them together. She did appear dehydrated on admission and I recommended adequate fluid intake. She is going to follow-up with her primary care physician for further workup.        Therefore, she is discharged in fair and stable condition with close outpatient follow-up.    SPECIFIC OUTPATIENT FOLLOW-UP  Primary care provider for further workup    DISCHARGE PROBLEM LIST  Principal Problem:    Syncope and collapse POA: Yes  Active Problems:    HTN (hypertension) POA: Yes      Overview: related to pregnancy had  Preeclampsia and emergency C/S in 2007     Hypokalemia POA: Yes    Hyponatremia POA: Yes    Depression POA: Yes    Anxiety POA: Yes  Resolved Problems:    * No resolved hospital problems. *      FOLLOW UP  Future Appointments  Date Time Provider Department Center   3/1/2018 10:40 AM [facility] None   3/15/2018 8:15 AM [facility] 2 [facility] [address] [place] [address]   4/26/2018 9:00 AM EMILY [facility] None     [facility]  [address] [address]  [phone]          [facility], Emergency Dept  [address]  [place] [address] [phone]    As needed, If symptoms worsen      MEDICATIONS ON " DISCHARGE   Brayden Katerina Lovely   Home Medication Instructions ELVIS:[id number]    Printed on:02/26/18 5472   Medication Information                      albuterol 108 (90 Base) MCG/ACT Aero Soln inhalation aerosol  Inhale 1-2 Puffs by mouth every four hours as needed for Shortness of Breath.             amphetamine-dextroamphetamine (ADDERALL) 15 MG tablet  Take 1 Tab by mouth 2 times a day.             asa/apap/caffeine (EXCEDRIN) 250-250-65 MG Tab  Take 2 Tabs by mouth every 6 hours as needed for Headache.             Multiple Vitamins-Minerals (MULTIVITAMIN ADULT PO)  Take 1 Tab by mouth every day.             phentermine (ADIPEX-P) 37.5 MG tablet  Take 1 Tab by mouth every morning before breakfast for 30 days.                 DIET  No orders of the defined types were placed in this encounter.      ACTIVITY  As tolerated.  Weight bearing as tolerated      CONSULTATIONS  None    PROCEDURES  None    LABORATORY  Lab Results   Component Value Date/Time    SODIUM 135 02/26/2018 04:30 AM    POTASSIUM 4.0 02/26/2018 04:30 AM    CHLORIDE 108 02/26/2018 04:30 AM    CO2 20 02/26/2018 04:30 AM    GLUCOSE 101 (H) 02/26/2018 04:30 AM    BUN <5 (L) 02/26/2018 04:30 AM    CREATININE 0.80 02/26/2018 04:30 AM    CREATININE 0.7 10/29/2007 06:06 PM        Lab Results   Component Value Date/Time    WBC 7.5 02/24/2018 04:00 PM    HEMOGLOBIN 12.8 02/24/2018 04:00 PM    HEMATOCRIT 38.0 02/24/2018 04:00 PM    PLATELETCT 300 02/24/2018 04:00 PM        Total time of the discharge process exceeds 32 minutes

## 2018-03-06 ENCOUNTER — OFFICE VISIT (OUTPATIENT)
Dept: MEDICAL GROUP | Facility: LAB | Age: 33
End: 2018-03-06
Payer: COMMERCIAL

## 2018-03-06 VITALS
HEART RATE: 93 BPM | OXYGEN SATURATION: 99 % | DIASTOLIC BLOOD PRESSURE: 92 MMHG | WEIGHT: 194 LBS | HEIGHT: 68 IN | RESPIRATION RATE: 12 BRPM | SYSTOLIC BLOOD PRESSURE: 132 MMHG | BODY MASS INDEX: 29.4 KG/M2 | TEMPERATURE: 99.1 F

## 2018-03-06 DIAGNOSIS — F33.0 MILD EPISODE OF RECURRENT MAJOR DEPRESSIVE DISORDER (HCC): ICD-10-CM

## 2018-03-06 DIAGNOSIS — R53.83 FATIGUE, UNSPECIFIED TYPE: ICD-10-CM

## 2018-03-06 DIAGNOSIS — Z87.19 H/O HERNIA REPAIR: ICD-10-CM

## 2018-03-06 DIAGNOSIS — R51.9 CHRONIC INTRACTABLE HEADACHE, UNSPECIFIED HEADACHE TYPE: ICD-10-CM

## 2018-03-06 DIAGNOSIS — Z98.890 H/O HERNIA REPAIR: ICD-10-CM

## 2018-03-06 DIAGNOSIS — G89.29 CHRONIC INTRACTABLE HEADACHE, UNSPECIFIED HEADACHE TYPE: ICD-10-CM

## 2018-03-06 DIAGNOSIS — R42 DIZZINESS: ICD-10-CM

## 2018-03-06 DIAGNOSIS — R63.5 WEIGHT GAIN: ICD-10-CM

## 2018-03-06 PROCEDURE — 99215 OFFICE O/P EST HI 40 MIN: CPT | Performed by: NURSE PRACTITIONER

## 2018-03-06 RX ORDER — FLUOXETINE HYDROCHLORIDE 20 MG/1
20 CAPSULE ORAL DAILY
Qty: 30 CAP | Refills: 5 | Status: SHIPPED | OUTPATIENT
Start: 2018-03-06 | End: 2018-04-26

## 2018-03-06 ASSESSMENT — PATIENT HEALTH QUESTIONNAIRE - PHQ9
CLINICAL INTERPRETATION OF PHQ2 SCORE: 6
SUM OF ALL RESPONSES TO PHQ QUESTIONS 1-9: 15
5. POOR APPETITE OR OVEREATING: 2 - MORE THAN HALF THE DAYS

## 2018-03-06 NOTE — PROGRESS NOTES
"Chief Complaint   Patient presents with   • Hospital Follow-up     F/V from dizziness & syncope, pt state she is still having issues with dizziness & migraine    • Depression     see depression screening        HPI  32-year-old established female here to follow-up on a recent hospitalization and overall stating she feels horrible / is concerned that a major medical problem is being missed. She presented to the hospital because of dizziness and 2 episodes of passing out on  and was kept for 2 days for testing. She had a negative CT head / neck and MRI venogram, echocardiogram, labs and stress test.      Since d/c from hospital  she still does not feel well - Feels \"fuzzy,\" in back of \"my head and behind my eyes.\"  Persistently dizzy - Has been dizzy x the past 2 months. Headaches on a daily basis.   Reports that she has been feeling \"crappy,\" for 2 years. Unintentional weight gain, hair thinning, low energy level, urinary frequency, frequent constipation, ringing in ears, decreased vision, abnormal smells like ammonia, occasional chest heaviness, easily choking and there is something stuck in throat.  Menses are very heavy but regular (chronic and nicole after TL). Feels tired all the time but does not feel that her sleep is very good, wakes up with a headache most days. Admits that she's been waking up with a headache for years, but becoming more frequent, was previously occurring a few times per month.  Low appetite.  Very sad, mood swings, irritable. She's very concerned that she has a missed, \"flat\" brain tumor or Cushing's disease.   No dysuria, vomiting, hematuria.  Last child was at 27 yrs old and has not felt well since.  Had preeclampsia during pregnancy, 33 week  - this dehissed and got infected - had to pack wound for weeks and on antibiotics.     2015.   She does have a history of depression and attention deficit disorder. Has been off of Prozac for a few years, can't recall that it " "really helped her all that much. Previously took Wellbutrin, did not feel that that was beneficial either. She admits that she is depressed, cries frequently and feels irritable but she denies any suicidal or homicidal ideations.    Past medical, surgical, family, and social history is reviewed and updated in Epic chart by me today.   Medications and allergies reviewed and updated in Epic chart by me today.     ROS:   As documented in history of present illness above    Exam:  Blood pressure 132/92, pulse 93, temperature 37.3 °C (99.1 °F), resp. rate 12, height 1.727 m (5' 8\"), weight 88 kg (194 lb), last menstrual period 02/10/2018, SpO2 99 %.  Constitutional: Alert, no distress, plus 3 vital signs  Skin:  Warm, dry, no rashes invisible areas  Eye: Equal, round and reactive, conjunctiva clear  ENMT: Lips without lesions, good dentition, oropharynx clear    Neck: Trachea midline, no masses, no thyromegaly  Respiratory: Unlabored respiration, lungs clear to auscultation, no wheezes, no rhonchi  Cardiovascular: Normal rate and rhythm, no murmur, no edema  Musculoskeletal: She does have bilateral lower cervical paraspinal muscular tenderness. No cervical spine bony tenderness. She has discomfort with flexion of her neck.  Abdomen: Soft, nontender  Psych: Alert, pleasant,.  tearful    Depression Screening    Little interest or pleasure in doing things?  3 - nearly every day  Feeling down, depressed , or hopeless? 3 - nearly every day  Trouble falling or staying asleep, or sleeping too much?  2 - more than half the days  Feeling tired or having little energy?  2 - more than half the days  Poor appetite or overeating?  2 - more than half the days  Feeling bad about yourself - or that you are a failure or have let yourself or your family down? 1 - several days  Trouble concentrating on things, such as reading the newspaper or watching television? 2 - more than half the days  Moving or speaking so slowly that other people " could have noticed.  Or the opposite - being so fidgety or restless that you have been moving around a lot more than usual?  0 - not at all  Thoughts that you would be better off dead, or of hurting yourself?  0 - not at all  Patient Health Questionnaire Score: 15      If depressive symptoms identified deferred to follow up visit unless specifically addressed in assesment and plan.    Interpretation of PHQ-9 Total Score   Score Severity   1-4 No Depression   5-9 Mild Depression   10-14 Moderate Depression   15-19 Moderately Severe Depression   20-27 Severe Depression  A/P:      A/P:  1. Chronic intractable headache, unspecified headache type  -Recommend a consult with endocrinology because of her entire realm of symptoms and her main concern of Cushing's disease. Discussed that she had a normal cortisol level in the hospital. Encouraged her to have an MRI of her cervical spine because of her headaches and neck pain although CT scan of her cervical spine did not show degenerative disc disease. She would also like to have an MRI of her brain to look at her pituitary gland.  - REFERRAL TO ENDOCRINOLOGY  - MR-CERVICAL SPINE-W/O; Future  - MR-BRAIN PITUITARY W/WO; Future    2. Weight gain  -As above. Encouraged her to follow a mostly plant-based diet and start an exercise program when she is not dizzy.  - REFERRAL TO ENDOCRINOLOGY  - MR-CERVICAL SPINE-W/O; Future  - MR-BRAIN PITUITARY W/WO; Future    3. Fatigue, unspecified type  - REFERRAL TO ENDOCRINOLOGY  - MR-CERVICAL SPINE-W/O; Future  - MR-BRAIN PITUITARY W/WO; Future    4. Dizziness  -Reviewed her entire hospitalization with her including each imaging exam. She is not dizzy today. I encouraged her to keep an eye on her blood pressure, emailing for readings consistently greater than 130/90. Discussed a low-sodium diet, she is eating a lot of soup and we discussed changing this over to fresh fruits and vegetables. Encouraged her not to drive when she is dizzy.  Paperwork was completed to keep her out of work until we are able to figure out what is causing her symptoms.    5. H/O hernia repair  -Wound is well-healed now.    6. Mild episode of recurrent major depressive disorder (CMS-HCC)  -Do suspect that some aspect of her symptoms may be attributed to anxiety and depression. She is willing to do a retrial of fluoxetine for the next 4 weeks and then follow-up with me. As above we discussed the importance of healthy eating and exercising. We'll see her back here in about 3 weeks.  - FLUoxetine (PROZAC) 20 MG Cap; Take 1 Cap by mouth every day.  Dispense: 30 Cap; Refill: 5      Total face to face time 40 minutes of which over 50% of this visit is spent in counseling, education and outlining a plan of treatment and coordination of care for the above conditions. This included but was not limited to discussion of medication options and potential risks related to the medications, referral and specialty care options. All patient questions were answered

## 2018-03-14 ENCOUNTER — APPOINTMENT (OUTPATIENT)
Dept: RADIOLOGY | Facility: MEDICAL CENTER | Age: 33
End: 2018-03-14
Attending: NURSE PRACTITIONER
Payer: COMMERCIAL

## 2018-03-15 ENCOUNTER — HOSPITAL ENCOUNTER (OUTPATIENT)
Dept: RADIOLOGY | Facility: MEDICAL CENTER | Age: 33
End: 2018-03-15
Attending: NURSE PRACTITIONER
Payer: COMMERCIAL

## 2018-03-15 DIAGNOSIS — N63.20 LEFT BREAST LUMP: ICD-10-CM

## 2018-03-15 PROCEDURE — 77066 DX MAMMO INCL CAD BI: CPT

## 2018-03-15 PROCEDURE — 76642 ULTRASOUND BREAST LIMITED: CPT | Mod: LT

## 2018-03-23 ENCOUNTER — HOSPITAL ENCOUNTER (OUTPATIENT)
Dept: RADIOLOGY | Facility: MEDICAL CENTER | Age: 33
End: 2018-03-23
Attending: NURSE PRACTITIONER
Payer: COMMERCIAL

## 2018-03-23 DIAGNOSIS — R51.9 CHRONIC INTRACTABLE HEADACHE, UNSPECIFIED HEADACHE TYPE: ICD-10-CM

## 2018-03-23 DIAGNOSIS — R53.83 FATIGUE, UNSPECIFIED TYPE: ICD-10-CM

## 2018-03-23 DIAGNOSIS — R63.5 WEIGHT GAIN: ICD-10-CM

## 2018-03-23 DIAGNOSIS — G89.29 CHRONIC INTRACTABLE HEADACHE, UNSPECIFIED HEADACHE TYPE: ICD-10-CM

## 2018-03-23 PROCEDURE — A9579 GAD-BASE MR CONTRAST NOS,1ML: HCPCS | Performed by: NURSE PRACTITIONER

## 2018-03-23 PROCEDURE — 70553 MRI BRAIN STEM W/O & W/DYE: CPT

## 2018-03-23 PROCEDURE — 700117 HCHG RX CONTRAST REV CODE 255: Performed by: NURSE PRACTITIONER

## 2018-03-23 RX ADMIN — GADODIAMIDE 20 ML: 287 INJECTION INTRAVENOUS at 17:17

## 2018-03-28 ENCOUNTER — OFFICE VISIT (OUTPATIENT)
Dept: MEDICAL GROUP | Facility: LAB | Age: 33
End: 2018-03-28
Payer: COMMERCIAL

## 2018-03-28 VITALS
OXYGEN SATURATION: 98 % | RESPIRATION RATE: 12 BRPM | HEART RATE: 99 BPM | DIASTOLIC BLOOD PRESSURE: 88 MMHG | WEIGHT: 187 LBS | SYSTOLIC BLOOD PRESSURE: 116 MMHG | HEIGHT: 68 IN | TEMPERATURE: 98.5 F | BODY MASS INDEX: 28.34 KG/M2

## 2018-03-28 DIAGNOSIS — F90.2 ATTENTION DEFICIT HYPERACTIVITY DISORDER (ADHD), COMBINED TYPE: ICD-10-CM

## 2018-03-28 DIAGNOSIS — R90.89 ABNORMAL FINDING ON MRI OF BRAIN: ICD-10-CM

## 2018-03-28 DIAGNOSIS — H93.13 TINNITUS OF BOTH EARS: ICD-10-CM

## 2018-03-28 DIAGNOSIS — R42 DIZZINESS: ICD-10-CM

## 2018-03-28 DIAGNOSIS — R55 SYNCOPE AND COLLAPSE: ICD-10-CM

## 2018-03-28 PROCEDURE — 99214 OFFICE O/P EST MOD 30 MIN: CPT | Performed by: NURSE PRACTITIONER

## 2018-03-28 RX ORDER — DEXTROAMPHETAMINE SACCHARATE, AMPHETAMINE ASPARTATE, DEXTROAMPHETAMINE SULFATE AND AMPHETAMINE SULFATE 3.75; 3.75; 3.75; 3.75 MG/1; MG/1; MG/1; MG/1
15 TABLET ORAL 2 TIMES DAILY
Qty: 56 TAB | Refills: 0 | Status: SHIPPED | OUTPATIENT
Start: 2018-03-28 | End: 2018-04-26 | Stop reason: SDUPTHER

## 2018-03-28 ASSESSMENT — PATIENT HEALTH QUESTIONNAIRE - PHQ9: CLINICAL INTERPRETATION OF PHQ2 SCORE: 0

## 2018-03-28 NOTE — PROGRESS NOTES
"Chief Complaint   Patient presents with   • Depression     F/V on Depression   • Headache     MRI results        HPI  Katerina is a 32-year-old established female here to follow-up on her last visit in which we discussed dizziness, depression, fatigue, unintentional weight changes, hair loss and overall just feeling horrible for the past few months. Brain fog has gotten worse. She continues to feel Feels \"fuzzy,\" in back of \"my head and behind my eyes.\"  Persistently dizzy - Has been dizzy x the past 3 months. Headaches on a daily basis, stating these started in  after her middle child was born. She does mention that she had extremely elevated blood pressure immediately prior to delivery and had an emergency  in . .   Reports that she has been feeling \"crappy,\" for at least 2 years. Unintentional weight gain, hair thinning, low energy level, urinary frequency, frequent constipation, ringing in ears, decreased vision, abnormal smells like ammonia, occasional chest heaviness, easily choking and there is something stuck in throat - persist, no improvement with the initiation of fluoxetine.   We did obtain an MRI of her brain to evaluate her pituitary gland and that was negative for a mass or any abnormalities seen.    Past Surgical History:   Procedure Laterality Date   • VENTRAL HERNIA REPAIR Right 1/10/2018    Procedure: VENTRAL HERNIA REPAIR- FOR INCISIONAL HERNIA REDUCIBLE  WITH MESH;  Surgeon: Gisele Delcid M.D.;  Location: SURGERY [facility];  Service: General   • BREAST BIOPSY Left 2016    Procedure: BREAST BIOPSY Excisional;  Surgeon: Gisele Delcid M.D.;  Location: SURGERY [facility];  Service:    • LUMPECTOMY  2014    both breasts - Dr. Garsia - [facility] 10/2014   • REPEAT C SECTION W TUBAL LIGATION  2011    Performed by AYAAN MERCHANT at LABOR AND DELIVERY   • SEPTOTURBINOPLASTY  2009    Performed by CHRISTIAN YEN at SURGERY SAME DAY [facility]   • PRIMARY C " "SECTION  Aug 7, 2007     preeclampsia         Past medical, surgical, family, and social history is reviewed and updated in Epic chart by me today.   Medications and allergies reviewed and updated in Epic chart by me today.     ROS:   As documented in history of present illness above    Exam:  Blood pressure 116/88, pulse 99, temperature 36.9 °C (98.5 °F), resp. rate 12, height 1.727 m (5' 8\"), weight 84.8 kg (187 lb), SpO2 98 %.  Constitutional: Alert, no distress, plus 3 vital signs  Skin:  Warm, dry, no rashes invisible areas  Eye: Equal, round and reactive, conjunctiva clear  ENMT: Lips without lesions, good dentition, oropharynx clear    Neck: Trachea midline, no masses, no thyromegaly  Respiratory: Unlabored respiration, lungs clear to auscultation, no wheezes, no rhonchi  Cardiovascular: Normal rate and rhythm, no murmur, no edema  Abdomen: Soft, nontender, no masses or hepatosplenomegaly  Psych: Alert, pleasant, well-groomed, normal affect    A/P:  1. Abnormal finding on MRI of brain  -She is very concerned about the slight abnormality on the MRI of her brain and like to see neurology, referral was placed.  - REFERRAL TO NEUROLOGY    2. Syncope and collapse  -History of, see last hospitalization and clinical note from our visit in early March.  - REFERRAL TO NEUROLOGY    3. Attention deficit hyperactivity disorder (ADHD), combined type  -Suspect some of her issues may be related to her moods and attention deficit disorder. Encouraged her to stop phentermine which she's been using for attempted weight loss and go back to Adderall to help her brain fog/focus and fatigue. Reviewed her prescription monitoring program. Only adderall on busy days / if return to work.    - amphetamine-dextroamphetamine (ADDERALL) 15 MG tablet; Take 1 Tab by mouth 2 times a day for 30 days.  Dispense: 56 Tab; Refill: 0    4. Dizziness  -stop prozac b/c of increased brain fog feeling. Encouraged her to see ENT and neurology, " then follow-up here.  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   - REFERRAL TO ENT    5. Tinnitus of both ears  - REFERRAL TO ENT

## 2018-04-03 ENCOUNTER — TELEPHONE (OUTPATIENT)
Dept: MEDICAL GROUP | Facility: LAB | Age: 33
End: 2018-04-03

## 2018-04-03 NOTE — TELEPHONE ENCOUNTER
1. Caller Name: Ye                                         Call Back Number: [phone]      Patient approves a detailed voicemail message: yes    Ye calling stating a form was faxed on behave of pt regarding times. Theses times need to be specific and they need to be faxed back ASAP or the pt case will be closed. Then the whole process will need to start all over.

## 2018-04-03 NOTE — TELEPHONE ENCOUNTER
I put form on your desk. Please confirm all specific dates with Ye on the phone and it's ready. thanks

## 2018-04-03 NOTE — TELEPHONE ENCOUNTER
Spoke to patient, informed her it was ready to pick-up. Encouraged her  to review dates to make sure it is correct

## 2018-04-04 ENCOUNTER — TELEPHONE (OUTPATIENT)
Dept: MEDICAL GROUP | Facility: LAB | Age: 33
End: 2018-04-04

## 2018-04-04 NOTE — TELEPHONE ENCOUNTER
VOICEMAIL - yesterday @   1. Caller Name: Katerina's spouse (Ye Ulrich)                     Call Back Number: [phone]    2. Message: I am sorry to keep bothering you with this. [facility] is now requesting that we give them the Physician Statement Form, the one that has the changes with the actual dates and such. If you can get it done today, I get off at 4pm and can come by and wait if needed. I called a little bit ago to get it to go to [facility], maybe I can get a copy of it and I can scan it to [facility]. Please call if there is any problems    3. Patient approves office to leave a detailed voicemail/MyChart message: yes

## 2018-04-09 ENCOUNTER — APPOINTMENT (OUTPATIENT)
Dept: RADIOLOGY | Facility: MEDICAL CENTER | Age: 33
End: 2018-04-09
Attending: EMERGENCY MEDICINE
Payer: COMMERCIAL

## 2018-04-09 ENCOUNTER — HOSPITAL ENCOUNTER (EMERGENCY)
Facility: MEDICAL CENTER | Age: 33
End: 2018-04-10
Attending: EMERGENCY MEDICINE
Payer: COMMERCIAL

## 2018-04-09 DIAGNOSIS — R06.02 SHORTNESS OF BREATH: ICD-10-CM

## 2018-04-09 LAB — EKG IMPRESSION: NORMAL

## 2018-04-09 PROCEDURE — 99285 EMERGENCY DEPT VISIT HI MDM: CPT

## 2018-04-09 PROCEDURE — 84484 ASSAY OF TROPONIN QUANT: CPT

## 2018-04-09 PROCEDURE — 85025 COMPLETE CBC W/AUTO DIFF WBC: CPT

## 2018-04-09 PROCEDURE — 84703 CHORIONIC GONADOTROPIN ASSAY: CPT

## 2018-04-09 PROCEDURE — 93005 ELECTROCARDIOGRAM TRACING: CPT | Performed by: EMERGENCY MEDICINE

## 2018-04-09 PROCEDURE — 71045 X-RAY EXAM CHEST 1 VIEW: CPT

## 2018-04-09 PROCEDURE — 93005 ELECTROCARDIOGRAM TRACING: CPT

## 2018-04-09 PROCEDURE — 84439 ASSAY OF FREE THYROXINE: CPT

## 2018-04-09 PROCEDURE — 84443 ASSAY THYROID STIM HORMONE: CPT

## 2018-04-09 PROCEDURE — 83880 ASSAY OF NATRIURETIC PEPTIDE: CPT

## 2018-04-09 PROCEDURE — 87086 URINE CULTURE/COLONY COUNT: CPT

## 2018-04-09 PROCEDURE — 85379 FIBRIN DEGRADATION QUANT: CPT

## 2018-04-09 PROCEDURE — 80053 COMPREHEN METABOLIC PANEL: CPT

## 2018-04-09 RX ORDER — SODIUM CHLORIDE 9 MG/ML
1000 INJECTION, SOLUTION INTRAVENOUS ONCE
Status: COMPLETED | OUTPATIENT
Start: 2018-04-09 | End: 2018-04-10

## 2018-04-10 ENCOUNTER — TELEPHONE (OUTPATIENT)
Dept: MEDICAL GROUP | Facility: LAB | Age: 33
End: 2018-04-10

## 2018-04-10 VITALS
TEMPERATURE: 96.7 F | HEIGHT: 68 IN | SYSTOLIC BLOOD PRESSURE: 148 MMHG | BODY MASS INDEX: 28.7 KG/M2 | OXYGEN SATURATION: 98 % | WEIGHT: 189.38 LBS | RESPIRATION RATE: 16 BRPM | DIASTOLIC BLOOD PRESSURE: 107 MMHG | HEART RATE: 81 BPM

## 2018-04-10 LAB
ALBUMIN SERPL BCP-MCNC: 4.3 G/DL (ref 3.2–4.9)
ALBUMIN/GLOB SERPL: 1.2 G/DL
ALP SERPL-CCNC: 79 U/L (ref 30–99)
ALT SERPL-CCNC: 36 U/L (ref 2–50)
ANION GAP SERPL CALC-SCNC: 9 MMOL/L (ref 0–11.9)
APPEARANCE UR: CLEAR
AST SERPL-CCNC: 30 U/L (ref 12–45)
BACTERIA #/AREA URNS HPF: ABNORMAL /HPF
BASOPHILS # BLD AUTO: 0.7 % (ref 0–1.8)
BASOPHILS # BLD: 0.07 K/UL (ref 0–0.12)
BILIRUB SERPL-MCNC: 0.6 MG/DL (ref 0.1–1.5)
BILIRUB UR QL STRIP.AUTO: NEGATIVE
BNP SERPL-MCNC: 5 PG/ML (ref 0–100)
BUN SERPL-MCNC: 6 MG/DL (ref 8–22)
CALCIUM SERPL-MCNC: 9.8 MG/DL (ref 8.5–10.5)
CHLORIDE SERPL-SCNC: 104 MMOL/L (ref 96–112)
CO2 SERPL-SCNC: 23 MMOL/L (ref 20–33)
COLOR UR: YELLOW
CREAT SERPL-MCNC: 0.83 MG/DL (ref 0.5–1.4)
CULTURE IF INDICATED INDCX: YES UA CULTURE
DEPRECATED D DIMER PPP IA-ACNC: <200 NG/ML(D-DU)
EOSINOPHIL # BLD AUTO: 0.04 K/UL (ref 0–0.51)
EOSINOPHIL NFR BLD: 0.4 % (ref 0–6.9)
EPI CELLS #/AREA URNS HPF: ABNORMAL /HPF
ERYTHROCYTE [DISTWIDTH] IN BLOOD BY AUTOMATED COUNT: 36.6 FL (ref 35.9–50)
GLOBULIN SER CALC-MCNC: 3.5 G/DL (ref 1.9–3.5)
GLUCOSE SERPL-MCNC: 94 MG/DL (ref 65–99)
GLUCOSE UR STRIP.AUTO-MCNC: NEGATIVE MG/DL
HCG SERPL QL: NEGATIVE
HCT VFR BLD AUTO: 42.5 % (ref 37–47)
HGB BLD-MCNC: 14.2 G/DL (ref 12–16)
HYALINE CASTS #/AREA URNS LPF: ABNORMAL /LPF
IMM GRANULOCYTES # BLD AUTO: 0.03 K/UL (ref 0–0.11)
IMM GRANULOCYTES NFR BLD AUTO: 0.3 % (ref 0–0.9)
KETONES UR STRIP.AUTO-MCNC: NEGATIVE MG/DL
LEUKOCYTE ESTERASE UR QL STRIP.AUTO: ABNORMAL
LYMPHOCYTES # BLD AUTO: 3.57 K/UL (ref 1–4.8)
LYMPHOCYTES NFR BLD: 33.3 % (ref 22–41)
MCH RBC QN AUTO: 27.2 PG (ref 27–33)
MCHC RBC AUTO-ENTMCNC: 33.4 G/DL (ref 33.6–35)
MCV RBC AUTO: 81.3 FL (ref 81.4–97.8)
MICRO URNS: ABNORMAL
MONOCYTES # BLD AUTO: 0.75 K/UL (ref 0–0.85)
MONOCYTES NFR BLD AUTO: 7 % (ref 0–13.4)
NEUTROPHILS # BLD AUTO: 6.26 K/UL (ref 2–7.15)
NEUTROPHILS NFR BLD: 58.3 % (ref 44–72)
NITRITE UR QL STRIP.AUTO: NEGATIVE
NRBC # BLD AUTO: 0 K/UL
NRBC BLD-RTO: 0 /100 WBC
PH UR STRIP.AUTO: 6 [PH]
PLATELET # BLD AUTO: 379 K/UL (ref 164–446)
PMV BLD AUTO: 10.6 FL (ref 9–12.9)
POTASSIUM SERPL-SCNC: 3.7 MMOL/L (ref 3.6–5.5)
PROT SERPL-MCNC: 7.8 G/DL (ref 6–8.2)
PROT UR QL STRIP: NEGATIVE MG/DL
RBC # BLD AUTO: 5.23 M/UL (ref 4.2–5.4)
RBC # URNS HPF: ABNORMAL /HPF
RBC UR QL AUTO: ABNORMAL
SODIUM SERPL-SCNC: 136 MMOL/L (ref 135–145)
SP GR UR STRIP.AUTO: 1.01
T4 FREE SERPL-MCNC: 1.03 NG/DL (ref 0.53–1.43)
TROPONIN I SERPL-MCNC: <0.01 NG/ML (ref 0–0.04)
TSH SERPL DL<=0.005 MIU/L-ACNC: 1.43 UIU/ML (ref 0.38–5.33)
UROBILINOGEN UR STRIP.AUTO-MCNC: 0.2 MG/DL
WBC # BLD AUTO: 10.7 K/UL (ref 4.8–10.8)
WBC #/AREA URNS HPF: ABNORMAL /HPF

## 2018-04-10 PROCEDURE — 700105 HCHG RX REV CODE 258: Performed by: EMERGENCY MEDICINE

## 2018-04-10 PROCEDURE — 81001 URINALYSIS AUTO W/SCOPE: CPT

## 2018-04-10 RX ADMIN — SODIUM CHLORIDE 1000 ML: 9 INJECTION, SOLUTION INTRAVENOUS at 00:01

## 2018-04-10 NOTE — TELEPHONE ENCOUNTER
1. Caller Name: Ye                                      Call Back Number: [phone]      Patient approves a detailed voicemail message: yes    Ye calling stating they received a letter in the mail from [facility] requesting a referral for pt to have a CT scan and if they find anything they will need to do a biopsy. He also was asking to have images and report sent to [facility] POS  Phone: [phone]  Fax: [phone]

## 2018-04-10 NOTE — ED NOTES
D/C home with written and verbal instructions re: Rx, activity, f/u.  Verbalizes understanding.  Ambulated out with steady gait.

## 2018-04-10 NOTE — ED TRIAGE NOTES
Katerina Murry [facility]  32 y.o.  Chief Complaint   Patient presents with   • Shortness of Breath     Pt states she has been feeling SOB, febrile, and dizzy off and on for a few months.    • Fever   • Dizziness     Pt self ambulatory to triage room, steady gait. NAD noted.     Triage process explained to patient, educated pt to notify staff at  if s/s worsened, apologized for wait time, and returned pt to lobby.

## 2018-04-10 NOTE — ED PROVIDER NOTES
ED Provider Note  ED Provider Note    Scribed for Maude Romero M.D. by Devorah Beltran. 4/9/2018, 10:22 PM.    Primary care provider: [facility]  Means of arrival: gabriel  History obtained from: patient  History limited by: none    CHIEF COMPLAINT  Chief Complaint   Patient presents with   • Shortness of Breath     Pt states she has been feeling SOB, febrile, and dizzy off and on for a few months.    • Fever   • Dizziness       HPI  Katerina Owen is a 32 y.o. female who presents to the Emergency Department for intermittent shortness of breath onset a few months ago. This has been progressively worsening with light-headedness and dizziness, though she notes no acute changes today. She was previously evaluated as an inpatient for the symptoms, was discharged and had an outpatient MRI for follow-up. This MRI demonstrated some nonspecific changes, she was referred to neurology for further evaluation. She is waiting for scheduling with a neurologist. Additionally, she has a chronic right breast rash the last two years. This has been biopsied with no evidence of malignancy. Rash has been intermittently worsening and improving for the past 2-3 years. Also reports intermittent nausea and vomiting, last episode of vomiting was 2-3 days ago, and she does have some current ongoing nausea that is chronic. She reports feeling warm, highest temperature at home was 99.6. She also cites a 12lb weight loss in one month, though this is not unintentional, states that she has been trying to lose weight for quite some time. No dysuria, leg pain, or leg swelling. She has been evaluated for concern of blood clots in brain. Not on estrogen or birth control. History of ADHD which she sometimes uses aderall for.    REVIEW OF SYSTEMS  Pertinent positives include shortness of breath, light-headedness, dizziness, rash, nausea, vomiting, fever, weight loss.   Pertinent negatives include no dysuria, leg pain, leg  "swelling.    All other systems reviewed and negative.  C    PAST MEDICAL HISTORY   has a past medical history of ADHD; Arthritis; Bowel habit changes; Chlamydia (5/11/2011); depression; Heart burn; HTN (hypertension); Migraine; Pain; Pre-eclampsia; PTSD (post-traumatic stress disorder); and Staph infection (2011).    SURGICAL HISTORY   has a past surgical history that includes septoturbinoplasty (4/21/2009); lumpectomy (2014); breast biopsy (Left, 2/26/2016); primary c section (Aug 7, 2007); repeat c section w tubal ligation (9/7/2011); and ventral hernia repair (Right, 1/10/2018).    SOCIAL HISTORY  Social History   Substance Use Topics   • Smoking status: Former Smoker     Packs/day: 0.25     Years: 6.00     Quit date: 12/26/2014   • Smokeless tobacco: Current User     Last attempt to quit: 10/30/2014      Comment: 1/4/2017 currently Vape   • Alcohol use No      History   Drug Use No       FAMILY HISTORY  Family History   Problem Relation Age of Onset   • Heart Disease Father      heart failure smoker    • Hypertension Father    • Cancer Maternal Grandfather      pancreastic   • Heart Disease Paternal Grandfather      heart attack       CURRENT MEDICATIONS  adderall    ALLERGIES  Allergies   Allergen Reactions   • Morphine Nausea     \"feel sick for a whole day after being given morphine\"  RXN=2/2016     • Vicodin [Hydrocodone-Acetaminophen] Vomiting and Nausea     JST=5339       PHYSICAL EXAM  Vital Signs: /107   Pulse (!) 101   Temp 35.9 °C (96.7 °F) (Temporal)   Resp 20   Ht 1.727 m (5' 8\")   Wt 85.9 kg (189 lb 6 oz)   LMP 03/02/2018   SpO2 97%   BMI 28.79 kg/m²   Constitutional: Alert, no acute distress  HENT: Normocephalic, atraumatic, moist mucus membranes  Eyes: Pupils equal and reactive, normal conjunctiva, non-icteric  Neck: Supple, normal range of motion, no stridor  Cardiovascular: tachycardia, Normal peripheral perfusion, no cyanosis, Normal cardiac auscultation  Pulmonary: No respiratory " distress, normal work of breathing, no accessory muscle usage, Clear to auscultation bilaterally  Abdomen: Soft, non tender, no peritoneal signs, bowel sounds are present.   Skin: Warm, dry, bilateral symmetric areas of hyperpigmentation with minimal areas of excoriation surrounding areolas on breast exam. Rash appears symmetric bilaterally.  Back: No pain with active range of motion  Musculoskeletal: Normal range of motion in all extremities, no swelling or deformity noted  Neurologic: Alert, oriented, normal motor function, no speech deficits  Psychiatric: Labile affect, at times anxious, and other times calm. Patient is very pleasant and cooperative.    DIAGNOSTIC STUDIES/PROCEDURES:  LABS  Labs Reviewed   CBC WITH DIFFERENTIAL - Abnormal; Notable for the following:        Result Value    MCV 81.3 (*)     MCHC 33.4 (*)     All other components within normal limits    Narrative:     1. Age less then 50  2. Heart reate less then 100  3. 02 sat > 94%  4. No prior history of DVT or PE  5. No recent trauma or surgery (2 weeks)  6. No hemoptisis.  7. No  or HRP  8. No un-lateral leg swelling.   COMP METABOLIC PANEL - Abnormal; Notable for the following:     Bun 6 (*)     All other components within normal limits    Narrative:     1. Age less then 50  2. Heart reate less then 100  3. 02 sat > 94%  4. No prior history of DVT or PE  5. No recent trauma or surgery (2 weeks)  6. No hemoptisis.  7. No  or HRP  8. No un-lateral leg swelling.   URINALYSIS,CULTURE IF INDICATED - Abnormal; Notable for the following:     Leukocyte Esterase Trace (*)     Occult Blood Trace (*)     All other components within normal limits   URINE MICROSCOPIC (W/UA) - Abnormal; Notable for the following:     WBC 5-10 (*)     Bacteria Few (*)     All other components within normal limits   HCG QUAL SERUM    Narrative:     1. Age less then 50  2. Heart reate less then 100  3. 02 sat > 94%  4. No prior history of DVT or PE  5. No recent trauma  or surgery (2 weeks)  6. No hemoptisis.  7. No  or HRP  8. No un-lateral leg swelling.   BTYPE NATRIURETIC PEPTIDE    Narrative:     1. Age less then 50  2. Heart reate less then 100  3. 02 sat > 94%  4. No prior history of DVT or PE  5. No recent trauma or surgery (2 weeks)  6. No hemoptisis.  7. No  or HRP  8. No un-lateral leg swelling.   TROPONIN    Narrative:     1. Age less then 50  2. Heart reate less then 100  3. 02 sat > 94%  4. No prior history of DVT or PE  5. No recent trauma or surgery (2 weeks)  6. No hemoptisis.  7. No  or HRP  8. No un-lateral leg swelling.   TSH    Narrative:     1. Age less then 50  2. Heart reate less then 100  3. 02 sat > 94%  4. No prior history of DVT or PE  5. No recent trauma or surgery (2 weeks)  6. No hemoptisis.  7. No  or HRP  8. No un-lateral leg swelling.   FREE THYROXINE    Narrative:     1. Age less then 50  2. Heart reate less then 100  3. 02 sat > 94%  4. No prior history of DVT or PE  5. No recent trauma or surgery (2 weeks)  6. No hemoptisis.  7. No  or HRP  8. No un-lateral leg swelling.   D-DIMER    Narrative:     1. Age less then 50  2. Heart reate less then 100  3. 02 sat > 94%  4. No prior history of DVT or PE  5. No recent trauma or surgery (2 weeks)  6. No hemoptisis.  7. No  or HRP  8. No un-lateral leg swelling.   ESTIMATED GFR    Narrative:     1. Age less then 50  2. Heart reate less then 100  3. 02 sat > 94%  4. No prior history of DVT or PE  5. No recent trauma or surgery (2 weeks)  6. No hemoptisis.  7. No  or HRP  8. No un-lateral leg swelling.   URINE CULTURE(NEW)     All labs reviewed by me.    EKG  12 Lead EKG interpreted by me to show:  Rate 104, no ST elevation or depression, no T-wave inversions, no ectopy, sinus tachycardia    Radiology results revealed:   DX-CHEST-PORTABLE (1 VIEW)   Final Result         1. No acute cardiopulmonary abnormalities are identified.           COURSE & MEDICAL DECISION MAKING  Pertinent Labs &  Imaging studies reviewed. (See chart for details)    Review of old medical records for continuity of care. Discharge summary reviewed from 2/26/18. Patient presented to the hospital with dizziness and syncope. She had a stress test, echocardiogram and lab work which did not show any abnormality. Blood pressures remained normal during her hospital stay. Patient is advised to continue to avoid Adderall and phentermine. She did appear dehydrated on admission, patient counseled on adequate fluid intake.    Differential diagnoses include but are not limited to: Electrode abnormality, thyroid disorder, pulmonary embolism, malignancy, dermatitis     10:22 PM - Patient seen and examined at bedside. Patient will be treated with 1000ml NS infusion for tachycardia. Ordered EKG, chest XR, cbc with differential, comp metabolic panel, and other labs to evaluate her symptoms.     11:27 PM Patient reevaluated at bedside. Patient tearful. Consoled patient. Discussed resulted studies.     1:35 AM Patient reevaluated at bedside. Patient resting comfortably and in no acute distress. Discussed resulted studies. Discussed plan for discharge; I advised the patient to follow-up with [facility] Vines, and to return to the [facility] ED with any new or worsening symptoms, including fever. Patient was given the opportunity for questions. I addressed all questions or concerns at this time and they verbalize agreement to the plan of care.    Patient has tolerated the NS infusion with a positive response, heart rate has now normalized, and is feeling significantly improved.    Decision Making:  This is a 32 y.o. year old female who presents with several months of shortness of breath, and lightheadedness. Additionally she has concern for abnormal rashes on both breasts. In talking with this patient, I do not appreciate any new symptoms, no acutely worsening symptoms. She has had this fully evaluated including an admission to this facility  as well as outpatient MRI and specialist referrals. She has had the breast lesions biopsied, was told that she did not have any evidence of malignancy. Her physical exam today is benign, she is normotensive. She is tachycardic on arrival, this improved with fluid bolus. With regard to her abnormal rashes, they do appear to be very symmetric on both breasts, less concerning for malignancy. I did consider inflammatory breast cancer, however this patient has already had biopsies that were found to be negative.    She has no risk factors for pulmonary embolism. Due to her tachycardia with concern for lightheadedness I did order a d-dimer for further risk stratification. This is negative, do not believe CT is indicated at this time. T4 and TSH are within normal limits. BMP is within normal limits, no evidence of heart failure. Troponin is undetectable, no evidence of cardiac ischemia. Urinalysis is negative for evidence of infection. CMP is within normal limits. Pregnancy test is negative ruling out pregnancy and pregnancy related complications. She is afebrile, she has a normal white blood count, no evidence of infectious etiology. Chest x-ray is negative for acute process.    Plan at this time is for discharge home with primary care follow-up. She has not yet seen a dermatologist for her abnormal rashes, she is instructed to follow-up with the breast center to discuss her concerns about recurrent rashes on the breast. Additionally she will follow up with dermatology for further evaluation as these rashes are causing her quite a bit of anxiety. She will schedule these follow-up appointments tomorrow for complete recheck.     Return precautions given including shortness of breath, fevers, chest pain, nausea or vomiting, worsening rashes, or any further concerns.    The patient will return for new or worsening symptoms and is stable at the time of discharge.    The patient is referred to a primary physician for blood  pressure management, diabetic screening, and for all other preventative health concerns.    DISPOSITION:  Patient will be discharged home in stable condition.    FOLLOW UP:  [facility] BREAST [facility]  [address]  [place] [id number] [phone]  Call today  To schedule a follow up appointment    [facility], Emergency Dept  [address]  [place] [id number] [phone]  Go to  If symptoms worsen    [facility] Vines  [address]  [address]  [place] [id number] [phone]    Go today  For complete recheck    Pam Latif M.D.  [address]  [place] [address]  [phone]      please call this morning to schedule a follow-up appointment    OUTPATIENT MEDICATIONS:  Discharge Medication List as of 4/10/2018  1:59 AM        FINAL IMPRESSION  1. Shortness of breath       Devorah UNGER (Oh), am scribing for, and in the presence of, Maude Romero M.D.    Electronically signed by: Devorah Beltran (Oh), 4/9/2018    IMaude M.D. personally performed the services described in this documentation, as scribed by Devorah Beltran in my presence, and it is both accurate and complete.    The note accurately reflects work and decisions made by me.  Maude Romero  4/10/2018  4:05 AM

## 2018-04-10 NOTE — DISCHARGE INSTRUCTIONS
Shortness of Breath  Shortness of breath means you have trouble breathing. Shortness of breath needs medical care right away.  HOME CARE   · Do not smoke.  · Avoid being around chemicals or things (paint fumes, dust) that may bother your breathing.  · Rest as needed. Slowly begin your normal activities.  · Only take medicines as told by your doctor.  · Keep all doctor visits as told.  GET HELP RIGHT AWAY IF:   · Your shortness of breath gets worse.  · You feel lightheaded, pass out (faint), or have a cough that is not helped by medicine.  · You cough up blood.  · You have pain with breathing.  · You have pain in your chest, arms, shoulders, or belly (abdomen).  · You have a fever.  · You cannot walk up stairs or exercise the way you normally do.  · You do not get better in the time expected.  · You have a hard time doing normal activities even with rest.  · You have problems with your medicines.  · You have any new symptoms.  MAKE SURE YOU:  · Understand these instructions.  · Will watch your condition.  · Will get help right away if you are not doing well or get worse.  This information is not intended to replace advice given to you by your health care provider. Make sure you discuss any questions you have with your health care provider.  Document Released: 06/05/2009 Document Revised: 12/23/2014 Document Reviewed: 03/04/2013  [employer] Interactive Patient Education © 2017 [employer]  Please follow-up with your primary care physician. Please follow-up with the breast center for complete recheck. Call tomorrow to schedule these appointments. Return to the emergency department if he develops any new or worsening symptoms including worsening shortness of breath, chest pain, fevers, or any further concerns.    Please follow up with a primary physician for blood pressure management, diabetic screening, and all other preventive health concerns.     Shortness of Breath  Shortness of breath means you have trouble  breathing. Shortness of breath needs medical care right away.  HOME CARE   · Do not smoke.  · Avoid being around chemicals or things (paint fumes, dust) that may bother your breathing.  · Rest as needed. Slowly begin your normal activities.  · Only take medicines as told by your doctor.  · Keep all doctor visits as told.  GET HELP RIGHT AWAY IF:   · Your shortness of breath gets worse.  · You feel lightheaded, pass out (faint), or have a cough that is not helped by medicine.  · You cough up blood.  · You have pain with breathing.  · You have pain in your chest, arms, shoulders, or belly (abdomen).  · You have a fever.  · You cannot walk up stairs or exercise the way you normally do.  · You do not get better in the time expected.  · You have a hard time doing normal activities even with rest.  · You have problems with your medicines.  · You have any new symptoms.  MAKE SURE YOU:  · Understand these instructions.  · Will watch your condition.  · Will get help right away if you are not doing well or get worse.  This information is not intended to replace advice given to you by your health care provider. Make sure you discuss any questions you have with your health care provider.  Document Released: 06/05/2009 Document Revised: 12/23/2014 Document Reviewed: 03/04/2013  [employer] Interactive Patient Education © 2017 [employer]

## 2018-04-11 NOTE — TELEPHONE ENCOUNTER
Spoke with Brianne from [facility] she states pt  called asking questions. She couldn't go into detail with him because of HIPPA. She states the letter sent to pt was to inform pt her imaging was normal and to follow up with provider.

## 2018-04-11 NOTE — TELEPHONE ENCOUNTER
Left message on number given explaining situation and requesting they scan or fax letter for Lashonda to nanda

## 2018-04-11 NOTE — TELEPHONE ENCOUNTER
Please call Ye and ask him to send a copy of this letter via my chart (he can take a picture of it) - breast center has no idea what he is talking about in terms of a CT recommendation.

## 2018-04-12 LAB
BACTERIA UR CULT: NORMAL
SIGNIFICANT IND 70042: NORMAL
SITE SITE: NORMAL
SOURCE SOURCE: NORMAL

## 2018-04-26 ENCOUNTER — OFFICE VISIT (OUTPATIENT)
Dept: MEDICAL GROUP | Facility: LAB | Age: 33
End: 2018-04-26
Payer: COMMERCIAL

## 2018-04-26 ENCOUNTER — TELEPHONE (OUTPATIENT)
Dept: MEDICAL GROUP | Facility: LAB | Age: 33
End: 2018-04-26

## 2018-04-26 VITALS
SYSTOLIC BLOOD PRESSURE: 120 MMHG | DIASTOLIC BLOOD PRESSURE: 94 MMHG | BODY MASS INDEX: 28.37 KG/M2 | OXYGEN SATURATION: 98 % | RESPIRATION RATE: 12 BRPM | TEMPERATURE: 98.5 F | HEIGHT: 68 IN | HEART RATE: 103 BPM | WEIGHT: 187.2 LBS

## 2018-04-26 DIAGNOSIS — F33.41 RECURRENT MAJOR DEPRESSIVE DISORDER, IN PARTIAL REMISSION (HCC): ICD-10-CM

## 2018-04-26 DIAGNOSIS — Z79.899 CONTROLLED SUBSTANCE AGREEMENT SIGNED: ICD-10-CM

## 2018-04-26 DIAGNOSIS — F90.2 ATTENTION DEFICIT HYPERACTIVITY DISORDER (ADHD), COMBINED TYPE: ICD-10-CM

## 2018-04-26 PROCEDURE — 99214 OFFICE O/P EST MOD 30 MIN: CPT | Performed by: NURSE PRACTITIONER

## 2018-04-26 RX ORDER — DEXTROAMPHETAMINE SACCHARATE, AMPHETAMINE ASPARTATE, DEXTROAMPHETAMINE SULFATE AND AMPHETAMINE SULFATE 3.75; 3.75; 3.75; 3.75 MG/1; MG/1; MG/1; MG/1
15 TABLET ORAL 2 TIMES DAILY
Qty: 60 TAB | Refills: 0 | Status: SHIPPED | OUTPATIENT
Start: 2018-06-25 | End: 2018-06-05

## 2018-04-26 RX ORDER — DEXTROAMPHETAMINE SACCHARATE, AMPHETAMINE ASPARTATE, DEXTROAMPHETAMINE SULFATE AND AMPHETAMINE SULFATE 3.75; 3.75; 3.75; 3.75 MG/1; MG/1; MG/1; MG/1
15 TABLET ORAL 2 TIMES DAILY
Qty: 60 TAB | Refills: 0 | Status: SHIPPED | OUTPATIENT
Start: 2018-05-26 | End: 2018-06-05

## 2018-04-26 RX ORDER — DEXTROAMPHETAMINE SACCHARATE, AMPHETAMINE ASPARTATE, DEXTROAMPHETAMINE SULFATE AND AMPHETAMINE SULFATE 3.75; 3.75; 3.75; 3.75 MG/1; MG/1; MG/1; MG/1
15 TABLET ORAL 2 TIMES DAILY
Qty: 56 TAB | Refills: 0 | Status: SHIPPED | OUTPATIENT
Start: 2018-04-26 | End: 2018-05-26

## 2018-04-26 ASSESSMENT — PATIENT HEALTH QUESTIONNAIRE - PHQ9: CLINICAL INTERPRETATION OF PHQ2 SCORE: 0

## 2018-04-26 NOTE — PROGRESS NOTES
"Chief Complaint   Patient presents with   • ADHD     F/V on medications        HPI  Katerina is a 33 yo est female here to f/u on depression, anxiety, attention deficit disorder and dizziness. Fortunately she feels that her anxiety and depression is much improved with Adderall. She is still bothered significantly with dizziness. In terms of dizziness, she will see neurology when appt is available, reports that there is a lot of time going back and forth regarding her imaging and approval to see the neurologist. Appetite is improving. Sleeping well.  Will see ENT tomorrow.  Her dizziness alternates between feeling lightheaded and the room spinning. She still has associated nausea throughout the day, particularly when her dizziness is present. She has not had any recent falls. She does not feel that she can safely return to work yet because of her dizziness.    Past medical, surgical, family, and social history is reviewed and updated in Epic chart by me today.   Medications and allergies reviewed and updated in Epic chart by me today.     ROS:   As documented in history of present illness above    Exam:  Blood pressure 120/94, pulse (!) 103, temperature 36.9 °C (98.5 °F), resp. rate 12, height 1.727 m (5' 8\"), weight 84.9 kg (187 lb 3.2 oz), SpO2 98 %.  Constitutional: Alert, no distress, plus 3 vital signs  Skin:  Warm, dry, no rashes invisible areas  Eye: Equal, round and reactive, conjunctiva clear  ENMT: Lips without lesions, good dentition, oropharynx clear    Neck: Trachea midline, no masses, no thyromegaly  Respiratory: Unlabored respiration, lungs clear to auscultation, no wheezes, no rhonchi  Cardiovascular: Normal rate and rhythm  Neurologic:              Mental Status: Awake, alert, oriented x 3.              Speech: Fluent with normal prosody.              Memory: Able to recall 3 words at 1 minute and 5 minutes. Able to recall recent and remote events accurately.               Concentration: Attentive. " Able to focus on history and follow multi-step commands.              Fund of Knowledge: Appropriate.              Cranial Nerves:                          CN II: PERRL. No afferent pupillary defect.                          CN III, IV, VI: Good eye closure, EOMI.                           CN V: Facial sensation intact and symmetric.                           CN VII: No facial asymmetry.                           CN VIII: Hearing intact.                           CN XI: Symmetric shoulder shrug.                           CN XII: Tongue midline.               Sensory: Intact light touch, vibration and temperature.               Coordination: No evidence of past-pointing on finger to nose testing, no dysdiadochokinesia. Heel to shin intact.               DTR's: 2+ throughout without clonus.               Babinski: Toes downgoing bilaterally.              Romberg: Positive.  Psych: Alert, pleasant, well-groomed, normal affect    A/P:  1. Attention deficit hyperactivity disorder (ADHD), combined type with associated depression /anxiety:  -improved.  Reviewed her  profile.    - amphetamine-dextroamphetamine (ADDERALL) 15 MG tablet; Take 1 Tab by mouth 2 times a day for 30 days.  Dispense: 56 Tab; Refill: 0  - amphetamine-dextroamphetamine (ADDERALL) 15 MG tablet; Take 1 Tab by mouth 2 times a day for 30 days.  Dispense: 60 Tab; Refill: 0  - amphetamine-dextroamphetamine (ADDERALL) 15 MG tablet; Take 1 Tab by mouth 2 times a day for 30 days.  Dispense: 60 Tab; Refill: 0    2. Controlled substance agreement signed  - Controlled Substance Treatment Agreement  - [facility] PAIN MANAGEMENT SCREEN; Future    3.  Dizziness:  Persistent. Optimistic that she is seeing ENT tomorrow and hopefully neurology in the near future. As always, encouraged her to refrain from driving when dizzy. Encouraged her to refrain from operating machinery.  Will re-evaluate after ENT and neurology consult in hopes that she can return to work.

## 2018-05-31 PROBLEM — R42 DIZZINESS: Status: ACTIVE | Noted: 2018-05-31

## 2018-05-31 PROBLEM — R51.9 CHRONIC HEADACHES: Status: ACTIVE | Noted: 2018-05-31

## 2018-05-31 PROBLEM — G89.29 CHRONIC HEADACHES: Status: ACTIVE | Noted: 2018-05-31

## 2018-06-05 ENCOUNTER — HOSPITAL ENCOUNTER (INPATIENT)
Facility: MEDICAL CENTER | Age: 33
LOS: 1 days | DRG: 343 | End: 2018-06-06
Attending: EMERGENCY MEDICINE | Admitting: SURGERY
Payer: COMMERCIAL

## 2018-06-05 ENCOUNTER — APPOINTMENT (OUTPATIENT)
Dept: RADIOLOGY | Facility: MEDICAL CENTER | Age: 33
DRG: 343 | End: 2018-06-05
Attending: EMERGENCY MEDICINE
Payer: COMMERCIAL

## 2018-06-05 DIAGNOSIS — K35.30 ACUTE APPENDICITIS WITH LOCALIZED PERITONITIS: Primary | ICD-10-CM

## 2018-06-05 LAB
ALBUMIN SERPL BCP-MCNC: 3.9 G/DL (ref 3.2–4.9)
ALBUMIN/GLOB SERPL: 1.1 G/DL
ALP SERPL-CCNC: 80 U/L (ref 30–99)
ALT SERPL-CCNC: 31 U/L (ref 2–50)
ANION GAP SERPL CALC-SCNC: 8 MMOL/L (ref 0–11.9)
APPEARANCE UR: CLEAR
AST SERPL-CCNC: 21 U/L (ref 12–45)
BASOPHILS # BLD AUTO: 0.4 % (ref 0–1.8)
BASOPHILS # BLD: 0.08 K/UL (ref 0–0.12)
BILIRUB SERPL-MCNC: 0.3 MG/DL (ref 0.1–1.5)
BILIRUB UR QL STRIP.AUTO: NEGATIVE
BUN SERPL-MCNC: 9 MG/DL (ref 8–22)
CALCIUM SERPL-MCNC: 8.9 MG/DL (ref 8.5–10.5)
CHLORIDE SERPL-SCNC: 106 MMOL/L (ref 96–112)
CO2 SERPL-SCNC: 23 MMOL/L (ref 20–33)
COLOR UR: YELLOW
CREAT SERPL-MCNC: 0.63 MG/DL (ref 0.5–1.4)
EOSINOPHIL # BLD AUTO: 0.05 K/UL (ref 0–0.51)
EOSINOPHIL NFR BLD: 0.3 % (ref 0–6.9)
ERYTHROCYTE [DISTWIDTH] IN BLOOD BY AUTOMATED COUNT: 41.1 FL (ref 35.9–50)
GLOBULIN SER CALC-MCNC: 3.4 G/DL (ref 1.9–3.5)
GLUCOSE SERPL-MCNC: 102 MG/DL (ref 65–99)
GLUCOSE UR STRIP.AUTO-MCNC: NEGATIVE MG/DL
HCG SERPL QL: NEGATIVE
HCT VFR BLD AUTO: 37.8 % (ref 37–47)
HGB BLD-MCNC: 12.2 G/DL (ref 12–16)
IMM GRANULOCYTES # BLD AUTO: 0.1 K/UL (ref 0–0.11)
IMM GRANULOCYTES NFR BLD AUTO: 0.6 % (ref 0–0.9)
KETONES UR STRIP.AUTO-MCNC: NEGATIVE MG/DL
LEUKOCYTE ESTERASE UR QL STRIP.AUTO: NEGATIVE
LIPASE SERPL-CCNC: 17 U/L (ref 11–82)
LYMPHOCYTES # BLD AUTO: 2.29 K/UL (ref 1–4.8)
LYMPHOCYTES NFR BLD: 12.9 % (ref 22–41)
MCH RBC QN AUTO: 26.3 PG (ref 27–33)
MCHC RBC AUTO-ENTMCNC: 32.3 G/DL (ref 33.6–35)
MCV RBC AUTO: 81.5 FL (ref 81.4–97.8)
MICRO URNS: NORMAL
MONOCYTES # BLD AUTO: 1.11 K/UL (ref 0–0.85)
MONOCYTES NFR BLD AUTO: 6.2 % (ref 0–13.4)
NEUTROPHILS # BLD AUTO: 14.19 K/UL (ref 2–7.15)
NEUTROPHILS NFR BLD: 79.6 % (ref 44–72)
NITRITE UR QL STRIP.AUTO: NEGATIVE
NRBC # BLD AUTO: 0 K/UL
NRBC BLD-RTO: 0 /100 WBC
PH UR STRIP.AUTO: 6 [PH]
PLATELET # BLD AUTO: 353 K/UL (ref 164–446)
PMV BLD AUTO: 10 FL (ref 9–12.9)
POTASSIUM SERPL-SCNC: 3.9 MMOL/L (ref 3.6–5.5)
PROT SERPL-MCNC: 7.3 G/DL (ref 6–8.2)
PROT UR QL STRIP: NEGATIVE MG/DL
RBC # BLD AUTO: 4.64 M/UL (ref 4.2–5.4)
RBC UR QL AUTO: NEGATIVE
SODIUM SERPL-SCNC: 137 MMOL/L (ref 135–145)
SP GR UR STRIP.AUTO: 1.04
UROBILINOGEN UR STRIP.AUTO-MCNC: 0.2 MG/DL
WBC # BLD AUTO: 17.8 K/UL (ref 4.8–10.8)

## 2018-06-05 PROCEDURE — 160039 HCHG SURGERY MINUTES - EA ADDL 1 MIN LEVEL 3: Performed by: SURGERY

## 2018-06-05 PROCEDURE — 700102 HCHG RX REV CODE 250 W/ 637 OVERRIDE(OP)

## 2018-06-05 PROCEDURE — 700111 HCHG RX REV CODE 636 W/ 250 OVERRIDE (IP): Performed by: SURGERY

## 2018-06-05 PROCEDURE — 74177 CT ABD & PELVIS W/CONTRAST: CPT

## 2018-06-05 PROCEDURE — 700101 HCHG RX REV CODE 250: Performed by: EMERGENCY MEDICINE

## 2018-06-05 PROCEDURE — 88304 TISSUE EXAM BY PATHOLOGIST: CPT

## 2018-06-05 PROCEDURE — 502571 HCHG PACK, LAP CHOLE: Performed by: SURGERY

## 2018-06-05 PROCEDURE — A6402 STERILE GAUZE <= 16 SQ IN: HCPCS | Performed by: SURGERY

## 2018-06-05 PROCEDURE — A4314 CATH W/DRAINAGE 2-WAY LATEX: HCPCS | Performed by: SURGERY

## 2018-06-05 PROCEDURE — 80053 COMPREHEN METABOLIC PANEL: CPT

## 2018-06-05 PROCEDURE — A9270 NON-COVERED ITEM OR SERVICE: HCPCS

## 2018-06-05 PROCEDURE — 700101 HCHG RX REV CODE 250

## 2018-06-05 PROCEDURE — 700117 HCHG RX CONTRAST REV CODE 255: Performed by: EMERGENCY MEDICINE

## 2018-06-05 PROCEDURE — 96376 TX/PRO/DX INJ SAME DRUG ADON: CPT

## 2018-06-05 PROCEDURE — 96375 TX/PRO/DX INJ NEW DRUG ADDON: CPT

## 2018-06-05 PROCEDURE — 160035 HCHG PACU - 1ST 60 MINS PHASE I: Performed by: SURGERY

## 2018-06-05 PROCEDURE — 160028 HCHG SURGERY MINUTES - 1ST 30 MINS LEVEL 3: Performed by: SURGERY

## 2018-06-05 PROCEDURE — 99291 CRITICAL CARE FIRST HOUR: CPT

## 2018-06-05 PROCEDURE — 501399 HCHG SPECIMAN BAG, ENDO CATC: Performed by: SURGERY

## 2018-06-05 PROCEDURE — 501568 HCHG TROCAR, BLUNTPORT 12MM: Performed by: SURGERY

## 2018-06-05 PROCEDURE — 0DTJ4ZZ RESECTION OF APPENDIX, PERCUTANEOUS ENDOSCOPIC APPROACH: ICD-10-PCS | Performed by: SURGERY

## 2018-06-05 PROCEDURE — 501572 HCHG TROCAR, SHIELD OBTU 5X100: Performed by: SURGERY

## 2018-06-05 PROCEDURE — 160002 HCHG RECOVERY MINUTES (STAT): Performed by: SURGERY

## 2018-06-05 PROCEDURE — A9270 NON-COVERED ITEM OR SERVICE: HCPCS | Performed by: SURGERY

## 2018-06-05 PROCEDURE — 81003 URINALYSIS AUTO W/O SCOPE: CPT

## 2018-06-05 PROCEDURE — 501838 HCHG SUTURE GENERAL: Performed by: SURGERY

## 2018-06-05 PROCEDURE — 96365 THER/PROPH/DIAG IV INF INIT: CPT

## 2018-06-05 PROCEDURE — 85025 COMPLETE CBC W/AUTO DIFF WBC: CPT

## 2018-06-05 PROCEDURE — 700105 HCHG RX REV CODE 258: Performed by: EMERGENCY MEDICINE

## 2018-06-05 PROCEDURE — 700101 HCHG RX REV CODE 250: Performed by: SURGERY

## 2018-06-05 PROCEDURE — 83690 ASSAY OF LIPASE: CPT

## 2018-06-05 PROCEDURE — 96361 HYDRATE IV INFUSION ADD-ON: CPT

## 2018-06-05 PROCEDURE — 160009 HCHG ANES TIME/MIN: Performed by: SURGERY

## 2018-06-05 PROCEDURE — 700111 HCHG RX REV CODE 636 W/ 250 OVERRIDE (IP)

## 2018-06-05 PROCEDURE — 160036 HCHG PACU - EA ADDL 30 MINS PHASE I: Performed by: SURGERY

## 2018-06-05 PROCEDURE — 770006 HCHG ROOM/CARE - MED/SURG/GYN SEMI*

## 2018-06-05 PROCEDURE — 84703 CHORIONIC GONADOTROPIN ASSAY: CPT

## 2018-06-05 PROCEDURE — 160048 HCHG OR STATISTICAL LEVEL 1-5: Performed by: SURGERY

## 2018-06-05 PROCEDURE — 700102 HCHG RX REV CODE 250 W/ 637 OVERRIDE(OP): Performed by: SURGERY

## 2018-06-05 PROCEDURE — 700111 HCHG RX REV CODE 636 W/ 250 OVERRIDE (IP): Performed by: EMERGENCY MEDICINE

## 2018-06-05 RX ORDER — ACETAMINOPHEN 500 MG
100 TABLET ORAL EVERY 6 HOURS PRN
Status: ON HOLD | COMMUNITY
End: 2018-06-06

## 2018-06-05 RX ORDER — CEFTRIAXONE 2 G/1
2 INJECTION, POWDER, FOR SOLUTION INTRAMUSCULAR; INTRAVENOUS ONCE
Status: COMPLETED | OUTPATIENT
Start: 2018-06-05 | End: 2018-06-05

## 2018-06-05 RX ORDER — AMOXICILLIN 250 MG
2 CAPSULE ORAL 2 TIMES DAILY
Status: DISCONTINUED | OUTPATIENT
Start: 2018-06-05 | End: 2018-06-06 | Stop reason: HOSPADM

## 2018-06-05 RX ORDER — ONDANSETRON 2 MG/ML
4 INJECTION INTRAMUSCULAR; INTRAVENOUS ONCE
Status: COMPLETED | OUTPATIENT
Start: 2018-06-05 | End: 2018-06-05

## 2018-06-05 RX ORDER — KETOROLAC TROMETHAMINE 30 MG/ML
30 INJECTION, SOLUTION INTRAMUSCULAR; INTRAVENOUS ONCE
Status: COMPLETED | OUTPATIENT
Start: 2018-06-05 | End: 2018-06-05

## 2018-06-05 RX ORDER — POLYETHYLENE GLYCOL 3350 17 G/17G
1 POWDER, FOR SOLUTION ORAL
Status: DISCONTINUED | OUTPATIENT
Start: 2018-06-05 | End: 2018-06-06 | Stop reason: HOSPADM

## 2018-06-05 RX ORDER — ONDANSETRON 2 MG/ML
4 INJECTION INTRAMUSCULAR; INTRAVENOUS EVERY 4 HOURS PRN
Status: DISCONTINUED | OUTPATIENT
Start: 2018-06-05 | End: 2018-06-06 | Stop reason: HOSPADM

## 2018-06-05 RX ORDER — BUPIVACAINE HYDROCHLORIDE AND EPINEPHRINE 2.5; 5 MG/ML; UG/ML
INJECTION, SOLUTION EPIDURAL; INFILTRATION; INTRACAUDAL; PERINEURAL
Status: DISCONTINUED | OUTPATIENT
Start: 2018-06-05 | End: 2018-06-05 | Stop reason: HOSPADM

## 2018-06-05 RX ORDER — PROMETHAZINE HYDROCHLORIDE 25 MG/1
12.5-25 SUPPOSITORY RECTAL EVERY 4 HOURS PRN
Status: DISCONTINUED | OUTPATIENT
Start: 2018-06-05 | End: 2018-06-06 | Stop reason: HOSPADM

## 2018-06-05 RX ORDER — ONDANSETRON 4 MG/1
4 TABLET, ORALLY DISINTEGRATING ORAL EVERY 4 HOURS PRN
Status: DISCONTINUED | OUTPATIENT
Start: 2018-06-05 | End: 2018-06-06 | Stop reason: HOSPADM

## 2018-06-05 RX ORDER — SCOLOPAMINE TRANSDERMAL SYSTEM 1 MG/1
PATCH, EXTENDED RELEASE TRANSDERMAL
Status: COMPLETED
Start: 2018-06-05 | End: 2018-06-05

## 2018-06-05 RX ORDER — BISACODYL 10 MG
10 SUPPOSITORY, RECTAL RECTAL
Status: DISCONTINUED | OUTPATIENT
Start: 2018-06-05 | End: 2018-06-06 | Stop reason: HOSPADM

## 2018-06-05 RX ORDER — SODIUM CHLORIDE AND POTASSIUM CHLORIDE 150; 900 MG/100ML; MG/100ML
INJECTION, SOLUTION INTRAVENOUS CONTINUOUS
Status: DISCONTINUED | OUTPATIENT
Start: 2018-06-05 | End: 2018-06-06 | Stop reason: HOSPADM

## 2018-06-05 RX ORDER — ACETAMINOPHEN 325 MG/1
TABLET ORAL
Status: COMPLETED
Start: 2018-06-05 | End: 2018-06-05

## 2018-06-05 RX ORDER — ACETAMINOPHEN 325 MG/1
650 TABLET ORAL EVERY 6 HOURS PRN
Status: DISCONTINUED | OUTPATIENT
Start: 2018-06-05 | End: 2018-06-06 | Stop reason: HOSPADM

## 2018-06-05 RX ORDER — PROMETHAZINE HYDROCHLORIDE 25 MG/1
12.5-25 TABLET ORAL EVERY 4 HOURS PRN
Status: DISCONTINUED | OUTPATIENT
Start: 2018-06-05 | End: 2018-06-06 | Stop reason: HOSPADM

## 2018-06-05 RX ORDER — DIPHENHYDRAMINE HYDROCHLORIDE 50 MG/ML
12.5-25 INJECTION INTRAMUSCULAR; INTRAVENOUS EVERY 6 HOURS PRN
Status: DISCONTINUED | OUTPATIENT
Start: 2018-06-05 | End: 2018-06-06 | Stop reason: HOSPADM

## 2018-06-05 RX ORDER — SODIUM CHLORIDE 9 MG/ML
1000 INJECTION, SOLUTION INTRAVENOUS ONCE
Status: COMPLETED | OUTPATIENT
Start: 2018-06-05 | End: 2018-06-05

## 2018-06-05 RX ORDER — LABETALOL HYDROCHLORIDE 5 MG/ML
10 INJECTION, SOLUTION INTRAVENOUS EVERY 4 HOURS PRN
Status: DISCONTINUED | OUTPATIENT
Start: 2018-06-05 | End: 2018-06-06 | Stop reason: HOSPADM

## 2018-06-05 RX ORDER — OXYCODONE HYDROCHLORIDE 5 MG/1
2.5 TABLET ORAL
Status: DISCONTINUED | OUTPATIENT
Start: 2018-06-05 | End: 2018-06-06 | Stop reason: HOSPADM

## 2018-06-05 RX ORDER — DEXTROAMPHETAMINE SACCHARATE, AMPHETAMINE ASPARTATE, DEXTROAMPHETAMINE SULFATE AND AMPHETAMINE SULFATE 3.75; 3.75; 3.75; 3.75 MG/1; MG/1; MG/1; MG/1
15 TABLET ORAL DAILY
COMMUNITY
End: 2018-08-22 | Stop reason: SDUPTHER

## 2018-06-05 RX ORDER — OXYCODONE HYDROCHLORIDE 5 MG/1
5 TABLET ORAL
Status: DISCONTINUED | OUTPATIENT
Start: 2018-06-05 | End: 2018-06-06 | Stop reason: HOSPADM

## 2018-06-05 RX ORDER — DIPHENHYDRAMINE HCL 25 MG
25 TABLET ORAL EVERY 6 HOURS PRN
Status: DISCONTINUED | OUTPATIENT
Start: 2018-06-05 | End: 2018-06-06 | Stop reason: HOSPADM

## 2018-06-05 RX ADMIN — KETOROLAC TROMETHAMINE 30 MG: 30 INJECTION, SOLUTION INTRAMUSCULAR at 05:43

## 2018-06-05 RX ADMIN — SODIUM CHLORIDE 1000 ML: 9 INJECTION, SOLUTION INTRAVENOUS at 05:40

## 2018-06-05 RX ADMIN — ACETAMINOPHEN 650 MG: 325 TABLET, FILM COATED ORAL at 16:00

## 2018-06-05 RX ADMIN — FENTANYL CITRATE 50 MCG: 50 INJECTION, SOLUTION INTRAMUSCULAR; INTRAVENOUS at 12:48

## 2018-06-05 RX ADMIN — FENTANYL CITRATE 50 MCG: 50 INJECTION INTRAMUSCULAR; INTRAVENOUS at 06:38

## 2018-06-05 RX ADMIN — FENTANYL CITRATE 50 MCG: 50 INJECTION, SOLUTION INTRAMUSCULAR; INTRAVENOUS at 08:06

## 2018-06-05 RX ADMIN — CEFOTETAN DISODIUM 1 G: 1 INJECTION, POWDER, FOR SOLUTION INTRAMUSCULAR; INTRAVENOUS at 21:41

## 2018-06-05 RX ADMIN — FENTANYL CITRATE 25 MCG: 50 INJECTION, SOLUTION INTRAMUSCULAR; INTRAVENOUS at 16:15

## 2018-06-05 RX ADMIN — OXYCODONE HYDROCHLORIDE 5 MG: 5 TABLET ORAL at 19:15

## 2018-06-05 RX ADMIN — METRONIDAZOLE 500 MG: 500 INJECTION, SOLUTION INTRAVENOUS at 06:38

## 2018-06-05 RX ADMIN — ONDANSETRON 4 MG: 2 INJECTION INTRAMUSCULAR; INTRAVENOUS at 05:40

## 2018-06-05 RX ADMIN — POTASSIUM CHLORIDE AND SODIUM CHLORIDE: 900; 150 INJECTION, SOLUTION INTRAVENOUS at 18:21

## 2018-06-05 RX ADMIN — CEFTRIAXONE SODIUM 2 G: 2 INJECTION, POWDER, FOR SOLUTION INTRAMUSCULAR; INTRAVENOUS at 06:38

## 2018-06-05 RX ADMIN — FENTANYL CITRATE 25 MCG: 50 INJECTION, SOLUTION INTRAMUSCULAR; INTRAVENOUS at 17:00

## 2018-06-05 RX ADMIN — IOHEXOL 100 ML: 350 INJECTION, SOLUTION INTRAVENOUS at 06:22

## 2018-06-05 RX ADMIN — STANDARDIZED SENNA CONCENTRATE AND DOCUSATE SODIUM 2 TABLET: 8.6; 5 TABLET, FILM COATED ORAL at 21:41

## 2018-06-05 RX ADMIN — SCOPOLAMINE 1 PATCH: 1 PATCH, EXTENDED RELEASE TRANSDERMAL at 14:15

## 2018-06-05 RX ADMIN — HYDROMORPHONE HYDROCHLORIDE 0.25 MG: 10 INJECTION, SOLUTION INTRAMUSCULAR; INTRAVENOUS; SUBCUTANEOUS at 21:41

## 2018-06-05 ASSESSMENT — PATIENT HEALTH QUESTIONNAIRE - PHQ9
1. LITTLE INTEREST OR PLEASURE IN DOING THINGS: NOT AT ALL
2. FEELING DOWN, DEPRESSED, IRRITABLE, OR HOPELESS: NOT AT ALL
SUM OF ALL RESPONSES TO PHQ9 QUESTIONS 1 AND 2: 0

## 2018-06-05 ASSESSMENT — COPD QUESTIONNAIRES
COPD SCREENING SCORE: 0
DO YOU EVER COUGH UP ANY MUCUS OR PHLEGM?: NO/ONLY WITH OCCASIONAL COLDS OR INFECTIONS
DURING THE PAST 4 WEEKS HOW MUCH DID YOU FEEL SHORT OF BREATH: NONE/LITTLE OF THE TIME
HAVE YOU SMOKED AT LEAST 100 CIGARETTES IN YOUR ENTIRE LIFE: NO/DON'T KNOW
IN THE PAST 12 MONTHS DO YOU DO LESS THAN YOU USED TO BECAUSE OF YOUR BREATHING PROBLEMS: DISAGREE/UNSURE

## 2018-06-05 ASSESSMENT — PAIN SCALES - GENERAL
PAINLEVEL_OUTOF10: 6
PAINLEVEL_OUTOF10: 4
PAINLEVEL_OUTOF10: 3
PAINLEVEL_OUTOF10: 4
PAINLEVEL_OUTOF10: 4
PAINLEVEL_OUTOF10: 5
PAINLEVEL_OUTOF10: 8
PAINLEVEL_OUTOF10: 4
PAINLEVEL_OUTOF10: 3
PAINLEVEL_OUTOF10: 5
PAINLEVEL_OUTOF10: 7
PAINLEVEL_OUTOF10: 3
PAINLEVEL_OUTOF10: 5
PAINLEVEL_OUTOF10: 7
PAINLEVEL_OUTOF10: 3

## 2018-06-05 ASSESSMENT — LIFESTYLE VARIABLES
ALCOHOL_USE: NO
DO YOU DRINK ALCOHOL: NO

## 2018-06-05 NOTE — ED TRIAGE NOTES
Katerina Murry [facility]  32 y.o.  Chief Complaint   Patient presents with   • RLQ Pain     woke from sleep tonight, rates pain 8/10   • N/V     with pain, emesis x 2 episodes     Ambulatory to triage with steady gait for above.        Triage process explained to patient, apologized for wait time, and returned to lobby.

## 2018-06-05 NOTE — OR NURSING
Pt to PACU s/p lap appy. Awake and CA upon arrival. VSS throughout stay, NSR on monitor, Bps WNL. Surgical incision WNL, dressing CDI, ice pack in place. Tolerating PO liquids and snacks with no c/o nausea. Medicated with IV and PO analgesics for mild c/o abdominal pain and HA. Updated pt and  to POC, emotional support provided. Ambulated with SBA to BR, voided X1. Stable for transfer to [facility]. Report to TIAN Estrella.

## 2018-06-05 NOTE — ED NOTES
Med rec updated and complete  Allergies reviewed  Pt reports no vitamin.   Pt reports no antibiotics in the last 30 days.

## 2018-06-05 NOTE — H&P
DATE OF ADMISSION:  2018    CHIEF COMPLAINT:  Abdominal pain.    HISTORY OF PRESENT ILLNESS:  The patient is a 32-year-old female complaining   of right lower quadrant pain x1 day.  She reports the pain is stabbing pain   that radiates to her right flank with associated nausea and vomiting.  Last   bowel movement was 3 days ago.  Pain is worse with walking movement and   alleviated with pain medication.    REVIEW OF SYSTEMS:  All other review of systems on a 10-point review except   for that stated in the HPI are negative.  The patient denies any abnormal   vaginal discharge.    PAST MEDICAL HISTORY:  Arthritis, constipation, chlamydia, depression, GERD,   hypertension, preeclampsia, PTSD.    PAST SURGICAL HISTORY:   section and then open repair of incisional   hernia with mesh, lumpectomy, bilateral tubal ligation, septoturbinoplasty.    MEDICATIONS:  Albuterol, Adderall, multivitamins.    ALLERGIES:  MORPHINE, VICODIN.    PHYSICAL EXAMINATION:  VITAL SIGNS:  Temperature 37, pulse ____, sats 98% on room air.  GENERAL:  Alert and oriented x3, no apparent distress.  Well-developed,   well-nourished.  HEENT:  Mucous membranes moist.  Pupils equally round and reactive to light.  NECK:  Soft and supple.  There is no cervical lymphadenopathy.  CARDIOVASCULAR:  Regular rate and rhythm.  EXTREMITIES:  Warm.  No edema.  PULMONARY:  Lungs are clear to auscultation.  SKIN:  Warm, well perfused.  No rashes or petechiae.    LABORATORY DATA:  CT findings as stated in the HPI.  White count of 17,   hemoglobin 12, hematocrit 37, platelets are 353.  Beta hCG negative.  Lipase   is 17.    ASSESSMENT AND PLAN:  A 32-year-old female with right lower quadrant pain.    She is exquisitely tender on exam.    PLAN:  To proceed for laparoscopic appendectomy.  CT findings are concerning   for appendicitis.  Risks of surgery were discussed with the patient postop   infection, bleeding, possible need for open surgery, incisional  pain,   incisional hernias.  The patient states she understands the risks of surgery   and wishes to proceed.       ____________________________________     MD REINIER Johnson / MARI    DD:  06/05/2018 12:20:00  DT:  06/05/2018 15:06:50    D#:  [id number]  Job#:  [id number]

## 2018-06-05 NOTE — H&P
Patient with one day of RLQ pain  +nausea  CT findings c/w appendicitis    Plan for laparoscopic appendectomy, possible open

## 2018-06-05 NOTE — ED PROVIDER NOTES
ED Provider Note    CHIEF COMPLAINT  Chief Complaint   Patient presents with   • RLQ Pain     woke from sleep tonight, rates pain 8/10   • N/V     with pain, emesis x 2 episodes         HPI  Katerina Owen is a 32 y.o. female who presents with abdominal pain.  Woke up from sleep this morning about an hour and a half ago with the pain.  Sharp stabbing right lower quadrant.  Radiates somewhat to the back.  Severe.  Associated with nausea and vomiting ×2.  Last bowel movement 3 days ago which is not atypical for her and usually does not result in discomfort.  Last night she felt like she had a fever, but took her temperature and it was normal.  No fever today.  Denies dysuria hematuria or frequency.  No abnormal vaginal discharge or bleeding.  Last menstrual period about 3 weeks ago.      REVIEW OF SYSTEMS  As per HPI  All other systems are negative.     PAST MEDICAL HISTORY  Past Medical History:   Diagnosis Date   • ADHD    • Arthritis     back   • Bowel habit changes     constipation   • Chlamydia 5/11/2011   • depression    • Heart burn    • HTN (hypertension)     related to pregnancy   • Migraine    • Pain     incisional hernia pain; back (arthritis)   • Pre-eclampsia     2007    • PTSD (post-traumatic stress disorder)    • Staph infection 2011    after C section       FAMILY HISTORY  Family History   Problem Relation Age of Onset   • Heart Disease Father      heart failure smoker    • Hypertension Father    • Cancer Maternal Grandfather      pancreastic   • Heart Disease Paternal Grandfather      heart attack       SOCIAL HISTORY  Social History   Substance Use Topics   • Smoking status: Former Smoker     Packs/day: 0.25     Years: 6.00     Quit date: 12/26/2014   • Smokeless tobacco: Current User     Last attempt to quit: 10/30/2014      Comment: 1/4/2017 currently Vape   • Alcohol use No       SURGICAL HISTORY  Past Surgical History:   Procedure Laterality Date   • VENTRAL HERNIA REPAIR Right  "1/10/2018    Procedure: VENTRAL HERNIA REPAIR- FOR INCISIONAL HERNIA REDUCIBLE  WITH MESH;  Surgeon: Gisele Delcid M.D.;  Location: SURGERY [facility];  Service: General   • BREAST BIOPSY Left 2016    Procedure: BREAST BIOPSY Excisional;  Surgeon: Gisele Delcid M.D.;  Location: SURGERY [facility];  Service:    • LUMPECTOMY  2014    both breasts - Dr. Garsia - [facility] 10/2014   • REPEAT C SECTION W TUBAL LIGATION  2011    Performed by AYAAN MERCHANT at LABOR AND DELIVERY   • SEPTOTURBINOPLASTY  2009    Performed by CHRISTIAN YEN at SURGERY SAME DAY [facility]   • PRIMARY C SECTION  Aug 7, 2007     preeclampsia       CURRENT MEDICATIONS  Home Medications     Reviewed by Brina Zambrano R.N. (Registered Nurse) on 18 at 0512  Med List Status: Complete   Medication Last Dose Status   albuterol 108 (90 Base) MCG/ACT Aero Soln inhalation aerosol 2018 Active   amphetamine-dextroamphetamine (ADDERALL) 15 MG tablet  Active   amphetamine-dextroamphetamine (ADDERALL) 15 MG tablet  Active   asa/apap/caffeine (EXCEDRIN) 250-250-65 MG Tab 2018 Active   Multiple Vitamins-Minerals (MULTIVITAMIN ADULT PO) not taking Active                ALLERGIES  Allergies   Allergen Reactions   • Morphine Nausea     \"feel sick for a whole day after being given morphine\"  RXN=2016     • Vicodin [Hydrocodone-Acetaminophen] Vomiting and Nausea     TYJ=6543       PHYSICAL EXAM  VITAL SIGNS: /106   Pulse (!) 110   Temp 36.3 °C (97.3 °F)   Resp 16   Ht 1.727 m (5' 8\")   Wt 88.4 kg (194 lb 14.2 oz)   SpO2 98%   BMI 29.63 kg/m²   Constitutional: Awake and alert  HENT: Dry mucous membranes  Eyes: Sclera white  Neck: Normal range of motion  Cardiovascular: Tachycardic  heart rate, Normal rhythm  Thorax & Lungs: Normal breath sounds, No respiratory distress, No wheezing, No chest tenderness.   Abdomen: Focal tenderness to palpation in the right lower quadrant.  No rebound or " peritonitis  Skin: No rash.   Back: No tenderness, No CVA tenderness.   Extremities: Intact, symmetric distal pulses, no edema.  Neurologic: Grossly normal    RADIOLOGY/PROCEDURES  CT-ABDOMEN-PELVIS WITH   Final Result         1. Acute uncomplicated appendicitis.           Imaging is interpreted by radiologist    Labs:   Results for orders placed or performed during the hospital encounter of 06/05/18   CBC WITH DIFFERENTIAL   Result Value Ref Range    WBC 17.8 (H) 4.8 - 10.8 K/uL    RBC 4.64 4.20 - 5.40 M/uL    Hemoglobin 12.2 12.0 - 16.0 g/dL    Hematocrit 37.8 37.0 - 47.0 %    MCV 81.5 81.4 - 97.8 fL    MCH 26.3 (L) 27.0 - 33.0 pg    MCHC 32.3 (L) 33.6 - 35.0 g/dL    RDW 41.1 35.9 - 50.0 fL    Platelet Count 353 164 - 446 K/uL    MPV 10.0 9.0 - 12.9 fL    Neutrophils-Polys 79.60 (H) 44.00 - 72.00 %    Lymphocytes 12.90 (L) 22.00 - 41.00 %    Monocytes 6.20 0.00 - 13.40 %    Eosinophils 0.30 0.00 - 6.90 %    Basophils 0.40 0.00 - 1.80 %    Immature Granulocytes 0.60 0.00 - 0.90 %    Nucleated RBC 0.00 /100 WBC    Neutrophils (Absolute) 14.19 (H) 2.00 - 7.15 K/uL    Lymphs (Absolute) 2.29 1.00 - 4.80 K/uL    Monos (Absolute) 1.11 (H) 0.00 - 0.85 K/uL    Eos (Absolute) 0.05 0.00 - 0.51 K/uL    Baso (Absolute) 0.08 0.00 - 0.12 K/uL    Immature Granulocytes (abs) 0.10 0.00 - 0.11 K/uL    NRBC (Absolute) 0.00 K/uL   COMP METABOLIC PANEL   Result Value Ref Range    Sodium 137 135 - 145 mmol/L    Potassium 3.9 3.6 - 5.5 mmol/L    Chloride 106 96 - 112 mmol/L    Co2 23 20 - 33 mmol/L    Anion Gap 8.0 0.0 - 11.9    Glucose 102 (H) 65 - 99 mg/dL    Bun 9 8 - 22 mg/dL    Creatinine 0.63 0.50 - 1.40 mg/dL    Calcium 8.9 8.5 - 10.5 mg/dL    AST(SGOT) 21 12 - 45 U/L    ALT(SGPT) 31 2 - 50 U/L    Alkaline Phosphatase 80 30 - 99 U/L    Total Bilirubin 0.3 0.1 - 1.5 mg/dL    Albumin 3.9 3.2 - 4.9 g/dL    Total Protein 7.3 6.0 - 8.2 g/dL    Globulin 3.4 1.9 - 3.5 g/dL    A-G Ratio 1.1 g/dL   HCG Qual Serum   Result Value Ref Range     Beta-Hcg Qualitative Serum Negative Negative   LIPASE   Result Value Ref Range    Lipase 17 11 - 82 U/L   ESTIMATED GFR   Result Value Ref Range    GFR If African American >60 >60 mL/min/1.73 m 2    GFR If Non African American >60 >60 mL/min/1.73 m 2        IV fluids given secondary dry mucous membranes and tachycardia indicating clinical dehydration. Patient was improved after treatment    COURSE & MEDICAL DECISION MAKING  Patient presents with focal right lower quadrant abdominal pain of 1 hour duration.  She has tenderness on her exam.  She was afebrile on arrival.  Laboratory data was collected.  She was given normal saline as noted above.  Given Toradol and Zofran with some improvement of pain and improvement of nausea.  Labs returned showing leukocytosis.  Proceed with CT scan of the abdomen and pelvis.  She has identified to have appendicitis.  I have ordered ceftriaxone and metronidazole.    I consulted Dr. Salomon.  She will see the patient with anticipated plan for appendectomy.      FINAL IMPRESSION  1.  Acute appendicitis        This dictation was created using voice recognition software. The accuracy of the dictation is limited to the abilities of the software.  The nursing notes were reviewed and certain aspects of this information were incorporated into this note.    Electronically signed by: Sancho Mullins, 6/5/2018 5:40 AM

## 2018-06-05 NOTE — OP REPORT
DATE OF SERVICE:  06/05/2018    PREOPERATIVE DIAGNOSIS:  Acute appendicitis.    POSTOPERATIVE DIAGNOSIS:  Acute nonperforated appendicitis without   peritonitis.    PROCEDURE PERFORMED:  Laparoscopic appendectomy.    SURGEON:  Izabel Salomon MD    ASSISTANT:  Jorge Nobles MD    ANESTHESIOLOGIST:  Sari Sanchez MD    ANESTHESIA:  GET.    ESTIMATED BLOOD LOSS:  10 mL.    SPECIMENS:  Appendix.    FINDINGS:  Dilated appendix with injected serosa.    COMPLICATIONS:  None.    PROCEDURE IN DETAIL:  The patient was consented preoperatively, taken to the   operating room and placed in the supine position.  Anesthesia was induced.    She was endotracheally intubated without difficulty.  Her abdomen was then   prepped and draped after a Reyes catheter was placed and a time-out was   performed.  A 2 cm infraumbilical incision was made to place a 12 mm Mukesh   port under direct visualization.  Abdomen was then insufflated to 12 mmHg and   under direct visualization, two 5 mm ports were placed, one in the epigastric   region, one in the suprapubic space.  The patient was then airplaned to her   left side down and placed in Trendelenburg.  The appendix was vermiform,   dilated with injected serosa.  There was no sign of peritonitis or infection.    The appendix was grasped and retracted towards the liver.  The mesoappendix   divided with the harmonic device exposing the base.  Base of the appendix was   then divided with Endo-HANY stapler using a blue load.  Staple line was   inspected, it was intact and there was no sign of bleeding.  The right lower   quadrant was then irrigated until the irrigant ran clear and hemostasis was   confirmed.  The 5 mm ports were removed under direct visualization.  There was   no bleeding from the abdominal wall.  The abdomen was then desufflated.  The   infraumbilical fascia incision closed with figure-of-eight using 0 Vicryl.    All skin incisions closed with subcuticular stitch using 4-0  Monocryl.    Steri-Strips, dry gauze dressing were then applied.  All lap, sponge and   instrument counts were correct at the end of the case x2.  The patient   tolerated the procedure well.  She was awakened from anesthesia, extubated,   taken to the postanesthesia care unit in good condition.       ____________________________________     MD REINIER Johnson / MARI    DD:  06/05/2018 15:05:48  DT:  06/05/2018 16:54:24    D#:  [id number]  Job#:  [id number]

## 2018-06-05 NOTE — OR SURGEON
Immediate Post OP Note    PreOp Diagnosis: Acute appendicitis    PostOp Diagnosis: Acute, non-perforated appendicitis without peritonitis    Procedure(s):  APPENDECTOMY LAPAROSCOPIC - Wound Class: Clean Contaminated    Surgeon(s):  IGOR Jain M.D.    Anesthesiologist/Type of Anesthesia:  Anesthesiologist: Sari Sanchez M.D./General    Surgical Staff:  Circulator: Karena Brooke R.N.  Relief Circulator: Beatriz Lopez R.N.  Scrub Person: Magdalena Lemon Tucker: Whitney Franz R.N.    Specimens removed if any:  Appendix    Estimated Blood Loss: 10cc    Findings: dilated appendix    Complications: none        6/5/2018 3:01 PM Izabel Salomon M.D.

## 2018-06-06 ENCOUNTER — TELEPHONE (OUTPATIENT)
Dept: MEDICAL GROUP | Facility: LAB | Age: 33
End: 2018-06-06

## 2018-06-06 VITALS
BODY MASS INDEX: 29.54 KG/M2 | HEIGHT: 68 IN | TEMPERATURE: 99.4 F | OXYGEN SATURATION: 96 % | DIASTOLIC BLOOD PRESSURE: 63 MMHG | RESPIRATION RATE: 17 BRPM | WEIGHT: 194.89 LBS | SYSTOLIC BLOOD PRESSURE: 102 MMHG | HEART RATE: 90 BPM

## 2018-06-06 LAB
BASOPHILS # BLD AUTO: 0.3 % (ref 0–1.8)
BASOPHILS # BLD: 0.04 K/UL (ref 0–0.12)
EOSINOPHIL # BLD AUTO: 0 K/UL (ref 0–0.51)
EOSINOPHIL NFR BLD: 0 % (ref 0–6.9)
ERYTHROCYTE [DISTWIDTH] IN BLOOD BY AUTOMATED COUNT: 42.6 FL (ref 35.9–50)
HCT VFR BLD AUTO: 34.8 % (ref 37–47)
HGB BLD-MCNC: 10.6 G/DL (ref 12–16)
IMM GRANULOCYTES # BLD AUTO: 0.09 K/UL (ref 0–0.11)
IMM GRANULOCYTES NFR BLD AUTO: 0.6 % (ref 0–0.9)
LYMPHOCYTES # BLD AUTO: 0.86 K/UL (ref 1–4.8)
LYMPHOCYTES NFR BLD: 5.6 % (ref 22–41)
MCH RBC QN AUTO: 25.4 PG (ref 27–33)
MCHC RBC AUTO-ENTMCNC: 30.5 G/DL (ref 33.6–35)
MCV RBC AUTO: 83.3 FL (ref 81.4–97.8)
MONOCYTES # BLD AUTO: 0.39 K/UL (ref 0–0.85)
MONOCYTES NFR BLD AUTO: 2.6 % (ref 0–13.4)
NEUTROPHILS # BLD AUTO: 13.88 K/UL (ref 2–7.15)
NEUTROPHILS NFR BLD: 90.9 % (ref 44–72)
NRBC # BLD AUTO: 0 K/UL
NRBC BLD-RTO: 0 /100 WBC
PLATELET # BLD AUTO: 327 K/UL (ref 164–446)
PMV BLD AUTO: 10.6 FL (ref 9–12.9)
RBC # BLD AUTO: 4.18 M/UL (ref 4.2–5.4)
WBC # BLD AUTO: 15.3 K/UL (ref 4.8–10.8)

## 2018-06-06 PROCEDURE — 700101 HCHG RX REV CODE 250: Performed by: SURGERY

## 2018-06-06 PROCEDURE — A9270 NON-COVERED ITEM OR SERVICE: HCPCS | Performed by: SURGERY

## 2018-06-06 PROCEDURE — 36415 COLL VENOUS BLD VENIPUNCTURE: CPT

## 2018-06-06 PROCEDURE — 700111 HCHG RX REV CODE 636 W/ 250 OVERRIDE (IP): Performed by: SURGERY

## 2018-06-06 PROCEDURE — 85025 COMPLETE CBC W/AUTO DIFF WBC: CPT

## 2018-06-06 PROCEDURE — 700102 HCHG RX REV CODE 250 W/ 637 OVERRIDE(OP): Performed by: SURGERY

## 2018-06-06 RX ORDER — PROMETHAZINE HYDROCHLORIDE 12.5 MG/1
12.5-25 TABLET ORAL EVERY 4 HOURS PRN
Qty: 30 TAB | Refills: 0 | Status: SHIPPED | OUTPATIENT
Start: 2018-06-06 | End: 2018-06-27

## 2018-06-06 RX ORDER — OXYCODONE HYDROCHLORIDE AND ACETAMINOPHEN 5; 325 MG/1; MG/1
1-2 TABLET ORAL EVERY 6 HOURS PRN
Qty: 20 TAB | Refills: 0 | Status: SHIPPED | OUTPATIENT
Start: 2018-06-06 | End: 2018-06-11

## 2018-06-06 RX ADMIN — HYDROMORPHONE HYDROCHLORIDE 0.25 MG: 10 INJECTION, SOLUTION INTRAMUSCULAR; INTRAVENOUS; SUBCUTANEOUS at 04:02

## 2018-06-06 RX ADMIN — OXYCODONE HYDROCHLORIDE 5 MG: 5 TABLET ORAL at 03:12

## 2018-06-06 RX ADMIN — CEFOTETAN DISODIUM 1 G: 1 INJECTION, POWDER, FOR SOLUTION INTRAMUSCULAR; INTRAVENOUS at 09:33

## 2018-06-06 RX ADMIN — STANDARDIZED SENNA CONCENTRATE AND DOCUSATE SODIUM 2 TABLET: 8.6; 5 TABLET, FILM COATED ORAL at 09:33

## 2018-06-06 RX ADMIN — OXYCODONE HYDROCHLORIDE 5 MG: 5 TABLET ORAL at 00:10

## 2018-06-06 RX ADMIN — OXYCODONE HYDROCHLORIDE 5 MG: 5 TABLET ORAL at 09:33

## 2018-06-06 RX ADMIN — ACETAMINOPHEN 650 MG: 325 TABLET, FILM COATED ORAL at 00:10

## 2018-06-06 ASSESSMENT — PAIN SCALES - GENERAL
PAINLEVEL_OUTOF10: 7
PAINLEVEL_OUTOF10: 1
PAINLEVEL_OUTOF10: 6
PAINLEVEL_OUTOF10: 0
PAINLEVEL_OUTOF10: 7

## 2018-06-06 NOTE — PROGRESS NOTES
To room, ambulated to bed and is steady, abd soft with intact lap stab sites xs 3 with gauze and tegaderm, HR regular and lungs clear, taking fluids well, VS stable, and oriented to room and call light.

## 2018-06-06 NOTE — PROGRESS NOTES
0645 /Received report, introduced self to pt, reviewed labs, orders, allergies, code status     1000 DC education complete, pt verbalized understanding. Dressing are CDI,  here to drive wife home, iv removed. Pt then ambulated off unit, steady gait, no complications with DC

## 2018-06-06 NOTE — PROGRESS NOTES
Resumed c/p pt, report received. Pt up in bed eating dinner, waiting for pain medication to kick in, does not appear to be in any distress. IVF continue at 125, tolerating PO fluids, will discontinue after this bag completes, no concerns noted, call light within reach, will continue to monitor.

## 2018-06-06 NOTE — CARE PLAN
Problem: Communication  Goal: The ability to communicate needs accurately and effectively will improve  Outcome: PROGRESSING AS EXPECTED  Pt demonstrates proper use of call light as needed for assistance and to make needs known.    Problem: Venous Thromboembolism (VTW)/Deep Vein Thrombosis (DVT) Prevention:  Goal: Patient will participate in Venous Thrombosis (VTE)/Deep Vein Thrombosis (DVT)Prevention Measures  Outcome: PROGRESSING AS EXPECTED  SCDs in place.    Problem: Bowel/Gastric:  Goal: Will not experience complications related to bowel motility  Outcome: PROGRESSING AS EXPECTED  Bowel sounds hypoactive, pt denies any flatus at this time, bowel care given per protocol, will continue to monitor.    Problem: Pain Management  Goal: Pain level will decrease to patient's comfort goal  Outcome: PROGRESSING AS EXPECTED  Pain controlled with PRN PO and breakthrough IV medications and ice, will continue to monitor.

## 2018-06-06 NOTE — PROGRESS NOTES
Date & Time:   6/6/2018   9:01 AM        Patient ID:             Name:             Katerina Owen   YOB: 1985  Age:                 32 y.o.  female   MRN:               [id number]    _______________________________POD#1  No events overnight    c/o pain RLQ  Cinthia umbilical    No nausea, tolerating regular diet  ambulating     Interval Exam:       Vitals:    06/05/18 2000 06/06/18 0000 06/06/18 0400 06/06/18 0726   BP: 123/80 103/65 111/62 102/63   Pulse: 91 92 99 90   Resp: 18 18 17 17   Temp: 36.5 °C (97.7 °F) 36.9 °C (98.5 °F) 37.6 °C (99.6 °F) 37.4 °C (99.4 °F)   SpO2: 94% 90% 90% 96%   Weight:       Height:         Weight/BMI: Body mass index is 29.63 kg/m².  Pulse Oximetry: 96 %, O2 (LPM): 2, O2 Delivery: None (Room Air)    Intake/Output Summary (Last 24 hours) at 06/06/18 0901  Last data filed at 06/06/18 0400   Gross per 24 hour   Intake             3311 ml   Output              110 ml   Net             3201 ml         Physical Exam  GENERAL:  Alert and oriented x 3. NAD, normal respiratory effort  CV: Regular rate and rhythm, no murmurs. Extremities warm no edema.   Lungs: Clear to auscultation bilaterally.    Abd: Soft, non-distended. Mild TTP around incisions  No rebound or guarding  Dressings dry    Recent Labs      06/05/18   0535   SODIUM  137   POTASSIUM  3.9   CHLORIDE  106   CO2  23   BUN  9   CREATININE  0.63   CALCIUM  8.9       Recent Labs      06/05/18   0535   ALTSGPT  31   ASTSGOT  21   ALKPHOSPHAT  80   TBILIRUBIN  0.3   LIPASE  17   GLUCOSE  102*       Recent Labs      06/05/18 0535  06/06/18   0247   RBC  4.64  4.18*   HEMOGLOBIN  12.2  10.6*   HEMATOCRIT  37.8  34.8*   PLATELETCT  353  327       Recent Labs      06/05/18 0535  06/06/18   0247   WBC  17.8*  15.3*   NEUTSPOLYS  79.60*  90.90*   LYMPHOCYTES  12.90*  5.60*   MONOCYTES  6.20  2.60   EOSINOPHILS  0.30  0.00   BASOPHILS  0.40  0.30   ASTSGOT  21   --    ALTSGPT  31   --    ALKPHOSPHAT  80   --     JOHN  0.3   --          ________________________________________________________________________     Current Assessment and Plan:   D/c home today  f/u 10-14 days

## 2018-06-06 NOTE — DISCHARGE INSTRUCTIONS
Discharge Instructions    Discharged to home by car with relative. Discharged via wheelchair, hospital escort: Yes.  Special equipment needed: Not Applicable    Be sure to schedule a follow-up appointment with your primary care doctor or any specialists as instructed.     Discharge Plan:   Diet Plan: Discussed  Activity Level: Discussed  Confirmed Follow up Appointment: Patient to Call and Schedule Appointment  Confirmed Symptoms Management: Discussed  Medication Reconciliation Updated: Yes  Influenza Vaccine Indication: Indicated: Not available from distributor/    I understand that a diet low in cholesterol, fat, and sodium is recommended for good health. Unless I have been given specific instructions below for another diet, I accept this instruction as my diet prescription.   Other diet: high fiber    Special Instructions: None    · Is patient discharged on Warfarin / Coumadin?   No     Depression / Suicide Risk    As you are discharged from this [facility] facility, it is important to learn how to keep safe from harming yourself.    Recognize the warning signs:  · Abrupt changes in personality, positive or negative- including increase in energy   · Giving away possessions  · Change in eating patterns- significant weight changes-  positive or negative  · Change in sleeping patterns- unable to sleep or sleeping all the time   · Unwillingness or inability to communicate  · Depression  · Unusual sadness, discouragement and loneliness  · Talk of wanting to die  · Neglect of personal appearance   · Rebelliousness- reckless behavior  · Withdrawal from people/activities they love  · Confusion- inability to concentrate     If you or a loved one observes any of these behaviors or has concerns about self-harm, here's what you can do:  · Talk about it- your feelings and reasons for harming yourself  · Remove any means that you might use to hurt yourself (examples: pills, rope, extension cords, firearm)  · Get  professional help from the community (Mental Health, Substance Abuse, psychological counseling)  · Do not be alone:Call your Safe Contact- someone whom you trust who will be there for you.  · Call your local CRISIS HOTLINE 978-7519 or 069-783-2625  · Call your local Children's Mobile Crisis Response Team [place] (692) 507-0502 or www.[employer].edu  · Call the toll free National Suicide Prevention Hotlines   · National Suicide Prevention Lifeline 341-086-UBAL (7787)  · National Hope Line Network 800-SUICIDE (743-0316)    High-Fiber Diet  Fiber, also called dietary fiber, is a type of carbohydrate found in fruits, vegetables, whole grains, and beans. A high-fiber diet can have many health benefits. Your health care provider may recommend a high-fiber diet to help:  · Prevent constipation. Fiber can make your bowel movements more regular.  · Lower your cholesterol.  · Relieve hemorrhoids, uncomplicated diverticulosis, or irritable bowel syndrome.  · Prevent overeating as part of a weight-loss plan.  · Prevent heart disease, type 2 diabetes, and certain cancers.  What is my plan?  The recommended daily intake of fiber includes:  · 38 grams for men under age 50.  · 30 grams for men over age 50.  · 25 grams for women under age 50.  · 21 grams for women over age 50.  You can get the recommended daily intake of dietary fiber by eating a variety of fruits, vegetables, grains, and beans. Your health care provider may also recommend a fiber supplement if it is not possible to get enough fiber through your diet.  What do I need to know about a high-fiber diet?  · Fiber supplements have not been widely studied for their effectiveness, so it is better to get fiber through food sources.  · Always check the fiber content on the nutrition facts label of any prepackaged food. Look for foods that contain at least 5 grams of fiber per serving.  · Ask your dietitian if you have questions about specific foods that are related to  your condition, especially if those foods are not listed in the following section.  · Increase your daily fiber consumption gradually. Increasing your intake of dietary fiber too quickly may cause bloating, cramping, or gas.  · Drink plenty of water. Water helps you to digest fiber.  What foods can I eat?  Grains   Whole-grain breads. Multigrain cereal. Oats and oatmeal. Brown rice. Barley. Bulgur wheat. Millet. Bran muffins. Popcorn. Rye wafer crackers.  Vegetables   Sweet potatoes. Spinach. Kale. Artichokes. Cabbage. Broccoli. Green peas. Carrots. Squash.  Fruits   Berries. Pears. Apples. Oranges. Avocados. Prunes and raisins. Dried figs.  Meats and Other Protein Sources   Navy, kidney, swanson, and soy beans. Split peas. Lentils. Nuts and seeds.  Dairy   Fiber-fortified yogurt.  Beverages   Fiber-fortified soy milk. Fiber-fortified orange juice.  Other   Fiber bars.  The items listed above may not be a complete list of recommended foods or beverages. Contact your dietitian for more options.   What foods are not recommended?  Grains   White bread. Pasta made with refined flour. White rice.  Vegetables   Fried potatoes. Canned vegetables. Well-cooked vegetables.  Fruits   Fruit juice. Cooked, strained fruit.  Meats and Other Protein Sources   Fatty cuts of meat. Fried poultry or fried fish.  Dairy   Milk. Yogurt. Cream cheese. Sour cream.  Beverages   Soft drinks.  Other   Cakes and pastries. Butter and oils.  The items listed above may not be a complete list of foods and beverages to avoid. Contact your dietitian for more information.   What are some tips for including high-fiber foods in my diet?  · Eat a wide variety of high-fiber foods.  · Make sure that half of all grains consumed each day are whole grains.  · Replace breads and cereals made from refined flour or white flour with whole-grain breads and cereals.  · Replace white rice with brown rice, bulgur wheat, or millet.  · Start the day with a breakfast that  is high in fiber, such as a cereal that contains at least 5 grams of fiber per serving.  · Use beans in place of meat in soups, salads, or pasta.  · Eat high-fiber snacks, such as berries, raw vegetables, nuts, or popcorn.  This information is not intended to replace advice given to you by your health care provider. Make sure you discuss any questions you have with your health care provider.  Document Released: 12/18/2006 Document Revised: 05/25/2017 Document Reviewed: 06/02/2015  Elsevier Interactive Patient Education © 2017 Elsevier Inc.

## 2018-06-06 NOTE — TELEPHONE ENCOUNTER
· Prior Auth paperwork received from cover my meds requiring provider signature.     · All appropriate fields completed by Medical Assistant: No    · Paperwork placed in MA folder/basket to process.please start PA

## 2018-06-07 NOTE — DISCHARGE SUMMARY
DATE OF ADMISSION:  06/05/2018    DATE OF DISCHARGE:  06/06/2018    PROCEDURES:  Laparoscopic appendectomy.    SURGEON:  Izabel Salomon MD    HISTORY AND HOSPITAL COURSE:  The patient is a 32-year-old female who   presented to the emergency department with 1-day history of right lower   quadrant pain.  CT showed dilated appendix.  She was taken to the operating   room for laparoscopic appendectomy.  Intraoperatively, the appendix was found   to be dilated with injection of the serosa, but no sign of perforation or   infection.  Her white count on admission was 17.  She was admitted for   overnight observation.  On postoperative day #1, the patient is tolerating   regular diet, she is ambulating, passing flatus and her white count is 15,   down from 17, and she has been afebrile.  She is appropriately tender around   her incisions.  Exam is benign.  Plan is to discharge her home today on   regular diet.    DISCHARGE INSTRUCTIONS:  She was instructed to continue her ____ dressings   until tomorrow, okay to shower, keep her incisions as dry as possible.  No   lifting over 20 pounds or strenuous activity for the next 6 weeks and to   follow up in my office in 10-14 days.       ____________________________________     MD REINIER Johnson / MARI    DD:  06/06/2018 08:47:38  DT:  06/06/2018 18:22:22    D#:  [id number]  Job#:  [id number]

## 2018-06-08 ENCOUNTER — TELEPHONE (OUTPATIENT)
Dept: MEDICAL GROUP | Facility: LAB | Age: 33
End: 2018-06-08

## 2018-06-08 DIAGNOSIS — Z79.899 CONTROLLED SUBSTANCE AGREEMENT SIGNED: ICD-10-CM

## 2018-06-08 NOTE — TELEPHONE ENCOUNTER
FINAL PRIOR AUTHORIZATION STATUS:    1.  Name of Medication & Dose: Adderall 15mg     2. Prior Auth Status: Approved through 6-8-19     3. Action Taken: Pharmacy Notified: yes Patient Notified: no

## 2018-06-08 NOTE — TELEPHONE ENCOUNTER
MEDICATION PRIOR AUTHORIZATION NEEDED:    1. Name of Medication: Adderall 15 mg    2. Requested By (Name of Pharmacy): [facility]     3. Is insurance on file current? Yes    4. What is the name & phone number of the 3rd party payor? [facility] [employer] # [phone]

## 2018-06-12 ENCOUNTER — HOSPITAL ENCOUNTER (EMERGENCY)
Facility: MEDICAL CENTER | Age: 33
End: 2018-06-12
Payer: COMMERCIAL

## 2018-06-12 VITALS
BODY MASS INDEX: 29.4 KG/M2 | HEIGHT: 68 IN | TEMPERATURE: 97.1 F | DIASTOLIC BLOOD PRESSURE: 108 MMHG | OXYGEN SATURATION: 99 % | HEART RATE: 112 BPM | RESPIRATION RATE: 14 BRPM | WEIGHT: 194 LBS | SYSTOLIC BLOOD PRESSURE: 143 MMHG

## 2018-06-12 PROCEDURE — 302449 STATCHG TRIAGE ONLY (STATISTIC)

## 2018-06-12 ASSESSMENT — PAIN SCALES - GENERAL: PAINLEVEL_OUTOF10: 7

## 2018-06-12 NOTE — ED NOTES
Patient states that there are too many people in the lobby and she is having an anxiety attack.  Unable to wait any longer, signed ama

## 2018-06-12 NOTE — ED TRIAGE NOTES
Pt to triage c/o abdominal pain waking her up last night. Pt had lap appy last week. Pt states pain is new and increasing. Denies fever.

## 2018-06-13 PROBLEM — G43.509 PERSISTENT MIGRAINE AURA WITHOUT CEREBRAL INFARCTION AND WITHOUT STATUS MIGRAINOSUS, NOT INTRACTABLE: Status: ACTIVE | Noted: 2018-06-13

## 2018-06-14 ENCOUNTER — TELEPHONE (OUTPATIENT)
Dept: MEDICAL GROUP | Facility: LAB | Age: 33
End: 2018-06-14

## 2018-06-14 NOTE — TELEPHONE ENCOUNTER
DOCUMENTATION OF PAR STATUS:    1. Name of Medication & Dose:  amphetamine-dextroamphetamine (ADDERALL) 15 MG tablet     2. Name of Prescription Coverage Company & phone #: [employer]/ Cover My Meds /[id number]  Your information has been submitted to [facility]. To check for an updated outcome later, reopen this PA request from your dashboard.      3. Date Prior Auth Submitted: 6/14/18    4. What information was given to obtain insurance decision?     5. Prior Auth Status? Sent my chart to patient inquiring on the status and if they have been able to  (7/12/18)  Patient states that a prior auth was not needed  6. Patient Notified: yes

## 2018-06-18 ENCOUNTER — TELEPHONE (OUTPATIENT)
Dept: MEDICAL GROUP | Facility: LAB | Age: 33
End: 2018-06-18

## 2018-06-27 ENCOUNTER — HOSPITAL ENCOUNTER (EMERGENCY)
Facility: MEDICAL CENTER | Age: 33
End: 2018-06-27
Attending: EMERGENCY MEDICINE
Payer: COMMERCIAL

## 2018-06-27 VITALS
OXYGEN SATURATION: 97 % | HEIGHT: 68 IN | TEMPERATURE: 97.8 F | WEIGHT: 194 LBS | BODY MASS INDEX: 29.4 KG/M2 | DIASTOLIC BLOOD PRESSURE: 90 MMHG | RESPIRATION RATE: 16 BRPM | HEART RATE: 90 BPM | SYSTOLIC BLOOD PRESSURE: 143 MMHG

## 2018-06-27 DIAGNOSIS — M54.30 SCIATICA, UNSPECIFIED LATERALITY: ICD-10-CM

## 2018-06-27 LAB
ALBUMIN SERPL BCP-MCNC: 4 G/DL (ref 3.2–4.9)
ALBUMIN/GLOB SERPL: 1.3 G/DL
ALP SERPL-CCNC: 80 U/L (ref 30–99)
ALT SERPL-CCNC: 54 U/L (ref 2–50)
ANION GAP SERPL CALC-SCNC: 8 MMOL/L (ref 0–11.9)
APPEARANCE UR: CLEAR
AST SERPL-CCNC: 45 U/L (ref 12–45)
BASOPHILS # BLD AUTO: 0.7 % (ref 0–1.8)
BASOPHILS # BLD: 0.05 K/UL (ref 0–0.12)
BILIRUB SERPL-MCNC: 0.5 MG/DL (ref 0.1–1.5)
BUN SERPL-MCNC: 6 MG/DL (ref 8–22)
CALCIUM SERPL-MCNC: 9.1 MG/DL (ref 8.4–10.2)
CHLORIDE SERPL-SCNC: 105 MMOL/L (ref 96–112)
CO2 SERPL-SCNC: 21 MMOL/L (ref 20–33)
COLOR UR: YELLOW
CREAT SERPL-MCNC: 0.84 MG/DL (ref 0.5–1.4)
CRP SERPL HS-MCNC: 0.25 MG/DL (ref 0–0.75)
EOSINOPHIL # BLD AUTO: 0.09 K/UL (ref 0–0.51)
EOSINOPHIL NFR BLD: 1.2 % (ref 0–6.9)
ERYTHROCYTE [DISTWIDTH] IN BLOOD BY AUTOMATED COUNT: 42 FL (ref 35.9–50)
ERYTHROCYTE [SEDIMENTATION RATE] IN BLOOD BY WESTERGREN METHOD: 18 MM/HOUR (ref 0–20)
GLOBULIN SER CALC-MCNC: 3.1 G/DL (ref 1.9–3.5)
GLUCOSE SERPL-MCNC: 131 MG/DL (ref 65–99)
GLUCOSE UR STRIP.AUTO-MCNC: NEGATIVE MG/DL
HCG UR QL: NEGATIVE
HCT VFR BLD AUTO: 36.4 % (ref 37–47)
HGB BLD-MCNC: 11.5 G/DL (ref 12–16)
IMM GRANULOCYTES # BLD AUTO: 0.02 K/UL (ref 0–0.11)
IMM GRANULOCYTES NFR BLD AUTO: 0.3 % (ref 0–0.9)
KETONES UR STRIP.AUTO-MCNC: NEGATIVE MG/DL
LEUKOCYTE ESTERASE UR QL STRIP.AUTO: NEGATIVE
LIPASE SERPL-CCNC: 23 U/L (ref 7–58)
LYMPHOCYTES # BLD AUTO: 2.3 K/UL (ref 1–4.8)
LYMPHOCYTES NFR BLD: 31.7 % (ref 22–41)
MCH RBC QN AUTO: 26 PG (ref 27–33)
MCHC RBC AUTO-ENTMCNC: 31.6 G/DL (ref 33.6–35)
MCV RBC AUTO: 82.2 FL (ref 81.4–97.8)
MONOCYTES # BLD AUTO: 0.64 K/UL (ref 0–0.85)
MONOCYTES NFR BLD AUTO: 8.8 % (ref 0–13.4)
NEUTROPHILS # BLD AUTO: 4.16 K/UL (ref 2–7.15)
NEUTROPHILS NFR BLD: 57.3 % (ref 44–72)
NITRITE UR QL STRIP.AUTO: NEGATIVE
NRBC # BLD AUTO: 0 K/UL
NRBC BLD-RTO: 0 /100 WBC
PH UR STRIP.AUTO: 6 [PH]
PLATELET # BLD AUTO: 345 K/UL (ref 164–446)
PMV BLD AUTO: 10.2 FL (ref 9–12.9)
POTASSIUM SERPL-SCNC: 3.5 MMOL/L (ref 3.6–5.5)
PROT SERPL-MCNC: 7.1 G/DL (ref 6–8.2)
PROT UR QL STRIP: NEGATIVE MG/DL
RBC # BLD AUTO: 4.43 M/UL (ref 4.2–5.4)
RBC UR QL AUTO: NEGATIVE
SODIUM SERPL-SCNC: 134 MMOL/L (ref 135–145)
SP GR UR STRIP.AUTO: <=1.005
WBC # BLD AUTO: 7.3 K/UL (ref 4.8–10.8)

## 2018-06-27 PROCEDURE — A9270 NON-COVERED ITEM OR SERVICE: HCPCS | Performed by: EMERGENCY MEDICINE

## 2018-06-27 PROCEDURE — 80053 COMPREHEN METABOLIC PANEL: CPT

## 2018-06-27 PROCEDURE — 700102 HCHG RX REV CODE 250 W/ 637 OVERRIDE(OP): Performed by: EMERGENCY MEDICINE

## 2018-06-27 PROCEDURE — 85025 COMPLETE CBC W/AUTO DIFF WBC: CPT

## 2018-06-27 PROCEDURE — 36415 COLL VENOUS BLD VENIPUNCTURE: CPT

## 2018-06-27 PROCEDURE — 81025 URINE PREGNANCY TEST: CPT

## 2018-06-27 PROCEDURE — 83690 ASSAY OF LIPASE: CPT

## 2018-06-27 PROCEDURE — 700111 HCHG RX REV CODE 636 W/ 250 OVERRIDE (IP)

## 2018-06-27 PROCEDURE — 85652 RBC SED RATE AUTOMATED: CPT

## 2018-06-27 PROCEDURE — 99285 EMERGENCY DEPT VISIT HI MDM: CPT

## 2018-06-27 PROCEDURE — 86140 C-REACTIVE PROTEIN: CPT

## 2018-06-27 PROCEDURE — 81002 URINALYSIS NONAUTO W/O SCOPE: CPT

## 2018-06-27 PROCEDURE — 96374 THER/PROPH/DIAG INJ IV PUSH: CPT

## 2018-06-27 RX ORDER — IBUPROFEN 800 MG/1
800 TABLET ORAL EVERY 8 HOURS PRN
Qty: 30 TAB | Refills: 0 | Status: SHIPPED | OUTPATIENT
Start: 2018-06-27 | End: 2020-08-18

## 2018-06-27 RX ORDER — CYCLOBENZAPRINE HCL 10 MG
10 TABLET ORAL ONCE
Status: COMPLETED | OUTPATIENT
Start: 2018-06-27 | End: 2018-06-27

## 2018-06-27 RX ORDER — OXYCODONE HYDROCHLORIDE AND ACETAMINOPHEN 5; 325 MG/1; MG/1
1-2 TABLET ORAL EVERY 6 HOURS PRN
Qty: 14 TAB | Refills: 0 | Status: SHIPPED | OUTPATIENT
Start: 2018-06-27 | End: 2018-06-30

## 2018-06-27 RX ORDER — KETOROLAC TROMETHAMINE 30 MG/ML
INJECTION, SOLUTION INTRAMUSCULAR; INTRAVENOUS
Status: COMPLETED
Start: 2018-06-27 | End: 2018-06-27

## 2018-06-27 RX ORDER — KETOROLAC TROMETHAMINE 30 MG/ML
30 INJECTION, SOLUTION INTRAMUSCULAR; INTRAVENOUS ONCE
Status: COMPLETED | OUTPATIENT
Start: 2018-06-27 | End: 2018-06-27

## 2018-06-27 RX ORDER — CYCLOBENZAPRINE HCL 10 MG
10 TABLET ORAL 3 TIMES DAILY PRN
Qty: 30 TAB | Refills: 0 | Status: SHIPPED | OUTPATIENT
Start: 2018-06-27 | End: 2020-08-18

## 2018-06-27 RX ADMIN — KETOROLAC TROMETHAMINE 30 MG: 30 INJECTION, SOLUTION INTRAMUSCULAR at 10:12

## 2018-06-27 RX ADMIN — KETOROLAC TROMETHAMINE 30 MG: 30 INJECTION, SOLUTION INTRAMUSCULAR; INTRAVENOUS at 10:12

## 2018-06-27 RX ADMIN — CYCLOBENZAPRINE HYDROCHLORIDE 10 MG: 10 TABLET, FILM COATED ORAL at 10:12

## 2018-06-27 ASSESSMENT — PAIN SCALES - GENERAL: PAINLEVEL_OUTOF10: 4

## 2018-06-27 NOTE — ED NOTES
[place] assessment done, pt assessment negative. No addition interventions needed  Call light within reach, bed in lowest position .

## 2018-06-27 NOTE — ED PROVIDER NOTES
ED Provider Note    CHIEF COMPLAINT  Chief Complaint   Patient presents with   • Abdominal Pain       HPI  Katerina Owen is a 32 y.o. female here for evaluation of left buttock and hip pain. The patient states that she had gradual onset of her symptoms on Thursday or Friday, with increasing pain over the weekend. She states that the pain starts in her anterior inguinal crease, and wraps around to the left buttock in the middle and upper part. She has no lower leg pain, no tenderness to the calf, and no swelling of legs. She's no chest pain or shortness of breath, no vomiting or abdominal pain. She has not taken anything for the pain. Moving around standing exacerbates her pain, remaining still alleviates some of her pain. The pain has been constant since its onset. She describes it as sharp  The patient states that she had surgery a week or so ago on her appendix, but today she has no abdominal pain.    PAST MEDICAL HISTORY   has a past medical history of ADHD; Arthritis; Bowel habit changes; Chlamydia (5/11/2011); depression; Heart burn; HTN (hypertension); Migraine; Pain; Pre-eclampsia; PTSD (post-traumatic stress disorder); and Staph infection (2011).    SOCIAL HISTORY  Social History     Social History Main Topics   • Smoking status: Former Smoker     Packs/day: 0.25     Years: 6.00     Quit date: 12/26/2014   • Smokeless tobacco: Current User     Last attempt to quit: 10/30/2014      Comment: 1/4/2017 currently Vape   • Alcohol use No   • Drug use: No   • Sexual activity: Not Currently     Partners: Male     Birth control/ protection: Surgical      Comment: ; three kids; wk: not working       SURGICAL HISTORY   has a past surgical history that includes septoturbinoplasty (4/21/2009); lumpectomy (2014); breast biopsy (Left, 2/26/2016); primary c section (Aug 7, 2007); repeat c section w tubal ligation (9/7/2011); ventral hernia repair (Right, 1/10/2018); and appendectomy laparoscopic  "(6/5/2018).    CURRENT MEDICATIONS  Home Medications     Reviewed by Deion Ng (Pharmacy Tech) on 06/27/18 at 0934  Med List Status: Complete   Medication Last Dose Status   amphetamine-dextroamphetamine (ADDERALL) 15 MG tablet 6/26/2018 Active   asa/apap/caffeine (EXCEDRIN) 250-250-65 MG Tab 6/23/2018 Active                ALLERGIES  Allergies   Allergen Reactions   • Morphine Nausea     \"feel sick for a whole day after being given morphine\"  RXN=2/2016     • Vicodin [Hydrocodone-Acetaminophen] Vomiting and Nausea     [id number]       REVIEW OF SYSTEMS  See HPI for further details. Review of systems as above, otherwise all other systems are negative.     PHYSICAL EXAM  VITAL SIGNS: /108   Pulse (!) 114   Temp 36.1 °C (97 °F)   Resp 18   Ht 1.727 m (5' 8\") Comment: Stated  Wt 88 kg (194 lb 0.1 oz)   LMP 06/06/2018   SpO2 99%   BMI 29.50 kg/m²     Constitutional: Well developed, well nourished. No acute distress.  HEENT: Normocephalic, atraumatic. MMM  Neck: Supple, Full range of motion   Chest/Pulmonary:  No respiratory distress.  Equal expansion   Musculoskeletal: No deformity, no edema, neurovascular intact.  Left lower extremity; tenderness over the piriformis, and mid inguinal crease. Tenderness with range of motion, neurovascularly intact distally. No edema. No erythema.  Neuro: Clear speech, appropriate, cooperative, cranial nerves II-XII grossly intact.  Psych: Normal mood and affect      PROCEDURES     MEDICAL RECORD  I have reviewed patient's medical record and pertinent results are listed above.    COURSE & MEDICAL DECISION MAKING  I have reviewed any medical record information, laboratory studies and radiographic results as noted above.    At this time, the patient is nontoxic-appearing, afebrile, and with a negative workup. Her symptoms appear to be more of a sciatic nature, as opposed to a septic hip joint or with septic arthritis. She has no fever, a negative CRP and a negative " sed rate. Her pain is reproducible with movement, and with palpation to the piriformis and anterior inguinal crease, but she has no erythema or edema.    I you have had any blood pressure issues while here in the emergency department, please see your doctor for a further evaluation or work up.    Differential diagnoses include but not limited to: septic joint, septic arthritis, sciatic pain, fracture, muscle strain    This patient presents with sciatic pain .  At this time, I have counseled the patient/family regarding their medications, pain control, and follow up.  They will continue their medications, if any, as prescribed.  They will return immediately for any worsening symptoms and/or any other medical concerns.  They will see their doctor, or contact the doctor provided, in 1-2 days for follow up.       FINAL IMPRESSION  1. Sciatica, unspecified laterality          Electronically signed by: Aniket Thomas, 6/27/2018 10:46 AM

## 2018-06-27 NOTE — ED NOTES
C/O acute onset of LLQ abdominal pain without N/V, worsening for the past 3 days. Reports a Hx of appendectomy the 9 th of this month.

## 2018-06-27 NOTE — ED NOTES
Med rec updated and complete  Allergies reviewed  Pt reports no vitamins.  Pt reports no antibiotics in the last 30 days, that she had to take at home.

## 2018-06-28 ENCOUNTER — TELEPHONE (OUTPATIENT)
Dept: MEDICAL GROUP | Facility: LAB | Age: 33
End: 2018-06-28

## 2018-07-02 DIAGNOSIS — R42 DIZZINESS: ICD-10-CM

## 2018-07-02 DIAGNOSIS — R51.9 CHRONIC INTRACTABLE HEADACHE, UNSPECIFIED HEADACHE TYPE: ICD-10-CM

## 2018-07-02 DIAGNOSIS — G89.29 CHRONIC INTRACTABLE HEADACHE, UNSPECIFIED HEADACHE TYPE: ICD-10-CM

## 2018-07-02 DIAGNOSIS — R55 SYNCOPE AND COLLAPSE: ICD-10-CM

## 2018-07-02 DIAGNOSIS — G43.509 PERSISTENT MIGRAINE AURA WITHOUT CEREBRAL INFARCTION AND WITHOUT STATUS MIGRAINOSUS, NOT INTRACTABLE: ICD-10-CM

## 2018-07-02 DIAGNOSIS — R52 COMPLAINTS OF TOTAL BODY PAIN: ICD-10-CM

## 2018-07-02 DIAGNOSIS — M25.50 MULTIPLE JOINT PAIN: ICD-10-CM

## 2018-07-16 ENCOUNTER — TELEPHONE (OUTPATIENT)
Dept: MEDICAL GROUP | Facility: LAB | Age: 33
End: 2018-07-16

## 2018-07-17 ENCOUNTER — HOSPITAL ENCOUNTER (OUTPATIENT)
Dept: RADIOLOGY | Facility: MEDICAL CENTER | Age: 33
End: 2018-07-17
Attending: NURSE PRACTITIONER
Payer: COMMERCIAL

## 2018-07-17 NOTE — TELEPHONE ENCOUNTER
----- Message from Keturah Henson sent at 7/11/2018  1:20 PM PDT -----  Regarding: Insurance needs Peer to Peer review for PETCT scan  Insurance will need a peer to peer review for the Pet Ct you ordered for this patient. Contact phone is [phone]  Use ID # [id number]  I had to use Fabiana Dias as ordering, but you can still do the peer to peer under your name, or other NP or MD in the office. We need this approved no later than 7/16    Thank you,  Keturah/Imaging authorizations  [phone]  (I will be out of the office on fri 7/13 )

## 2018-08-01 NOTE — TELEPHONE ENCOUNTER
"2. Disability paperwork received from [employer] requiring provider signature.     3. All appropriate fields completed by Medical Assistant: No    4. Paperwork placed in \"MA to Provider\" folder/basket. Awaiting provider completion/signature.  " 01-Aug-2018 13:06

## 2018-08-02 ENCOUNTER — TELEPHONE (OUTPATIENT)
Dept: MEDICAL GROUP | Facility: LAB | Age: 33
End: 2018-08-02

## 2018-08-02 NOTE — TELEPHONE ENCOUNTER
Spoke with Ye he states pt has an appt on Aug 20 th with Dr. Fischer. He is not sure of any other eye appts. Pt was seen at ENT but has never had a follow up.

## 2018-08-02 NOTE — TELEPHONE ENCOUNTER
I would like to see Katerina to discuss this.  Before I see her, please have her make an appt with her eye doctor as recommended by Dr. Fischer (neurologist she saw).

## 2018-08-02 NOTE — TELEPHONE ENCOUNTER
In his consult note, he recommended that she see an ophthalmologist prior to following up with him.  you could recommend [facility].  Let us have her follow-up here to discuss disability paperwork after her follow-up with neurology

## 2018-08-02 NOTE — TELEPHONE ENCOUNTER
1. Caller Name: Ye                                       Call Back Number: [phone]      Patient approves a detailed voicemail message: yes    Spouse calling requesting a letter for long term disability. It needs to state that pt can't work and why. If you have any questions feel free to call Ye

## 2018-08-03 DIAGNOSIS — H53.9 VISION CHANGES: ICD-10-CM

## 2018-08-03 NOTE — TELEPHONE ENCOUNTER
Ye called asking for a referral to [facility], he called to make an appt they will not schedule w/o a referral.

## 2018-08-22 ENCOUNTER — OFFICE VISIT (OUTPATIENT)
Dept: MEDICAL GROUP | Facility: LAB | Age: 33
End: 2018-08-22
Payer: COMMERCIAL

## 2018-08-22 VITALS
HEART RATE: 113 BPM | RESPIRATION RATE: 12 BRPM | TEMPERATURE: 98.4 F | OXYGEN SATURATION: 97 % | WEIGHT: 193 LBS | HEIGHT: 68 IN | BODY MASS INDEX: 29.25 KG/M2 | SYSTOLIC BLOOD PRESSURE: 130 MMHG | DIASTOLIC BLOOD PRESSURE: 98 MMHG

## 2018-08-22 DIAGNOSIS — R51.9 CHRONIC INTRACTABLE HEADACHE, UNSPECIFIED HEADACHE TYPE: ICD-10-CM

## 2018-08-22 DIAGNOSIS — F33.41 RECURRENT MAJOR DEPRESSIVE DISORDER, IN PARTIAL REMISSION (HCC): ICD-10-CM

## 2018-08-22 DIAGNOSIS — F90.2 ATTENTION DEFICIT HYPERACTIVITY DISORDER (ADHD), COMBINED TYPE: ICD-10-CM

## 2018-08-22 DIAGNOSIS — G89.29 CHRONIC INTRACTABLE HEADACHE, UNSPECIFIED HEADACHE TYPE: ICD-10-CM

## 2018-08-22 PROCEDURE — 99214 OFFICE O/P EST MOD 30 MIN: CPT | Performed by: NURSE PRACTITIONER

## 2018-08-22 RX ORDER — DEXTROAMPHETAMINE SACCHARATE, AMPHETAMINE ASPARTATE, DEXTROAMPHETAMINE SULFATE AND AMPHETAMINE SULFATE 3.75; 3.75; 3.75; 3.75 MG/1; MG/1; MG/1; MG/1
15 TABLET ORAL 2 TIMES DAILY
Qty: 60 TAB | Refills: 0 | Status: SHIPPED | OUTPATIENT
Start: 2018-08-22 | End: 2018-08-22 | Stop reason: SDUPTHER

## 2018-08-22 RX ORDER — DEXTROAMPHETAMINE SACCHARATE, AMPHETAMINE ASPARTATE, DEXTROAMPHETAMINE SULFATE AND AMPHETAMINE SULFATE 3.75; 3.75; 3.75; 3.75 MG/1; MG/1; MG/1; MG/1
15 TABLET ORAL 2 TIMES DAILY
Qty: 60 TAB | Refills: 0 | Status: SHIPPED | OUTPATIENT
Start: 2018-09-21 | End: 2018-10-11 | Stop reason: SDUPTHER

## 2018-08-22 RX ORDER — DEXTROAMPHETAMINE SACCHARATE, AMPHETAMINE ASPARTATE, DEXTROAMPHETAMINE SULFATE AND AMPHETAMINE SULFATE 3.75; 3.75; 3.75; 3.75 MG/1; MG/1; MG/1; MG/1
15 TABLET ORAL 2 TIMES DAILY
Qty: 60 TAB | Refills: 0 | Status: SHIPPED | OUTPATIENT
Start: 2018-09-21 | End: 2018-08-22 | Stop reason: SDUPTHER

## 2018-08-22 RX ORDER — PROPRANOLOL HYDROCHLORIDE 10 MG/1
10 TABLET ORAL 2 TIMES DAILY
Qty: 60 TAB | Refills: 5 | Status: SHIPPED | OUTPATIENT
Start: 2018-08-22 | End: 2020-08-18

## 2018-08-22 RX ORDER — DEXTROAMPHETAMINE SACCHARATE, AMPHETAMINE ASPARTATE, DEXTROAMPHETAMINE SULFATE AND AMPHETAMINE SULFATE 3.75; 3.75; 3.75; 3.75 MG/1; MG/1; MG/1; MG/1
15 TABLET ORAL 2 TIMES DAILY
Qty: 60 TAB | Refills: 0 | Status: SHIPPED | OUTPATIENT
Start: 2018-08-22 | End: 2019-02-11 | Stop reason: SDUPTHER

## 2018-08-22 NOTE — PROGRESS NOTES
"Chief Complaint   Patient presents with   • Other     F/V on disability        HPI  Katerina is a 32-year-old established female here with request to return to work.  She has been out of work for quite some time for chronic headaches and dizziness.  She has been through an extensive workup to include an MRI of her brain, spine, neurology consult.  Still getting headaches but not as severe.  \"bad\" once or twice a week but mild to moderate headache daily.  Currently using excedrin or 800 mg ibuprofen for headaches.  Had negative EEG and brain scans.  She did request a PET scan that her insurance denied this, no previous scans had found any type of malignancy.  No n/v.  Occasional abdominal pain.  Awaiting autoimmune w/u through Dr. Fischer, her neurologist who she did follow-up with 2 days ago and it is recommended that she start a preventative medication such as a beta-blocker for both headaches and her blood pressure.  She does tell me that she suspects that her appendix may have turned into a chronic appendicitis and now that she has had that out, her abdomen does feel better.    Attention deficit disorder: This is a chronic issue.  She like to get back on Adderall on a regular basis so that she can return to work and be productive.  She denies any previous negative side effects of Adderall.    Depression with anxiety: Chronic issue.  She feels that this has worsened by staying at home.  She would like to return to work to keep herself busy.  She denies any suicidal or homicidal ideation. She finds herself bothered by what is wrong with her body.  She is tearful easily.  She denies any self-harm.  She is done poorly on several different antidepressants.  She has seen a therapist but not a psychiatrist.      Past medical, surgical, family, and social history is reviewed and updated in Epic chart by me today.   Medications and allergies reviewed and updated in Epic chart by me today.     ROS:   As documented in " "history of present illness above    Exam:  Blood pressure 130/98, pulse (!) 113, temperature 36.9 °C (98.4 °F), resp. rate 12, height 1.727 m (5' 8\"), weight 87.5 kg (193 lb), SpO2 97 %.  Constitutional: Alert, no distress, plus 3 vital signs  Skin:  Warm, dry, no rashes invisible areas  Eye: Equal, round and reactive, conjunctiva clear  ENMT: Lips without lesions, good dentition, oropharynx clear    Neck: Trachea midline, no masses, no thyromegaly  Respiratory: Unlabored respiration, lungs clear to auscultation, no wheezes, no rhonchi  Cardiovascular: Normal rate and rhythm  Psych: Alert, pleasant, well-groomed, normal affect    A/P:  1. Chronic intractable headache, unspecified headache type  -Initiated propanolol 10 mg twice daily to help reduce her heart rate, blood pressure and her headache frequency.  Discussed with her how to take propanolol as well as potential side effects.  Recommend that she keep a headache diary every day until her follow-up at 6 weeks.  She will notify me if she is having any problems taking propanolol.  She may continue with Excedrin as needed.  - propranolol (INDERAL) 10 MG Tab; Take 1 Tab by mouth 2 times a day. To prevent headaches  Dispense: 60 Tab; Refill: 5    2. Attention deficit hyperactivity disorder (ADHD), combined type  -Reviewed her  profile which shows that her last Adderall refill is July 15 by myself.  In prescribing controlled substances to this patient, I certify that I have obtained and reviewed the medical history of Katerina Owen. I have also made a good ant effort to obtain applicable records from other providers who have treated the patient and records did not demonstrate any increased risk of substance abuse that would prevent me from prescribing controlled substances.     I have conducted a physical exam and documented it. I have reviewed Ms. Owen’s prescription history as maintained by the [place] Prescription Monitoring Program.     I have " assessed the patient’s risk for abuse, dependency, and addiction using the validated Opioid Risk Tool available at https://www.mdcalc.com/iscvqe-aosl-liwo-ort-narcotic-abuse.     Given the above, I believe the benefits of controlled substance therapy outweigh the risks. The reasons for prescribing controlled substances include non-narcotic, oral analgesic alternatives have been inadequate for pain control. Accordingly, I have discussed the risk and benefits, treatment plan, and alternative therapies with the patient.   -Encouraged her to follow-up here in 6 weeks regarding how she is doing back at work and on Adderall  - amphetamine-dextroamphetamine (ADDERALL) 15 MG tablet; Take 1 Tab by mouth 2 times a day for 30 days.  Dispense: 60 Tab; Refill: 0  - amphetamine-dextroamphetamine (ADDERALL) 15 MG tablet; Take 1 Tab by mouth 2 times a day for 30 days.  Dispense: 60 Tab; Refill: 0    3. Recurrent major depressive disorder, in partial remission (HCC)  -Stable.  Not on any antidepressant therapy.  Denies suicidal or homicidal ideations.  Follow-up 6 weeks.      In terms of the patient currently being on short-term disability, she was cleared today to return to work as she has had a completely negative workup and is feeling better.

## 2018-08-22 NOTE — PATIENT INSTRUCTIONS
Find a monthly pocket calendar and nica each day that you have a headache.  Use a different color for severe headaches. Bring this with you to our appointment in 6 weeks.

## 2018-08-22 NOTE — LETTER
August 22, 2018       Patient: Katerina Owen   YOB: 1985   Date of Visit: 8/22/2018         To Whom It May Concern:    It is my medical opinion that Katerina Owen return to full duty, no restrictions 8/27/2018.    If you have any questions or concerns, please don't hesitate to call [phone]          Sincerely,          MARTITA Vines.  Electronically Signed

## 2018-10-03 ENCOUNTER — APPOINTMENT (OUTPATIENT)
Dept: MEDICAL GROUP | Facility: LAB | Age: 33
End: 2018-10-03
Payer: COMMERCIAL

## 2018-10-11 ENCOUNTER — OFFICE VISIT (OUTPATIENT)
Dept: MEDICAL GROUP | Facility: LAB | Age: 33
End: 2018-10-11
Payer: COMMERCIAL

## 2018-10-11 VITALS
DIASTOLIC BLOOD PRESSURE: 85 MMHG | OXYGEN SATURATION: 98 % | SYSTOLIC BLOOD PRESSURE: 135 MMHG | HEART RATE: 98 BPM | RESPIRATION RATE: 12 BRPM | WEIGHT: 187 LBS | HEIGHT: 68 IN | TEMPERATURE: 96.8 F | BODY MASS INDEX: 28.34 KG/M2

## 2018-10-11 DIAGNOSIS — L30.9 DERMATITIS: ICD-10-CM

## 2018-10-11 DIAGNOSIS — F90.2 ATTENTION DEFICIT HYPERACTIVITY DISORDER (ADHD), COMBINED TYPE: ICD-10-CM

## 2018-10-11 DIAGNOSIS — G43.509 PERSISTENT MIGRAINE AURA WITHOUT CEREBRAL INFARCTION AND WITHOUT STATUS MIGRAINOSUS, NOT INTRACTABLE: ICD-10-CM

## 2018-10-11 PROCEDURE — 99214 OFFICE O/P EST MOD 30 MIN: CPT | Performed by: NURSE PRACTITIONER

## 2018-10-11 RX ORDER — DEXTROAMPHETAMINE SACCHARATE, AMPHETAMINE ASPARTATE, DEXTROAMPHETAMINE SULFATE AND AMPHETAMINE SULFATE 3.75; 3.75; 3.75; 3.75 MG/1; MG/1; MG/1; MG/1
15 TABLET ORAL 2 TIMES DAILY
Qty: 60 TAB | Refills: 0 | Status: SHIPPED | OUTPATIENT
Start: 2018-10-20 | End: 2019-02-11 | Stop reason: SDUPTHER

## 2018-10-11 RX ORDER — DEXTROAMPHETAMINE SACCHARATE, AMPHETAMINE ASPARTATE, DEXTROAMPHETAMINE SULFATE AND AMPHETAMINE SULFATE 3.75; 3.75; 3.75; 3.75 MG/1; MG/1; MG/1; MG/1
15 TABLET ORAL 2 TIMES DAILY
Qty: 60 TAB | Refills: 0 | Status: SHIPPED | OUTPATIENT
Start: 2018-12-19 | End: 2019-01-18

## 2018-10-11 RX ORDER — DEXTROAMPHETAMINE SACCHARATE, AMPHETAMINE ASPARTATE, DEXTROAMPHETAMINE SULFATE AND AMPHETAMINE SULFATE 3.75; 3.75; 3.75; 3.75 MG/1; MG/1; MG/1; MG/1
15 TABLET ORAL 2 TIMES DAILY
Qty: 60 TAB | Refills: 0 | Status: SHIPPED | OUTPATIENT
Start: 2018-11-19 | End: 2019-02-11 | Stop reason: SDUPTHER

## 2018-10-11 NOTE — PROGRESS NOTES
"Chief Complaint   Patient presents with   • Headache     follow up       HPI   Katerina is a 33-year-old established female here to follow-up on several things:   #1-migraines: Much improved.  Tells me that she really has not had a headache at all since her visit 1 month ago.  Initiated propranool 8/22 bid and she denies any negative side effects of this, in fact she is only taking 1/day.   Neurology  Dr. Fischer.    #2-attention deficit disorder: Chronic issue.  Doing really well back on Adderall 15 mg twice daily now that she is back at work.  She denies any negative side effects of Adderall.  She feels that her concentration at work is doing well and she is very productive.    #3-rash to both breasts x at least 8 years.  Using eucerin and has tried topical steroids without improvement.  Would like to have a consult with dermatology.  She has had breast biopsies done and states the rash was there prior to.  The rash is itchy.    Past medical, surgical, family, and social history is reviewed and updated in Epic chart by me today.   Medications and allergies reviewed and updated in Epic chart by me today.     ROS:   As documented in history of present illness above    Exam:  Blood pressure 135/85, pulse 98, temperature 36 °C (96.8 °F), resp. rate 12, height 1.727 m (5' 8\"), weight 84.8 kg (187 lb), last menstrual period 09/18/2018, SpO2 98 %, not currently breastfeeding.    Constitutional: Alert, no distress, plus 3 vital signs  Skin:  Warm, dry, no rashes invisible areas  Eye: Equal, round and reactive, conjunctiva clear  ENMT: Lips without lesions  Respiratory: Unlabored respiration, lungs clear to auscultation, no wheezes, no rhonchi  Cardiovascular: Normal rate and rhythm.  Neurologic: Alert and oriented. Cranial nerves II-XII intact, EOMs intact, no tongue deviation, PERRL, no facial asymmetry to motor or sensation, symmetric palate, normal finger-to-nose test, no pronator drift. No focal motor deficits. " Symmetric reflexes. Normal station and gait, normal tandem walk. Coordination normal  Psych: Alert, pleasant, well-groomed, normal affect    A/P:  1. Attention deficit hyperactivity disorder (ADHD), combined type  -Stable.  Refilled medication.  Reviewed  profile.  Follow-up 6 months.  In prescribing controlled substances to this patient, I certify that I have obtained and reviewed the medical history of Katerina Owen. I have also made a good ant effort to obtain applicable records from other providers who have treated the patient and records did not demonstrate any increased risk of substance abuse that would prevent me from prescribing controlled substances.     I have conducted a physical exam and documented it. I have reviewed Ms. Owen’s prescription history as maintained by the [place] Prescription Monitoring Program.     I have assessed the patient’s risk for abuse, dependency, and addiction using the validated Opioid Risk Tool available at https://www.[facility] [employer].com/ctlhme-gddr-yyuo-ort-narcotic-abuse.     Given the above, I believe the benefits of controlled substance therapy outweigh the risks. The reasons for prescribing controlled substances include non-narcotic, oral analgesic alternatives have been inadequate for pain control. Accordingly, I have discussed the risk and benefits, treatment plan, and alternative therapies with the patient.       - amphetamine-dextroamphetamine (ADDERALL) 15 MG tablet; Take 1 Tab by mouth 2 times a day for 30 days.  Dispense: 60 Tab; Refill: 0  - amphetamine-dextroamphetamine (ADDERALL) 15 MG tablet; Take 1 Tab by mouth 2 times a day for 30 days.  Dispense: 60 Tab; Refill: 0  - amphetamine-dextroamphetamine (ADDERALL) 15 MG tablet; Take 1 Tab by mouth 2 times a day for 30 days.  Dispense: 60 Tab; Refill: 0    2. Dermatitis - both breasts  -Recommend consult dermatology.  Continue Eucerin.  Declined any further topical steroids.  - REFERRAL TO DERMATOLOGY    3.  Persistent migraine aura without cerebral infarction and without status migrainosus, not intractable  -Continue propanolol.  Much improved.  Follow-up as needed.

## 2018-11-13 ENCOUNTER — OFFICE VISIT (OUTPATIENT)
Dept: MEDICAL GROUP | Facility: LAB | Age: 33
End: 2018-11-13
Payer: COMMERCIAL

## 2018-11-13 VITALS
SYSTOLIC BLOOD PRESSURE: 138 MMHG | BODY MASS INDEX: 28.34 KG/M2 | OXYGEN SATURATION: 98 % | DIASTOLIC BLOOD PRESSURE: 88 MMHG | WEIGHT: 187 LBS | RESPIRATION RATE: 12 BRPM | HEART RATE: 90 BPM | TEMPERATURE: 97.4 F | HEIGHT: 68 IN

## 2018-11-13 DIAGNOSIS — J02.9 ACUTE PHARYNGITIS, UNSPECIFIED ETIOLOGY: ICD-10-CM

## 2018-11-13 DIAGNOSIS — J01.00 ACUTE NON-RECURRENT MAXILLARY SINUSITIS: ICD-10-CM

## 2018-11-13 PROCEDURE — 99214 OFFICE O/P EST MOD 30 MIN: CPT | Performed by: NURSE PRACTITIONER

## 2018-11-13 RX ORDER — AMOXICILLIN AND CLAVULANATE POTASSIUM 875; 125 MG/1; MG/1
1 TABLET, FILM COATED ORAL 2 TIMES DAILY
Qty: 14 TAB | Refills: 0 | Status: SHIPPED | OUTPATIENT
Start: 2018-11-13 | End: 2018-11-20

## 2018-11-13 NOTE — LETTER
November 13, 2018       Patient: Katerina Owen   YOB: 1985   Date of Visit: 11/13/2018         To Whom It May Concern:    Please excuse Katerina Owen from work 11/11 -  11/13/2018 due to illness.      If you have any questions or concerns, please don't hesitate to call [phone]          Sincerely,          PABLO Vines.P.N.  Electronically Signed

## 2018-11-13 NOTE — PROGRESS NOTES
Chief Complaint   Patient presents with   • Pharyngitis     itchy/ cough X 1 day   • Otalgia        HPI:   Katerina is a 32 yo est female complaining of 4 days of illness including: chills, ear pain, nasal congestion, sore throat.  In particular she tells me that her left ear, left side of her throat and her facial pressure is worse on the left side.  Mucus is: thin.  Similarly ill exposures: yes -her children.  Treatments tried: tylenol, mucinex, fluids  She feels she is worsening.    She  reports that she quit smoking about 3 years ago. She has a 1.50 pack-year smoking history. She uses smokeless tobacco..     ROS:  No nausea, changes in bowel movements or skin rash. + slight cough and low grade fever.      I reviewed the patient's medications, allergies and medical history:  Current Outpatient Prescriptions   Medication Sig Dispense Refill   • amphetamine-dextroamphetamine (ADDERALL) 15 MG tablet Take 1 Tab by mouth 2 times a day for 30 days. 60 Tab 0   • [START ON 11/19/2018] amphetamine-dextroamphetamine (ADDERALL) 15 MG tablet Take 1 Tab by mouth 2 times a day for 30 days. 60 Tab 0   • [START ON 12/19/2018] amphetamine-dextroamphetamine (ADDERALL) 15 MG tablet Take 1 Tab by mouth 2 times a day for 30 days. 60 Tab 0   • cyclobenzaprine (FLEXERIL) 10 MG Tab Take 1 Tab by mouth 3 times a day as needed. 30 Tab 0   • propranolol (INDERAL) 10 MG Tab Take 1 Tab by mouth 2 times a day. To prevent headaches 60 Tab 5   • asa/apap/caffeine (EXCEDRIN) 250-250-65 MG Tab Take 2-3 Tabs by mouth every 6 hours as needed for Headache.     • ibuprofen (MOTRIN) 800 MG Tab Take 1 Tab by mouth every 8 hours as needed. 30 Tab 0     No current facility-administered medications for this visit.      Morphine and Vicodin [hydrocodone-acetaminophen]  Past Medical History:   Diagnosis Date   • ADHD    • Arthritis     back   • Bowel habit changes     constipation   • Chlamydia 5/11/2011   • depression    • Heart burn    • HTN  "(hypertension)     related to pregnancy   • Migraine    • Pain     incisional hernia pain; back (arthritis)   • Pre-eclampsia     2007    • PTSD (post-traumatic stress disorder)    • Staph infection 2011    after C section        EXAM:  Blood pressure 138/88, pulse 90, temperature 36.3 °C (97.4 °F), resp. rate 12, height 1.727 m (5' 8\"), weight 84.8 kg (187 lb), last menstrual period 10/21/2018, SpO2 98 %, not currently breastfeeding.  General: Alert, no conversational dyspnea or audible wheeze, non-toxic appearance.  Eyes: PERRL, conjunctiva slightly injected, no eye discharge.  Ears: Normal pinnae, positive cloudy air-fluid interface bilaterally, left worse than right.  Nares: Patent with boggy nasal mucosa  Sinuses: tender over left sided maxillary sinus  Throat: Erythematous injection without exudate.   Neck: Supple, with mildly enlarged cervical nodes.  Lungs: Clear to auscultation bilaterally, no wheeze, crackles or rhonchi.   Heart: Regular rate without murmur.  Skin: Warm and dry without rash.     ASSESSMENT:   1. Acute pharyngitis/sinusitis:        PLAN:  1. Educated patient that majority of upper respiratory infections are viral and do not need antibiotics. As symptoms have been worsening over the last week, will treat with antibiotics -Augmentin 875 twice daily for 7 days but I encouraged her to start this within 72 hours if her symptoms are not beginning to improve.  She was given a work note for the last 2 nights and tonight and I encouraged her to rest.  2. Twice daily use of nasal saline rinse or Neti-Pot.  3. OTC anti-pyretics and decongestants as needed.  4. Follow-up in office or urgent care for worsening symptoms, difficulty breathing, lack of expected recovery, or should new symptoms or problems arise.  "

## 2018-11-15 ENCOUNTER — APPOINTMENT (RX ONLY)
Dept: URBAN - METROPOLITAN AREA CLINIC 22 | Facility: CLINIC | Age: 33
Setting detail: DERMATOLOGY
End: 2018-11-15

## 2018-11-15 DIAGNOSIS — R21 RASH AND OTHER NONSPECIFIC SKIN ERUPTION: ICD-10-CM

## 2018-11-15 PROCEDURE — 99202 OFFICE O/P NEW SF 15 MIN: CPT | Mod: 25

## 2018-11-15 PROCEDURE — ? BIOPSY BY PUNCH METHOD

## 2018-11-15 PROCEDURE — 11100: CPT

## 2018-11-15 PROCEDURE — ? SEPARATE AND IDENTIFIABLE DOCUMENTATION

## 2018-11-15 PROCEDURE — ? COUNSELING

## 2018-11-15 ASSESSMENT — LOCATION SIMPLE DESCRIPTION DERM
LOCATION SIMPLE: LEFT BREAST
LOCATION SIMPLE: RIGHT BREAST

## 2018-11-15 ASSESSMENT — LOCATION ZONE DERM: LOCATION ZONE: TRUNK

## 2018-11-15 ASSESSMENT — LOCATION DETAILED DESCRIPTION DERM
LOCATION DETAILED: RIGHT AREOLA
LOCATION DETAILED: LEFT AREOLA

## 2018-11-15 NOTE — PROCEDURE: COUNSELING
Detail Level: Zone
Patient Specific Counseling (Will Not Stick From Patient To Patient): In 2014 pt had bilateral ductal excisions due to fibrocystic breast disease.  She states that they did not ever fully heal.  This \"rash\" came after the procedure.  The areas are itchy.  TAC x 3-6 months in the past did not help.

## 2018-11-15 NOTE — PROCEDURE: BIOPSY BY PUNCH METHOD
Patient Will Remove Sutures At Home?: No
Home Suture Removal Text: Patient will come into the office for suture removal.
Epidermal Sutures: 5-0 Nylon
X Depth Of Punch In Cm (Optional): 0
Anesthesia Volume In Cc: 0.5
Biopsy Type: H and E
Was A Bandage Applied: Yes
Consent: Written consent was obtained and risks were reviewed including but not limited to scarring, infection, bleeding, scabbing, incomplete removal, nerve damage and allergy to anesthesia.
Dressing: bandage
Notification Instructions: Patient will be notified of biopsy results; however, patient is instructed to call the office if not contacted within 2 weeks.
Hemostasis: Electrocautery
Lab: [id number]
Anesthesia Type: 1% lidocaine with 1:100,000 epinephrine and a 1:10 solution of 8.4% sodium bicarbonate
Billing Type: Third-Party Bill
Lab Facility: 
Wound Care: Aquaphor
Post-Care Instructions: Keep the biopsy site dry overnight, then keep the site clean by washing with soap and water twice daily then covering with Vaseline/Aquaphor and a Band-Aid until healed.
Punch Size In Mm: 5
Detail Level: Detailed
Suture Removal: 14 days

## 2018-11-29 ENCOUNTER — APPOINTMENT (RX ONLY)
Dept: URBAN - METROPOLITAN AREA CLINIC 22 | Facility: CLINIC | Age: 33
Setting detail: DERMATOLOGY
End: 2018-11-29

## 2018-11-29 DIAGNOSIS — F42.4 EXCORIATION (SKIN-PICKING) DISORDER: ICD-10-CM

## 2018-11-29 DIAGNOSIS — L43.8 OTHER LICHEN PLANUS: ICD-10-CM | Status: UNCHANGED

## 2018-11-29 PROBLEM — S20.102A UNSPECIFIED SUPERFICIAL INJURIES OF BREAST, LEFT BREAST, INITIAL ENCOUNTER: Status: ACTIVE | Noted: 2018-11-29

## 2018-11-29 PROBLEM — S20.101A UNSPECIFIED SUPERFICIAL INJURIES OF BREAST, RIGHT BREAST, INITIAL ENCOUNTER: Status: ACTIVE | Noted: 2018-11-29

## 2018-11-29 PROCEDURE — 99213 OFFICE O/P EST LOW 20 MIN: CPT

## 2018-11-29 PROCEDURE — ? PRESCRIPTION

## 2018-11-29 PROCEDURE — ? PRESCRIPTION MEDICATION MANAGEMENT

## 2018-11-29 PROCEDURE — ? ORDER TESTS

## 2018-11-29 PROCEDURE — ? SUTURE REMOVAL (NO GLOBAL PERIOD)

## 2018-11-29 PROCEDURE — ? COUNSELING

## 2018-11-29 RX ORDER — CLOBETASOL PROPIONATE 0.5 MG/G
OINTMENT TOPICAL
Qty: 1 | Refills: 1 | Status: ERX | COMMUNITY
Start: 2018-11-29

## 2018-11-29 RX ADMIN — CLOBETASOL PROPIONATE: 0.5 OINTMENT TOPICAL at 22:23

## 2018-11-29 ASSESSMENT — LOCATION ZONE DERM: LOCATION ZONE: TRUNK

## 2018-11-29 ASSESSMENT — LOCATION SIMPLE DESCRIPTION DERM
LOCATION SIMPLE: LEFT BREAST
LOCATION SIMPLE: RIGHT BREAST

## 2018-11-29 ASSESSMENT — LOCATION DETAILED DESCRIPTION DERM
LOCATION DETAILED: RIGHT AREOLA
LOCATION DETAILED: LEFT AREOLA

## 2018-11-29 NOTE — PROCEDURE: COUNSELING
Detail Level: Detailed
Patient Specific Counseling (Will Not Stick From Patient To Patient): Punch biopsy-proven LP.  Pt said at one point her  left then came back so she would like to be tested for the viral infections.  Discussed how this may spontaneously resolve or may be more chronic.  ILK among others are future options.  In 2014 pt had bilateral ductal excisions due to fibrocystic breast disease. She states that they did not ever fully heal and the LP came after the procedure. Itchy.  TAC x 3-6 months in the past did not help.
Detail Level: Zone

## 2019-02-11 ENCOUNTER — OFFICE VISIT (OUTPATIENT)
Dept: MEDICAL GROUP | Facility: LAB | Age: 34
End: 2019-02-11
Payer: COMMERCIAL

## 2019-02-11 VITALS
DIASTOLIC BLOOD PRESSURE: 100 MMHG | BODY MASS INDEX: 27.28 KG/M2 | SYSTOLIC BLOOD PRESSURE: 138 MMHG | WEIGHT: 180 LBS | TEMPERATURE: 97.1 F | OXYGEN SATURATION: 100 % | HEIGHT: 68 IN | HEART RATE: 82 BPM

## 2019-02-11 DIAGNOSIS — R53.82 CHRONIC FATIGUE: ICD-10-CM

## 2019-02-11 DIAGNOSIS — R42 DIZZY: ICD-10-CM

## 2019-02-11 DIAGNOSIS — F90.2 ATTENTION DEFICIT HYPERACTIVITY DISORDER (ADHD), COMBINED TYPE: ICD-10-CM

## 2019-02-11 DIAGNOSIS — M25.50 MULTIPLE JOINT PAIN: ICD-10-CM

## 2019-02-11 PROCEDURE — 99214 OFFICE O/P EST MOD 30 MIN: CPT | Performed by: NURSE PRACTITIONER

## 2019-02-11 RX ORDER — DEXTROAMPHETAMINE SACCHARATE, AMPHETAMINE ASPARTATE, DEXTROAMPHETAMINE SULFATE AND AMPHETAMINE SULFATE 3.75; 3.75; 3.75; 3.75 MG/1; MG/1; MG/1; MG/1
15 TABLET ORAL 2 TIMES DAILY
Qty: 60 TAB | Refills: 0 | Status: SHIPPED | OUTPATIENT
Start: 2019-02-11 | End: 2019-06-03 | Stop reason: SDUPTHER

## 2019-02-11 RX ORDER — DEXTROAMPHETAMINE SACCHARATE, AMPHETAMINE ASPARTATE, DEXTROAMPHETAMINE SULFATE AND AMPHETAMINE SULFATE 3.75; 3.75; 3.75; 3.75 MG/1; MG/1; MG/1; MG/1
15 TABLET ORAL 2 TIMES DAILY
Qty: 60 TAB | Refills: 0 | Status: SHIPPED | OUTPATIENT
Start: 2019-03-13 | End: 2019-06-03 | Stop reason: SDUPTHER

## 2019-02-11 RX ORDER — DEXTROAMPHETAMINE SACCHARATE, AMPHETAMINE ASPARTATE, DEXTROAMPHETAMINE SULFATE AND AMPHETAMINE SULFATE 3.75; 3.75; 3.75; 3.75 MG/1; MG/1; MG/1; MG/1
15 TABLET ORAL 2 TIMES DAILY
Qty: 60 TAB | Refills: 0 | Status: SHIPPED | OUTPATIENT
Start: 2019-04-12 | End: 2019-06-03 | Stop reason: SDUPTHER

## 2019-02-11 ASSESSMENT — PATIENT HEALTH QUESTIONNAIRE - PHQ9
1. LITTLE INTEREST OR PLEASURE IN DOING THINGS: NOT AT ALL
3. TROUBLE FALLING OR STAYING ASLEEP OR SLEEPING TOO MUCH: NOT AT ALL
6. FEELING BAD ABOUT YOURSELF - OR THAT YOU ARE A FAILURE OR HAVE LET YOURSELF OR YOUR FAMILY DOWN: NOT AL ALL
4. FEELING TIRED OR HAVING LITTLE ENERGY: NEARLY EVERY DAY
5. POOR APPETITE OR OVEREATING: NOT AT ALL
2. FEELING DOWN, DEPRESSED, IRRITABLE, OR HOPELESS: NOT AT ALL
9. THOUGHTS THAT YOU WOULD BE BETTER OFF DEAD, OR OF HURTING YOURSELF: NOT AT ALL
8. MOVING OR SPEAKING SO SLOWLY THAT OTHER PEOPLE COULD HAVE NOTICED. OR THE OPPOSITE, BEING SO FIGETY OR RESTLESS THAT YOU HAVE BEEN MOVING AROUND A LOT MORE THAN USUAL: NOT AT ALL
7. TROUBLE CONCENTRATING ON THINGS, SUCH AS READING THE NEWSPAPER OR WATCHING TELEVISION: NOT AT ALL
SUM OF ALL RESPONSES TO PHQ9 QUESTIONS 1 AND 2: 0
SUM OF ALL RESPONSES TO PHQ QUESTIONS 1-9: 3

## 2019-02-11 NOTE — LETTER
February 11, 2019       Patient: Katerina Owen   YOB: 1985   Date of Visit: 2/11/2019         To Whom It May Concern:    It is my medical opinion that Katerina Owen remain out of work until 2/2/2019 - 2/12/2019.    If you have any questions or concerns, please don't hesitate to call [phone]          Sincerely,          Lashonda Chow, A.P.N.  Electronically Signed

## 2019-02-12 ENCOUNTER — HOSPITAL ENCOUNTER (OUTPATIENT)
Dept: LAB | Facility: MEDICAL CENTER | Age: 34
End: 2019-02-12
Attending: NURSE PRACTITIONER
Payer: COMMERCIAL

## 2019-02-12 DIAGNOSIS — R42 DIZZY: ICD-10-CM

## 2019-02-12 DIAGNOSIS — R53.82 CHRONIC FATIGUE: ICD-10-CM

## 2019-02-12 DIAGNOSIS — M25.50 MULTIPLE JOINT PAIN: ICD-10-CM

## 2019-02-12 LAB
ALBUMIN SERPL BCP-MCNC: 4.2 G/DL (ref 3.2–4.9)
ALBUMIN/GLOB SERPL: 1.4 G/DL
ALP SERPL-CCNC: 80 U/L (ref 30–99)
ALT SERPL-CCNC: 18 U/L (ref 2–50)
ANION GAP SERPL CALC-SCNC: 8 MMOL/L (ref 0–11.9)
AST SERPL-CCNC: 18 U/L (ref 12–45)
BASOPHILS # BLD AUTO: 0.8 % (ref 0–1.8)
BASOPHILS # BLD: 0.06 K/UL (ref 0–0.12)
BILIRUB SERPL-MCNC: 0.3 MG/DL (ref 0.1–1.5)
BUN SERPL-MCNC: 10 MG/DL (ref 8–22)
C3 SERPL-MCNC: 191 MG/DL (ref 87–200)
C4 SERPL-MCNC: 33 MG/DL (ref 19–52)
CALCIUM SERPL-MCNC: 9 MG/DL (ref 8.5–10.5)
CHLORIDE SERPL-SCNC: 106 MMOL/L (ref 96–112)
CO2 SERPL-SCNC: 24 MMOL/L (ref 20–33)
CREAT SERPL-MCNC: 0.7 MG/DL (ref 0.5–1.4)
CRP SERPL HS-MCNC: 0.32 MG/DL (ref 0–0.75)
EOSINOPHIL # BLD AUTO: 0.08 K/UL (ref 0–0.51)
EOSINOPHIL NFR BLD: 1 % (ref 0–6.9)
ERYTHROCYTE [DISTWIDTH] IN BLOOD BY AUTOMATED COUNT: 51.4 FL (ref 35.9–50)
ERYTHROCYTE [SEDIMENTATION RATE] IN BLOOD BY WESTERGREN METHOD: 22 MM/HOUR (ref 0–20)
GLOBULIN SER CALC-MCNC: 2.9 G/DL (ref 1.9–3.5)
GLUCOSE SERPL-MCNC: 73 MG/DL (ref 65–99)
HCT VFR BLD AUTO: 41.6 % (ref 37–47)
HGB BLD-MCNC: 12.6 G/DL (ref 12–16)
IMM GRANULOCYTES # BLD AUTO: 0.01 K/UL (ref 0–0.11)
IMM GRANULOCYTES NFR BLD AUTO: 0.1 % (ref 0–0.9)
LYMPHOCYTES # BLD AUTO: 2.58 K/UL (ref 1–4.8)
LYMPHOCYTES NFR BLD: 33 % (ref 22–41)
MCH RBC QN AUTO: 24.9 PG (ref 27–33)
MCHC RBC AUTO-ENTMCNC: 30.3 G/DL (ref 33.6–35)
MCV RBC AUTO: 82.1 FL (ref 81.4–97.8)
MONOCYTES # BLD AUTO: 0.54 K/UL (ref 0–0.85)
MONOCYTES NFR BLD AUTO: 6.9 % (ref 0–13.4)
NEUTROPHILS # BLD AUTO: 4.54 K/UL (ref 2–7.15)
NEUTROPHILS NFR BLD: 58.2 % (ref 44–72)
NRBC # BLD AUTO: 0 K/UL
NRBC BLD-RTO: 0 /100 WBC
PLATELET # BLD AUTO: 337 K/UL (ref 164–446)
PMV BLD AUTO: 11.3 FL (ref 9–12.9)
POTASSIUM SERPL-SCNC: 4.1 MMOL/L (ref 3.6–5.5)
PROT SERPL-MCNC: 7.1 G/DL (ref 6–8.2)
RBC # BLD AUTO: 5.07 M/UL (ref 4.2–5.4)
RHEUMATOID FACT SER IA-ACNC: <10 IU/ML (ref 0–14)
SODIUM SERPL-SCNC: 138 MMOL/L (ref 135–145)
TSH SERPL DL<=0.005 MIU/L-ACNC: 1.27 UIU/ML (ref 0.38–5.33)
WBC # BLD AUTO: 7.8 K/UL (ref 4.8–10.8)

## 2019-02-12 PROCEDURE — 85025 COMPLETE CBC W/AUTO DIFF WBC: CPT

## 2019-02-12 PROCEDURE — 36415 COLL VENOUS BLD VENIPUNCTURE: CPT

## 2019-02-12 PROCEDURE — 80053 COMPREHEN METABOLIC PANEL: CPT

## 2019-02-12 PROCEDURE — 86160 COMPLEMENT ANTIGEN: CPT | Mod: 91

## 2019-02-12 PROCEDURE — 84443 ASSAY THYROID STIM HORMONE: CPT

## 2019-02-12 PROCEDURE — 86038 ANTINUCLEAR ANTIBODIES: CPT

## 2019-02-12 PROCEDURE — 86140 C-REACTIVE PROTEIN: CPT

## 2019-02-12 PROCEDURE — 86431 RHEUMATOID FACTOR QUANT: CPT

## 2019-02-12 PROCEDURE — 85652 RBC SED RATE AUTOMATED: CPT

## 2019-02-12 NOTE — PROGRESS NOTES
"Chief Complaint   Patient presents with   • Dizziness     x 2 weeks       HPI  Katerina is a 33-year-old established female here with complaint of dizziness, fatigue and ringing in ears x the past 2 weeks.   Fortunately she was plagued with dizziness fatigue and headaches for about 6 months last year all.  Mentions \"getting sick,\" with vomiting / diarrhea x 3-4 days before symptoms of dizziness and fatigue returned.  Her dizziness comes and goes but is much worse during the daytime, better at night when she can lay down and relax.  Denies any acute head injuries or traumas.  No trouble with bowels or bladder.    No n/v/d but she has had loss of appetite.  No abdominal pain.    Headaches are improving in terms of their frequency and chronicity- occurring once per week and severe, down from occurring most days of the week.  She is on preventative propanolol.  Taking excedrin as needed which helps.    Anxiety well controlled.  Denies depression.    C/o  Multiple joint pain - knees, back, neck, feet, wrists.  She does tell me that when she saw neurology last fall, it was recommended that she have an autoimmune workup which she never moved forward with in terms of the lab orders.  No known autoimmune arthritis in family but cousin has type I diabetes.   Sister has Crohn's disease.      Attention deficit disorder: Chronic issue.  Does very well with Adderall immediate release 15 mg tablets twice daily.  She tells me that without Adderall she frequently spaces out at work, has difficulty completing her job duties and get side tracked on conversations.  Denies any negative side effects of Adderall.    Hypertension: This is been an intermittent issue for the patient.  She is compliant with propanolol.  When she takes her blood pressure at home it is typically well controlled around 120/70.  She is not having chest pain or shortness of breath.  She does not smoke.    Past medical, surgical, family, and social history is " "reviewed and updated in Epic chart by me today.   Medications and allergies reviewed and updated in Epic chart by me today.     ROS:   As documented in history of present illness above    Exam:  Blood pressure 140/100, pulse 82, temperature 36.2 °C (97.1 °F), temperature source Temporal, height 1.727 m (5' 8\"), weight 81.6 kg (180 lb), SpO2 100 %, not currently breastfeeding.  Constitutional: Alert, no distress, plus 3 vital signs  Skin:  Warm, dry, no rashes invisible areas  Eye: Equal, round and reactive, conjunctiva clear  ENMT: Lips without lesions, good dentition, oropharynx clear    Neck: Trachea midline, no masses, no thyromegaly  Respiratory: Unlabored respiration, lungs clear to auscultation, no wheezes, no rhonchi  Cardiovascular: Normal rate and rhythm  Psych: Alert, pleasant, well-groomed, normal affect    Assessment / Plan / Medical Decision makin. Multiple joint pain  -Recommend the below autoimmune arthritis workup and then following up here in a couple of weeks.  Encouraged her to continue to attend work as physical activity/distraction should be helpful during her current symptoms.  Discussed appropriate amounts of NSAIDs with GI and renal precautions.  - EMELIA IGG HARIS W/RFLX TO EMELIA IGG IFA; Future  - RHEUMATOID ARTHRITIS FACTOR; Future  - WESTERGREN SED RATE; Future  - CRP QUANTITIVE (NON-CARDIAC); Future  - Comp Metabolic Panel; Future  - TSH; Future  - CBC WITH DIFFERENTIAL; Future  - COMPLEMENT C3+C4 SERUM; Future    2. Chronic fatigue  - EMELIA IGG HARIS W/RFLX TO EMELIA IGG IFA; Future  - RHEUMATOID ARTHRITIS FACTOR; Future  - WESTERGREN SED RATE; Future  - CRP QUANTITIVE (NON-CARDIAC); Future  - Comp Metabolic Panel; Future  - TSH; Future  - CBC WITH DIFFERENTIAL; Future    3. Dizzy  - EMELIA IGG HARIS W/RFLX TO EMELIA IGG IFA; Future  - RHEUMATOID ARTHRITIS FACTOR; Future  - WESTERGREN SED RATE; Future  - CRP QUANTITIVE (NON-CARDIAC); Future  - Comp Metabolic Panel; Future  - TSH; Future  - CBC " WITH DIFFERENTIAL; Future    4. Attention deficit hyperactivity disorder (ADHD), combined type  -[facility]  In prescribing controlled substances to this patient, I certify that I have obtained and reviewed the medical history of Katerina Owen. I have also made a good ant effort to obtain applicable records from other providers who have treated the patient and records did not demonstrate any increased risk of substance abuse that would prevent me from prescribing controlled substances.     I have conducted a physical exam and documented it. I have reviewed Ms. Owen’s prescription history as maintained by the [place] Prescription Monitoring Program.     I have assessed the patient’s risk for abuse, dependency, and addiction using the validated Opioid Risk Tool available at https://www.[employer].com/awotkd-yxcr-jaxt-ort-narcotic-abuse.    Accordingly, I have discussed the risk and benefits, treatment plan, and alternative therapies with the patient.       - amphetamine-dextroamphetamine (ADDERALL) 15 MG tablet; Take 1 Tab by mouth 2 times a day for 30 days.  Dispense: 60 Tab; Refill: 0  - amphetamine-dextroamphetamine (ADDERALL) 15 MG tablet; Take 1 Tab by mouth 2 times a day for 30 days.  Dispense: 60 Tab; Refill: 0  - amphetamine-dextroamphetamine (ADDERALL) 15 MG tablet; Take 1 Tab by mouth 2 times a day for 30 days.  Dispense: 60 Tab; Refill: 0      5.  Hypertension:  I suspect stress and situational as home blood pressure readings have been doing well.  She will continue to blood pressures at home, emailing me for readings consistently greater than 130/90.

## 2019-02-15 LAB — NUCLEAR IGG SER QL IA: NORMAL

## 2019-02-19 ENCOUNTER — TELEPHONE (OUTPATIENT)
Dept: MEDICAL GROUP | Facility: LAB | Age: 34
End: 2019-02-19

## 2019-02-19 DIAGNOSIS — Z23 NEED FOR VACCINATION: ICD-10-CM

## 2019-02-19 NOTE — TELEPHONE ENCOUNTER
1. Caller Name: Katerina Lopez is on the MA Schedule tomorrow for Hep B vaccine/injection.    SPECIFIC Action To Be Taken: Orders pending, please sign.

## 2019-02-20 NOTE — TELEPHONE ENCOUNTER
I have placed the below orders and discussed them with Dr. Saravia. the MA is performing the below orders under the direction of Dr. COATES

## 2019-05-13 ENCOUNTER — HOSPITAL ENCOUNTER (EMERGENCY)
Facility: MEDICAL CENTER | Age: 34
End: 2019-05-13
Attending: EMERGENCY MEDICINE
Payer: COMMERCIAL

## 2019-05-13 ENCOUNTER — NON-PROVIDER VISIT (OUTPATIENT)
Dept: OCCUPATIONAL MEDICINE | Facility: CLINIC | Age: 34
End: 2019-05-13

## 2019-05-13 VITALS
TEMPERATURE: 98.9 F | RESPIRATION RATE: 18 BRPM | HEART RATE: 77 BPM | OXYGEN SATURATION: 100 % | HEIGHT: 68 IN | SYSTOLIC BLOOD PRESSURE: 167 MMHG | DIASTOLIC BLOOD PRESSURE: 89 MMHG | WEIGHT: 180.34 LBS | BODY MASS INDEX: 27.33 KG/M2

## 2019-05-13 DIAGNOSIS — K40.30 INGUINAL HERNIA OF RIGHT SIDE WITH OBSTRUCTION AND WITHOUT GANGRENE: ICD-10-CM

## 2019-05-13 DIAGNOSIS — Z02.83 ENCOUNTER FOR DRUG SCREENING: ICD-10-CM

## 2019-05-13 DIAGNOSIS — Z02.1 PRE-EMPLOYMENT DRUG SCREENING: ICD-10-CM

## 2019-05-13 LAB
ALBUMIN SERPL BCP-MCNC: 4.4 G/DL (ref 3.2–4.9)
ALBUMIN/GLOB SERPL: 1.2 G/DL
ALP SERPL-CCNC: 87 U/L (ref 30–99)
ALT SERPL-CCNC: 28 U/L (ref 2–50)
ANION GAP SERPL CALC-SCNC: 10 MMOL/L (ref 0–11.9)
APPEARANCE UR: CLEAR
AST SERPL-CCNC: 27 U/L (ref 12–45)
BACTERIA #/AREA URNS HPF: NEGATIVE /HPF
BASOPHILS # BLD AUTO: 0.9 % (ref 0–1.8)
BASOPHILS # BLD: 0.07 K/UL (ref 0–0.12)
BILIRUB SERPL-MCNC: 0.5 MG/DL (ref 0.1–1.5)
BILIRUB UR QL STRIP.AUTO: NEGATIVE
BUN SERPL-MCNC: 7 MG/DL (ref 8–22)
CALCIUM SERPL-MCNC: 9.1 MG/DL (ref 8.4–10.2)
CHLORIDE SERPL-SCNC: 103 MMOL/L (ref 96–112)
CO2 SERPL-SCNC: 22 MMOL/L (ref 20–33)
COLOR UR: YELLOW
CREAT SERPL-MCNC: 0.79 MG/DL (ref 0.5–1.4)
EOSINOPHIL # BLD AUTO: 0.04 K/UL (ref 0–0.51)
EOSINOPHIL NFR BLD: 0.5 % (ref 0–6.9)
EPI CELLS #/AREA URNS HPF: NORMAL /HPF
ERYTHROCYTE [DISTWIDTH] IN BLOOD BY AUTOMATED COUNT: 43.6 FL (ref 35.9–50)
GLOBULIN SER CALC-MCNC: 3.8 G/DL (ref 1.9–3.5)
GLUCOSE SERPL-MCNC: 90 MG/DL (ref 65–99)
GLUCOSE UR STRIP.AUTO-MCNC: NEGATIVE MG/DL
HCT VFR BLD AUTO: 35.1 % (ref 37–47)
HGB BLD-MCNC: 11.1 G/DL (ref 12–16)
IMM GRANULOCYTES # BLD AUTO: 0.03 K/UL (ref 0–0.11)
IMM GRANULOCYTES NFR BLD AUTO: 0.4 % (ref 0–0.9)
KETONES UR STRIP.AUTO-MCNC: NEGATIVE MG/DL
LEUKOCYTE ESTERASE UR QL STRIP.AUTO: NEGATIVE
LIPASE SERPL-CCNC: 25 U/L (ref 7–58)
LYMPHOCYTES # BLD AUTO: 2.01 K/UL (ref 1–4.8)
LYMPHOCYTES NFR BLD: 25.1 % (ref 22–41)
MCH RBC QN AUTO: 25.3 PG (ref 27–33)
MCHC RBC AUTO-ENTMCNC: 31.6 G/DL (ref 33.6–35)
MCV RBC AUTO: 80 FL (ref 81.4–97.8)
MICRO URNS: ABNORMAL
MONOCYTES # BLD AUTO: 0.66 K/UL (ref 0–0.85)
MONOCYTES NFR BLD AUTO: 8.2 % (ref 0–13.4)
MUCOUS THREADS #/AREA URNS HPF: NORMAL /HPF
NEUTROPHILS # BLD AUTO: 5.21 K/UL (ref 2–7.15)
NEUTROPHILS NFR BLD: 64.9 % (ref 44–72)
NITRITE UR QL STRIP.AUTO: NEGATIVE
NRBC # BLD AUTO: 0 K/UL
NRBC BLD-RTO: 0 /100 WBC
PH UR STRIP.AUTO: 5.5 [PH]
PLATELET # BLD AUTO: 312 K/UL (ref 164–446)
PMV BLD AUTO: 10.6 FL (ref 9–12.9)
POTASSIUM SERPL-SCNC: 3.4 MMOL/L (ref 3.6–5.5)
PROT SERPL-MCNC: 8.2 G/DL (ref 6–8.2)
PROT UR QL STRIP: NEGATIVE MG/DL
RBC # BLD AUTO: 4.39 M/UL (ref 4.2–5.4)
RBC # URNS HPF: NORMAL /HPF
RBC UR QL AUTO: ABNORMAL
SODIUM SERPL-SCNC: 135 MMOL/L (ref 135–145)
SP GR UR STRIP.AUTO: 1.02
WBC # BLD AUTO: 8 K/UL (ref 4.8–10.8)
WBC #/AREA URNS HPF: NORMAL /HPF

## 2019-05-13 PROCEDURE — 8899 PR URINE 11 PANEL - AFTER HOURS: Performed by: PREVENTIVE MEDICINE

## 2019-05-13 PROCEDURE — 81001 URINALYSIS AUTO W/SCOPE: CPT

## 2019-05-13 PROCEDURE — 85025 COMPLETE CBC W/AUTO DIFF WBC: CPT

## 2019-05-13 PROCEDURE — 82075 ASSAY OF BREATH ETHANOL: CPT | Performed by: PREVENTIVE MEDICINE

## 2019-05-13 PROCEDURE — 99284 EMERGENCY DEPT VISIT MOD MDM: CPT

## 2019-05-13 PROCEDURE — 80305 DRUG TEST PRSMV DIR OPT OBS: CPT | Performed by: PREVENTIVE MEDICINE

## 2019-05-13 PROCEDURE — 80053 COMPREHEN METABOLIC PANEL: CPT

## 2019-05-13 PROCEDURE — 83690 ASSAY OF LIPASE: CPT

## 2019-05-13 ASSESSMENT — PAIN DESCRIPTION - DESCRIPTORS: DESCRIPTORS: CRAMPING

## 2019-05-13 NOTE — ED PROVIDER NOTES
ED Provider Note    CHIEF COMPLAINT  Chief Complaint   Patient presents with   • Side Pain   • Abdominal Pain       HPI  Katerina Owen is a 33 y.o. female who presents for evaluation of pain on the right lower abdominal wall.  The patient has an extensive surgical history including , incisional hernia related to that, right inguinal hernia repaired by Dr. Delcid around a year and a half ago.  She also had her appendix out around a year ago as well.  The patient reports that she does significant heavy lifting at work and felt a slight tearing sensation in her right lower abdominal wall.  She specifically denies nausea vomiting or abdominal distention.  No black or bloody stools no flank pain or hematuria.  Pain is worse with lifting and bearing down.  She is passing gas no other symptoms report    REVIEW OF SYSTEMS  See HPI for further details.  No high fevers chills night sweats weight loss numbness tingling weakness all other systems are negative.     PAST MEDICAL HISTORY  Past Medical History:   Diagnosis Date   • Chlamydia 2011   • Staph infection     after C section   • ADHD    • Arthritis     back   • Bowel habit changes     constipation   • depression    • Heart burn    • HTN (hypertension)     related to pregnancy   • Migraine    • Pain     incisional hernia pain; back (arthritis)   • Pre-eclampsia         • PTSD (post-traumatic stress disorder)        FAMILY HISTORY  Noncontributory    SOCIAL HISTORY  Social History     Social History   • Marital status:      Spouse name: N/A   • Number of children: N/A   • Years of education: N/A     Social History Main Topics   • Smoking status: Former Smoker     Packs/day: 0.25     Years: 6.00     Quit date: 2014   • Smokeless tobacco: Current User     Last attempt to quit: 10/30/2014      Comment: 2017 currently Vape   • Alcohol use No   • Drug use: No   • Sexual activity: Not Currently     Partners: Male     Birth  "control/ protection: Surgical      Comment: ; three kids; wk: not working     Other Topics Concern   • Not on file     Social History Narrative   • No narrative on file     Denies IV drug  SURGICAL HISTORY  Past Surgical History:   Procedure Laterality Date   • APPENDECTOMY LAPAROSCOPIC  2018    Procedure: APPENDECTOMY LAPAROSCOPIC;  Surgeon: Izabel Salomon M.D.;  Location: SURGERY [facility];  Service: Gen Robotic   • VENTRAL HERNIA REPAIR Right 1/10/2018    Procedure: VENTRAL HERNIA REPAIR- FOR INCISIONAL HERNIA REDUCIBLE  WITH MESH;  Surgeon: Gisele Delcid M.D.;  Location: SURGERY [facility];  Service: General   • BREAST BIOPSY Left 2016    Procedure: BREAST BIOPSY Excisional;  Surgeon: Gisele Delcid M.D.;  Location: SURGERY [facility];  Service:    • LUMPECTOMY  2014    both breasts - Dr. Garsia - [facility] 10/2014   • REPEAT C SECTION W TUBAL LIGATION  2011    Performed by AYAAN MERCHANT at LABOR AND DELIVERY   • SEPTOTURBINOPLASTY  2009    Performed by CHRISTIAN YEN at SURGERY [facility]   • PRIMARY C SECTION  Aug 7, 2007     preeclampsia       CURRENT MEDICATIONS  Home Medications    **Home medications have not yet been reviewed for this encounter**         ALLERGIES  Allergies   Allergen Reactions   • Morphine Nausea     \"feel sick for a whole day after being given morphine\"  RXN=2016     • Vicodin [Hydrocodone-Acetaminophen] Vomiting and Nausea     RLM=4131       PHYSICAL EXAM  VITAL SIGNS: BP (!) 167/89   Pulse 77   Temp 37.2 °C (98.9 °F) (Temporal)   Resp 18   Ht 1.727 m (5' 8\")   Wt 81.8 kg (180 lb 5.4 oz)   LMP 2019   SpO2 100%   BMI 27.42 kg/m²       Constitutional: Well developed, Well nourished, No acute distress, Non-toxic appearance.   HENT: Normocephalic, Atraumatic, Bilateral external ears normal, Oropharynx moist, No oral exudates, Nose normal.   Eyes: PERRLA, EOMI, Conjunctiva normal, No discharge.   Neck: Normal " range of motion, No tenderness, Supple, No stridor.   Cardiovascular: Normal heart rate, Normal rhythm, No murmurs, No rubs, No gallops.   Thorax & Lungs: Normal breath sounds, No respiratory distress, No wheezing, No chest tenderness.   Abdomen: Bowel sounds normal, Soft, No tenderness, No masses, No pulsatile masses.  There appears to be a bulging mass with Valsalva in the right inguinal region likely consistent with possible early inguinal either hernia or recurrence of prior hernia.  There is no suggestion of incarceration  Skin: Warm, Dry, No erythema, No rash.   Back: No tenderness, No CVA tenderness.   Extremities: Intact distal pulses, No edema, No tenderness, No cyanosis, No clubbing.   Neurologic: Alert & oriented x 3, Normal motor function, Normal sensory function, No focal deficits noted.   Psychiatric: Affect normal, Judgment normal, Mood normal.     RADIOLOGY/PROCEDURES  Results for orders placed or performed during the hospital encounter of 05/13/19   CBC WITH DIFFERENTIAL   Result Value Ref Range    WBC 8.0 4.8 - 10.8 K/uL    RBC 4.39 4.20 - 5.40 M/uL    Hemoglobin 11.1 (L) 12.0 - 16.0 g/dL    Hematocrit 35.1 (L) 37.0 - 47.0 %    MCV 80.0 (L) 81.4 - 97.8 fL    MCH 25.3 (L) 27.0 - 33.0 pg    MCHC 31.6 (L) 33.6 - 35.0 g/dL    RDW 43.6 35.9 - 50.0 fL    Platelet Count 312 164 - 446 K/uL    MPV 10.6 9.0 - 12.9 fL    Neutrophils-Polys 64.90 44.00 - 72.00 %    Lymphocytes 25.10 22.00 - 41.00 %    Monocytes 8.20 0.00 - 13.40 %    Eosinophils 0.50 0.00 - 6.90 %    Basophils 0.90 0.00 - 1.80 %    Immature Granulocytes 0.40 0.00 - 0.90 %    Nucleated RBC 0.00 /100 WBC    Neutrophils (Absolute) 5.21 2.00 - 7.15 K/uL    Lymphs (Absolute) 2.01 1.00 - 4.80 K/uL    Monos (Absolute) 0.66 0.00 - 0.85 K/uL    Eos (Absolute) 0.04 0.00 - 0.51 K/uL    Baso (Absolute) 0.07 0.00 - 0.12 K/uL    Immature Granulocytes (abs) 0.03 0.00 - 0.11 K/uL    NRBC (Absolute) 0.00 K/uL   COMP METABOLIC PANEL   Result Value Ref Range     Sodium 135 135 - 145 mmol/L    Potassium 3.4 (L) 3.6 - 5.5 mmol/L    Chloride 103 96 - 112 mmol/L    Co2 22 20 - 33 mmol/L    Anion Gap 10.0 0.0 - 11.9    Glucose 90 65 - 99 mg/dL    Bun 7 (L) 8 - 22 mg/dL    Creatinine 0.79 0.50 - 1.40 mg/dL    Calcium 9.1 8.4 - 10.2 mg/dL    AST(SGOT) 27 12 - 45 U/L    ALT(SGPT) 28 2 - 50 U/L    Alkaline Phosphatase 87 30 - 99 U/L    Total Bilirubin 0.5 0.1 - 1.5 mg/dL    Albumin 4.4 3.2 - 4.9 g/dL    Total Protein 8.2 6.0 - 8.2 g/dL    Globulin 3.8 (H) 1.9 - 3.5 g/dL    A-G Ratio 1.2 g/dL   LIPASE   Result Value Ref Range    Lipase 25 7 - 58 U/L   URINALYSIS,CULTURE IF INDICATED   Result Value Ref Range    Color Yellow     Character Clear     Specific Gravity 1.025 <1.035    Ph 5.5 5.0 - 8.0    Glucose Negative Negative mg/dL    Ketones Negative Negative mg/dL    Protein Negative Negative mg/dL    Bilirubin Negative Negative    Nitrite Negative Negative    Leukocyte Esterase Negative Negative    Occult Blood Trace (A) Negative    Micro Urine Req Microscopic    ESTIMATED GFR   Result Value Ref Range    GFR If African American >60 >60 mL/min/1.73 m 2    GFR If Non African American >60 >60 mL/min/1.73 m 2   URINE MICROSCOPIC (W/UA)   Result Value Ref Range    WBC 0-2 /hpf    RBC 0-2 /hpf    Bacteria Negative None /hpf    Epithelial Cells Rare Few /hpf    Mucous Threads Moderate /hpf         COURSE & MEDICAL DECISION MAKING  Pertinent Labs & Imaging studies reviewed. (See chart for details)  Patient had basic laboratory studies including CBC metabolic panel urinalysis all of which were normal and unremarkable.  I feel that she is likely experiencing early recurrence of inguinal hernia.  I did not feel that any emergent imaging studies are indicated as there is no suggestion of incarceration on physical exam or work-up.  I reviewed the findings with Dr. Delcid who is the patient's surgeon and agrees that no imaging is indicated and they will expedite early follow-up with her later  this week.  I counseled the patient on return precautions and to avoid heavy lifting    FINAL IMPRESSION  1.   1. Inguinal hernia of right side with obstruction and without gangrene               Electronically signed by: Brent Alvarado, 5/13/2019 3:08 PM

## 2019-05-13 NOTE — ED TRIAGE NOTES
Pt ambulates to triage  Chief Complaint   Patient presents with   • Side Pain   • Abdominal Pain   after lifting heavy sacks while at work today, hx of hernia  Pt A & 0 x 4, speech clear, ambulates well  Pt asked to wait in lobby, pt updated on triage process and pt asked to inform RN of any changes.

## 2019-05-13 NOTE — LETTER
"  FORM C-4:  EMPLOYEE’S CLAIM FOR COMPENSATION/ REPORT OF INITIAL TREATMENT  EMPLOYEE’S CLAIM - PROVIDE ALL INFORMATION REQUESTED   First Name  Katerina Last Name  Brayden Birthdate             Age  1985 33 y.o. Sex  female Claim Number   Home Employee Address  [address]                                     Zip  [address] Height  1.727 m (5' 8\") Weight  81.8 kg (180 lb 5.4 oz) [facility]  [id number]   Mailing Employee Address                           [address]               Zip  [address] Telephone  [phone] (home)  Primary Language Spoken  ENGLISH   Insurer  *** Third Party   [employer] Employee's Occupation (Job Title) When Injury or Occupational Disease Occurred     Employer's Name  [employer] Telephone  [phone]    Employer Address  [address] Zip  [address]   Date of Injury         Hour of Injury   Date Employer Notified   Last Day of Work after Injury or Occupational Disease   Supervisor to Whom Injury Reported     Address or Location of Accident (if applicable)  [[address],[place]]   What were you doing at the time of accident? (if applicable)      How did this injury or occupational disease occur? Be specific and answer in detail. Use additional sheet if necessary)     If you believe that you have an occupational disease, when did you first have knowledge of the disability and it relationship to your employment?   Witnesses to the Accident       Nature of Injury or Occupational Disease    Part(s) of Body Injured or Affected  , ,     I certify that the above is true and correct to the best of my knowledge and that I have provided this information in order to obtain the benefits of [place]  I hereby authorize any physician, chiropractor, surgeon, " practitioner, or other person, any hospital, including [facility] or [facility], any medical service organization, any insurance company, or other institution or organization to release to each other, any medical or other information, including benefits paid or payable, pertinent to this injury or disease, except information relative to diagnosis, treatment and/or counseling for AIDS, psychological conditions, alcohol or controlled substances, for which I must give specific authorization.  A Photostat of this authorization shall be as valid as the original.   Date Place   Employee’s Signature   THIS REPORT MUST BE COMPLETED AND MAILED WITHIN 3 WORKING DAYS OF TREATMENT   Place  [facility], EMERGENCY DEPT  Name of Facility   [facility]   Date  5/13/2019 Diagnosis  (K40.30) Inguinal hernia of right side with obstruction and without gangrene Is there evidence the injured employee was under the influence of alcohol and/or another controlled substance at the time of accident?   Hour  3:50 PM Description of Injury or Disease  Inguinal hernia of right side with obstruction and without gangrene     Treatment     Have you advised the patient to remain off work five days or more?             X-Ray Findings      If Yes   From Date    To Date      From information given by the employee, together with medical evidence, can you directly connect this injury or occupational disease as job incurred?    If No, is the employee capable of: Full Duty    Modified Duty      Is additional medical care by a physician indicated?    If Modified Duty, Specify any Limitations / Restrictions        Do you know of any previous injury or disease contributing to this condition or occupational disease?      Date  5/13/2019 Print Doctor’s Name  Brent Alvarado I certify the employer’s copy of this form was mailed on:   Address  [address]  "[id number]  [phone] Insurer’s Use Only   [place] Zip  [address]    Provider’s Tax ID Number  [id number] Telephone  Dept: [phone]    Doctor’s Signature    Degree       Original - TREATING PHYSICIAN OR CHIROPRACTOR   Pg 2-Insurer/TPA   Pg 3-Employer   Pg 4-Employee                                                                                                  Form C-4 (rev01/03)     BRIEF DESCRIPTION OF RIGHTS AND BENEFITS  (Pursuant to [place] 616C.[place])    Notice of Injury or Occupational Disease (Incident Report Form C-1): If an injury or occupational disease (OD) arises out of and in the course of employment, you must provide written notice to your employer as soon as practicable, but no later than 7 days after the accident or OD. Your employer shall maintain a sufficient supply of the required forms.    Claim for Compensation (Form C-4): If medical treatment is sought, the form C-4 is available at the place of initial treatment. A completed \"Claim for Compensation\" (Form C-4) must be filed within 90 days after an accident or OD. The treating physician or chiropractor must, within 3 working days after treatment, complete and mail to the employer, the employer's insurer and third-party , the Claim for Compensation.    Medical Treatment: If you require medical treatment for your on-the-job injury or OD, you may be required to select a physician or chiropractor from a list provided by your workers’ compensation insurer, if it has contracted with an Organization for Managed Care (MCO) or Preferred Provider Organization (PPO) or providers of health care. If your employer has not entered into a contract with an MCO or PPO, you may select a physician or chiropractor from the Panel of Physicians and Chiropractors. Any medical costs related to your industrial injury or OD will be paid by your insurer.    Temporary Total Disability (TTD): If your doctor has certified that you are unable " to work for a period of at least 5 consecutive days, or 5 cumulative days in a 20-day period, or places restrictions on you that your employer does not accommodate, you may be entitled to TTD compensation.    Temporary Partial Disability (TPD): If the wage you receive upon reemployment is less than the compensation for TTD to which you are entitled, the insurer may be required to pay you TPD compensation to make up the difference. TPD can only be paid for a maximum of 24 months.    Permanent Partial Disability (PPD): When your medical condition is stable and there is an indication of a PPD as a result of your injury or OD, within 30 days, your insurer must arrange for an evaluation by a rating physician or chiropractor to determine the degree of your PPD. The amount of your PPD award depends on the date of injury, the results of the PPD evaluation and your age and wage.    Permanent Total Disability (PTD): If you are medically certified by a treating physician or chiropractor as permanently and totally disabled and have been granted a PTD status by your insurer, you are entitled to receive monthly benefits not to exceed 66 2/3% of your average monthly wage. The amount of your PTD payments is subject to reduction if you previously received a PPD award.    Vocational Rehabilitation Services: You may be eligible for vocational rehabilitation services if you are unable to return to the job due to a permanent physical impairment or permanent restrictions as a result of your injury or occupational disease.    Transportation and Per Mack Reimbursement: You may be eligible for travel expenses and per mack associated with medical treatment.  Reopening: You may be able to reopen your claim if your condition worsens after claim closure.    Appeal Process: If you disagree with a written determination issued by the insurer or the insurer does not respond to your request, you may appeal to the Department of Administration,  , by following the instructions contained in your determination letter. You must appeal the determination within 70 days from the date of the determination letter at [address], [address], [place], [place] [address], or [address], [place], [place] [address]. If you disagree with the  decision, you may appeal to the Department of Administration, . You must file your appeal within 30 days from the date of the  decision letter at [address], [address], [place], [place] [address], or [address], [address], [place], [place] [address]. If you disagree with a decision of an , you may file a petition for judicial review with the District Court. You must do so within 30 days of the Appeal Officer’s decision. You may be represented by an  at your own expense or you may contact the [facility] for possible representation.    [place]  for Injured Workers (NAIW): If you disagree with a  decision, you may request that NAIW represent you without charge at an  Hearing. For information regarding denial of benefits, you may contact the [facility] at: [address], [address], [place] [address], [phone], or [address], [place] [address], [phone]    To File a Complaint with the Division: If you wish to file a complaint with the  of the Division of Industrial Relations (DIR), please contact the Workers’ Compensation Section, [address], [address], [place], [place] [address], telephone [phone], or [address], [facility] [address], [place] [address], telephone [phone].    For assistance with Workers’ Compensation Issues: you may contact the Office of the Governor Consumer Health Assistance, [address], [address], [place], [place] [address], Toll Free 1-748.359.2450, Web  site: http://luis..[place].us, E-mail koko@.nv.                                                                                                                                                                               __________________________________________________________________                                    _________________            Employee Name / Signature                                                                                                                            Date                                       D-2 (rev. 10/07)

## 2019-05-13 NOTE — LETTER
"  FORM C-4:  EMPLOYEE’S CLAIM FOR COMPENSATION/ REPORT OF INITIAL TREATMENT  EMPLOYEE’S CLAIM - PROVIDE ALL INFORMATION REQUESTED   First Name  Katerina Last Name  Brayden Birthdate             Age  1985 33 y.o. Sex  female Claim Number   Home Employee Address  [address]                                     Zip  [address] Height  1.727 m (5' 8\") Weight  81.8 kg (180 lb 5.4 oz) [facility]     Mailing Employee Address                           [address]               Zip  [address] Telephone  [phone] (home)  Primary Language Spoken  ENGLISH   Insurer   Third Party   [employer] Employee's Occupation (Job Title) When Injury or Occupational Disease Occurred     Employer's Name  [employer] Telephone  [phone]    Employer Address  [address] [facility] [[address]] Zip  [address]   Date of Injury  5/13/2019       Hour of Injury  11:10 AM Date Employer Notified  5/13/2019 Last Day of Work after Injury or Occupational Disease  5/13/2019 Supervisor to Whom Injury Reported  [place]   Address or Location of Accident (if applicable)  [[address] [facility] [address]]   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 Be specific and answer in detail. Use additional sheet if necessary)  Repeated continious heavy lifting for 12 hours   If you believe that you have an occupational disease, when did you first have knowledge of the disability and it relationship to your employment?  n/a Witnesses to the Accident  [place]     Nature of Injury or Occupational Disease  Hernia  Part(s) of Body Injured or Affected  Abdomen Including Groin, N/A, N/A    I certify that the above is true and correct to the best of my knowledge and that I have provided this information in order to obtain the benefits of [place] " [place] or [place]).  I hereby authorize any physician, chiropractor, surgeon, practitioner, or other person, any hospital, including [facility] or [facility] hospital, any medical service organization, any insurance company, or other institution or organization to release to each other, any medical or other information, including benefits paid or payable, pertinent to this injury or disease, except information relative to diagnosis, treatment and/or counseling for AIDS, psychological conditions, alcohol or controlled substances, for which I must give specific authorization.  A Photostat of this authorization shall be as valid as the original.   Date  May 13, 2019 Place  [facility] Employee’s Signature   THIS REPORT MUST BE COMPLETED AND MAILED WITHIN 3 WORKING DAYS OF TREATMENT   Place  [facility], EMERGENCY DEPT  Name of Facility   [facility]   Date  5/13/2019 Diagnosis  (K40.30) Inguinal hernia of right side with obstruction and without gangrene Is there evidence the injured employee was under the influence of alcohol and/or another controlled substance at the time of accident?   Hour  3:56 PM Description of Injury or Disease  Inguinal hernia of right side with obstruction and without gangrene No   Treatment  Physician evaluation and treatment of likely right inguinal hernia recurrent  Have you advised the patient to remain off work five days or more?         No   X-Ray Findings  Negative   If Yes   From Date    To Date      From information given by the employee, together with medical evidence, can you directly connect this injury or occupational disease as job incurred?  Yes If No, is the employee capable of: Full Duty  No Modified Duty  Yes   Is additional medical care by a physician indicated?  Yes  Comments:Referral to general surgery with Dr. Delcid If Modified  "Duty, Specify any Limitations / Restrictions  No lifting more than 10 pound     Do you know of any previous injury or disease contributing to this condition or occupational disease?  Yes   Date  5/13/2019 Print Doctor’s Name  Brent Alvarado certify the employer’s copy of this form was mailed on:   Address  [address] [place] [address] [phone] Insurer’s Use Only   [facility]  [id number]    Provider’s Tax ID Number  [id number] Telephone  Dept: [phone]    Doctor’s Signature  e-BRENT Aiken M.D. Degree  MD    Original - TREATING PHYSICIAN OR CHIROPRACTOR   Pg 2-Insurer/TPA   Pg 3-Employer   Pg 4-Employee                                                                                                  Form C-4 (rev01/03)     BRIEF DESCRIPTION OF RIGHTS AND BENEFITS  (Pursuant to [place] 616C.050)    Notice of Injury or Occupational Disease (Incident Report Form C-1): If an injury or occupational disease (OD) arises out of and in the course of employment, you must provide written notice to your employer as soon as practicable, but no later than 7 days after the accident or OD. Your employer shall maintain a sufficient supply of the required forms.    Claim for Compensation (Form C-4): If medical treatment is sought, the form C-4 is available at the place of initial treatment. A completed \"Claim for Compensation\" (Form C-4) must be filed within 90 days after an accident or OD. The treating physician or chiropractor must, within 3 working days after treatment, complete and mail to the employer, the employer's insurer and third-party , the Claim for Compensation.    Medical Treatment: If you require medical treatment for your on-the-job injury or OD, you may be required to select a physician or chiropractor from a list provided by your workers’ compensation insurer, if it has contracted with an Organization for Managed Care (MCO) or Preferred Provider Organization " (PPO) or providers of health care. If your employer has not entered into a contract with an MCO or PPO, you may select a physician or chiropractor from the Panel of Physicians and Chiropractors. Any medical costs related to your industrial injury or OD will be paid by your insurer.    Temporary Total Disability (TTD): If your doctor has certified that you are unable to work for a period of at least 5 consecutive days, or 5 cumulative days in a 20-day period, or places restrictions on you that your employer does not accommodate, you may be entitled to TTD compensation.    Temporary Partial Disability (TPD): If the wage you receive upon reemployment is less than the compensation for TTD to which you are entitled, the insurer may be required to pay you TPD compensation to make up the difference. TPD can only be paid for a maximum of 24 months.    Permanent Partial Disability (PPD): When your medical condition is stable and there is an indication of a PPD as a result of your injury or OD, within 30 days, your insurer must arrange for an evaluation by a rating physician or chiropractor to determine the degree of your PPD. The amount of your PPD award depends on the date of injury, the results of the PPD evaluation and your age and wage.    Permanent Total Disability (PTD): If you are medically certified by a treating physician or chiropractor as permanently and totally disabled and have been granted a PTD status by your insurer, you are entitled to receive monthly benefits not to exceed 66 2/3% of your average monthly wage. The amount of your PTD payments is subject to reduction if you previously received a PPD award.    Vocational Rehabilitation Services: You may be eligible for vocational rehabilitation services if you are unable to return to the job due to a permanent physical impairment or permanent restrictions as a result of your injury or occupational disease.    Transportation and Per Odalys Reimbursement: You may  be eligible for travel expenses and per mack associated with medical treatment.  Reopening: You may be able to reopen your claim if your condition worsens after claim closure.    Appeal Process: If you disagree with a written determination issued by the insurer or the insurer does not respond to your request, you may appeal to the Department of Administration, , by following the instructions contained in your determination letter. You must appeal the determination within 70 days from the date of the determination letter at [address], [address], [place], [place] [address], or [address], [address], [place], [place] [address]. If you disagree with the  decision, you may appeal to the Department of Administration, . You must file your appeal within 30 days from the date of the  decision letter at [address], [address], [place], [place] [address], or [address], [address], [place], [place] [address]. If you disagree with a decision of an , you may file a petition for judicial review with the District Court. You must do so within 30 days of the Appeal Officer’s decision. You may be represented by an  at your own expense or you may contact the [facility] for possible representation.    [place]  for Injured Workers (NAIW): If you disagree with a  decision, you may request that NAIW represent you without charge at an  Hearing. For information regarding denial of benefits, you may contact the [facility] at: [address], [address], [place] [address], [phone], or [address], [address], [place] [address], [phone]    To File a Complaint with the Division: If you wish to file a complaint with the  of the Division of Industrial Relations (DIR), please contact the Workers’ Compensation Section, [address], [place]  [place], [place] [address], telephone [phone]) [phone], or [address], [address], [place], [place] [address], telephone [phone].    For assistance with Workers’ Compensation Issues: you may contact the Office of the Governor Consumer Health Assistance, [address] [place] [address], [address], [place], [place] [address], Toll Free 1-489.392.5067, Web site: http://[employer].[facility].nv., E-mail [email or web address].                                                                                                                                                                               __________________________________________________________________                                    May 13, 2019            Employee Name / Signature                                                                                                                            Date                                       D-2 (rev. 10/07)

## 2019-05-14 LAB
AMP AMPHETAMINE: NORMAL
BAR BARBITURATES: NORMAL
BREATH ALCOHOL COMMENT: NORMAL
BZO BENZODIAZEPINES: NORMAL
COC COCAINE: NORMAL
INT CON NEG: NORMAL
INT CON POS: NORMAL
MDMA ECSTASY: NORMAL
MET METHAMPHETAMINES: NORMAL
MTD METHADONE: NORMAL
OPI OPIATES: NORMAL
OXY OXYCODONE: NORMAL
PCP PHENCYCLIDINE: NORMAL
POC BREATHALIZER: 0 PERCENT (ref 0–0.01)
POC URINE DRUG SCREEN OCDRS: NEGATIVE
THC: NORMAL

## 2019-06-03 DIAGNOSIS — F90.2 ATTENTION DEFICIT HYPERACTIVITY DISORDER (ADHD), COMBINED TYPE: ICD-10-CM

## 2019-06-03 RX ORDER — DEXTROAMPHETAMINE SACCHARATE, AMPHETAMINE ASPARTATE, DEXTROAMPHETAMINE SULFATE AND AMPHETAMINE SULFATE 3.75; 3.75; 3.75; 3.75 MG/1; MG/1; MG/1; MG/1
15 TABLET ORAL 2 TIMES DAILY
Qty: 60 TAB | Refills: 0 | Status: SHIPPED | OUTPATIENT
Start: 2019-08-02 | End: 2019-08-27 | Stop reason: SDUPTHER

## 2019-06-03 RX ORDER — DEXTROAMPHETAMINE SACCHARATE, AMPHETAMINE ASPARTATE, DEXTROAMPHETAMINE SULFATE AND AMPHETAMINE SULFATE 3.75; 3.75; 3.75; 3.75 MG/1; MG/1; MG/1; MG/1
15 TABLET ORAL 2 TIMES DAILY
Qty: 60 TAB | Refills: 0 | Status: SHIPPED | OUTPATIENT
Start: 2019-07-03 | End: 2019-08-27 | Stop reason: SDUPTHER

## 2019-06-03 RX ORDER — DEXTROAMPHETAMINE SACCHARATE, AMPHETAMINE ASPARTATE, DEXTROAMPHETAMINE SULFATE AND AMPHETAMINE SULFATE 3.75; 3.75; 3.75; 3.75 MG/1; MG/1; MG/1; MG/1
15 TABLET ORAL 2 TIMES DAILY
Qty: 60 TAB | Refills: 0 | Status: SHIPPED | OUTPATIENT
Start: 2019-06-03 | End: 2019-08-27 | Stop reason: SDUPTHER

## 2019-06-03 NOTE — TELEPHONE ENCOUNTER
Was the patient seen in the last year in this department? Yes  2/11/19   4/27/19  Does patient have an active prescription for medications requested? No     Received Request Via: Patient

## 2019-06-03 NOTE — TELEPHONE ENCOUNTER
----- Message from Katerina Owen sent at 6/3/2019 11:37 AM PDT -----  Regarding: Prescription Question  Contact: [phone]  Hello, do I have to come in for a office visit to refill my adderall prescription?

## 2019-06-04 ENCOUNTER — TELEPHONE (OUTPATIENT)
Dept: MEDICAL GROUP | Facility: LAB | Age: 34
End: 2019-06-04

## 2019-06-04 DIAGNOSIS — K46.0 HERNIA WITH OBSTRUCTION: ICD-10-CM

## 2019-06-04 NOTE — TELEPHONE ENCOUNTER
Referral pending in orders    Katerina Rockwell, Med Ass't   Phone Number: [phone]             Izabel gooden would be good for referral.  Yeah 8f you could hold them that would be great . I'm picking up my prescription tomorrow so I can just grab both. Thank you.

## 2019-06-13 ENCOUNTER — APPOINTMENT (OUTPATIENT)
Dept: MEDICAL GROUP | Facility: LAB | Age: 34
End: 2019-06-13
Payer: COMMERCIAL

## 2019-07-02 ENCOUNTER — TELEPHONE (OUTPATIENT)
Dept: MEDICAL GROUP | Facility: LAB | Age: 34
End: 2019-07-02

## 2019-07-02 NOTE — TELEPHONE ENCOUNTER
"· Disability paperwork received from [employer]  requiring provider signature.     · All appropriate fields completed by Medical Assistant: No    · Paperwork placed in \"MA to Provider\" folder/basket. Awaiting provider completion/signature.  "

## 2019-07-03 ENCOUNTER — TELEPHONE (OUTPATIENT)
Dept: MEDICAL GROUP | Facility: LAB | Age: 34
End: 2019-07-03

## 2019-07-09 ENCOUNTER — OFFICE VISIT (OUTPATIENT)
Dept: MEDICAL GROUP | Facility: LAB | Age: 34
End: 2019-07-09
Payer: COMMERCIAL

## 2019-07-09 VITALS
RESPIRATION RATE: 14 BRPM | HEART RATE: 104 BPM | WEIGHT: 176 LBS | SYSTOLIC BLOOD PRESSURE: 122 MMHG | BODY MASS INDEX: 26.67 KG/M2 | DIASTOLIC BLOOD PRESSURE: 98 MMHG | OXYGEN SATURATION: 98 % | TEMPERATURE: 97.9 F | HEIGHT: 68 IN

## 2019-07-09 DIAGNOSIS — K40.90 RIGHT INGUINAL HERNIA: ICD-10-CM

## 2019-07-09 PROCEDURE — 99214 OFFICE O/P EST MOD 30 MIN: CPT | Performed by: NURSE PRACTITIONER

## 2019-07-09 NOTE — PROGRESS NOTES
"Chief Complaint   Patient presents with   • Hernia     FMLA       HPI  Katerina is a 34 yo est female here with request for disability paperwork completion for recurrent hernia - new issue to me today.  Previous hernia repair 1/2018 - ventral hernia repair with Dr Delcid.  Appendectomy 2018 as well.    She tells me that she lifted a bucket at work 5/13 , felt a bulge in the area where she had a previous hernia and had immediate discomfort.  Went to ER 5/13/2019 and was told that she has a re-occuring hernia via exam.  Has outstanding bill with Dr. Delcid and is paying this slowly - has appt 7/23/2019 to discuss repair.  Went to work for a few days on light duty following 5/13/2019 ER visit but was sent home and told that she can not return until hernia is fixed or given a note clearing her for full duty.   As long as she is not lifting anything over 10 pounds, she is not in pain to her abdomen.  Denies any constipation or diarrhea.  Denies nausea or vomiting.    Past medical, surgical, family, and social history is reviewed and updated in Epic chart by me today.   Medications and allergies reviewed and updated in Epic chart by me today.     ROS:   As documented in history of present illness above    Exam:  /98 (BP Location: Left arm, Patient Position: Sitting, BP Cuff Size: Large adult)   Pulse (!) 104   Temp 36.6 °C (97.9 °F)   Resp 14   Ht 1.727 m (5' 8\")   Wt 79.8 kg (176 lb)   SpO2 98%   Constitutional: Alert, no distress, plus 3 vital signs  Skin:  Warm, dry, no rashes invisible areas  Eye: Equal, round and reactive, conjunctiva clear  ENMT: Lips without lesions, good dentition, oropharynx clear    Neck: Trachea midline, no masses, no thyromegaly  Respiratory: Unlabored respiration, lungs clear to auscultation, no wheezes, no rhonchi  Cardiovascular: Normal rate and rhythm, no murmur, no edema  Abdomen: Soft.  Right inguinal hernia appreciated with valsalva maneuver - no evidence of incarceration.  "   Psych: Alert, pleasant, well-groomed, normal affect    Assessment / Plan / Medical Decision makin. Right inguinal hernia  -Awaiting surgical consult.  Reiterated the importance of avoiding lifting, pulling or pushing anything over 10 pounds.  Discussed the avoidance of constipation.  Discussed ER precautions should she have new onset firm, bulging mass that is not reducible on her own.  Completed disability paperwork with the patient.    Total face to face time 25 minutes of which over 50% of this visit is spent in counseling, education and outlining a plan of treatment and coordination of care for the above conditions as well as filling out disability paperwork. This included but was not limited to discussion of medication options and potential risks related to the medications, referral and specialty care options. All patient questions were answered

## 2019-08-27 DIAGNOSIS — F90.2 ATTENTION DEFICIT HYPERACTIVITY DISORDER (ADHD), COMBINED TYPE: ICD-10-CM

## 2019-08-27 RX ORDER — DEXTROAMPHETAMINE SACCHARATE, AMPHETAMINE ASPARTATE, DEXTROAMPHETAMINE SULFATE AND AMPHETAMINE SULFATE 3.75; 3.75; 3.75; 3.75 MG/1; MG/1; MG/1; MG/1
15 TABLET ORAL 2 TIMES DAILY
Qty: 60 TAB | Refills: 0 | Status: SHIPPED | OUTPATIENT
Start: 2019-10-04 | End: 2020-08-18 | Stop reason: SDUPTHER

## 2019-08-27 RX ORDER — DEXTROAMPHETAMINE SACCHARATE, AMPHETAMINE ASPARTATE, DEXTROAMPHETAMINE SULFATE AND AMPHETAMINE SULFATE 3.75; 3.75; 3.75; 3.75 MG/1; MG/1; MG/1; MG/1
15 TABLET ORAL 2 TIMES DAILY
Qty: 60 TAB | Refills: 0 | Status: SHIPPED | OUTPATIENT
Start: 2019-09-04 | End: 2020-08-18 | Stop reason: SDUPTHER

## 2019-08-27 RX ORDER — DEXTROAMPHETAMINE SACCHARATE, AMPHETAMINE ASPARTATE, DEXTROAMPHETAMINE SULFATE AND AMPHETAMINE SULFATE 3.75; 3.75; 3.75; 3.75 MG/1; MG/1; MG/1; MG/1
15 TABLET ORAL 2 TIMES DAILY
Qty: 60 TAB | Refills: 0 | Status: SHIPPED | OUTPATIENT
Start: 2019-11-03 | End: 2019-12-26 | Stop reason: SDUPTHER

## 2019-08-27 NOTE — TELEPHONE ENCOUNTER
Was the patient seen in the last year in this department? Yes  LOV 7/9/19    Last Fill 8/5/19 #60  Does patient have an active prescription for medications requested? No     Received Request Via: Patient

## 2019-10-10 ENCOUNTER — TELEPHONE (OUTPATIENT)
Dept: MEDICAL GROUP | Facility: LAB | Age: 34
End: 2019-10-10

## 2019-10-10 NOTE — TELEPHONE ENCOUNTER
"· Disability paperwork received from [employer] Attending [employer]icains statement  requiring provider signature.     · All appropriate fields completed by Medical Assistant: No    · Paperwork placed in \"MA to Provider\" folder/basket. Awaiting provider completion/signature.  "

## 2019-10-14 NOTE — TELEPHONE ENCOUNTER
Lashonda states this is to be sent to her surgeon. Sent my chart to patient to inquire who this is to send paperwork

## 2019-12-26 DIAGNOSIS — F90.2 ATTENTION DEFICIT HYPERACTIVITY DISORDER (ADHD), COMBINED TYPE: ICD-10-CM

## 2019-12-26 RX ORDER — DEXTROAMPHETAMINE SACCHARATE, AMPHETAMINE ASPARTATE, DEXTROAMPHETAMINE SULFATE AND AMPHETAMINE SULFATE 3.75; 3.75; 3.75; 3.75 MG/1; MG/1; MG/1; MG/1
15 TABLET ORAL 2 TIMES DAILY
Qty: 60 TAB | Refills: 0 | Status: SHIPPED | OUTPATIENT
Start: 2019-12-26 | End: 2020-08-18 | Stop reason: SDUPTHER

## 2019-12-26 NOTE — TELEPHONE ENCOUNTER
Was the patient seen in the last year in this department? Yes  7/9/19  11/12/19  Does patient have an active prescription for medications requested? No     Received Request Via: Patient

## 2019-12-26 NOTE — TELEPHONE ENCOUNTER
----- Message from Katerina Owen sent at 12/26/2019  6:43 AM PST -----  Regarding: Prescription Question  Contact: [phone]  Hello, do I need to schedule an appointment or can I get a refill on the adderall prescription?

## 2020-08-18 ENCOUNTER — TELEMEDICINE (OUTPATIENT)
Dept: MEDICAL GROUP | Facility: LAB | Age: 35
End: 2020-08-18

## 2020-08-18 DIAGNOSIS — F90.2 ATTENTION DEFICIT HYPERACTIVITY DISORDER (ADHD), COMBINED TYPE: ICD-10-CM

## 2020-08-18 PROCEDURE — 99213 OFFICE O/P EST LOW 20 MIN: CPT | Mod: 95,CR | Performed by: NURSE PRACTITIONER

## 2020-08-18 RX ORDER — DEXTROAMPHETAMINE SACCHARATE, AMPHETAMINE ASPARTATE, DEXTROAMPHETAMINE SULFATE AND AMPHETAMINE SULFATE 3.75; 3.75; 3.75; 3.75 MG/1; MG/1; MG/1; MG/1
15 TABLET ORAL 2 TIMES DAILY
Qty: 60 TAB | Refills: 0 | Status: SHIPPED | OUTPATIENT
Start: 2020-08-18 | End: 2020-09-17

## 2020-08-18 RX ORDER — DEXTROAMPHETAMINE SACCHARATE, AMPHETAMINE ASPARTATE, DEXTROAMPHETAMINE SULFATE AND AMPHETAMINE SULFATE 3.75; 3.75; 3.75; 3.75 MG/1; MG/1; MG/1; MG/1
15 TABLET ORAL 2 TIMES DAILY
Qty: 60 TAB | Refills: 0 | Status: SHIPPED | OUTPATIENT
Start: 2020-09-17 | End: 2020-09-17 | Stop reason: SDUPTHER

## 2020-08-18 RX ORDER — DEXTROAMPHETAMINE SACCHARATE, AMPHETAMINE ASPARTATE, DEXTROAMPHETAMINE SULFATE AND AMPHETAMINE SULFATE 3.75; 3.75; 3.75; 3.75 MG/1; MG/1; MG/1; MG/1
15 TABLET ORAL 2 TIMES DAILY
Qty: 60 TAB | Refills: 0 | Status: SHIPPED | OUTPATIENT
Start: 2020-10-17 | End: 2020-11-16

## 2020-08-18 NOTE — PROGRESS NOTES
"Telemedicine Visit: Established Patient     This evaluation was conducted via Zoom, using secure and encrypted videoconferencing technology.  The patient was physical located at Home in [place], [place] and the physician was located in [facility].  The patient was presented by self, at home.  The patient's identity was confirmed and verbal consent for the telemedicine encounter was obtained.      Subjective:   CC:   Katerina is a 34 y.o. female presenting for evaluation and management of:    #1- Right inguinal hernia :  Issue 2019 - 2020.  repaired twice in the past two years  - pain improved.  Working at [employer] - happy there and good pay - no lifting.      #2- ADHD:  Chronic issue.  Requesting to go back on Adderall - last refill was 12/2019.  Having trouble concentrating at work and has to catch errors quickly. Denies any negative side effects of adderall.  Working nights.        ROS   Denies any recent fevers or chills. No nausea or vomiting. No chest pains or shortness of breath.     Allergies   Allergen Reactions   • Morphine Nausea     \"feel sick for a whole day after being given morphine\"  RXN=2/2016     • Vicodin [Hydrocodone-Acetaminophen] Vomiting and Nausea     FFG=2189       Current medicines (including changes today)  Current Outpatient Medications   Medication Sig Dispense Refill   • propranolol (INDERAL) 10 MG Tab Take 1 Tab by mouth 2 times a day. To prevent headaches 60 Tab 5   • asa/apap/caffeine (EXCEDRIN) 250-250-65 MG Tab Take 2-3 Tabs by mouth every 6 hours as needed for Headache.     • ibuprofen (MOTRIN) 800 MG Tab Take 1 Tab by mouth every 8 hours as needed. 30 Tab 0   • cyclobenzaprine (FLEXERIL) 10 MG Tab Take 1 Tab by mouth 3 times a day as needed. 30 Tab 0     No current facility-administered medications for this visit.        Patient Active Problem List    Diagnosis Date Noted   • Syncope and collapse 02/24/2018     Priority: High   • Hypokalemia 02/24/2018     Priority: " Medium   • Hyponatremia 02/24/2018     Priority: Medium   • HTN (hypertension)      Priority: Medium   • Depression 01/16/2014     Priority: Low   • Persistent migraine aura without cerebral infarction and without status migrainosus, not intractable 06/13/2018   • Dizziness 05/31/2018   • Chronic headaches 05/31/2018   • Attention deficit hyperactivity disorder (ADHD), combined type 10/24/2017   • PTSD (post-traumatic stress disorder) 11/30/2015   • Anxiety 01/16/2014   • H/O hernia repair 05/05/2011       Family History   Problem Relation Age of Onset   • Heart Disease Father         heart failure smoker    • Hypertension Father    • Cancer Maternal Grandfather         pancreastic   • Heart Disease Paternal Grandfather         heart attack       She  has a past medical history of ADHD, Arthritis, Bowel habit changes, Chlamydia (5/11/2011), depression, Heart burn, HTN (hypertension), Migraine, Pain, Pre-eclampsia, PTSD (post-traumatic stress disorder), and Staph infection (2011). She also has no past medical history of Muscle disorder, OSTEOPOROSIS, or Pituitary disease (HCC).  She  has a past surgical history that includes septoturbinoplasty (4/21/2009); lumpectomy (2014); breast biopsy (Left, 2/26/2016); primary c section (Aug 7, 2007); repeat c section w tubal ligation (9/7/2011); ventral hernia repair (Right, 1/10/2018); and appendectomy laparoscopic (6/5/2018).       Objective:   There were no vitals taken for this visit.    Physical Exam:  Constitutional: Alert, no distress, well-groomed.  Skin: No rashes in visible areas.  Eye: Round. Conjunctiva clear, lids normal. No icterus.   ENMT: Lips pink without lesions, good dentition, moist mucous membranes. Phonation normal.  Neck: No masses, no thyromegaly. Moves freely without pain.  CV: Pulse as reported by patient  Respiratory: Unlabored respiratory effort, no cough or audible wheeze  Psych: Alert and oriented x3, normal affect and mood.       Assessment and  Plan:   The following treatment plan was discussed:   1. Attention deficit hyperactivity disorder (ADHD), combined type  amphetamine-dextroamphetamine (ADDERALL) 15 MG tablet    amphetamine-dextroamphetamine (ADDERALL) 15 MG tablet    amphetamine-dextroamphetamine (ADDERALL) 15 MG tablet       Refilled Adderall.  Last refill of Adderall was last December by myself.  Reminded her of how to take Adderall as well as potential side effects.  She will follow-up with me in 3 months when she has insurance for a urine drug screen, controlled substance agreement and overall follow-up.  I also encouraged her to have her Pap smear when she is here in 3 months.  In prescribing controlled substances to this patient, I certify that I have obtained and reviewed the medical history of Katerina Owen. I have also made a good ant effort to obtain applicable records from other providers who have treated the patient and records did not demonstrate any increased risk of substance abuse that would prevent me from prescribing controlled substances.     I have conducted a physical exam and documented it. I have reviewed Ms. Owen’s prescription history as maintained by the [place] Prescription Monitoring Program.     I have assessed the patient’s risk for abuse, dependency, and addiction using the validated Opioid Risk Tool available at https://www.mdcalc.com/ccuwcy-qxbd-eurf-ort-narcotic-abuse.     Given the above, I believe the benefits of controlled substance therapy outweigh the risks. Accordingly, I have discussed the risk and benefits, treatment plan, and alternative therapies with the patient.       Follow-up: 3 mo

## 2020-09-17 DIAGNOSIS — F90.2 ATTENTION DEFICIT HYPERACTIVITY DISORDER (ADHD), COMBINED TYPE: ICD-10-CM

## 2020-09-17 RX ORDER — DEXTROAMPHETAMINE SACCHARATE, AMPHETAMINE ASPARTATE, DEXTROAMPHETAMINE SULFATE AND AMPHETAMINE SULFATE 3.75; 3.75; 3.75; 3.75 MG/1; MG/1; MG/1; MG/1
15 TABLET ORAL 2 TIMES DAILY
Qty: 60 TAB | Refills: 0 | Status: SHIPPED | OUTPATIENT
Start: 2020-09-17 | End: 2020-11-17 | Stop reason: SDUPTHER

## 2020-09-17 NOTE — TELEPHONE ENCOUNTER
----- Message from Katerina Owen sent at 9/17/2020 10:33 AM PDT -----  Regarding: Prescription Question  Contact: [phone]  Can my adderall prescription be transmitted to [address] [facility]? The [address] one will not have it until next week. Thank you.

## 2020-11-17 DIAGNOSIS — F90.2 ATTENTION DEFICIT HYPERACTIVITY DISORDER (ADHD), COMBINED TYPE: ICD-10-CM

## 2020-11-17 RX ORDER — DEXTROAMPHETAMINE SACCHARATE, AMPHETAMINE ASPARTATE, DEXTROAMPHETAMINE SULFATE AND AMPHETAMINE SULFATE 3.75; 3.75; 3.75; 3.75 MG/1; MG/1; MG/1; MG/1
15 TABLET ORAL 2 TIMES DAILY
Qty: 60 TAB | Refills: 0 | Status: SHIPPED | OUTPATIENT
Start: 2020-11-17 | End: 2020-12-16

## 2020-12-15 ENCOUNTER — APPOINTMENT (OUTPATIENT)
Dept: MEDICAL GROUP | Facility: LAB | Age: 35
End: 2020-12-15
Payer: COMMERCIAL

## 2020-12-16 ENCOUNTER — HOSPITAL ENCOUNTER (OUTPATIENT)
Facility: MEDICAL CENTER | Age: 35
End: 2020-12-16
Attending: NURSE PRACTITIONER
Payer: COMMERCIAL

## 2020-12-16 ENCOUNTER — OFFICE VISIT (OUTPATIENT)
Dept: MEDICAL GROUP | Facility: LAB | Age: 35
End: 2020-12-16

## 2020-12-16 VITALS
DIASTOLIC BLOOD PRESSURE: 86 MMHG | HEART RATE: 88 BPM | RESPIRATION RATE: 12 BRPM | WEIGHT: 181 LBS | BODY MASS INDEX: 27.43 KG/M2 | HEIGHT: 68 IN | OXYGEN SATURATION: 98 % | TEMPERATURE: 97.7 F | SYSTOLIC BLOOD PRESSURE: 130 MMHG

## 2020-12-16 DIAGNOSIS — Z12.4 SCREENING FOR MALIGNANT NEOPLASM OF CERVIX: ICD-10-CM

## 2020-12-16 DIAGNOSIS — E66.3 OVERWEIGHT (BMI 25.0-29.9): ICD-10-CM

## 2020-12-16 DIAGNOSIS — Z23 NEED FOR VACCINATION: ICD-10-CM

## 2020-12-16 DIAGNOSIS — Z11.3 SCREEN FOR STD (SEXUALLY TRANSMITTED DISEASE): ICD-10-CM

## 2020-12-16 DIAGNOSIS — Z12.31 ENCOUNTER FOR SCREENING MAMMOGRAM FOR BREAST CANCER: ICD-10-CM

## 2020-12-16 DIAGNOSIS — Z00.00 PREVENTATIVE HEALTH CARE: ICD-10-CM

## 2020-12-16 DIAGNOSIS — Z01.419 ENCOUNTER FOR GYNECOLOGICAL EXAMINATION: ICD-10-CM

## 2020-12-16 PROCEDURE — 99395 PREV VISIT EST AGE 18-39: CPT | Mod: 25 | Performed by: NURSE PRACTITIONER

## 2020-12-16 PROCEDURE — 99000 SPECIMEN HANDLING OFFICE-LAB: CPT | Performed by: NURSE PRACTITIONER

## 2020-12-16 PROCEDURE — 87591 N.GONORRHOEAE DNA AMP PROB: CPT

## 2020-12-16 PROCEDURE — 88175 CYTOPATH C/V AUTO FLUID REDO: CPT

## 2020-12-16 PROCEDURE — 90471 IMMUNIZATION ADMIN: CPT | Performed by: NURSE PRACTITIONER

## 2020-12-16 PROCEDURE — 90686 IIV4 VACC NO PRSV 0.5 ML IM: CPT | Performed by: NURSE PRACTITIONER

## 2020-12-16 PROCEDURE — 87491 CHLMYD TRACH DNA AMP PROBE: CPT

## 2020-12-16 RX ORDER — PHENTERMINE HYDROCHLORIDE 37.5 MG/1
37.5 TABLET ORAL
Qty: 30 TAB | Refills: 2 | Status: SHIPPED | OUTPATIENT
Start: 2020-12-16 | End: 2021-01-15

## 2020-12-16 ASSESSMENT — FIBROSIS 4 INDEX: FIB4 SCORE: 0.61

## 2020-12-16 NOTE — PROGRESS NOTES
Chief Complaint   Patient presents with   • Annual Exam     PAP       HPI:  Katerina is a 35 y.o.  female who presents for annual exam. Generally the patient is feeling good. She has no complaints or concerns.  Going through a divorce.  Requesting STD screening.  Denies pelvic pain or abnormal vaginal discharge.  Regarding her health maintenance:   Last pap: 2017  No abnormal pap history.  Denies vaginal pain, rashes, abnormal d/c.    Menses regular  Has had a tubal ligation for contraception  Last Mammo:  2018 - due again this year  Last colonoscopy:not applicable.  Denies bowel issues.  Chronic hernia, followed by general surgery.  Bone density test:N\A   Last Lab: due for follow up   Last Td:current   Influenza vaccination:current   Pneumococcal vaccination:not applicable   Hx STD''s: no   Regular exercise: yes  Concerned about her weight, has responded well to phentermine in the past.  Would like to stop Adderall and try phentermine but for both attention deficit disorder and her weight.  Would like to get down to 150 pounds.    meds:   None right now    Allergies: No Known Allergies    family:   Family History   Problem Relation Age of Onset   • Heart Disease Father         heart failure smoker    • Hypertension Father    • Cancer Maternal Grandfather         pancreastic   • Heart Disease Paternal Grandfather         heart attack       social hx:   Social History     Socioeconomic History   • Marital status:      Spouse name: Not on file   • Number of children: Not on file   • Years of education: Not on file   • Highest education level: Not on file   Occupational History   • Not on file   Social Needs   • Financial resource strain: Not on file   • Food insecurity     Worry: Not on file     Inability: Not on file   • Transportation needs     Medical: Not on file     Non-medical: Not on file   Tobacco Use   • Smoking status: Former Smoker     Packs/day: 0.25     Years: 6.00     Pack years: 1.50     Quit  "date: 2014     Years since quittin.9   • Smokeless tobacco: Current User     Last attempt to quit: 10/30/2014   • Tobacco comment: 2017 currently Vape   Substance and Sexual Activity   • Alcohol use: No     Alcohol/week: 0.0 oz   • Drug use: No   • Sexual activity: Not Currently     Partners: Male     Birth control/protection: Surgical     Comment: ; three kids; wk: not working   Lifestyle   • Physical activity     Days per week: Not on file     Minutes per session: Not on file   • Stress: Not on file   Relationships   • Social connections     Talks on phone: Not on file     Gets together: Not on file     Attends [employer] service: Not on file     Active member of club or organization: Not on file     Attends meetings of clubs or organizations: Not on file     Relationship status: Not on file   • Intimate partner violence     Fear of current or ex partner: Not on file     Emotionally abused: Not on file     Physically abused: Not on file     Forced sexual activity: Not on file   Other Topics Concern   • Not on file   Social History Narrative   • Not on file       ROS:  No fever, chills, sweats.   No polydipsia, polyuria, temperature intolerance, significant weight changes   No visual changes, blurred vision.  No chest pain, palpitations, peripheral swelling   No chronic cough, shortness of breath, dyspnea with exertion.   No dysphagia, odynophagia, black or bloody stools.   No abdominal pain, nausea, persistent diarrhea, constipation   No dysuria, hematuria, incontinence. Denies nocturia  No rash, pruritis, pigment changes.   No focal weakness, syncope, headache, confusion, persistent numbness.   All other systems are reviewed and negative.    PHYSICAL EXAMINATION:  /86 (BP Location: Left arm, Patient Position: Sitting, BP Cuff Size: Large adult)   Pulse 88   Temp 36.5 °C (97.7 °F)   Resp 12   Ht 1.727 m (5' 8\")   Wt 82.1 kg (181 lb)   SpO2 98%   General appearance:healthy, well " developed, well nourished  Psych: alert, no distress, cooperative  Eyes: EOM's normal, pupils equal, round, reactive to light  ENT: Ears: external ears normal to inspection and palpation, TM's clear, Nose/Sinuses: nose shows no deformity, asymmetry, or inflammation  Neck: no asymmetry, masses, or scars, no adenopathy, thyroid normal to palpation  Lungs:chest symmetric with normal A/P diameter, no chest deformities noted, normal respiratory rate and rhythm  Cardiovascular:regular rate and rhythm, S1 normal  Breasts: Declined, stating she sees general surgery regularly and is due for a mammogram.  Denies breast pain.  Abdomen: Soft.  Nontender.  Musculoskeletal:ROM of all joints is normal, no evidence of joint instability  Lymphatic: None significantly enlarged  Skin: no rash, no edema  Neuro: mental status intact, cranial nerves 2-12 intact  Pelvic: external genitalia normal, cervix normal in appearance, bimanual exam reveals normal uterus, adnexa without masses or tenderness, vaginal mucosa normal      ASSESSMENT/PLAN:  1.annual physical exam: HCM: Pap completed.  BSE technique reviewed and patient encouraged to perform self-exam monthly.   Encourage monthly self breast exam  Encourage daily exercise for at least 30 minutes  Recommend mammogram considering her history.  Recommend 1500 mg Calcium with 600 units vit d daily.    Pap smears every 3 years of today's is normal.  Full STD panel included, GC/chlamydia on Pap and HIV/RPR/hep C on labs.  Recommend CBC, CMP, TSH, LP - fasting.  Prescribed phentermine 37.5 mg and reminded her potential side effects as well as when to take this medication.  Stopped Adderall.  Follow-up 3 months.  Discussed the importance of exercise and a low carbohydrate/lean protein/high-fiber diet.  Reviewed her  profile which shows that all controlled substances have been by myself, last Adderall prescription 1 month ago.

## 2020-12-17 DIAGNOSIS — Z12.4 SCREENING FOR MALIGNANT NEOPLASM OF CERVIX: ICD-10-CM

## 2020-12-18 LAB
C TRACH DNA GENITAL QL NAA+PROBE: NEGATIVE
CYTOLOGY REG CYTOL: NORMAL
N GONORRHOEA DNA GENITAL QL NAA+PROBE: NEGATIVE
SPECIMEN SOURCE: NORMAL

## 2021-01-11 RX ORDER — CLINDAMYCIN HYDROCHLORIDE 300 MG/1
300 CAPSULE ORAL 2 TIMES DAILY
Qty: 14 CAP | Refills: 0 | Status: SHIPPED | OUTPATIENT
Start: 2021-01-11 | End: 2021-02-19

## 2021-02-19 ENCOUNTER — OFFICE VISIT (OUTPATIENT)
Dept: URGENT CARE | Facility: PHYSICIAN GROUP | Age: 36
End: 2021-02-19
Payer: COMMERCIAL

## 2021-02-19 ENCOUNTER — HOSPITAL ENCOUNTER (OUTPATIENT)
Facility: MEDICAL CENTER | Age: 36
End: 2021-02-19
Attending: PHYSICIAN ASSISTANT
Payer: COMMERCIAL

## 2021-02-19 VITALS
BODY MASS INDEX: 26.07 KG/M2 | HEART RATE: 86 BPM | TEMPERATURE: 97.8 F | WEIGHT: 172 LBS | OXYGEN SATURATION: 97 % | DIASTOLIC BLOOD PRESSURE: 92 MMHG | RESPIRATION RATE: 14 BRPM | SYSTOLIC BLOOD PRESSURE: 142 MMHG | HEIGHT: 68 IN

## 2021-02-19 DIAGNOSIS — J02.9 PHARYNGITIS, UNSPECIFIED ETIOLOGY: ICD-10-CM

## 2021-02-19 DIAGNOSIS — J06.9 VIRAL URI: ICD-10-CM

## 2021-02-19 LAB
COVID ORDER STATUS COVID19: NORMAL
INT CON NEG: NEGATIVE
INT CON POS: POSITIVE
S PYO AG THROAT QL: NEGATIVE
SARS-COV-2 RNA RESP QL NAA+PROBE: NOTDETECTED
SPECIMEN SOURCE: NORMAL

## 2021-02-19 PROCEDURE — U0005 INFEC AGEN DETEC AMPLI PROBE: HCPCS

## 2021-02-19 PROCEDURE — U0003 INFECTIOUS AGENT DETECTION BY NUCLEIC ACID (DNA OR RNA); SEVERE ACUTE RESPIRATORY SYNDROME CORONAVIRUS 2 (SARS-COV-2) (CORONAVIRUS DISEASE [COVID-19]), AMPLIFIED PROBE TECHNIQUE, MAKING USE OF HIGH THROUGHPUT TECHNOLOGIES AS DESCRIBED BY CMS-2020-01-R: HCPCS

## 2021-02-19 PROCEDURE — 87880 STREP A ASSAY W/OPTIC: CPT | Performed by: PHYSICIAN ASSISTANT

## 2021-02-19 PROCEDURE — 99213 OFFICE O/P EST LOW 20 MIN: CPT | Performed by: PHYSICIAN ASSISTANT

## 2021-02-19 ASSESSMENT — ENCOUNTER SYMPTOMS
NAUSEA: 0
WHEEZING: 0
HEADACHES: 1
FEVER: 0
MYALGIAS: 0
EYE PAIN: 0
SINUS PAIN: 0
DIZZINESS: 0
RHINORRHEA: 0
PALPITATIONS: 0
DIARRHEA: 1
BLURRED VISION: 0
SORE THROAT: 1
VOMITING: 0
SWOLLEN GLANDS: 1
ABDOMINAL PAIN: 1
CHILLS: 0
COUGH: 1

## 2021-02-19 ASSESSMENT — FIBROSIS 4 INDEX: FIB4 SCORE: 0.61

## 2021-02-19 NOTE — PROGRESS NOTES
"Subjective:      Katerina Murry Brayden is a 35 y.o. female who presents with Pharyngitis (sx started yesterday with a sore throat, cough, headache and diarrhea. Pt is around people constantly and doesn't know of any exposure. )      URI   This is a new problem. The current episode started yesterday. The problem has been unchanged. There has been no fever. Associated symptoms include abdominal pain, congestion, coughing, diarrhea, headaches, a sore throat and swollen glands. Pertinent negatives include no chest pain, ear pain, joint pain, nausea, plugged ear sensation, rash, rhinorrhea, sinus pain, sneezing, vomiting or wheezing. She has tried nothing for the symptoms.       Review of Systems   Constitutional: Positive for malaise/fatigue. Negative for chills and fever.   HENT: Positive for congestion and sore throat. Negative for ear pain, rhinorrhea, sinus pain and sneezing.    Eyes: Negative for blurred vision and pain.   Respiratory: Positive for cough. Negative for wheezing.    Cardiovascular: Negative for chest pain and palpitations.   Gastrointestinal: Positive for abdominal pain and diarrhea. Negative for nausea and vomiting.   Musculoskeletal: Negative for joint pain and myalgias.   Skin: Negative for rash.   Neurological: Positive for headaches. Negative for dizziness.       PMH:  has a past medical history of ADHD, Arthritis, Bowel habit changes, Chlamydia (5/11/2011), depression, Heart burn, HTN (hypertension), Migraine, Pain, Pre-eclampsia, PTSD (post-traumatic stress disorder), and Staph infection (2011). She also has no past medical history of Muscle disorder, OSTEOPOROSIS, or Pituitary disease (Formerly [facility]).  MEDS:   Current Outpatient Medications:   •  clindamycin (CLEOCIN) 300 MG Cap, Take 1 Cap by mouth 2 times a day. (Patient not taking: Reported on 2/19/2021), Disp: 14 Cap, Rfl: 0  ALLERGIES:   Allergies   Allergen Reactions   • Morphine Nausea     \"feel sick for a whole day after being given " "morphine\"  RXN=2016     • Vicodin [Hydrocodone-Acetaminophen] Vomiting and Nausea     [id number]     SURGHX:   Past Surgical History:   Procedure Laterality Date   • APPENDECTOMY LAPAROSCOPIC  2018    Procedure: APPENDECTOMY LAPAROSCOPIC;  Surgeon: Izabel Salomon M.D.;  Location: SURGERY [facility];  Service: Gen Robotic   • VENTRAL HERNIA REPAIR Right 1/10/2018    Procedure: VENTRAL HERNIA REPAIR- FOR INCISIONAL HERNIA REDUCIBLE  WITH MESH;  Surgeon: Gisele Delcid M.D.;  Location: SURGERY [facility];  Service: General   • BREAST BIOPSY Left 2016    Procedure: BREAST BIOPSY Excisional;  Surgeon: Gisele Delcid M.D.;  Location: SURGERY [facility];  Service:    • LUMPECTOMY  2014    both breasts - Dr. Garsia - [facility] 10/2014   • REPEAT C SECTION W TUBAL LIGATION  2011    Performed by AYAAN MERCHANT at LABOR AND DELIVERY   • SEPTOTURBINOPLASTY  2009    Performed by CHRISTIAN YEN at SURGERY SAME DAY [facility]   • PRIMARY C SECTION  Aug 7, 2007     preeclampsia     SOCHX:  reports that she quit smoking about 6 years ago. She has a 1.50 pack-year smoking history. She uses smokeless tobacco. She reports that she does not drink alcohol and does not use drugs.  FH: Family history was reviewed, no pertinent findings to report     Objective:     /92 (BP Location: Right arm, Patient Position: Sitting, BP Cuff Size: Adult)   Pulse 86   Temp 36.6 °C (97.8 °F) (Temporal)   Resp 14   Ht 1.727 m (5' 8\")   Wt 78 kg (172 lb)   LMP 2021   SpO2 97%   BMI 26.15 kg/m²      Physical Exam  Constitutional:       Appearance: She is well-developed.   HENT:      Head: Normocephalic and atraumatic.      Right Ear: Ear canal and external ear normal.      Left Ear: Tympanic membrane, ear canal and external ear normal.      Nose: Mucosal edema and congestion present. No rhinorrhea.      Mouth/Throat:      Lips: Pink.      Mouth: Mucous membranes are moist.      Pharynx: Uvula " midline. Posterior oropharyngeal erythema present. No oropharyngeal exudate or uvula swelling.      Tonsils: No tonsillar exudate or tonsillar abscesses.   Eyes:      Conjunctiva/sclera: Conjunctivae normal.      Pupils: Pupils are equal, round, and reactive to light.   Cardiovascular:      Rate and Rhythm: Normal rate and regular rhythm.      Heart sounds: Normal heart sounds. No murmur.   Pulmonary:      Effort: Pulmonary effort is normal.      Breath sounds: Normal breath sounds. No wheezing.   Musculoskeletal:      Cervical back: Normal range of motion.   Lymphadenopathy:      Cervical: Cervical adenopathy present.   Skin:     General: Skin is warm and dry.      Capillary Refill: Capillary refill takes less than 2 seconds.   Neurological:      Mental Status: She is alert and oriented to person, place, and time.   Psychiatric:         Behavior: Behavior normal.         Judgment: Judgment normal.         POCT Rapid Strep A - Negative     Assessment/Plan:     1. Pharyngitis, unspecified etiology  - POCT Rapid Strep A  - COVID/SARS CoV-2 PCR; Future  -Supportive care discussed to include salt water gargles, throat lozenges, and increased fluid intake    2. Viral URI  - COVID/SARS CoV-2 PCR; Future  - OTC cold/flu medications  - PO fluids  - Rest  - Tylenol or ibuprofen as needed for fever > 100.4 F      *Patient had a nasal swab to test for COVID-19 virus.  Patient was advised to stay home and self isolate/self quarantine while awaiting the results.  Supportive care was reiterated.  Return/ER precautions discussed.         *Discussed with patient that I did not recommend that we test for COVID-19 virus today as she has had symptoms for less than 24 hours.  Patient wished to proceed anyways.  Discussed that there is a higher chance of having a false negative result if we test this early and if her symptoms are not improved after a few days she will likely need repeat testing if her initial test comes back negative.   Discussed option of her doing a virtual visit at that time if needed.              Differential Diagnosis, natural history, and supportive care discussed. Return to the Urgent Care or follow up with your PCP if symptoms fail to resolve, or for any new or worsening symptoms. Emergency room precautions discussed. Patient and/or family appears understanding of information.

## 2021-02-19 NOTE — LETTER
February 19, 2021     Patient: Katerina Owen   YOB: 1985   Date of Visit: 2/19/2021       To Whom it May Concern:    Katerina Owen was seen in my clinic on 2/19/2021. A concern for COVID-19 has been identified and testing is in progress. They will be able to access their results through our electronic delivery system called [employer].     We are asking you to excuse absences while they follow self-isolation protocol per CDC guidelines.   • 10 days since symptoms first appeared and   • 24 hours with no fever without the use of fever-reducing medications and   • Other symptoms of COVID-19 are improving*  *Loss of taste and smell may persist for weeks or months after recovery and need not delay the end of isolation    Most people do not require testing to decide when they can be around others. If results are negative your employee must continue to follow the self-isolation protocol.    If the results of testing are positive then they will be contacted by the [facility] for further instructions on duration of self-isolation and return to work protocol. In general this will also follow the CDC guidelines with a minimum of 10 days from the onset with improving symptoms and no fever.    This is the only note that will be provided from [facility] for this visit. Please schedule a visit with primary care if documents for FMLA, disability, or unemployment are required.       If you have any questions or concerns, please don't hesitate to call.        Sincerely,           Marylu Reese P.A.-C.  Electronically Signed

## 2021-02-22 ENCOUNTER — APPOINTMENT (OUTPATIENT)
Dept: RADIOLOGY | Facility: MEDICAL CENTER | Age: 36
End: 2021-02-22
Attending: EMERGENCY MEDICINE
Payer: COMMERCIAL

## 2021-02-22 ENCOUNTER — HOSPITAL ENCOUNTER (EMERGENCY)
Facility: MEDICAL CENTER | Age: 36
End: 2021-02-22
Attending: EMERGENCY MEDICINE
Payer: COMMERCIAL

## 2021-02-22 VITALS
RESPIRATION RATE: 19 BRPM | OXYGEN SATURATION: 98 % | WEIGHT: 175 LBS | TEMPERATURE: 97 F | BODY MASS INDEX: 26.52 KG/M2 | HEART RATE: 76 BPM | SYSTOLIC BLOOD PRESSURE: 160 MMHG | DIASTOLIC BLOOD PRESSURE: 94 MMHG | HEIGHT: 68 IN

## 2021-02-22 DIAGNOSIS — R00.2 PALPITATIONS: ICD-10-CM

## 2021-02-22 DIAGNOSIS — R10.31 RIGHT LOWER QUADRANT ABDOMINAL PAIN: ICD-10-CM

## 2021-02-22 DIAGNOSIS — N83.201 CYST OF RIGHT OVARY: ICD-10-CM

## 2021-02-22 LAB
ALBUMIN SERPL BCP-MCNC: 4.7 G/DL (ref 3.2–4.9)
ALBUMIN/GLOB SERPL: 1.4 G/DL
ALP SERPL-CCNC: 84 U/L (ref 30–99)
ALT SERPL-CCNC: 22 U/L (ref 2–50)
ANION GAP SERPL CALC-SCNC: 11 MMOL/L (ref 7–16)
APPEARANCE UR: CLEAR
AST SERPL-CCNC: 22 U/L (ref 12–45)
BASOPHILS # BLD AUTO: 0.5 % (ref 0–1.8)
BASOPHILS # BLD: 0.05 K/UL (ref 0–0.12)
BILIRUB SERPL-MCNC: 0.3 MG/DL (ref 0.1–1.5)
BILIRUB UR QL STRIP.AUTO: NEGATIVE
BUN SERPL-MCNC: 9 MG/DL (ref 8–22)
CALCIUM SERPL-MCNC: 9.6 MG/DL (ref 8.5–10.5)
CHLORIDE SERPL-SCNC: 101 MMOL/L (ref 96–112)
CO2 SERPL-SCNC: 23 MMOL/L (ref 20–33)
COLOR UR: YELLOW
CREAT SERPL-MCNC: 0.74 MG/DL (ref 0.5–1.4)
EKG IMPRESSION: NORMAL
EOSINOPHIL # BLD AUTO: 0.02 K/UL (ref 0–0.51)
EOSINOPHIL NFR BLD: 0.2 % (ref 0–6.9)
ERYTHROCYTE [DISTWIDTH] IN BLOOD BY AUTOMATED COUNT: 49.1 FL (ref 35.9–50)
GLOBULIN SER CALC-MCNC: 3.4 G/DL (ref 1.9–3.5)
GLUCOSE SERPL-MCNC: 96 MG/DL (ref 65–99)
GLUCOSE UR STRIP.AUTO-MCNC: NEGATIVE MG/DL
HCG UR QL: NEGATIVE
HCT VFR BLD AUTO: 41.4 % (ref 37–47)
HGB BLD-MCNC: 12.6 G/DL (ref 12–16)
IMM GRANULOCYTES # BLD AUTO: 0.05 K/UL (ref 0–0.11)
IMM GRANULOCYTES NFR BLD AUTO: 0.5 % (ref 0–0.9)
KETONES UR STRIP.AUTO-MCNC: NEGATIVE MG/DL
LEUKOCYTE ESTERASE UR QL STRIP.AUTO: NEGATIVE
LYMPHOCYTES # BLD AUTO: 2.38 K/UL (ref 1–4.8)
LYMPHOCYTES NFR BLD: 21.9 % (ref 22–41)
MCH RBC QN AUTO: 25 PG (ref 27–33)
MCHC RBC AUTO-ENTMCNC: 30.4 G/DL (ref 33.6–35)
MCV RBC AUTO: 82.1 FL (ref 81.4–97.8)
MICRO URNS: NORMAL
MONOCYTES # BLD AUTO: 0.64 K/UL (ref 0–0.85)
MONOCYTES NFR BLD AUTO: 5.9 % (ref 0–13.4)
NEUTROPHILS # BLD AUTO: 7.74 K/UL (ref 2–7.15)
NEUTROPHILS NFR BLD: 71 % (ref 44–72)
NITRITE UR QL STRIP.AUTO: NEGATIVE
NRBC # BLD AUTO: 0 K/UL
NRBC BLD-RTO: 0 /100 WBC
PH UR STRIP.AUTO: 6 [PH] (ref 5–8)
PLATELET # BLD AUTO: 323 K/UL (ref 164–446)
PMV BLD AUTO: 10.5 FL (ref 9–12.9)
POTASSIUM SERPL-SCNC: 3.5 MMOL/L (ref 3.6–5.5)
PROT SERPL-MCNC: 8.1 G/DL (ref 6–8.2)
PROT UR QL STRIP: NEGATIVE MG/DL
RBC # BLD AUTO: 5.04 M/UL (ref 4.2–5.4)
RBC UR QL AUTO: NEGATIVE
SODIUM SERPL-SCNC: 135 MMOL/L (ref 135–145)
SP GR UR STRIP.AUTO: >=1.03
TROPONIN T SERPL-MCNC: <6 NG/L (ref 6–19)
UROBILINOGEN UR STRIP.AUTO-MCNC: 0.2 MG/DL
WBC # BLD AUTO: 10.9 K/UL (ref 4.8–10.8)

## 2021-02-22 PROCEDURE — 96374 THER/PROPH/DIAG INJ IV PUSH: CPT

## 2021-02-22 PROCEDURE — 85025 COMPLETE CBC W/AUTO DIFF WBC: CPT

## 2021-02-22 PROCEDURE — 81025 URINE PREGNANCY TEST: CPT

## 2021-02-22 PROCEDURE — 80053 COMPREHEN METABOLIC PANEL: CPT

## 2021-02-22 PROCEDURE — 76856 US EXAM PELVIC COMPLETE: CPT

## 2021-02-22 PROCEDURE — 93005 ELECTROCARDIOGRAM TRACING: CPT | Performed by: EMERGENCY MEDICINE

## 2021-02-22 PROCEDURE — 96375 TX/PRO/DX INJ NEW DRUG ADDON: CPT

## 2021-02-22 PROCEDURE — 74177 CT ABD & PELVIS W/CONTRAST: CPT

## 2021-02-22 PROCEDURE — 700111 HCHG RX REV CODE 636 W/ 250 OVERRIDE (IP): Performed by: EMERGENCY MEDICINE

## 2021-02-22 PROCEDURE — 81003 URINALYSIS AUTO W/O SCOPE: CPT

## 2021-02-22 PROCEDURE — 84484 ASSAY OF TROPONIN QUANT: CPT

## 2021-02-22 PROCEDURE — 94760 N-INVAS EAR/PLS OXIMETRY 1: CPT

## 2021-02-22 PROCEDURE — 93005 ELECTROCARDIOGRAM TRACING: CPT

## 2021-02-22 PROCEDURE — 99285 EMERGENCY DEPT VISIT HI MDM: CPT

## 2021-02-22 PROCEDURE — 700117 HCHG RX CONTRAST REV CODE 255: Performed by: EMERGENCY MEDICINE

## 2021-02-22 PROCEDURE — 700105 HCHG RX REV CODE 258: Performed by: EMERGENCY MEDICINE

## 2021-02-22 RX ORDER — PHENTERMINE HYDROCHLORIDE 37.5 MG/1
37.5 CAPSULE ORAL
COMMUNITY
End: 2021-03-19 | Stop reason: SDUPTHER

## 2021-02-22 RX ORDER — ONDANSETRON 2 MG/ML
4 INJECTION INTRAMUSCULAR; INTRAVENOUS ONCE
Status: COMPLETED | OUTPATIENT
Start: 2021-02-22 | End: 2021-02-22

## 2021-02-22 RX ORDER — SODIUM CHLORIDE, SODIUM LACTATE, POTASSIUM CHLORIDE, CALCIUM CHLORIDE 600; 310; 30; 20 MG/100ML; MG/100ML; MG/100ML; MG/100ML
1000 INJECTION, SOLUTION INTRAVENOUS ONCE
Status: COMPLETED | OUTPATIENT
Start: 2021-02-22 | End: 2021-02-22

## 2021-02-22 RX ORDER — IBUPROFEN 200 MG
600 TABLET ORAL
Status: SHIPPED | COMMUNITY
End: 2022-07-23

## 2021-02-22 RX ADMIN — IOHEXOL 100 ML: 350 INJECTION, SOLUTION INTRAVENOUS at 05:24

## 2021-02-22 RX ADMIN — SODIUM CHLORIDE, POTASSIUM CHLORIDE, SODIUM LACTATE AND CALCIUM CHLORIDE 1000 ML: 600; 310; 30; 20 INJECTION, SOLUTION INTRAVENOUS at 04:45

## 2021-02-22 RX ADMIN — FENTANYL CITRATE 50 MCG: 50 INJECTION, SOLUTION INTRAMUSCULAR; INTRAVENOUS at 05:00

## 2021-02-22 RX ADMIN — ONDANSETRON 4 MG: 2 INJECTION INTRAMUSCULAR; INTRAVENOUS at 05:00

## 2021-02-22 ASSESSMENT — ENCOUNTER SYMPTOMS
BLOOD IN STOOL: 0
BACK PAIN: 0
FEVER: 0
VOMITING: 0
SHORTNESS OF BREATH: 0
HEADACHES: 0
FLANK PAIN: 0
NAUSEA: 1
PALPITATIONS: 1
CHILLS: 0
DIZZINESS: 1
DIARRHEA: 1
COUGH: 0
ABDOMINAL PAIN: 1

## 2021-02-22 ASSESSMENT — PAIN DESCRIPTION - DESCRIPTORS: DESCRIPTORS: ACHING;DULL

## 2021-02-22 ASSESSMENT — FIBROSIS 4 INDEX: FIB4 SCORE: 0.61

## 2021-02-22 NOTE — ED TRIAGE NOTES
Katerina Murry [facility]  35 y.o.  female  Chief Complaint   Patient presents with   • Abdominal Pain     c/o lower abdominal pain x 2 days. + nausea. denies vomiting. Hx hernia repair x 2   • Chest Pressure     c/o fluttery feeling on her chest since 11 pm

## 2021-02-22 NOTE — ED PROVIDER NOTES
ED Provider Note    ED Provider Note    Primary care provider: DELMA Vines  Means of arrival: POV  History obtained from: patient  History limited by: None    CHIEF COMPLAINT  Chief Complaint   Patient presents with   • Abdominal Pain     c/o lower abdominal pain x 2 days. + nausea. denies vomiting. Hx hernia repair x 2   • Chest Pressure     c/o fluttery feeling on her chest since 11 pm       HPI  Katerina Owen is a 35 y.o. female who presents to the Emergency Department with a chief complaint of abdominal pain, right lower quadrant that started while at work.  She states she started feeling dizzy, sweaty with palpitations prompting her to leave work and come here for evaluation.  She states she felt like her heart was pausing.  This has since resolved.  She denies having associated chest pain or shortness of breath.  Has a history of hernia repair and subsequently having an appendicitis in the same area of her abdomen 2 weeks later and then another hernia repair about 6 months later.  Tonight she complains of nausea but no vomiting.  She had diarrhea and a sore throat on Friday last week and was tested for Covid which was negative.  Her diarrhea and sore throat have since resolved.  She denies any urinary complaints.  She denies the possibility of pregnancy, stating that she has had her tubes tied and she had a normal period on February 2.  She denies vaginal itching, odor or discharge.  She reports that the pain feels familiar, similar to symptoms she has had in the past, related to hernias.    REVIEW OF SYSTEMS  Review of Systems   Constitutional: Negative for chills and fever.   HENT: Negative for congestion.    Respiratory: Negative for cough and shortness of breath.    Cardiovascular: Positive for palpitations.   Gastrointestinal: Positive for abdominal pain, diarrhea and nausea. Negative for blood in stool and vomiting.   Genitourinary: Negative for dysuria, flank pain and urgency.  "  Musculoskeletal: Negative for back pain.   Neurological: Positive for dizziness. Negative for headaches.   All other systems reviewed and are negative.      PAST MEDICAL HISTORY   has a past medical history of ADHD, Arthritis, Bowel habit changes, Chlamydia (2011), depression, Heart burn, HTN (hypertension), Migraine, Pain, Pre-eclampsia, PTSD (post-traumatic stress disorder), and Staph infection ().    SURGICAL HISTORY   has a past surgical history that includes septoturbinoplasty (2009); lumpectomy (); breast biopsy (Left, 2016); primary c section (Aug 7, 2007); repeat c section w tubal ligation (2011); ventral hernia repair (Right, 1/10/2018); and appendectomy laparoscopic (2018).    SOCIAL HISTORY  Social History     Tobacco Use   • Smoking status: Former Smoker     Packs/day: 0.25     Years: 6.00     Pack years: 1.50     Quit date: 2014     Years since quittin.1   • Smokeless tobacco: Current User     Last attempt to quit: 10/30/2014   • Tobacco comment: 2017 currently Vape   Substance Use Topics   • Alcohol use: No     Alcohol/week: 0.0 oz   • Drug use: No      Social History     Substance and Sexual Activity   Drug Use No       FAMILY HISTORY  Family History   Problem Relation Age of Onset   • Heart Disease Father         heart failure smoker    • Hypertension Father    • Cancer Maternal Grandfather         pancreastic   • Heart Disease Paternal Grandfather         heart attack       CURRENT MEDICATIONS  Home Medications     Reviewed by Deion Partida (Pharmacy Tech) on 21 at 0459  Med List Status: Complete   Medication Last Dose Status   ibuprofen (MOTRIN) 200 MG Tab >2 days Active   Multiple Vitamin (MULTIVITAMIN PO) 2021 Active   phentermine 37.5 MG capsule 2021 Active                ALLERGIES  Allergies   Allergen Reactions   • Morphine Nausea     \"feel sick for a whole day after being given morphine\"  RXN=2016     • Vicodin " "[Hydrocodone-Acetaminophen] Vomiting and Nausea     UUF=4337       PHYSICAL EXAM  VITAL SIGNS: /94   Pulse 76   Temp 36.1 °C (97 °F) (Temporal)   Resp 19   Ht 1.727 m (5' 8\")   Wt 79.4 kg (175 lb)   LMP 02/02/2021   SpO2 98%   BMI 26.61 kg/m²   Vitals reviewed.  Constitutional: Patient is oriented to person, place, and time. Appears well-developed and well-nourished. Mild distress.    Head: Normocephalic and atraumatic.   Ears: Normal external ears bilaterally.   Mouth/Throat: Oropharynx is clear and moist  Eyes: Conjunctivae are normal. Pupils are equal, round, and reactive to light.   Neck: Normal range of motion.   Cardiovascular: Normal rate, regular rhythm and normal heart sounds. Normal peripheral pulses.  Pulmonary/Chest: Effort normal and breath sounds normal. No respiratory distress, no wheezes, rhonchi, or rales. No chest wall tenderness.  Abdominal: Soft. Bowel sounds are normal. There is RLQ tenderness. No rebound or guarding, or peritoneal signs. No CVA tenderness.  Musculoskeletal: No edema and no tenderness.   Neurological: No focal deficits.   Skin: Skin is warm and dry. No erythema. No pallor.   Psychiatric: Patient has a normal mood and affect.     LABS  Results for orders placed or performed during the hospital encounter of 02/22/21   CBC WITH DIFFERENTIAL   Result Value Ref Range    WBC 10.9 (H) 4.8 - 10.8 K/uL    RBC 5.04 4.20 - 5.40 M/uL    Hemoglobin 12.6 12.0 - 16.0 g/dL    Hematocrit 41.4 37.0 - 47.0 %    MCV 82.1 81.4 - 97.8 fL    MCH 25.0 (L) 27.0 - 33.0 pg    MCHC 30.4 (L) 33.6 - 35.0 g/dL    RDW 49.1 35.9 - 50.0 fL    Platelet Count 323 164 - 446 K/uL    MPV 10.5 9.0 - 12.9 fL    Neutrophils-Polys 71.00 44.00 - 72.00 %    Lymphocytes 21.90 (L) 22.00 - 41.00 %    Monocytes 5.90 0.00 - 13.40 %    Eosinophils 0.20 0.00 - 6.90 %    Basophils 0.50 0.00 - 1.80 %    Immature Granulocytes 0.50 0.00 - 0.90 %    Nucleated RBC 0.00 /100 WBC    Neutrophils (Absolute) 7.74 (H) 2.00 - " 7.15 K/uL    Lymphs (Absolute) 2.38 1.00 - 4.80 K/uL    Monos (Absolute) 0.64 0.00 - 0.85 K/uL    Eos (Absolute) 0.02 0.00 - 0.51 K/uL    Baso (Absolute) 0.05 0.00 - 0.12 K/uL    Immature Granulocytes (abs) 0.05 0.00 - 0.11 K/uL    NRBC (Absolute) 0.00 K/uL   COMP METABOLIC PANEL   Result Value Ref Range    Sodium 135 135 - 145 mmol/L    Potassium 3.5 (L) 3.6 - 5.5 mmol/L    Chloride 101 96 - 112 mmol/L    Co2 23 20 - 33 mmol/L    Anion Gap 11.0 7.0 - 16.0    Glucose 96 65 - 99 mg/dL    Bun 9 8 - 22 mg/dL    Creatinine 0.74 0.50 - 1.40 mg/dL    Calcium 9.6 8.5 - 10.5 mg/dL    AST(SGOT) 22 12 - 45 U/L    ALT(SGPT) 22 2 - 50 U/L    Alkaline Phosphatase 84 30 - 99 U/L    Total Bilirubin 0.3 0.1 - 1.5 mg/dL    Albumin 4.7 3.2 - 4.9 g/dL    Total Protein 8.1 6.0 - 8.2 g/dL    Globulin 3.4 1.9 - 3.5 g/dL    A-G Ratio 1.4 g/dL   TROPONIN   Result Value Ref Range    Troponin T <6 6 - 19 ng/L   URINALYSIS    Specimen: Urine, Clean Catch   Result Value Ref Range    Color Yellow     Character Clear     Specific Gravity >=1.030 <1.035    Ph 6.0 5.0 - 8.0    Glucose Negative Negative mg/dL    Ketones Negative Negative mg/dL    Protein Negative Negative mg/dL    Bilirubin Negative Negative    Urobilinogen, Urine 0.2 Negative    Nitrite Negative Negative    Leukocyte Esterase Negative Negative    Occult Blood Negative Negative    Micro Urine Req see below    BETA-HCG QUALITATIVE URINE   Result Value Ref Range    Beta-Hcg Urine Negative Negative   ESTIMATED GFR   Result Value Ref Range    GFR If African American >60 >60 mL/min/1.73 m 2    GFR If Non African American >60 >60 mL/min/1.73 m 2   EKG   Result Value Ref Range    Report       [facility] Emergency Dept.    Test Date:  2021  Pt Name:    MARS MACIAS                Department: ER  MRN:        [id number]                      Room:  Gender:     Female                       Technician: [id number]  :        [id number]                   Requested By:CHUN  TRIAGE PROTOCOL  Order #:    [id number]                    Reading MD: QUINN JULES DO    Measurements  Intervals                                Axis  Rate:       103                          P:          59  AL:         164                          QRS:        2  QRSD:       80                           T:          42  QT:         320  QTc:        419    Interpretive Statements  SINUS TACHYCARDIA  Compared to ECG 04/09/2018 20:59:28  No significant changes  Electronically Signed On 2- 4:28:34 PST by QUINN JULES DO         All labs reviewed by me.    EKG Interpretation  Interpreted by me    RADIOLOGY  US-PELVIC COMPLETE (TRANSABDOMINAL/TRANSVAGINAL) (COMBO)   Final Result      Unremarkable transvaginal appearance of the pelvis.      CT-ABDOMEN-PELVIS WITH   Final Result      1.  Interval appendectomy   2.  19 mm RIGHT adnexal cyst. Further gonadal assessment could be performed with ultrasound   3.  No evidence of abdominal or pelvic mass, adenopathy, or inflammatory change.                 The radiologist's interpretation of all radiological studies have been reviewed by me.    COURSE & MEDICAL DECISION MAKING  Pertinent Labs & Imaging studies reviewed. (See chart for details)    Obtained and reviewed past medical records.  Last encounter was an outpatient visit in the urgent care in February 19 for pharyngitis.  Prior to that patient was seen in the family medicine clinic for her annual exam and Pap smear.  Last ED visit was for abdominal pain in May 2019.  At that time, she was diagnosed with an iron inguinal hernia without obstruction.    4:26 AM - Patient seen and examined at bedside.  Is a pleasant 35-year-old female who presents with right lower quadrant abdominal pain associated with nausea.  She was having a symptoms of heart fluttering, palpitations and dizziness which has since resolved.  She has normal vital signs at this time.  I have ordered lab tests including CBC chemistry and urine  analysis.  Of ordered a CT scan to further evaluate her symptoms.    Differential diagnosis includes but is not limited to: Hernia, bowel obstruction, enteritis, constipation, less likely pregnancy.    6:34 AM data reviewed.  pregnancy test negative.  White blood cell count slightly elevated at 10.9.  Normal H&H without neutrophilic shift.  Chemistries only significant for a slightly low potassium of 3.5.  Trop is less than 6.  Is unrevealing.  CT shows a right-sided adnexal cyst recommending further evaluation with ultrasound.  Otherwise, unrevealing.  Discussed these findings and the plan of care with the patient.  She is resting and appears comfortable, is given an opportunity for questions, will reassess after her sound completed.    0820am patient is reevaluated at the bedside.  No new complaints.  She is resting and appears comfortable.  She does report continued pain though it bearable.  We discussed ultrasound findings which show a approximately 2 cm, right lower quadrant ovarian cyst without other concerning findings.  Patient notes she has a long history of GYN issues in her family.  She herself has had her tubes tied though she reports, that she has had this ongoing pain for years. She would like to have a hysterectomy.  At this point, no emergent intervention is necessary.  She does not have a gynecologist here in town she will be referred to the on-call physician, with whom she can discuss going forward and management of her pain.  Patient is agreeable to this plan of care.  She is well-appearing and nontoxic.    She will be discharged home in stable condition.    FINAL IMPRESSION  1. Cyst of right ovary    2. Right lower quadrant abdominal pain    3. Palpitations

## 2021-02-22 NOTE — ED NOTES
Assumed care of patient. Pt assessed, AAO x 4. Patient's concerns addressed.  Fall precautions in place.  Pt repositioned and comfortable.  Call light within reach. Will continue to monitor. This RN masked and in appropriate PPE during encounter.

## 2021-03-19 DIAGNOSIS — E66.3 OVERWEIGHT (BMI 25.0-29.9): ICD-10-CM

## 2021-03-19 RX ORDER — PHENTERMINE HYDROCHLORIDE 37.5 MG/1
37.5 CAPSULE ORAL
Qty: 15 CAPSULE | Refills: 0 | Status: SHIPPED | OUTPATIENT
Start: 2021-03-19 | End: 2021-04-01

## 2021-03-19 NOTE — TELEPHONE ENCOUNTER
----- Message from Katerina Owen sent at 3/19/2021 12:14 PM PDT -----  Regarding: Prescription Question  Contact: [phone]  Hello can you refill the phentermine? I scheduled a follow up for the first. Thank you for your time.

## 2021-03-19 NOTE — TELEPHONE ENCOUNTER
Received request via: Pharmacy    Was the patient seen in the last year in this department? Yes  12/16/20  Does the patient have an active prescription (recently filled or refills available) for medication(s) requested? No

## 2021-04-01 ENCOUNTER — OFFICE VISIT (OUTPATIENT)
Dept: MEDICAL GROUP | Facility: LAB | Age: 36
End: 2021-04-01
Payer: COMMERCIAL

## 2021-04-01 VITALS
HEIGHT: 68 IN | RESPIRATION RATE: 12 BRPM | WEIGHT: 171 LBS | OXYGEN SATURATION: 97 % | HEART RATE: 100 BPM | BODY MASS INDEX: 25.91 KG/M2 | SYSTOLIC BLOOD PRESSURE: 158 MMHG | DIASTOLIC BLOOD PRESSURE: 96 MMHG | TEMPERATURE: 97.9 F

## 2021-04-01 DIAGNOSIS — F41.1 GAD (GENERALIZED ANXIETY DISORDER): ICD-10-CM

## 2021-04-01 DIAGNOSIS — N92.0 MENORRHAGIA WITH REGULAR CYCLE: ICD-10-CM

## 2021-04-01 DIAGNOSIS — F43.10 PTSD (POST-TRAUMATIC STRESS DISORDER): ICD-10-CM

## 2021-04-01 DIAGNOSIS — F90.2 ATTENTION DEFICIT HYPERACTIVITY DISORDER (ADHD), COMBINED TYPE: ICD-10-CM

## 2021-04-01 DIAGNOSIS — N83.209 CYST OF OVARY, UNSPECIFIED LATERALITY: ICD-10-CM

## 2021-04-01 DIAGNOSIS — Z98.51 HISTORY OF TUBAL LIGATION: ICD-10-CM

## 2021-04-01 PROCEDURE — 99214 OFFICE O/P EST MOD 30 MIN: CPT | Performed by: NURSE PRACTITIONER

## 2021-04-01 RX ORDER — DEXTROAMPHETAMINE SACCHARATE, AMPHETAMINE ASPARTATE, DEXTROAMPHETAMINE SULFATE AND AMPHETAMINE SULFATE 3.75; 3.75; 3.75; 3.75 MG/1; MG/1; MG/1; MG/1
15 TABLET ORAL 2 TIMES DAILY
Qty: 60 TABLET | Refills: 0 | Status: SHIPPED | OUTPATIENT
Start: 2021-04-01 | End: 2021-04-06 | Stop reason: SDUPTHER

## 2021-04-01 ASSESSMENT — FIBROSIS 4 INDEX: FIB4 SCORE: 0.54

## 2021-04-01 NOTE — PROGRESS NOTES
"Chief Complaint   Patient presents with   • Medication Management       HPI  35-year-old established female here for a couple of things:    #1-right ovarian cyst: Tells me that she went to ER for RLQ pain end of February- 2 cm right simple adnexal cyst found - pain has now resolved.  Also has extremely heavy menses since tubal ligation 2011 also feels that hair has thinned every since.  She would like to see a gynecologist to discuss reversal of tubal ligation as well as all of her current gynecological issues.    #2-ADD: chronic issue.  Was seen a few months ago and requested to try phentermine instead of Adderall in hopes of losing weight.  Focus at work has been poor on phentermine and she would like to go back on Adderall.  States that she is not focusing as well on phentermine and feels it raises BP/makes her more anxious.  Phentermine does not help her focus for long periods of time or as efficiently as adderall.  Wants to go back on adderall and est with psychiatry.  Lost 10 lbs on phentermine.      #3-PTSD with generalized anxiety disorder: Chronic issue.  Requesting a referral to establish with psychiatry.  Denies suicidal or homicidal ideations.  Does not want to try any new medications for psychiatric health today except for going back on Adderall.    Past medical, surgical, family, and social history is reviewed and updated in Epic chart by me today.   Medications and allergies reviewed and updated in Epic chart by me today.     ROS:   As documented in history of present illness above    Exam:  /98 (BP Location: Right arm, Patient Position: Sitting, BP Cuff Size: Adult)   Pulse 100   Temp 36.6 °C (97.9 °F)   Resp 12   Ht 1.727 m (5' 8\")   Wt 77.6 kg (171 lb)   SpO2 97%   Gen. appears healthy in no distress   Skin appropriate for sex and age   Neck trachea is midline  Lungs unlabored breathing  Heart regular rate  Neuro gait and station normal   Psych appropriate, calm, interactive, " "well-groomed    Assessment / Plan / Medical Decision making:   \"  1. Cyst of ovary, unspecified laterality  REFERRAL TO GYNECOLOGY   2. Menorrhagia with regular cycle  REFERRAL TO GYNECOLOGY   3. History of tubal ligation  REFERRAL TO GYNECOLOGY   4. PTSD (post-traumatic stress disorder)  REFERRAL TO PSYCHIATRY   5. ADRIAN (generalized anxiety disorder)  REFERRAL TO PSYCHIATRY   6. Attention deficit hyperactivity disorder (ADHD), combined type  amphetamine-dextroamphetamine (ADDERALL) 15 MG tablet   \"  Reviewed transvaginal ultrasound with patient.  Recommend repeat for resolution of cyst or seeing gynecology and she would like to start with seeing gynecology.  Fortunately pain has resolved.  She would also like to see a gynecological surgeon to discuss tubal ligation reversal as she feels this has negatively impacted her body in terms of hormones.    Referred to psychiatry for evaluation of PTSD and anxiety symptoms as well as possible medication management.    Restarted the patient on previous Adderall dosage which she has done well with for many years.  Stopped phentermine.  Reviewed  profile which shows last Adderall refill was by myself in November.  She does not have Adderall in her system currently and urine drug screen was not collected.  She will contact me when she has a psychiatry appointment and drop by for a urine drug screen if I am going to continue prescribing her Adderall.    In prescribing controlled substances to this patient, I certify that I have obtained and reviewed the medical history of Katerina Owen. I have also made a good ant effort to obtain applicable records from other providers who have treated the patient and records did not demonstrate any increased risk of substance abuse that would prevent me from prescribing controlled substances.     I have conducted a physical exam and documented it. I have reviewed Ms. Owen’s prescription history as maintained by the [place] " Prescription Monitoring Program.

## 2021-04-06 ENCOUNTER — PATIENT MESSAGE (OUTPATIENT)
Dept: MEDICAL GROUP | Facility: LAB | Age: 36
End: 2021-04-06

## 2021-04-06 DIAGNOSIS — F90.2 ATTENTION DEFICIT HYPERACTIVITY DISORDER (ADHD), COMBINED TYPE: ICD-10-CM

## 2021-04-06 NOTE — TELEPHONE ENCOUNTER
----- Message from Katerina Owen sent at 4/6/2021  4:40 PM PDT -----  Regarding: Prescription Question  Contact: [phone]  Could you send my adderall prescription to [facility] in [facility]? [facility] pharmacy apparently has been on back order for weeks and does not know when they will get the medication.

## 2021-04-07 RX ORDER — DEXTROAMPHETAMINE SACCHARATE, AMPHETAMINE ASPARTATE, DEXTROAMPHETAMINE SULFATE AND AMPHETAMINE SULFATE 3.75; 3.75; 3.75; 3.75 MG/1; MG/1; MG/1; MG/1
15 TABLET ORAL 2 TIMES DAILY
Qty: 60 TABLET | Refills: 0 | Status: SHIPPED | OUTPATIENT
Start: 2021-04-07 | End: 2021-05-03 | Stop reason: SDUPTHER

## 2021-05-03 ENCOUNTER — TELEMEDICINE (OUTPATIENT)
Dept: MEDICAL GROUP | Facility: LAB | Age: 36
End: 2021-05-03
Payer: COMMERCIAL

## 2021-05-03 DIAGNOSIS — F41.9 ANXIETY: ICD-10-CM

## 2021-05-03 DIAGNOSIS — F90.2 ATTENTION DEFICIT HYPERACTIVITY DISORDER (ADHD), COMBINED TYPE: ICD-10-CM

## 2021-05-03 DIAGNOSIS — F33.1 MODERATE EPISODE OF RECURRENT MAJOR DEPRESSIVE DISORDER (HCC): ICD-10-CM

## 2021-05-03 PROCEDURE — 99214 OFFICE O/P EST MOD 30 MIN: CPT | Mod: 95,CR | Performed by: NURSE PRACTITIONER

## 2021-05-03 RX ORDER — ALPRAZOLAM 0.25 MG/1
0.25 TABLET ORAL NIGHTLY PRN
Qty: 15 TABLET | Refills: 1 | Status: SHIPPED | OUTPATIENT
Start: 2021-05-03 | End: 2021-05-18

## 2021-05-03 RX ORDER — DEXTROAMPHETAMINE SACCHARATE, AMPHETAMINE ASPARTATE, DEXTROAMPHETAMINE SULFATE AND AMPHETAMINE SULFATE 3.75; 3.75; 3.75; 3.75 MG/1; MG/1; MG/1; MG/1
15 TABLET ORAL 2 TIMES DAILY
Qty: 60 TABLET | Refills: 0 | Status: SHIPPED | OUTPATIENT
Start: 2021-05-03 | End: 2021-06-02

## 2021-05-03 RX ORDER — FLUOXETINE HYDROCHLORIDE 20 MG/1
20 CAPSULE ORAL DAILY
Qty: 30 CAPSULE | Refills: 5 | Status: SHIPPED | OUTPATIENT
Start: 2021-05-03 | End: 2021-06-14

## 2021-05-03 NOTE — PROGRESS NOTES
"Telemedicine: Established Patient   This evaluation was conducted via Zoom using secure and encrypted videoconferencing technology. The patient was in a private location in the state of [place].    The patient's identity was confirmed and verbal consent was obtained for this virtual visit.    Subjective:   CC:   f/u    HPI:   Katerina is a 35 y.o. female presenting for evaluation and management of:    Depression and anxiety: Chronic issue and recurrent because of recent break-up/life circumstances.  Has a referral in to establish care with a psychiatrist as she is concerned that she is bipolar but has not made the appointment yet despite referral approval.   Kids are healthy.  Feeling sad, tearful and overwhelmed.  prozac and xanax have helped in the past - helps her calm down - wants to restart these medications.   Denies SI or HI.  Has a new pug which helps her emotions.  Moods fluctuate frequently.  Appetite goes up and down.    ADD: Chronic issue.  At our last visit she was changed from phentermine to Adderall, previously using phentermine multipurpose to help suppress her appetite and it helped her attention deficit but not sufficiently.  Taking adderall 15 mg bid and finds this helpful to help with focus / functioning in general.  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  Denies any negative side effects of Adderall.    ROS  Negative except for HPI    Allergies   Allergen Reactions   • Morphine Nausea     \"feel sick for a whole day after being given morphine\"  RXN=2/2016     • Vicodin [Hydrocodone-Acetaminophen] Vomiting and Nausea     RTN=3527       Current medicines (including changes today)  Current Outpatient Medications   Medication Sig Dispense Refill   • amphetamine-dextroamphetamine (ADDERALL) 15 MG tablet Take 1 tablet by mouth 2 times a day for 30 days. 60 tablet 0   • Multiple Vitamin (MULTIVITAMIN PO) Take  by mouth.     • ibuprofen (MOTRIN) 200 MG Tab Take 600 mg by mouth one time as " needed for Mild Pain.       No current facility-administered medications for this visit.       Patient Active Problem List    Diagnosis Date Noted   • Syncope and collapse 02/24/2018     Priority: High   • Hypokalemia 02/24/2018     Priority: Medium   • Hyponatremia 02/24/2018     Priority: Medium   • HTN (hypertension)      Priority: Medium   • Depression 01/16/2014     Priority: Low   • Persistent migraine aura without cerebral infarction and without status migrainosus, not intractable 06/13/2018   • Dizziness 05/31/2018   • Chronic headaches 05/31/2018   • Attention deficit hyperactivity disorder (ADHD), combined type 10/24/2017   • PTSD (post-traumatic stress disorder) 11/30/2015   • Anxiety 01/16/2014   • H/O hernia repair 05/05/2011       Family History   Problem Relation Age of Onset   • Heart Disease Father         heart failure smoker    • Hypertension Father    • Cancer Maternal Grandfather         pancreastic   • Heart Disease Paternal Grandfather         heart attack       She  has a past medical history of ADHD, Arthritis, Bowel habit changes, Chlamydia (5/11/2011), depression, Heart burn, HTN (hypertension), Migraine, Pain, Pre-eclampsia, PTSD (post-traumatic stress disorder), and Staph infection (2011). She also has no past medical history of Muscle disorder, OSTEOPOROSIS, or Pituitary disease ([facility]).  She  has a past surgical history that includes septoturbinoplasty (4/21/2009); lumpectomy (2014); breast biopsy (Left, 2/26/2016); primary c section (Aug 7, 2007); repeat c section w tubal ligation (9/7/2011); ventral hernia repair (Right, 1/10/2018); and appendectomy laparoscopic (6/5/2018).       Objective:       Physical Exam  Gen: NAD  Resp: nonlabored.  Able to speak in full sentences  Psy: pleasant / cooperative but easily tearful  Neuro:  Alert and oriented x 3  Assessment and Plan:   The following treatment plan was discussed:   1. Attention deficit hyperactivity disorder (ADHD), combined  type  -Symptoms stable/improved compared to using phentermine multipurpose.  Refilled Adderall for 1 month until she is able to see psychiatry or follow-up with me again next month regarding depression and anxiety.  Reviewed her  profile which shows that her last refill of Adderall was prescribed by myself on April 7. amphetamine-dextroamphetamine (ADDERALL) 15 MG tablet; Take 1 tablet by mouth 2 times a day for 30 days.  Dispense: 60 tablet; Refill: 0    2. Anxiety  -She was given a small prescription of alprazolam to help her calm down and potentially fall asleep a bit easier at night short-term.  She understands that she should not take Xanax within 6 hours of driving a car, operating machinery or drinking alcohol.  She understands the chemically dependent nature of Xanax.  - ALPRAZolam (XANAX) 0.25 MG Tab; Take 1 tablet by mouth at bedtime as needed for Anxiety for up to 15 days.  Dispense: 15 tablet; Refill: 1    3. Moderate episode of recurrent major depressive disorder (HCC)  -Reinitiated Prozac at 20 mg.  Highly encouraged her to schedule an appoint with psychiatry and she states that she will call their office as soon as we hang up today.  Fortunately she denies suicidal or homicidal ideations.  Discussed symptoms of bipolar or disorder with her and I will allow psychiatry to further evaluate/diagnose this.

## 2021-05-22 ENCOUNTER — NON-PROVIDER VISIT (OUTPATIENT)
Dept: URGENT CARE | Facility: PHYSICIAN GROUP | Age: 36
End: 2021-05-22
Payer: COMMERCIAL

## 2021-05-22 DIAGNOSIS — Z02.1 PRE-EMPLOYMENT DRUG SCREENING: ICD-10-CM

## 2021-05-22 LAB
AMP AMPHETAMINE: NORMAL
BAR BARBITURATES: NORMAL
BZO BENZODIAZEPINES: NORMAL
COC COCAINE: NORMAL
INT CON NEG: NORMAL
INT CON POS: NORMAL
MDMA ECSTASY: NORMAL
MET METHAMPHETAMINES: NORMAL
MTD METHADONE: NORMAL
OPI OPIATES: NORMAL
OXY OXYCODONE: NORMAL
PCP PHENCYCLIDINE: NORMAL
POC URINE DRUG SCREEN OCDRS: NORMAL
THC: NORMAL

## 2021-05-22 PROCEDURE — 80305 DRUG TEST PRSMV DIR OPT OBS: CPT | Performed by: PHYSICIAN ASSISTANT

## 2021-06-03 ENCOUNTER — NON-PROVIDER VISIT (OUTPATIENT)
Dept: OCCUPATIONAL MEDICINE | Facility: CLINIC | Age: 36
End: 2021-06-03
Payer: COMMERCIAL

## 2021-06-03 DIAGNOSIS — Z02.83 ENCOUNTER FOR DRUG SCREENING: ICD-10-CM

## 2021-06-03 PROCEDURE — 8911 PR MRO FEE: Performed by: NURSE PRACTITIONER

## 2021-06-07 ENCOUNTER — APPOINTMENT (OUTPATIENT)
Dept: MEDICAL GROUP | Facility: LAB | Age: 36
End: 2021-06-07
Payer: COMMERCIAL

## 2021-06-14 ENCOUNTER — TELEMEDICINE (OUTPATIENT)
Dept: MEDICAL GROUP | Facility: LAB | Age: 36
End: 2021-06-14
Payer: COMMERCIAL

## 2021-06-14 VITALS — HEIGHT: 68 IN | WEIGHT: 173 LBS | BODY MASS INDEX: 26.22 KG/M2

## 2021-06-14 DIAGNOSIS — F90.2 ATTENTION DEFICIT HYPERACTIVITY DISORDER (ADHD), COMBINED TYPE: ICD-10-CM

## 2021-06-14 DIAGNOSIS — F33.41 RECURRENT MAJOR DEPRESSIVE DISORDER, IN PARTIAL REMISSION (HCC): ICD-10-CM

## 2021-06-14 DIAGNOSIS — Z79.899 CONTROLLED SUBSTANCE AGREEMENT SIGNED: ICD-10-CM

## 2021-06-14 PROCEDURE — 99213 OFFICE O/P EST LOW 20 MIN: CPT | Mod: 95 | Performed by: NURSE PRACTITIONER

## 2021-06-14 RX ORDER — DEXTROAMPHETAMINE SACCHARATE, AMPHETAMINE ASPARTATE, DEXTROAMPHETAMINE SULFATE AND AMPHETAMINE SULFATE 3.75; 3.75; 3.75; 3.75 MG/1; MG/1; MG/1; MG/1
15 TABLET ORAL 2 TIMES DAILY
Qty: 60 TABLET | Refills: 0 | Status: SHIPPED | OUTPATIENT
Start: 2021-07-14 | End: 2021-10-11 | Stop reason: SDUPTHER

## 2021-06-14 RX ORDER — DEXTROAMPHETAMINE SACCHARATE, AMPHETAMINE ASPARTATE, DEXTROAMPHETAMINE SULFATE AND AMPHETAMINE SULFATE 3.75; 3.75; 3.75; 3.75 MG/1; MG/1; MG/1; MG/1
15 TABLET ORAL 2 TIMES DAILY
Qty: 60 TABLET | Refills: 0 | Status: SHIPPED | OUTPATIENT
Start: 2021-06-14 | End: 2021-10-11 | Stop reason: SDUPTHER

## 2021-06-14 RX ORDER — DEXTROAMPHETAMINE SACCHARATE, AMPHETAMINE ASPARTATE, DEXTROAMPHETAMINE SULFATE AND AMPHETAMINE SULFATE 3.75; 3.75; 3.75; 3.75 MG/1; MG/1; MG/1; MG/1
15 TABLET ORAL 2 TIMES DAILY
Qty: 60 TABLET | Refills: 0 | Status: SHIPPED | OUTPATIENT
Start: 2021-08-13 | End: 2021-09-07 | Stop reason: SDUPTHER

## 2021-06-14 RX ORDER — ESCITALOPRAM OXALATE 10 MG/1
10 TABLET ORAL DAILY
Qty: 30 TABLET | Refills: 5 | Status: SHIPPED | OUTPATIENT
Start: 2021-06-14 | End: 2022-06-07

## 2021-06-14 ASSESSMENT — PATIENT HEALTH QUESTIONNAIRE - PHQ9
9. THOUGHTS THAT YOU WOULD BE BETTER OFF DEAD, OR OF HURTING YOURSELF: NOT AT ALL
1. LITTLE INTEREST OR PLEASURE IN DOING THINGS: NOT AT ALL
2. FEELING DOWN, DEPRESSED, IRRITABLE, OR HOPELESS: NOT AT ALL
3. TROUBLE FALLING OR STAYING ASLEEP OR SLEEPING TOO MUCH: NOT AT ALL
SUM OF ALL RESPONSES TO PHQ9 QUESTIONS 1 AND 2: 0
8. MOVING OR SPEAKING SO SLOWLY THAT OTHER PEOPLE COULD HAVE NOTICED. OR THE OPPOSITE, BEING SO FIGETY OR RESTLESS THAT YOU HAVE BEEN MOVING AROUND A LOT MORE THAN USUAL: NOT AT ALL
4. FEELING TIRED OR HAVING LITTLE ENERGY: NOT AT ALL
6. FEELING BAD ABOUT YOURSELF - OR THAT YOU ARE A FAILURE OR HAVE LET YOURSELF OR YOUR FAMILY DOWN: NOT AL ALL
7. TROUBLE CONCENTRATING ON THINGS, SUCH AS READING THE NEWSPAPER OR WATCHING TELEVISION: NOT AT ALL
SUM OF ALL RESPONSES TO PHQ QUESTIONS 1-9: 0
5. POOR APPETITE OR OVEREATING: NOT AT ALL

## 2021-06-14 ASSESSMENT — FIBROSIS 4 INDEX: FIB4 SCORE: 0.54

## 2021-06-14 NOTE — PROGRESS NOTES
"Telemedicine: Established Patient   This evaluation was conducted via Zoom using secure and encrypted videoconferencing technology. The patient was in a private location in the state of [place].    The patient's identity was confirmed and verbal consent was obtained for this virtual visit.    Subjective:   CC:   Chief Complaint   Patient presents with   • Medication Management       Katerina Owen is a 35 y.o. female presenting for evaluation and management of:    #1-Depression with anxiety: Chronic issue.  Was prescribed Prozac at our visit last month and is following up regarding this.  Felt a weird feeling in her head with prozac after 3 weeks and stopped it. Moods are improving anyway with break-up / now healing.  Liked feeling even keel on prozac - \"not up and down,\" but disliked the zombie feeling.  Would like to try something similar to Prozac that may agree with her a bit more.  Denies SI or HI.    #2-ADD:  Chronic issue.  Started new job last Monday and struggling to keep up with trouble shooting but overall finds adderall 15 mg bid helpful.  Requesting a refill of Adderall.  Had a urine drug screen to enter her new job which showed her Adderall.  Denies any negative side effects of Adderall.    ROS  Negative except for HPI, specifically denies heart palpitations or chest pain.    Allergies   Allergen Reactions   • Morphine Nausea     \"feel sick for a whole day after being given morphine\"  RXN=2/2016     • Vicodin [Hydrocodone-Acetaminophen] Vomiting and Nausea     GPJ=5066       Current medicines (including changes today)  Current Outpatient Medications   Medication Sig Dispense Refill   • FLUoxetine (PROZAC) 20 MG Cap Take 1 capsule by mouth every day. 30 capsule 5   • Multiple Vitamin (MULTIVITAMIN PO) Take  by mouth.     • ibuprofen (MOTRIN) 200 MG Tab Take 600 mg by mouth one time as needed for Mild Pain.       No current facility-administered medications for this visit.       Patient Active " "Problem List    Diagnosis Date Noted   • Persistent migraine aura without cerebral infarction and without status migrainosus, not intractable 06/13/2018   • Dizziness 05/31/2018   • Chronic headaches 05/31/2018   • Syncope and collapse 02/24/2018   • Hypokalemia 02/24/2018   • Hyponatremia 02/24/2018   • Attention deficit hyperactivity disorder (ADHD), combined type 10/24/2017   • PTSD (post-traumatic stress disorder) 11/30/2015   • Anxiety 01/16/2014   • Depression 01/16/2014   • H/O hernia repair 05/05/2011   • HTN (hypertension)        Family History   Problem Relation Age of Onset   • Heart Disease Father         heart failure smoker    • Hypertension Father    • Cancer Maternal Grandfather         pancreastic   • Heart Disease Paternal Grandfather         heart attack       She  has a past medical history of ADHD, Arthritis, Bowel habit changes, Chlamydia (5/11/2011), depression, Heart burn, HTN (hypertension), Migraine, Pain, Pre-eclampsia, PTSD (post-traumatic stress disorder), and Staph infection (2011). She also has no past medical history of Muscle disorder, OSTEOPOROSIS, or Pituitary disease ([facility]).  She  has a past surgical history that includes septoturbinoplasty (4/21/2009); lumpectomy (2014); breast biopsy (Left, 2/26/2016); primary c section (Aug 7, 2007); repeat c section w tubal ligation (9/7/2011); ventral hernia repair (Right, 1/10/2018); and appendectomy laparoscopic (6/5/2018).       Objective:   Ht 1.727 m (5' 8\")   Wt 78.5 kg (173 lb)   BMI 26.30 kg/m²     Physical Exam  Gen: NAD  Resp: nonlabored.  Able to speak in full sentences  Psy: pleasant / cooperative.   Neuro:  Alert and oriented x 3  Assessment and Plan:   The following treatment plan was discussed:     1. Attention deficit hyperactivity disorder (ADHD), combined type  amphetamine-dextroamphetamine (ADDERALL) 15 MG tablet    amphetamine-dextroamphetamine (ADDERALL) 15 MG tablet    amphetamine-dextroamphetamine (ADDERALL) 15 MG " tablet   2. Controlled substance agreement signed     3. Recurrent major depressive disorder, in partial remission (HCC)  escitalopram (LEXAPRO) 10 MG Tab     Overall attention deficit disorder stable with Adderall 15 mg twice daily.  Follow-up regarding Adderall in 6 months.  Urine drug screen up-to-date.    Trial of a different SSRI, Lexapro 10 mg once daily regardless of food intake.  Discussed potential length of onset efficacy as well as side effects of Lexapro.  Fortunately she states her depression is not severe and she denies suicidal or homicidal ideations but appreciated mood stability with Lexapro.  Follow-up regarding this via email within a few weeks, sooner with any problems taking Lexapro.        F/u 1 mo via email regarding lexapro and 6 mo for adderall refills.

## 2021-09-07 DIAGNOSIS — F90.2 ATTENTION DEFICIT HYPERACTIVITY DISORDER (ADHD), COMBINED TYPE: ICD-10-CM

## 2021-09-07 RX ORDER — DEXTROAMPHETAMINE SACCHARATE, AMPHETAMINE ASPARTATE, DEXTROAMPHETAMINE SULFATE AND AMPHETAMINE SULFATE 3.75; 3.75; 3.75; 3.75 MG/1; MG/1; MG/1; MG/1
15 TABLET ORAL 2 TIMES DAILY
Qty: 60 TABLET | Refills: 0 | Status: SHIPPED | OUTPATIENT
Start: 2021-09-12 | End: 2021-12-06 | Stop reason: SDUPTHER

## 2021-09-07 NOTE — TELEPHONE ENCOUNTER
Received request via: Patient    Was the patient seen in the last year in this department? Yes  LOV 06/14/2021 - Telemedicine  Does the patient have an active prescription (recently filled or refills available) for medication(s) requested? No

## 2021-09-25 ENCOUNTER — NON-PROVIDER VISIT (OUTPATIENT)
Dept: URGENT CARE | Facility: PHYSICIAN GROUP | Age: 36
End: 2021-09-25
Payer: COMMERCIAL

## 2021-09-25 DIAGNOSIS — Z02.1 PRE-EMPLOYMENT DRUG SCREENING: ICD-10-CM

## 2021-09-25 LAB
AMP AMPHETAMINE: NORMAL
COC COCAINE: NORMAL
INT CON NEG: NORMAL
INT CON POS: NORMAL
MET METHAMPHETAMINES: NORMAL
OPI OPIATES: NORMAL
PCP PHENCYCLIDINE: NORMAL
POC DRUG COMMENT 753798-OCCUPATIONAL HEALTH: NORMAL
THC: NORMAL

## 2021-09-25 PROCEDURE — 80305 DRUG TEST PRSMV DIR OPT OBS: CPT | Performed by: PHYSICIAN ASSISTANT

## 2021-11-22 ENCOUNTER — APPOINTMENT (OUTPATIENT)
Dept: MEDICAL GROUP | Facility: LAB | Age: 36
End: 2021-11-22
Payer: COMMERCIAL

## 2021-12-06 ENCOUNTER — OFFICE VISIT (OUTPATIENT)
Dept: MEDICAL GROUP | Facility: LAB | Age: 36
End: 2021-12-06
Payer: COMMERCIAL

## 2021-12-06 VITALS
HEIGHT: 68 IN | TEMPERATURE: 97.7 F | SYSTOLIC BLOOD PRESSURE: 158 MMHG | DIASTOLIC BLOOD PRESSURE: 96 MMHG | BODY MASS INDEX: 28.04 KG/M2 | RESPIRATION RATE: 12 BRPM | HEART RATE: 96 BPM | OXYGEN SATURATION: 98 % | WEIGHT: 185 LBS

## 2021-12-06 DIAGNOSIS — F90.2 ATTENTION DEFICIT HYPERACTIVITY DISORDER (ADHD), COMBINED TYPE: ICD-10-CM

## 2021-12-06 DIAGNOSIS — L21.9 SEBORRHEIC DERMATITIS OF SCALP: ICD-10-CM

## 2021-12-06 DIAGNOSIS — Z00.00 WELL ADULT EXAM: ICD-10-CM

## 2021-12-06 PROCEDURE — 99395 PREV VISIT EST AGE 18-39: CPT | Performed by: NURSE PRACTITIONER

## 2021-12-06 RX ORDER — DEXTROAMPHETAMINE SACCHARATE, AMPHETAMINE ASPARTATE, DEXTROAMPHETAMINE SULFATE AND AMPHETAMINE SULFATE 3.75; 3.75; 3.75; 3.75 MG/1; MG/1; MG/1; MG/1
15 TABLET ORAL 2 TIMES DAILY
Qty: 60 TABLET | Refills: 0 | Status: SHIPPED | OUTPATIENT
Start: 2022-02-08 | End: 2022-03-11 | Stop reason: SDUPTHER

## 2021-12-06 RX ORDER — DEXTROAMPHETAMINE SACCHARATE, AMPHETAMINE ASPARTATE, DEXTROAMPHETAMINE SULFATE AND AMPHETAMINE SULFATE 3.75; 3.75; 3.75; 3.75 MG/1; MG/1; MG/1; MG/1
15 TABLET ORAL 2 TIMES DAILY
Qty: 60 TABLET | Refills: 0 | Status: SHIPPED | OUTPATIENT
Start: 2022-01-09 | End: 2022-02-08

## 2021-12-06 RX ORDER — CLOBETASOL PROPIONATE 0.05 G/100ML
1 SHAMPOO TOPICAL DAILY
Qty: 118 ML | Refills: 1 | Status: SHIPPED | OUTPATIENT
Start: 2021-12-06 | End: 2021-12-13

## 2021-12-06 RX ORDER — CLOBETASOL PROPIONATE 0.46 MG/ML
1 SOLUTION TOPICAL 2 TIMES DAILY
Qty: 50 ML | Refills: 1 | Status: SHIPPED | OUTPATIENT
Start: 2021-12-06 | End: 2022-07-23

## 2021-12-06 RX ORDER — DEXTROAMPHETAMINE SACCHARATE, AMPHETAMINE ASPARTATE, DEXTROAMPHETAMINE SULFATE AND AMPHETAMINE SULFATE 3.75; 3.75; 3.75; 3.75 MG/1; MG/1; MG/1; MG/1
15 TABLET ORAL 2 TIMES DAILY
Qty: 60 TABLET | Refills: 0 | Status: SHIPPED | OUTPATIENT
Start: 2021-12-10 | End: 2022-01-09

## 2021-12-06 ASSESSMENT — FIBROSIS 4 INDEX: FIB4 SCORE: 0.55

## 2021-12-07 NOTE — PROGRESS NOTES
"Chief Complaint   Patient presents with   • Annual Exam       HPI  Katerina is a 37 yo est female here for annual exam.  Biggest c/o is an itchy rash to scalp since she had covid months ago.  Shaved her head but rash hasn't improved.  Rash is itchy and painful.    Overall feeling well otherwise.  ADHD flaring up and wants to increase adderall to 20 mg bid.    Needs adderall refills - last filled 10/10/2021.    Denies any negative side effects of adderall.  Now working at [employer].   Menses monthly.    Pap UTD.    Denies breast pain.   Due for multiple immunizations.     Past medical, surgical, family, and social history is reviewed and updated in Epic chart by me today.   Medications and allergies reviewed and updated in Epic chart by me today.     ROS:   As documented in history of present illness above    Exam:  BP (!) 164/100 (BP Location: Left arm, Patient Position: Sitting, BP Cuff Size: Large adult)   Pulse 96   Temp 36.5 °C (97.7 °F)   Resp 12   Ht 1.727 m (5' 8\")   Wt 83.9 kg (185 lb)   SpO2 98%   Constitutional: Alert, no distress, plus 3 vital signs  Skin:  Warm, dry.  Erythematous scales scattered to frontal and temporal areas of scalp without open lesions.  Eye: Equal, round and reactive, conjunctiva clear  ENMT: Lips without lesions, good dentition, oropharynx clear    Neck: Trachea midline, no masses, no thyromegaly  Respiratory: Unlabored respiration, lungs clear to auscultation, no wheezes, no rhonchi  Cardiovascular: Normal rate and rhythm, no murmur, no edema  Abdomen: Soft, nontender, no masses or hepatosplenomegaly  Psych: Alert, pleasant, well-groomed, normal affect    Assessment / Plan / Medical Decision makin. Well adult exam  -declined all vaccines today.  Mammogram age 38 . Colonoscopy age 48.  Labs as below.  Encouraged stopping vaping.  Discussed exercise and healthy eating.   - Comp Metabolic Panel; Future  - CBC WITH DIFFERENTIAL; Future  - Lipid Profile; Future  - TSH; " Future    2. Seborrheic dermatitis of scalp  -tx of both as below x 4 weeks, derm consult if not improving.   - clobetasol (TEMOVATE) 0.05 % external solution; Apply 1 Application topically 2 times a day.  Dispense: 50 mL; Refill: 1  - Clobetasol Propionate (CLOBEX) 0.05 % Shampoo; Apply 1 Application topically every day.  Dispense: 118 mL; Refill: 1    3. Attention deficit hyperactivity disorder (ADHD), combined type  -refilled adderall at current dosage.  Encouraged consult with psychiatry for increased dose need evaluation.    - amphetamine-dextroamphetamine (ADDERALL) 15 MG tablet; Take 1 Tablet by mouth 2 times a day for 30 days.  Dispense: 60 Tablet; Refill: 0  - amphetamine-dextroamphetamine (ADDERALL) 15 MG tablet; Take 1 Tablet by mouth 2 times a day for 30 days.  Dispense: 60 Tablet; Refill: 0  - amphetamine-dextroamphetamine (ADDERALL) 15 MG tablet; Take 1 Tablet by mouth 2 times a day for 30 days.  Dispense: 60 Tablet; Refill: 0  - Referral to Psychiatry    In prescribing controlled substances to this patient, I certify that I have obtained and reviewed the medical history of Katerina Owen. I have also made a good ant effort to obtain applicable records from other providers who have treated the patient and records did not demonstrate any increased risk of substance abuse that would prevent me from prescribing controlled substances.     I have conducted a physical exam and documented it. I have reviewed Ms. Owen’s prescription history as maintained by the [place] Prescription Monitoring Program.     I have assessed the patient’s risk for abuse, dependency, and addiction using the validated Opioid Risk Tool available at https://www.mdcalc.com/qcgxnw-fowh-enna-ort-narcotic-abuse.

## 2021-12-10 ENCOUNTER — TELEPHONE (OUTPATIENT)
Dept: MEDICAL GROUP | Facility: LAB | Age: 36
End: 2021-12-10

## 2021-12-10 NOTE — TELEPHONE ENCOUNTER
MEDICATION PRIOR AUTHORIZATION NEEDED:    1. Name of Medication: clobetasol shampoo    2. Requested By (Name of Pharmacy): [facility]     3. Is insurance on file current? yes    4. What is the name & phone number of the 3rd party payor? [employer]    Cover my meds Key # [id number]

## 2021-12-13 RX ORDER — CLOBETASOL PROPIONATE 0.05 MG/G
1 GEL TOPICAL DAILY
Qty: 60 G | Refills: 1 | Status: SHIPPED | OUTPATIENT
Start: 2021-12-13 | End: 2022-07-23

## 2022-02-04 ENCOUNTER — HOSPITAL ENCOUNTER (OUTPATIENT)
Dept: RADIOLOGY | Facility: MEDICAL CENTER | Age: 37
End: 2022-02-04
Attending: PHYSICIAN ASSISTANT
Payer: COMMERCIAL

## 2022-02-04 ENCOUNTER — OFFICE VISIT (OUTPATIENT)
Dept: URGENT CARE | Facility: PHYSICIAN GROUP | Age: 37
End: 2022-02-04
Payer: COMMERCIAL

## 2022-02-04 VITALS
TEMPERATURE: 98.1 F | SYSTOLIC BLOOD PRESSURE: 122 MMHG | RESPIRATION RATE: 12 BRPM | BODY MASS INDEX: 26.52 KG/M2 | OXYGEN SATURATION: 99 % | WEIGHT: 175 LBS | HEIGHT: 68 IN | HEART RATE: 104 BPM | DIASTOLIC BLOOD PRESSURE: 70 MMHG

## 2022-02-04 DIAGNOSIS — K40.91 UNILATERAL RECURRENT INGUINAL HERNIA WITHOUT OBSTRUCTION OR GANGRENE: ICD-10-CM

## 2022-02-04 DIAGNOSIS — R10.9 ABDOMINAL WALL PAIN: ICD-10-CM

## 2022-02-04 DIAGNOSIS — Z87.19 HISTORY OF INGUINAL HERNIA: ICD-10-CM

## 2022-02-04 LAB
APPEARANCE UR: CLEAR
BILIRUB UR STRIP-MCNC: NEGATIVE MG/DL
COLOR UR AUTO: NORMAL
GLUCOSE UR STRIP.AUTO-MCNC: NEGATIVE MG/DL
INT CON NEG: NORMAL
INT CON POS: NORMAL
KETONES UR STRIP.AUTO-MCNC: NEGATIVE MG/DL
LEUKOCYTE ESTERASE UR QL STRIP.AUTO: NEGATIVE
NITRITE UR QL STRIP.AUTO: NEGATIVE
PH UR STRIP.AUTO: 5.5 [PH] (ref 5–8)
POC URINE PREGNANCY TEST: NEGATIVE
PROT UR QL STRIP: 30 MG/DL
RBC UR QL AUTO: NORMAL
SP GR UR STRIP.AUTO: 1.03
UROBILINOGEN UR STRIP-MCNC: 0.2 MG/DL

## 2022-02-04 PROCEDURE — 99215 OFFICE O/P EST HI 40 MIN: CPT | Performed by: PHYSICIAN ASSISTANT

## 2022-02-04 PROCEDURE — 81025 URINE PREGNANCY TEST: CPT | Performed by: PHYSICIAN ASSISTANT

## 2022-02-04 PROCEDURE — 81002 URINALYSIS NONAUTO W/O SCOPE: CPT | Performed by: PHYSICIAN ASSISTANT

## 2022-02-04 PROCEDURE — 76857 US EXAM PELVIC LIMITED: CPT

## 2022-02-04 ASSESSMENT — FIBROSIS 4 INDEX: FIB4 SCORE: 0.55

## 2022-02-04 ASSESSMENT — ENCOUNTER SYMPTOMS
FEVER: 0
BLOOD IN STOOL: 0
VOMITING: 0
NAUSEA: 0
ABDOMINAL PAIN: 1
CHILLS: 0
DIARRHEA: 0

## 2022-02-04 NOTE — PROGRESS NOTES
"Subjective:   Katerina Murry Brayden is a 36 y.o. female who presents for Abdominal Pain (Righ side abdominal pain x 2 days.  Hx of hernia.)        Patient presents with concerns of pain at prior site of inguinal hernia- right side. In the last week she noticed the the small lump that is chronically in this area  increased in sizedand she feels like there is \"fluid there\" as well.  Hernia was first diagnosed in 2017.  States that she had an open hernia repair in early 2019 and experienced complications.  She had a subsequent laparoscopic hernia repair later that same year.  She states that she has had intermittent pain in this area since her last surgery. She has not noticed any redness.  She states that she currently feels constipated but she had a bowel movement approximately 24 hours ago.  No melena or hematochezia.  No nausea or vomiting.  She has decreased appetite currently but she was recently diagnosed with COVID-19.  Past medical history also significant for appendectomy in 2018.  Denies possible pregnancy.  No reported urinary symptoms.     Review of Systems   Constitutional: Negative for chills and fever.   Gastrointestinal: Positive for abdominal pain. Negative for blood in stool, diarrhea, melena, nausea and vomiting.   Genitourinary: Negative.        PMH:  has a past medical history of ADHD, Arthritis, Bowel habit changes, Chlamydia (5/11/2011), depression, Heart burn, HTN (hypertension), Migraine, Pain, Pre-eclampsia, PTSD (post-traumatic stress disorder), and Staph infection (2011). She also has no past medical history of Muscle disorder, OSTEOPOROSIS, or Pituitary disease ([facility]).  MEDS:   Current Outpatient Medications:   •  Clobetasol Propionate 0.05 % Gel, Apply 1 Application topically every day. To scalp., Disp: 60 g, Rfl: 1  •  clobetasol (TEMOVATE) 0.05 % external solution, Apply 1 Application topically 2 times a day., Disp: 50 mL, Rfl: 1  •  [START ON 2/8/2022] amphetamine-dextroamphetamine " "(ADDERALL) 15 MG tablet, Take 1 Tablet by mouth 2 times a day for 30 days., Disp: 60 Tablet, Rfl: 0  •  amphetamine-dextroamphetamine (ADDERALL) 15 MG tablet, Take 1 Tablet by mouth 2 times a day for 30 days., Disp: 60 Tablet, Rfl: 0  •  escitalopram (LEXAPRO) 10 MG Tab, Take 1 tablet by mouth every day., Disp: 30 tablet, Rfl: 5  •  Multiple Vitamin (MULTIVITAMIN PO), Take  by mouth., Disp: , Rfl:   •  ibuprofen (MOTRIN) 200 MG Tab, Take 600 mg by mouth one time as needed for Mild Pain., Disp: , Rfl:   ALLERGIES:   Allergies   Allergen Reactions   • Morphine Nausea     \"feel sick for a whole day after being given morphine\"  RXN=2016     • Vicodin [Hydrocodone-Acetaminophen] Vomiting and Nausea     YHJ=2270     SURGHX:   Past Surgical History:   Procedure Laterality Date   • APPENDECTOMY LAPAROSCOPIC  2018    Procedure: APPENDECTOMY LAPAROSCOPIC;  Surgeon: Izabel Salomon M.D.;  Location: SURGERY [facility];  Service: Gen Robotic   • VENTRAL HERNIA REPAIR Right 1/10/2018    Procedure: VENTRAL HERNIA REPAIR- FOR INCISIONAL HERNIA REDUCIBLE  WITH MESH;  Surgeon: Gisele Delcid M.D.;  Location: SURGERY [facility];  Service: General   • BREAST BIOPSY Left 2016    Procedure: BREAST BIOPSY Excisional;  Surgeon: Gisele Delcid M.D.;  Location: SURGERY [facility];  Service:    • LUMPECTOMY  2014    both breasts - Dr. Garsia - [facility] 10/2014   • REPEAT C SECTION W TUBAL LIGATION  2011    Performed by AYAAN MERCHANT at LABOR AND DELIVERY   • SEPTOTURBINOPLASTY  2009    Performed by CHRISTIAN YEN at SURGERY SAME DAY [facility] ORS   • PRIMARY C SECTION  Aug 7, 2007     preeclampsia     SOCHX:  reports that she quit smoking about 7 years ago. She has a 1.50 pack-year smoking history. She uses smokeless tobacco. She reports that she does not drink alcohol and does not use drugs.  FH: Family history was reviewed, no pertinent findings to report   Objective:   /70   Pulse (!) " "104   Temp 36.7 °C (98.1 °F) (Temporal)   Resp 12   Ht 1.727 m (5' 8\")   Wt 79.4 kg (175 lb)   SpO2 99%   BMI 26.61 kg/m²   Physical Exam  Vitals reviewed.   Constitutional:       General: She is not in acute distress.     Appearance: Normal appearance. She is well-developed. She is not toxic-appearing.   HENT:      Head: Normocephalic and atraumatic.      Right Ear: External ear normal.      Left Ear: External ear normal.      Nose: Nose normal.   Cardiovascular:      Rate and Rhythm: Regular rhythm. Tachycardia present.   Pulmonary:      Effort: Pulmonary effort is normal. No respiratory distress.      Breath sounds: No stridor.   Abdominal:      Comments: palpable 1.5 cm mass just below right side of  scar.  No overlying erythema or edema.  Moderately tender to palpation.   Skin:     General: Skin is dry.   Neurological:      Comments: Alert and oriented.    Psychiatric:         Speech: Speech normal.         Behavior: Behavior normal.           US from 10/20/2017:     HISTORY/REASON FOR EXAM:  Intermittent right lower quadrant pain. Prior history of . The pain is at the edge of the  scar.        TECHNIQUE/EXAM DESCRIPTION AND NUMBER OF VIEWS:  Limited right lower quadrant abdominal ultrasound is performed with and without Valsalva maneuver as well as in a supine and upright position.     COMPARISON: None     FINDINGS:  There is a compressible fat-containing hernia at the edge of the scar located just superior and medial to the iliac vessels. There are no visible bowel loops are peristalsis within the area of herniated fat.     IMPRESSION:     1.  There is a compressible fat-containing hernia at the edge of the  scar in the right lower quadrant.    Results for orders placed or performed in visit on 22   POCT Urinalysis   Result Value Ref Range    POC Color Dark Yellow Negative    POC Appearance Clear Negative    POC Leukocyte Esterase Negative Negative    POC " Nitrites Negative Negative    POC Urobiligen 0.2 Negative (0.2) mg/dL    POC Protein 30 Negative mg/dL    POC Urine PH 5.5 5.0 - 8.0    POC Blood Large Negative    POC Specific Gravity 1.030 <1.005 - >1.030    POC Ketones Negative Negative mg/dL    POC Bilirubin Negative Negative mg/dL    POC Glucose Negative Negative mg/dL   POCT Pregnancy   Result Value Ref Range    POC Urine Pregnancy Test Negative Negative    Internal Control Positive Valid     Internal Control Negative Valid        Assessment/Plan:   1. Unilateral recurrent inguinal hernia without obstruction or gangrene  - Referral to General Surgery    2. History of inguinal hernia  - POCT Urinalysis  - POCT Pregnancy  - US-INGUINAL HERNIA; Future  - Referral to General Surgery    3. Abdominal wall pain  - POCT Urinalysis  - POCT Pregnancy  - US-INGUINAL HERNIA; Future    Ultrasound demonstrates small compressible fat-containing hernia in the area of patient's pain.  Given symptoms we will refer her to general surgery for further evaluation and management as indicated.  Red flag signs and symptoms reviewed at length with patient and she was given strict return and ED precautions.  She verbalized good understanding of these.    Differential diagnosis, natural history, supportive care, and indications for immediate follow-up discussed.    My total time spent caring for the patient on the day of the encounter was 60 minutes.   This does not include time spent on separately billable procedures/tests.

## 2022-02-04 NOTE — LETTER
February 4, 2022    To Whom It May Concern:         This is confirmation that Katerina Owen attended her scheduled appointment with Nicolás Clinton P.A.-C. on 2/04/22. Please excuse her from work on 2/3-2/5.         If you have any questions please do not hesitate to call me at the phone number listed below.    Sincerely,          Nicolás Clinton P.A.-C.  [phone]

## 2022-02-06 NOTE — RESULT ENCOUNTER NOTE
Tres Borges,    Your ultrasound does show a small fat-containing hernia. They did not see any bowel entrapment, which is good. I will refer you to general surgery for further management.    Kind regards,  SMOOTH

## 2022-03-11 DIAGNOSIS — F90.2 ATTENTION DEFICIT HYPERACTIVITY DISORDER (ADHD), COMBINED TYPE: ICD-10-CM

## 2022-03-13 RX ORDER — DEXTROAMPHETAMINE SACCHARATE, AMPHETAMINE ASPARTATE, DEXTROAMPHETAMINE SULFATE AND AMPHETAMINE SULFATE 3.75; 3.75; 3.75; 3.75 MG/1; MG/1; MG/1; MG/1
15 TABLET ORAL 2 TIMES DAILY
Qty: 60 TABLET | Refills: 0 | Status: SHIPPED | OUTPATIENT
Start: 2022-03-13 | End: 2022-04-10 | Stop reason: SDUPTHER

## 2022-04-10 DIAGNOSIS — F90.2 ATTENTION DEFICIT HYPERACTIVITY DISORDER (ADHD), COMBINED TYPE: ICD-10-CM

## 2022-04-11 RX ORDER — DEXTROAMPHETAMINE SACCHARATE, AMPHETAMINE ASPARTATE, DEXTROAMPHETAMINE SULFATE AND AMPHETAMINE SULFATE 3.75; 3.75; 3.75; 3.75 MG/1; MG/1; MG/1; MG/1
15 TABLET ORAL 2 TIMES DAILY
Qty: 60 TABLET | Refills: 0 | Status: SHIPPED | OUTPATIENT
Start: 2022-04-11 | End: 2022-05-09 | Stop reason: SDUPTHER

## 2022-04-11 NOTE — TELEPHONE ENCOUNTER
Received request via: Pharmacy    Was the patient seen in the last year in this department? Yes  12/6/2021  Does the patient have an active prescription (recently filled or refills available) for medication(s) requested? No

## 2022-05-09 DIAGNOSIS — F90.2 ATTENTION DEFICIT HYPERACTIVITY DISORDER (ADHD), COMBINED TYPE: ICD-10-CM

## 2022-05-09 RX ORDER — DEXTROAMPHETAMINE SACCHARATE, AMPHETAMINE ASPARTATE, DEXTROAMPHETAMINE SULFATE AND AMPHETAMINE SULFATE 3.75; 3.75; 3.75; 3.75 MG/1; MG/1; MG/1; MG/1
15 TABLET ORAL 2 TIMES DAILY
Qty: 60 TABLET | Refills: 0 | Status: SHIPPED | OUTPATIENT
Start: 2022-05-09 | End: 2022-06-06 | Stop reason: SDUPTHER

## 2022-05-10 ENCOUNTER — TELEPHONE (OUTPATIENT)
Dept: MEDICAL GROUP | Facility: LAB | Age: 37
End: 2022-05-10
Payer: COMMERCIAL

## 2022-05-10 NOTE — TELEPHONE ENCOUNTER
DOCUMENTATION OF PAR STATUS:    1. Name of Medication & Dose: ADDERALL     2. Name of Prescription Coverage Company & phone #: [employer]    3. Date Prior Auth Submitted: 5/10/2022    4. What information was given to obtain insurance decision?   NONE    5. Prior Auth Status? Approved    6. Patient Notified: no

## 2022-05-11 ENCOUNTER — OFFICE VISIT (OUTPATIENT)
Dept: MEDICAL GROUP | Facility: LAB | Age: 37
End: 2022-05-11
Payer: COMMERCIAL

## 2022-05-11 VITALS
DIASTOLIC BLOOD PRESSURE: 96 MMHG | HEART RATE: 100 BPM | TEMPERATURE: 97.1 F | RESPIRATION RATE: 12 BRPM | OXYGEN SATURATION: 97 % | HEIGHT: 68 IN | BODY MASS INDEX: 28.19 KG/M2 | SYSTOLIC BLOOD PRESSURE: 158 MMHG | WEIGHT: 186 LBS

## 2022-05-11 DIAGNOSIS — Z23 NEED FOR TDAP VACCINATION: ICD-10-CM

## 2022-05-11 DIAGNOSIS — F39 MOOD DISORDER (HCC): ICD-10-CM

## 2022-05-11 DIAGNOSIS — F41.0 ANXIETY ATTACK: ICD-10-CM

## 2022-05-11 DIAGNOSIS — K40.91 UNILATERAL RECURRENT INGUINAL HERNIA WITHOUT OBSTRUCTION OR GANGRENE: ICD-10-CM

## 2022-05-11 PROBLEM — K46.9 ABDOMINAL HERNIA: Status: ACTIVE | Noted: 2022-05-11

## 2022-05-11 PROCEDURE — 99214 OFFICE O/P EST MOD 30 MIN: CPT | Mod: 25 | Performed by: NURSE PRACTITIONER

## 2022-05-11 PROCEDURE — 90715 TDAP VACCINE 7 YRS/> IM: CPT | Performed by: NURSE PRACTITIONER

## 2022-05-11 PROCEDURE — 90471 IMMUNIZATION ADMIN: CPT | Performed by: NURSE PRACTITIONER

## 2022-05-11 RX ORDER — ALPRAZOLAM 0.25 MG/1
0.25 TABLET ORAL NIGHTLY PRN
Qty: 7 TABLET | Refills: 0 | Status: SHIPPED | OUTPATIENT
Start: 2022-05-11 | End: 2022-05-18

## 2022-05-11 ASSESSMENT — PATIENT HEALTH QUESTIONNAIRE - PHQ9: CLINICAL INTERPRETATION OF PHQ2 SCORE: 0

## 2022-05-11 ASSESSMENT — FIBROSIS 4 INDEX: FIB4 SCORE: 0.55

## 2022-05-11 NOTE — PROGRESS NOTES
"Chief Complaint   Patient presents with   • Other     Return to work note         HPI:  36-year-old established female here with request for mood stabilizers and a note to return to work.    #1- mood changes:  Moods vary day to day.  Moves from depression, extreme happiness, to anxiety day to day.  Has panic attacks and known pstd.  Night owl - going to bed late chronically and has stayed up a few days at a time in the past.  +impulse behaviors but not on a regular basis- bought a pug after watching videos.  Dog provides her happiness.  Mother is extremely hard on her and very negative - for example her mother has told her to go kill herself.  Sister drank herself to death and brother shot himself.  2012 suicide attempt - took 120 xanax and was admitted for two days.  Denies any acute suicidal ideations or plans.    #2- she would like a note to return to work.  She feels fully capable of doing all job duties.  She stopped working at [employer] 2/2/2022 b/c of recurrent hernia - US February/2022 showed right inguinal hernia 8.9 mm containing fat.  States hernia is not bothersome and she feels fully capable of working.  Plans on postponing hernia repair.      Has not taken adderall in the past 24 hours.  Last uds 5/2022.        Exam:    BP (!) 158/96   Pulse 100   Temp 36.2 °C (97.1 °F)   Resp 12   Ht 1.727 m (5' 8\")   Wt 84.4 kg (186 lb)   SpO2 97%   Gen. appears healthy in no distress   Skin appropriate for sex and age   Neck trachea is midline  Lungs unlabored breathing  Heart regular rate  Abdomen soft and nontender.  Right inguinal hernia is not strangulated or tender and is extremely small.  Neuro gait and station normal   Psych appropriate, calm, interactive, well-groomed      A/P:  1. Mood disorder (HCC)  -Discussed that I would prefer she get an exact diagnoses through psychiatry prior to going on a mood stabilizer.  At this point I encouraged her to stay on Lexapro for her depression.  She was given a very " small prescription of alprazolam to use for panic attacks in situations in which she is unable to calm down on her own with coping techniques such as deep breathing and exercise.  Encouraged her to stay away from things like red bull which she enjoys frequently.  We discussed [facility] if she begins to have concrete suicidal thoughts/plans.  Will notify me when she has a psychiatry appointment and after the appointment to let me know how this goes.  - Referral to Psychiatry    2. Unilateral recurrent inguinal hernia without obstruction or gangrene  -Not causing her pain.  Not strangulated.  Discussed proper lifting techniques and hernia compression type clothing articles.  I did encourage her to follow-up with general surgery at some point over the next year, particularly if this begins to get larger or cause her any pain.  May return to work full duty.  Discussed ER precautions and signs/symptoms of strangulated hernias.

## 2022-05-11 NOTE — LETTER
May 11, 2022        Katerina Murry [facility]  [address]  [place] [address]      To whom it may concern:     Please allow Katerina to return to work tomorrow 5/12/2022.  She is cleared for full duty.      If you have any questions or concerns, please don't hesitate to call.        Sincerely,        MARTITA Vines.    Electronically Signed

## 2022-05-13 ENCOUNTER — TELEPHONE (OUTPATIENT)
Dept: MEDICAL GROUP | Facility: LAB | Age: 37
End: 2022-05-13
Payer: COMMERCIAL

## 2022-05-16 NOTE — TELEPHONE ENCOUNTER
No need for referral for obgyn for any insurance except [facility].  Please suggest [facility] or [facility].  I can also try to help her if the wait is too long.

## 2022-06-06 DIAGNOSIS — F90.2 ATTENTION DEFICIT HYPERACTIVITY DISORDER (ADHD), COMBINED TYPE: ICD-10-CM

## 2022-06-06 RX ORDER — DEXTROAMPHETAMINE SACCHARATE, AMPHETAMINE ASPARTATE, DEXTROAMPHETAMINE SULFATE AND AMPHETAMINE SULFATE 3.75; 3.75; 3.75; 3.75 MG/1; MG/1; MG/1; MG/1
15 TABLET ORAL 2 TIMES DAILY
Qty: 60 TABLET | Refills: 0 | Status: ON HOLD | OUTPATIENT
Start: 2022-07-09 | End: 2022-07-27

## 2022-06-06 RX ORDER — DEXTROAMPHETAMINE SACCHARATE, AMPHETAMINE ASPARTATE, DEXTROAMPHETAMINE SULFATE AND AMPHETAMINE SULFATE 3.75; 3.75; 3.75; 3.75 MG/1; MG/1; MG/1; MG/1
15 TABLET ORAL 2 TIMES DAILY
Qty: 60 TABLET | Refills: 0 | Status: ON HOLD | OUTPATIENT
Start: 2022-08-08 | End: 2022-07-27

## 2022-06-06 RX ORDER — DEXTROAMPHETAMINE SACCHARATE, AMPHETAMINE ASPARTATE, DEXTROAMPHETAMINE SULFATE AND AMPHETAMINE SULFATE 3.75; 3.75; 3.75; 3.75 MG/1; MG/1; MG/1; MG/1
15 TABLET ORAL 2 TIMES DAILY
Qty: 60 TABLET | Refills: 0 | Status: SHIPPED | OUTPATIENT
Start: 2022-06-09 | End: 2022-07-09

## 2022-06-07 DIAGNOSIS — F33.41 RECURRENT MAJOR DEPRESSIVE DISORDER, IN PARTIAL REMISSION (HCC): ICD-10-CM

## 2022-06-07 RX ORDER — ESCITALOPRAM OXALATE 10 MG/1
10 TABLET ORAL DAILY
Qty: 30 TABLET | Refills: 5 | Status: ON HOLD | OUTPATIENT
Start: 2022-06-07 | End: 2022-07-27

## 2022-06-07 NOTE — TELEPHONE ENCOUNTER
Received request via: Pharmacy    Was the patient seen in the last year in this department? Yes  LOV 05/11/2022  Does the patient have an active prescription (recently filled or refills available) for medication(s) requested? No

## 2022-06-14 ENCOUNTER — OFFICE VISIT (OUTPATIENT)
Dept: URGENT CARE | Facility: PHYSICIAN GROUP | Age: 37
End: 2022-06-14
Payer: COMMERCIAL

## 2022-06-14 VITALS
OXYGEN SATURATION: 98 % | TEMPERATURE: 98.2 F | SYSTOLIC BLOOD PRESSURE: 160 MMHG | BODY MASS INDEX: 27.16 KG/M2 | HEIGHT: 68 IN | RESPIRATION RATE: 16 BRPM | WEIGHT: 179.2 LBS | HEART RATE: 103 BPM | DIASTOLIC BLOOD PRESSURE: 108 MMHG

## 2022-06-14 DIAGNOSIS — H92.02 ACUTE OTALGIA, LEFT: ICD-10-CM

## 2022-06-14 PROCEDURE — 99213 OFFICE O/P EST LOW 20 MIN: CPT | Performed by: STUDENT IN AN ORGANIZED HEALTH CARE EDUCATION/TRAINING PROGRAM

## 2022-06-14 ASSESSMENT — FIBROSIS 4 INDEX: FIB4 SCORE: 0.55

## 2022-06-14 NOTE — LETTER
June 14, 2022       Patient: Katerina Owen   YOB: 1985   Date of Visit: 6/14/2022         To Whom It May Concern:    Katerina Owen was seen in urgent care on 6/14/2022 for her doctor's appointment and is excused from work today.  She was also experiencing viral-like symptoms last week and tested negative for COVID on 2 separate occasions.  No further testing was performed today.    If you have any questions or concerns, please don't hesitate to call [phone]          Sincerely,          Donell Mcnair D.O.  Electronically Signed

## 2022-06-14 NOTE — PROGRESS NOTES
"Subjective:   CHIEF COMPLAINT  Chief Complaint   Patient presents with   • Earache     L ear pressure, dizziness, x6 days        HPI  Katerina Owen is a 36 y.o. female who presents with a chief complaint of left ear discomfort for 8 days.  She has been taking ibuprofen which provides transient relief of symptoms.  There has been no drainage or discharge from her ear.  No additional modifying factors.  On review of systems patient reports she had viral-like symptoms last week including runny nose, body aches and headache but those symptoms have since resolved.  Patient is requesting a note for work.  She has had 2 home COVID test which were both negative.    REVIEW OF SYSTEMS  General: no fever or chills  GI: no nausea or vomiting  See HPI for further details.    PAST MEDICAL HISTORY  Patient Active Problem List    Diagnosis Date Noted   • Abdominal hernia 05/11/2022   • Recurrent inguinal hernia 03/10/2022   • Persistent migraine aura without cerebral infarction and without status migrainosus, not intractable 06/13/2018   • Dizziness 05/31/2018   • Chronic headaches 05/31/2018   • Syncope and collapse 02/24/2018   • Hypokalemia 02/24/2018   • Hyponatremia 02/24/2018   • Attention deficit hyperactivity disorder (ADHD), combined type 10/24/2017   • PTSD (post-traumatic stress disorder) 11/30/2015   • Anxiety 01/16/2014   • Depression 01/16/2014   • H/O hernia repair 05/05/2011   • HTN (hypertension)        SURGICAL HISTORY   has a past surgical history that includes septoturbinoplasty (4/21/2009); lumpectomy (2014); breast biopsy (Left, 2/26/2016); primary c section (Aug 7, 2007); repeat c section w tubal ligation (9/7/2011); ventral hernia repair (Right, 1/10/2018); and appendectomy laparoscopic (6/5/2018).    ALLERGIES  Allergies   Allergen Reactions   • Morphine Nausea and Unspecified     \"feel sick for a whole day after being given morphine\"  RXN=2/2016     • Vicodin [Hydrocodone-Acetaminophen] Vomiting " "and Nausea     EAP=1025       CURRENT MEDICATIONS  Home Medications     Reviewed by Basia Giles Med Ass't (Medical Assistant) on 22 at 1143  Med List Status: <None>   Medication Last Dose Status   amphetamine-dextroamphetamine (ADDERALL) 15 MG tablet Taking Active   amphetamine-dextroamphetamine (ADDERALL) 15 MG tablet Taking Active   amphetamine-dextroamphetamine (ADDERALL) 15 MG tablet Taking Active   clobetasol (TEMOVATE) 0.05 % external solution Taking Active   Clobetasol Propionate 0.05 % Gel Taking Active   escitalopram (LEXAPRO) 10 MG Tab Taking Active   ibuprofen (MOTRIN) 200 MG Tab Taking Active   Multiple Vitamin (MULTIVITAMIN PO) Taking Active                SOCIAL HISTORY  Social History     Tobacco Use   • Smoking status: Former Smoker     Packs/day: 0.25     Years: 6.00     Pack years: 1.50     Quit date: 2014     Years since quittin.4   • Smokeless tobacco: Never Used   • Tobacco comment: 2017 currently Vape   Vaping Use   • Vaping Use: Every day   • Substances: Nicotine   • Devices: Disposable   Substance and Sexual Activity   • Alcohol use: No     Alcohol/week: 0.0 oz   • Drug use: No   • Sexual activity: Not Currently     Partners: Male     Birth control/protection: Surgical     Comment: three kids       FAMILY HISTORY  Family History   Problem Relation Age of Onset   • Heart Disease Father         heart failure smoker    • Hypertension Father    • Cancer Maternal Grandfather         pancreastic   • Heart Disease Paternal Grandfather         heart attack   • Diabetes Brother           Objective:   PHYSICAL EXAM  VITAL SIGNS: BP (!) 160/108 (BP Location: Right arm, Patient Position: Sitting, BP Cuff Size: Adult)   Pulse (!) 103   Temp 36.8 °C (98.2 °F) (Temporal)   Resp 16   Ht 1.727 m (5' 8\")   Wt 81.3 kg (179 lb 3.2 oz)   SpO2 98%   BMI 27.25 kg/m²     Gen: no acute distress, normal voice  Skin: dry, intact, moist mucosal membranes  ENT: TMs clear intact bilateral " without bulging, erythema or effusion.  No erythema or edema of bilateral external canals.  Lungs: CTAB w/ symmetric expansion  CV: RRR w/o murmurs or clicks  Psych: normal affect, normal judgement, alert, awake    Assessment/Plan:     1. Acute otalgia, left     Possibly secondary to eustachian tube dysfunction.  Examination was unremarkable without any focal nidus for bacterial infection or indication for antibiotics at this point.  She has had 2 negative COVID test which were both negative.  - NeilMed sinus rinses, Flonase and Allegra; handout provided  - Provided note for work  - Return to urgent care any new/worsening symptoms or further questions or concerns.  Patient understood everything discussed.  All questions were answered.    Differential diagnosis, natural history, supportive care, and indications for immediate follow-up discussed. All questions answered. Patient agrees with the plan of care.    Follow-up as needed if symptoms worsen or fail to improve to PCP, Urgent care or Emergency Room.    Please note that this dictation was created using voice recognition software. I have made a reasonable attempt to correct obvious errors, but I expect that there are errors of grammar and possibly content that I did not discover before finalizing the note.

## 2022-07-22 ENCOUNTER — HOSPITAL ENCOUNTER (EMERGENCY)
Facility: MEDICAL CENTER | Age: 37
End: 2022-07-23
Attending: EMERGENCY MEDICINE
Payer: COMMERCIAL

## 2022-07-22 DIAGNOSIS — N39.0 ACUTE UTI: ICD-10-CM

## 2022-07-22 DIAGNOSIS — T50.901A ACCIDENTAL DRUG INGESTION, INITIAL ENCOUNTER: ICD-10-CM

## 2022-07-22 DIAGNOSIS — I10 HYPERTENSION, UNSPECIFIED TYPE: ICD-10-CM

## 2022-07-22 DIAGNOSIS — R45.851 SUICIDAL IDEATION: ICD-10-CM

## 2022-07-22 PROCEDURE — 99285 EMERGENCY DEPT VISIT HI MDM: CPT

## 2022-07-22 ASSESSMENT — LIFESTYLE VARIABLES
TOTAL SCORE: 0
HOW MANY TIMES IN THE PAST YEAR HAVE YOU HAD 5 OR MORE DRINKS IN A DAY: 0
TOTAL SCORE: 0
EVER HAD A DRINK FIRST THING IN THE MORNING TO STEADY YOUR NERVES TO GET RID OF A HANGOVER: NO
HAVE PEOPLE ANNOYED YOU BY CRITICIZING YOUR DRINKING: NO
DO YOU DRINK ALCOHOL: NO
DOES PATIENT WANT TO STOP DRINKING: NO
AVERAGE NUMBER OF DAYS PER WEEK YOU HAVE A DRINK CONTAINING ALCOHOL: 0
EVER FELT BAD OR GUILTY ABOUT YOUR DRINKING: NO
CONSUMPTION TOTAL: NEGATIVE
HAVE YOU EVER FELT YOU SHOULD CUT DOWN ON YOUR DRINKING: NO
TOTAL SCORE: 0
ON A TYPICAL DAY WHEN YOU DRINK ALCOHOL HOW MANY DRINKS DO YOU HAVE: 0

## 2022-07-22 ASSESSMENT — FIBROSIS 4 INDEX: FIB4 SCORE: 0.55

## 2022-07-23 ENCOUNTER — HOSPITAL ENCOUNTER (INPATIENT)
Facility: MEDICAL CENTER | Age: 37
LOS: 3 days | DRG: 854 | End: 2022-07-27
Attending: EMERGENCY MEDICINE | Admitting: STUDENT IN AN ORGANIZED HEALTH CARE EDUCATION/TRAINING PROGRAM
Payer: COMMERCIAL

## 2022-07-23 VITALS
HEIGHT: 68 IN | OXYGEN SATURATION: 96 % | TEMPERATURE: 97 F | DIASTOLIC BLOOD PRESSURE: 106 MMHG | SYSTOLIC BLOOD PRESSURE: 168 MMHG | RESPIRATION RATE: 16 BRPM | HEART RATE: 90 BPM | BODY MASS INDEX: 25.76 KG/M2 | WEIGHT: 170 LBS

## 2022-07-23 DIAGNOSIS — S61.511A WRIST LACERATION, RIGHT, INITIAL ENCOUNTER: ICD-10-CM

## 2022-07-23 DIAGNOSIS — F23 ACUTE PSYCHOSIS (HCC): ICD-10-CM

## 2022-07-23 DIAGNOSIS — S61.502A FLEXOR TENDON LACERATION OF LEFT WRIST WITH OPEN WOUND, INITIAL ENCOUNTER: Primary | ICD-10-CM

## 2022-07-23 DIAGNOSIS — S61.512A WRIST LACERATION, LEFT, INITIAL ENCOUNTER: ICD-10-CM

## 2022-07-23 DIAGNOSIS — R45.1 AGITATION: ICD-10-CM

## 2022-07-23 DIAGNOSIS — E86.0 DEHYDRATION: ICD-10-CM

## 2022-07-23 DIAGNOSIS — S66.922A FLEXOR TENDON LACERATION OF LEFT WRIST WITH OPEN WOUND, INITIAL ENCOUNTER: Primary | ICD-10-CM

## 2022-07-23 DIAGNOSIS — N30.00 ACUTE CYSTITIS WITHOUT HEMATURIA: ICD-10-CM
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
## 2022-07-23 DIAGNOSIS — I10 PRIMARY HYPERTENSION: ICD-10-CM

## 2022-07-23 DIAGNOSIS — R45.851 SUICIDAL IDEATION: ICD-10-CM

## 2022-07-23 DIAGNOSIS — F19.10 POLYSUBSTANCE ABUSE (HCC): ICD-10-CM

## 2022-07-23 LAB
AMORPH CRY #/AREA URNS HPF: PRESENT /HPF
AMPHET UR QL SCN: NEGATIVE
APPEARANCE UR: ABNORMAL
BACTERIA #/AREA URNS HPF: ABNORMAL /HPF
BARBITURATES UR QL SCN: NEGATIVE
BENZODIAZ UR QL SCN: NEGATIVE
BILIRUB UR QL STRIP.AUTO: NEGATIVE
BZE UR QL SCN: NEGATIVE
CANNABINOIDS UR QL SCN: NEGATIVE
COLOR UR: YELLOW
EPI CELLS #/AREA URNS HPF: ABNORMAL /HPF
GLUCOSE UR STRIP.AUTO-MCNC: NEGATIVE MG/DL
GRAN CASTS #/AREA URNS LPF: ABNORMAL /LPF
HCG UR QL: NEGATIVE
HYALINE CASTS #/AREA URNS LPF: ABNORMAL /LPF
KETONES UR STRIP.AUTO-MCNC: ABNORMAL MG/DL
LEUKOCYTE ESTERASE UR QL STRIP.AUTO: ABNORMAL
METHADONE UR QL SCN: NEGATIVE
MICRO URNS: ABNORMAL
NITRITE UR QL STRIP.AUTO: NEGATIVE
OPIATES UR QL SCN: NEGATIVE
OXYCODONE UR QL SCN: NEGATIVE
PCP UR QL SCN: NEGATIVE
PH UR STRIP.AUTO: 5.5 [PH] (ref 5–8)
POC BREATHALIZER: 0 PERCENT (ref 0–0.01)
PROPOXYPH UR QL SCN: NEGATIVE
PROT UR QL STRIP: 30 MG/DL
RBC # URNS HPF: ABNORMAL /HPF
RBC UR QL AUTO: ABNORMAL
SP GR UR STRIP.AUTO: 1.02
UROBILINOGEN UR STRIP.AUTO-MCNC: 0.2 MG/DL
WBC #/AREA URNS HPF: ABNORMAL /HPF

## 2022-07-23 PROCEDURE — 81025 URINE PREGNANCY TEST: CPT

## 2022-07-23 PROCEDURE — 700102 HCHG RX REV CODE 250 W/ 637 OVERRIDE(OP): Performed by: EMERGENCY MEDICINE

## 2022-07-23 PROCEDURE — 99285 EMERGENCY DEPT VISIT HI MDM: CPT

## 2022-07-23 PROCEDURE — 90791 PSYCH DIAGNOSTIC EVALUATION: CPT

## 2022-07-23 PROCEDURE — 81001 URINALYSIS AUTO W/SCOPE: CPT

## 2022-07-23 PROCEDURE — 302970 POC BREATHALIZER: Performed by: EMERGENCY MEDICINE

## 2022-07-23 PROCEDURE — 80307 DRUG TEST PRSMV CHEM ANLYZR: CPT

## 2022-07-23 PROCEDURE — A9270 NON-COVERED ITEM OR SERVICE: HCPCS | Performed by: EMERGENCY MEDICINE

## 2022-07-23 RX ORDER — CEFDINIR 300 MG/1
300 CAPSULE ORAL ONCE
Status: COMPLETED | OUTPATIENT
Start: 2022-07-23 | End: 2022-07-23

## 2022-07-23 RX ORDER — HYDROXYZINE HYDROCHLORIDE 25 MG/1
50 TABLET, FILM COATED ORAL ONCE
Status: COMPLETED | OUTPATIENT
Start: 2022-07-23 | End: 2022-07-23

## 2022-07-23 RX ORDER — CEFDINIR 300 MG/1
300 CAPSULE ORAL 2 TIMES DAILY
Qty: 14 CAPSULE | Refills: 0 | Status: ON HOLD | OUTPATIENT
Start: 2022-07-23 | End: 2022-07-27

## 2022-07-23 RX ADMIN — HYDROXYZINE HYDROCHLORIDE 50 MG: 25 TABLET ORAL at 10:18

## 2022-07-23 RX ADMIN — CEFDINIR 300 MG: 300 CAPSULE ORAL at 10:18
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
## 2022-07-23 NOTE — ED NOTES
Patient provided discharge instructions. Patient verbalized understanding. Patient leaving ER in stable condition by taxi home. Patient ambulatory with steady gait, walked out of ED by Aliza OVERTON.

## 2022-07-23 NOTE — ED NOTES
"While taking patient's vitals the patient became very agitated stating \"please sedate me, I don't want to know what happened to them\" repeatedly. Patient unable to clarify on what or who she was talking about, however patient was able to be verbally redirected and calmed down. Unable to obtain urine specimen at this time due to patient's inability to follow commands.   "

## 2022-07-23 NOTE — ED NOTES
Per Aliza, patient no longer needs 1 to 1 sitter. Sitter discontinued. Patient resting calmly on gurney.
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
## 2022-07-23 NOTE — ED NOTES
"  Cardiothoracic - Vascular Surgery Daily Note        LOS: 1 day   Patient Care Team:  MADELIN Hernandez as PCP - General (Family Medicine)    POD1  RIGHT CAROTID ENDARTERECTOMY   carotid cerebral arteriogram           Chief Complaint:  Carotid Stenosis      Subjective     The following portions of the patient's history were reviewed and updated as appropriate: allergies, current medications, past family history, past medical history, past social history, past surgical history and problem list.     Subjective:  Symptoms:  Stable.    Diet:  Adequate intake.    Activity level: Normal.    Pain:  She reports no pain.          Objective     Vital Signs  Temp:  [96.5 °F (35.8 °C)-97.5 °F (36.4 °C)] 96.5 °F (35.8 °C)  Heart Rate:  [59-77] 72  Resp:  [16-20] 16  BP: (106-144)/(52-74) 118/56  Arterial Line BP: (102-145)/(44-72) 102/72  Body mass index is 23.03 kg/m².    Intake/Output Summary (Last 24 hours) at 04/14/18 0935  Last data filed at 04/14/18 0321   Gross per 24 hour   Intake             2540 ml   Output              655 ml   Net             1885 ml     No intake/output data recorded.    Wt Readings from Last 3 Encounters:   04/14/18 57.1 kg (125 lb 14.4 oz)   04/10/18 51.7 kg (114 lb)   03/26/18 51.5 kg (113 lb 9.6 oz)         Physical Exam   Objective:  General Appearance:  Comfortable.    Vital signs: (most recent): Blood pressure 118/56, pulse 72, temperature 96.5 °F (35.8 °C), temperature source Axillary, resp. rate 16, height 157.5 cm (62\"), weight 57.1 kg (125 lb 14.4 oz), SpO2 96 %.  Vital signs are normal.  No fever.    Output: Producing urine and no stool output (flatus).    HEENT: Normal HEENT exam.    Lungs:  Normal effort and normal respiratory rate.  Breath sounds clear to auscultation.    Heart: Normal rate.    Abdomen: Abdomen is soft and non-distended.  Bowel sounds are normal.   There is no abdominal tenderness.     Extremities: Normal range of motion.  (Normal gait)  Pulses: Distal pulses are " Dr. aKy and Aliza from alert team at bedside.    intact.    Neurological: Patient is alert and oriented to person, place and time.  (Smile Symmetrical  Tongue Midline   equal  ).    Pupils:  Pupils are equal, round, and reactive to light.    Skin:  Warm and dry.  ((R) Neck Inc: Prineo intact  FP Dc'd)              Results Review:    Lab Results   Component Value Date    WBC 5.99 04/10/2018    HGB 13.9 04/10/2018    HCT 40.0 04/10/2018    MCV 90.3 04/10/2018     04/10/2018     Lab Results   Component Value Date    GLUCOSE 102 (H) 04/10/2018    BUN 15 04/10/2018    CREATININE 0.65 04/10/2018    EGFRIFNONA 86 04/10/2018    BCR 23.1 04/10/2018    K 4.3 04/10/2018    CO2 27.0 04/10/2018    CALCIUM 10.0 04/10/2018    ALBUMIN 4.3 02/03/2015    AST 20 02/03/2015    ALT 16 02/03/2015       Calcium No results found for: CALCIUM   Magnesium No results found for: MG     Imaging Results (last 24 hours)     Procedure Component Value Units Date/Time    FL C Arm During Surgery [[id number]] Updated:  04/13/18 1501                                albuterol 2.5 mg Nebulization Q8H - RT   amLODIPine 5 mg Oral Daily   aspirin 81 mg Oral Daily   atorvastatin 20 mg Oral Daily   ceFAZolin 2 g Intravenous Q8H   latanoprost 1 drop Both Eyes Nightly   metoprolol succinate XL 25 mg Oral Daily   PATIENT SUPPLIED MEDICATION 1 drop Both Eyes Q12H       dextrose 5 % and sodium chloride 0.45 % 75 mL/hr Last Rate: 75 mL/hr (04/13/18 1730)   nitroglycerin 5-200 mcg/min              ASSESSMENT/PLAN     Active Problems:    Bilateral carotid artery stenosis      Assessment:    Condition: In stable condition.   (Stable Post Op RIGHT CEA: Progressing well. Return of bodily functions postop    Carotid Stenosis: ASA. STATIN    Resp: Stable.    Pain: Denies. No pain medication.    Rehab: Ambulated in [place] without difficulty    ).     Plan:   Discharge home.  Encourage ambulation.  Regular diet.  Administer medications as ordered and resume home regimen.   (  -Stable, DC Home  -Follow up 1 week as  scheduled).                 This document has been electronically signed by MADELIN Bautista on April 14, 2018 9:35 AM
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
## 2022-07-23 NOTE — DISCHARGE INSTRUCTIONS
You were evaluated today for suicidal ideation. Your physical exam and labs were reassuring. You were medically cleared in the emergency department, had a psychiatric evaluation performed and you are being discharged from the emergency department. Please follow all the recommendations set by your psychiatrist.    If you have thoughts of suicide, please seek help. This may be a family member, friend, mental health professional, suicide hotline, or any emergency department.     National Suicide Prevention Lifeline: 1-696.554.6543  Available 24hours a day, 7 days a week    Please return to the ED or seek medical attention if you develop:  Fever, severe headache, vomiting, thoughts of suicide or other concerning findings

## 2022-07-23 NOTE — ED NOTES
Patient refuses to provide a urine sample at this time, which is consistent with previous attempts from this night. Patient remains in the same condition as she has been for the past several hours, nonagitated but uncooperative.

## 2022-07-23 NOTE — ED NOTES
Patient resting sitting up on gurney. No agitation noted. 1 to 1 sitter in direct view of patient.

## 2022-07-24 ENCOUNTER — APPOINTMENT (OUTPATIENT)
Dept: RADIOLOGY | Facility: MEDICAL CENTER | Age: 37
DRG: 854 | End: 2022-07-24
Attending: STUDENT IN AN ORGANIZED HEALTH CARE EDUCATION/TRAINING PROGRAM
Payer: COMMERCIAL

## 2022-07-24 PROBLEM — N30.00 ACUTE CYSTITIS: Status: ACTIVE | Noted: 2022-07-24
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
## 2022-07-24 PROBLEM — M21.332 LEFT WRIST DROP: Status: ACTIVE | Noted: 2022-07-24

## 2022-07-24 PROBLEM — M21.331 RIGHT WRIST DROP: Status: ACTIVE | Noted: 2022-07-24

## 2022-07-24 PROBLEM — E87.20 LACTIC ACIDOSIS: Status: ACTIVE | Noted: 2022-07-24

## 2022-07-24 PROBLEM — T50.901A OVERDOSE: Status: ACTIVE | Noted: 2022-07-24

## 2022-07-24 PROBLEM — D72.829 LEUKOCYTOSIS: Status: ACTIVE | Noted: 2022-07-24

## 2022-07-24 PROBLEM — M21.331 RIGHT WRIST DROP: Status: RESOLVED | Noted: 2022-07-24 | Resolved: 2022-07-24

## 2022-07-24 PROBLEM — A41.9 SEPSIS (HCC): Status: ACTIVE | Noted: 2022-07-24

## 2022-07-24 PROBLEM — I16.1 HYPERTENSIVE EMERGENCY: Status: ACTIVE | Noted: 2022-07-24

## 2022-07-24 LAB
ALBUMIN SERPL BCP-MCNC: 4.4 G/DL (ref 3.2–4.9)
ALBUMIN/GLOB SERPL: 1.5 G/DL
ALP SERPL-CCNC: 79 U/L (ref 30–99)
ALT SERPL-CCNC: 31 U/L (ref 2–50)
AMORPH CRY #/AREA URNS HPF: PRESENT /HPF
AMPHET UR QL SCN: POSITIVE
ANION GAP SERPL CALC-SCNC: 15 MMOL/L (ref 7–16)
ANION GAP SERPL CALC-SCNC: 15 MMOL/L (ref 7–16)
ANION GAP SERPL CALC-SCNC: 19 MMOL/L (ref 7–16)
APAP SERPL-MCNC: <5 UG/ML (ref 10–30)
APPEARANCE UR: CLEAR
AST SERPL-CCNC: 32 U/L (ref 12–45)
B-OH-BUTYR SERPL-MCNC: 0.72 MMOL/L (ref 0.02–0.27)
BACTERIA #/AREA URNS HPF: ABNORMAL /HPF
BARBITURATES UR QL SCN: NEGATIVE
BASOPHILS # BLD AUTO: 0.2 % (ref 0–1.8)
BASOPHILS # BLD: 0.03 K/UL (ref 0–0.12)
BENZODIAZ UR QL SCN: POSITIVE
BILIRUB SERPL-MCNC: 0.5 MG/DL (ref 0.1–1.5)
BILIRUB UR QL STRIP.AUTO: NEGATIVE
BUN SERPL-MCNC: 11 MG/DL (ref 8–22)
BUN SERPL-MCNC: 8 MG/DL (ref 8–22)
BUN SERPL-MCNC: 9 MG/DL (ref 8–22)
BZE UR QL SCN: NEGATIVE
CALCIUM SERPL-MCNC: 8 MG/DL (ref 8.5–10.5)
CALCIUM SERPL-MCNC: 8.5 MG/DL (ref 8.5–10.5)
CALCIUM SERPL-MCNC: 9.5 MG/DL (ref 8.5–10.5)
CANNABINOIDS UR QL SCN: NEGATIVE
CHLORIDE SERPL-SCNC: 100 MMOL/L (ref 96–112)
CHLORIDE SERPL-SCNC: 106 MMOL/L (ref 96–112)
CHLORIDE SERPL-SCNC: 106 MMOL/L (ref 96–112)
CK SERPL-CCNC: 42 U/L (ref 0–154)
CO2 SERPL-SCNC: 14 MMOL/L (ref 20–33)
CO2 SERPL-SCNC: 17 MMOL/L (ref 20–33)
CO2 SERPL-SCNC: 18 MMOL/L (ref 20–33)
COLOR UR: ABNORMAL
CREAT SERPL-MCNC: 0.61 MG/DL (ref 0.5–1.4)
CREAT SERPL-MCNC: 0.62 MG/DL (ref 0.5–1.4)
CREAT SERPL-MCNC: 0.82 MG/DL (ref 0.5–1.4)
EOSINOPHIL # BLD AUTO: 0.01 K/UL (ref 0–0.51)
EOSINOPHIL NFR BLD: 0.1 % (ref 0–6.9)
EPI CELLS #/AREA URNS HPF: ABNORMAL /HPF
ERYTHROCYTE [DISTWIDTH] IN BLOOD BY AUTOMATED COUNT: 47.5 FL (ref 35.9–50)
ETHANOL BLD-MCNC: <10.1 MG/DL
GFR SERPLBLD CREATININE-BSD FMLA CKD-EPI: 116 ML/MIN/1.73 M 2
GFR SERPLBLD CREATININE-BSD FMLA CKD-EPI: 117 ML/MIN/1.73 M 2
GFR SERPLBLD CREATININE-BSD FMLA CKD-EPI: 93 ML/MIN/1.73 M 2
GLOBULIN SER CALC-MCNC: 3 G/DL (ref 1.9–3.5)
GLUCOSE SERPL-MCNC: 188 MG/DL (ref 65–99)
GLUCOSE SERPL-MCNC: 72 MG/DL (ref 65–99)
GLUCOSE SERPL-MCNC: 76 MG/DL (ref 65–99)
GLUCOSE UR STRIP.AUTO-MCNC: NEGATIVE MG/DL
HCG SERPL QL: NEGATIVE
HCT VFR BLD AUTO: 41.6 % (ref 37–47)
HGB BLD-MCNC: 14.3 G/DL (ref 12–16)
HYALINE CASTS #/AREA URNS LPF: ABNORMAL /LPF
IMM GRANULOCYTES # BLD AUTO: 0.07 K/UL (ref 0–0.11)
IMM GRANULOCYTES NFR BLD AUTO: 0.5 % (ref 0–0.9)
KETONES UR STRIP.AUTO-MCNC: ABNORMAL MG/DL
LACTATE SERPL-SCNC: 1.3 MMOL/L (ref 0.5–2)
LACTATE SERPL-SCNC: 2.4 MMOL/L (ref 0.5–2)
LACTATE SERPL-SCNC: 5.1 MMOL/L (ref 0.5–2)
LEUKOCYTE ESTERASE UR QL STRIP.AUTO: ABNORMAL
LYMPHOCYTES # BLD AUTO: 1.77 K/UL (ref 1–4.8)
LYMPHOCYTES NFR BLD: 12.7 % (ref 22–41)
MCH RBC QN AUTO: 28.8 PG (ref 27–33)
MCHC RBC AUTO-ENTMCNC: 34.4 G/DL (ref 33.6–35)
MCV RBC AUTO: 83.9 FL (ref 81.4–97.8)
METHADONE UR QL SCN: NEGATIVE
MICRO URNS: ABNORMAL
MONOCYTES # BLD AUTO: 1.02 K/UL (ref 0–0.85)
MONOCYTES NFR BLD AUTO: 7.3 % (ref 0–13.4)
MUCOUS THREADS #/AREA URNS HPF: ABNORMAL /HPF
NEUTROPHILS # BLD AUTO: 11.01 K/UL (ref 2–7.15)
NEUTROPHILS NFR BLD: 79.2 % (ref 44–72)
NITRITE UR QL STRIP.AUTO: NEGATIVE
NRBC # BLD AUTO: 0 K/UL
NRBC BLD-RTO: 0 /100 WBC
OPIATES UR QL SCN: NEGATIVE
OXYCODONE UR QL SCN: NEGATIVE
PCP UR QL SCN: NEGATIVE
PH UR STRIP.AUTO: 6 [PH] (ref 5–8)
PLATELET # BLD AUTO: 296 K/UL (ref 164–446)
PMV BLD AUTO: 11.8 FL (ref 9–12.9)
POTASSIUM SERPL-SCNC: 3.4 MMOL/L (ref 3.6–5.5)
POTASSIUM SERPL-SCNC: 3.5 MMOL/L (ref 3.6–5.5)
POTASSIUM SERPL-SCNC: 4.2 MMOL/L (ref 3.6–5.5)
PROPOXYPH UR QL SCN: NEGATIVE
PROT SERPL-MCNC: 7.4 G/DL (ref 6–8.2)
PROT UR QL STRIP: 100 MG/DL
RBC # BLD AUTO: 4.96 M/UL (ref 4.2–5.4)
RBC # URNS HPF: >150 /HPF
RBC UR QL AUTO: ABNORMAL
SALICYLATES SERPL-MCNC: <1 MG/DL (ref 15–25)
SODIUM SERPL-SCNC: 133 MMOL/L (ref 135–145)
SODIUM SERPL-SCNC: 138 MMOL/L (ref 135–145)
SODIUM SERPL-SCNC: 139 MMOL/L (ref 135–145)
SP GR UR STRIP.AUTO: 1.01
TROPONIN T SERPL-MCNC: 11 NG/L (ref 6–19)
UROBILINOGEN UR STRIP.AUTO-MCNC: 0.2 MG/DL
WBC # BLD AUTO: 13.9 K/UL (ref 4.8–10.8)
WBC #/AREA URNS HPF: ABNORMAL /HPF

## 2022-07-24 PROCEDURE — 71045 X-RAY EXAM CHEST 1 VIEW: CPT

## 2022-07-24 PROCEDURE — 80179 DRUG ASSAY SALICYLATE: CPT

## 2022-07-24 PROCEDURE — 84703 CHORIONIC GONADOTROPIN ASSAY: CPT

## 2022-07-24 PROCEDURE — 84484 ASSAY OF TROPONIN QUANT: CPT

## 2022-07-24 PROCEDURE — 700111 HCHG RX REV CODE 636 W/ 250 OVERRIDE (IP): Performed by: HOSPITALIST

## 2022-07-24 PROCEDURE — 85025 COMPLETE CBC W/AUTO DIFF WBC: CPT

## 2022-07-24 PROCEDURE — 99223 1ST HOSP IP/OBS HIGH 75: CPT | Mod: GC | Performed by: STUDENT IN AN ORGANIZED HEALTH CARE EDUCATION/TRAINING PROGRAM

## 2022-07-24 PROCEDURE — 87077 CULTURE AEROBIC IDENTIFY: CPT

## 2022-07-24 PROCEDURE — 304217 HCHG IRRIGATION SYSTEM

## 2022-07-24 PROCEDURE — 303747 HCHG EXTRA SUTURE

## 2022-07-24 PROCEDURE — 0JQH0ZZ REPAIR LEFT LOWER ARM SUBCUTANEOUS TISSUE AND FASCIA, OPEN APPROACH: ICD-10-PCS | Performed by: EMERGENCY MEDICINE

## 2022-07-24 PROCEDURE — 700111 HCHG RX REV CODE 636 W/ 250 OVERRIDE (IP): Performed by: STUDENT IN AN ORGANIZED HEALTH CARE EDUCATION/TRAINING PROGRAM

## 2022-07-24 PROCEDURE — 82077 ASSAY SPEC XCP UR&BREATH IA: CPT

## 2022-07-24 PROCEDURE — 99233 SBSQ HOSP IP/OBS HIGH 50: CPT | Performed by: HOSPITALIST

## 2022-07-24 PROCEDURE — 96375 TX/PRO/DX INJ NEW DRUG ADDON: CPT

## 2022-07-24 PROCEDURE — 87086 URINE CULTURE/COLONY COUNT: CPT

## 2022-07-24 PROCEDURE — 304999 HCHG REPAIR-SIMPLE/INTERMED LEVEL 1

## 2022-07-24 PROCEDURE — 96374 THER/PROPH/DIAG INJ IV PUSH: CPT

## 2022-07-24 PROCEDURE — 36415 COLL VENOUS BLD VENIPUNCTURE: CPT

## 2022-07-24 PROCEDURE — 80053 COMPREHEN METABOLIC PANEL: CPT

## 2022-07-24 PROCEDURE — 96376 TX/PRO/DX INJ SAME DRUG ADON: CPT

## 2022-07-24 PROCEDURE — 700101 HCHG RX REV CODE 250: Performed by: HOSPITALIST

## 2022-07-24 PROCEDURE — 302970 POC BREATHALIZER: Performed by: EMERGENCY MEDICINE

## 2022-07-24 PROCEDURE — 82010 KETONE BODYS QUAN: CPT

## 2022-07-24 PROCEDURE — 82550 ASSAY OF CK (CPK): CPT

## 2022-07-24 PROCEDURE — A9270 NON-COVERED ITEM OR SERVICE: HCPCS | Performed by: STUDENT IN AN ORGANIZED HEALTH CARE EDUCATION/TRAINING PROGRAM

## 2022-07-24 PROCEDURE — 700111 HCHG RX REV CODE 636 W/ 250 OVERRIDE (IP)

## 2022-07-24 PROCEDURE — 80143 DRUG ASSAY ACETAMINOPHEN: CPT

## 2022-07-24 PROCEDURE — 99222 1ST HOSP IP/OBS MODERATE 55: CPT | Mod: 57 | Performed by: ORTHOPAEDIC SURGERY

## 2022-07-24 PROCEDURE — 81001 URINALYSIS AUTO W/SCOPE: CPT

## 2022-07-24 PROCEDURE — 80307 DRUG TEST PRSMV CHEM ANLYZR: CPT

## 2022-07-24 PROCEDURE — 0JQG0ZZ REPAIR RIGHT LOWER ARM SUBCUTANEOUS TISSUE AND FASCIA, OPEN APPROACH: ICD-10-PCS | Performed by: EMERGENCY MEDICINE

## 2022-07-24 PROCEDURE — 80048 BASIC METABOLIC PNL TOTAL CA: CPT

## 2022-07-24 PROCEDURE — 83605 ASSAY OF LACTIC ACID: CPT

## 2022-07-24 PROCEDURE — 700101 HCHG RX REV CODE 250: Performed by: STUDENT IN AN ORGANIZED HEALTH CARE EDUCATION/TRAINING PROGRAM

## 2022-07-24 PROCEDURE — 700105 HCHG RX REV CODE 258: Performed by: EMERGENCY MEDICINE

## 2022-07-24 PROCEDURE — 700102 HCHG RX REV CODE 250 W/ 637 OVERRIDE(OP): Performed by: STUDENT IN AN ORGANIZED HEALTH CARE EDUCATION/TRAINING PROGRAM

## 2022-07-24 PROCEDURE — 700111 HCHG RX REV CODE 636 W/ 250 OVERRIDE (IP): Performed by: EMERGENCY MEDICINE

## 2022-07-24 PROCEDURE — 770020 HCHG ROOM/CARE - TELE (206)

## 2022-07-24 RX ORDER — BISACODYL 10 MG
10 SUPPOSITORY, RECTAL RECTAL
Status: DISCONTINUED | OUTPATIENT
Start: 2022-07-24 | End: 2022-07-27 | Stop reason: HOSPADM

## 2022-07-24 RX ORDER — SODIUM CHLORIDE, SODIUM LACTATE, POTASSIUM CHLORIDE, AND CALCIUM CHLORIDE .6; .31; .03; .02 G/100ML; G/100ML; G/100ML; G/100ML
30 INJECTION, SOLUTION INTRAVENOUS ONCE
Status: COMPLETED | OUTPATIENT
Start: 2022-07-24 | End: 2022-07-24

## 2022-07-24 RX ORDER — OXYCODONE HYDROCHLORIDE 5 MG/1
5 TABLET ORAL EVERY 4 HOURS PRN
Status: DISCONTINUED | OUTPATIENT
Start: 2022-07-24 | End: 2022-07-27 | Stop reason: HOSPADM

## 2022-07-24 RX ORDER — MIDAZOLAM HYDROCHLORIDE 1 MG/ML
2 INJECTION INTRAMUSCULAR; INTRAVENOUS ONCE
Status: COMPLETED | OUTPATIENT
Start: 2022-07-24 | End: 2022-07-24

## 2022-07-24 RX ORDER — HYDROMORPHONE HYDROCHLORIDE 1 MG/ML
0.5 INJECTION, SOLUTION INTRAMUSCULAR; INTRAVENOUS; SUBCUTANEOUS EVERY 4 HOURS PRN
Status: DISCONTINUED | OUTPATIENT
Start: 2022-07-24 | End: 2022-07-27 | Stop reason: HOSPADM

## 2022-07-24 RX ORDER — POLYETHYLENE GLYCOL 3350 17 G/17G
1 POWDER, FOR SOLUTION ORAL
Status: DISCONTINUED | OUTPATIENT
Start: 2022-07-24 | End: 2022-07-27 | Stop reason: HOSPADM

## 2022-07-24 RX ORDER — SODIUM CHLORIDE AND POTASSIUM CHLORIDE 150; 900 MG/100ML; MG/100ML
INJECTION, SOLUTION INTRAVENOUS CONTINUOUS
Status: DISCONTINUED | OUTPATIENT
Start: 2022-07-24 | End: 2022-07-26

## 2022-07-24 RX ORDER — HALOPERIDOL 5 MG/ML
2 INJECTION INTRAMUSCULAR ONCE
Status: DISCONTINUED | OUTPATIENT
Start: 2022-07-24 | End: 2022-07-24

## 2022-07-24 RX ORDER — LABETALOL HYDROCHLORIDE 5 MG/ML
10 INJECTION, SOLUTION INTRAVENOUS EVERY 6 HOURS PRN
Status: DISCONTINUED | OUTPATIENT
Start: 2022-07-24 | End: 2022-07-27 | Stop reason: HOSPADM

## 2022-07-24 RX ORDER — CHOLECALCIFEROL (VITAMIN D3) 125 MCG
5 CAPSULE ORAL ONCE
Status: COMPLETED | OUTPATIENT
Start: 2022-07-25 | End: 2022-07-25

## 2022-07-24 RX ORDER — AMOXICILLIN 250 MG
2 CAPSULE ORAL 2 TIMES DAILY
Status: DISCONTINUED | OUTPATIENT
Start: 2022-07-24 | End: 2022-07-27 | Stop reason: HOSPADM

## 2022-07-24 RX ORDER — SODIUM CHLORIDE 9 MG/ML
1000 INJECTION, SOLUTION INTRAVENOUS ONCE
Status: COMPLETED | OUTPATIENT
Start: 2022-07-24 | End: 2022-07-24

## 2022-07-24 RX ORDER — HALOPERIDOL 5 MG/ML
5 INJECTION INTRAMUSCULAR ONCE
Status: COMPLETED | OUTPATIENT
Start: 2022-07-24 | End: 2022-07-24

## 2022-07-24 RX ORDER — SODIUM CHLORIDE 9 MG/ML
INJECTION, SOLUTION INTRAVENOUS CONTINUOUS
Status: DISCONTINUED | OUTPATIENT
Start: 2022-07-24 | End: 2022-07-24

## 2022-07-24 RX ORDER — ENOXAPARIN SODIUM 100 MG/ML
40 INJECTION SUBCUTANEOUS DAILY
Status: DISCONTINUED | OUTPATIENT
Start: 2022-07-24 | End: 2022-07-24

## 2022-07-24 RX ADMIN — SODIUM CHLORIDE 1000 ML: 9 INJECTION, SOLUTION INTRAVENOUS at 01:21

## 2022-07-24 RX ADMIN — POTASSIUM BICARBONATE 50 MEQ: 978 TABLET, EFFERVESCENT ORAL at 04:39

## 2022-07-24 RX ADMIN — SODIUM CHLORIDE, POTASSIUM CHLORIDE, SODIUM LACTATE AND CALCIUM CHLORIDE 2313 ML: 600; 310; 30; 20 INJECTION, SOLUTION INTRAVENOUS at 01:59

## 2022-07-24 RX ADMIN — SENNOSIDES AND DOCUSATE SODIUM 2 TABLET: 50; 8.6 TABLET ORAL at 16:09

## 2022-07-24 RX ADMIN — HYDROMORPHONE HYDROCHLORIDE 0.5 MG: 1 INJECTION, SOLUTION INTRAMUSCULAR; INTRAVENOUS; SUBCUTANEOUS at 20:51

## 2022-07-24 RX ADMIN — POTASSIUM CHLORIDE AND SODIUM CHLORIDE: 900; 150 INJECTION, SOLUTION INTRAVENOUS at 14:47

## 2022-07-24 RX ADMIN — HYDROMORPHONE HYDROCHLORIDE 0.5 MG: 1 INJECTION, SOLUTION INTRAMUSCULAR; INTRAVENOUS; SUBCUTANEOUS at 13:52

## 2022-07-24 RX ADMIN — HALOPERIDOL LACTATE 5 MG: 5 INJECTION, SOLUTION INTRAMUSCULAR at 00:45

## 2022-07-24 RX ADMIN — CEFTRIAXONE SODIUM 1 G: 10 INJECTION, POWDER, FOR SOLUTION INTRAVENOUS at 03:46

## 2022-07-24 RX ADMIN — MIDAZOLAM HYDROCHLORIDE 2 MG: 1 INJECTION, SOLUTION INTRAMUSCULAR; INTRAVENOUS at 00:45

## 2022-07-24 RX ADMIN — MIDAZOLAM HYDROCHLORIDE 2 MG: 1 INJECTION, SOLUTION INTRAMUSCULAR; INTRAVENOUS at 02:55

## 2022-07-24 ASSESSMENT — ENCOUNTER SYMPTOMS
WEAKNESS: 0
PSYCHIATRIC NEGATIVE: 1
MYALGIAS: 0
MUSCULOSKELETAL NEGATIVE: 1
COUGH: 0
BRUISES/BLEEDS EASILY: 0
DIZZINESS: 0
NEUROLOGICAL NEGATIVE: 1
EYES NEGATIVE: 1
SORE THROAT: 0
DIAPHORESIS: 0
NAUSEA: 0
DEPRESSION: 0
RESPIRATORY NEGATIVE: 1
CARDIOVASCULAR NEGATIVE: 1
CHILLS: 0
GASTROINTESTINAL NEGATIVE: 1
VOMITING: 0
WEIGHT LOSS: 0
HEADACHES: 0
PALPITATIONS: 0
SHORTNESS OF BREATH: 0
BLURRED VISION: 0
FOCAL WEAKNESS: 0
FEVER: 0
CONSTITUTIONAL NEGATIVE: 1
ABDOMINAL PAIN: 0

## 2022-07-24 ASSESSMENT — PAIN SCALES - PAIN ASSESSMENT IN ADVANCED DEMENTIA (PAINAD)
CONSOLABILITY: NO NEED TO CONSOLE
BODYLANGUAGE: RELAXED
TOTALSCORE: 1
FACIALEXPRESSION: SMILING OR INEXPRESSIVE
BREATHING: NORMAL
NEGVOCALIZATION: OCCASIONAL MOAN/GROAN, LOW SPEECH, NEGATIVE/DISAPPROVING QUALITY

## 2022-07-24 ASSESSMENT — FIBROSIS 4 INDEX: FIB4 SCORE: 0.55

## 2022-07-24 ASSESSMENT — LIFESTYLE VARIABLES: SUBSTANCE_ABUSE: 0

## 2022-07-24 ASSESSMENT — PAIN DESCRIPTION - PAIN TYPE
TYPE: ACUTE PAIN
TYPE: ACUTE PAIN

## 2022-07-24 NOTE — ED NOTES
ERP at bedside to repair jeanine wrists wound laceration.  Dry dressing applied to jeanine wrists. No bleeding noted.  Left wrist placed on splint as ordered

## 2022-07-24 NOTE — ASSESSMENT & PLAN NOTE
Patient attempted suicide by cutting wrists. Resulted in wrist drop. Legal hold placed by police prior to arrival to ED. Will need psych consult in AM

## 2022-07-24 NOTE — ASSESSMENT & PLAN NOTE
Patient cut tendon while attempting suicide  S/p repair with ortho surgery on 7/25  Cleared by Ortho for discharge. Will need ortho follow up in 10-14 days  Keep splint clean dry and intact until follow-up appointment. Sutures are dissolvable

## 2022-07-24 NOTE — ED NOTES
Pt. Ambulatory to restroom and back to bed. Monitors in place. Denies further needs. In view of 1:1 sitter.

## 2022-07-24 NOTE — ED NOTES
Pt. Noted to remove splint and monitors. All reapplied by RN; pt verbalized understanding of necessity of monitors and splint. VSS. No needs voiced or identified at this time. In view of 1:1 sitter.

## 2022-07-24 NOTE — ASSESSMENT & PLAN NOTE
Sepsis on admission  Initially treated with ceftriaxone  Urine culture growing probable gardnerella vaginalis, rocephin discontinued, started on flagyl
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
## 2022-07-24 NOTE — ED NOTES
"Pt walked outside the room naked.   Redirected back to room  Started screaming \"The end is near. We are not crazy. We will all die\"  "

## 2022-07-24 NOTE — ED NOTES
Pt resting in bed. Respirations even and unlabored. VSS. In view of 1:1 sitter. No needs voiced or identified at this time.

## 2022-07-24 NOTE — ASSESSMENT & PLAN NOTE
Patient reports that she overdosed on pills and fentanyl but does not remember what  UDS positive for amphetamine and benzo  Salicylate and tylenol level negative  Has 1:1 sitter  Legal hold  Psych consulted  Hayley started 7/26

## 2022-07-24 NOTE — ASSESSMENT & PLAN NOTE
Unclear etiology. Possibly due to septic shock? No signs of hemorrhage, as HGB 14. Low suspicion for carbon monoxide poisoning. Liver enzymes normal. She is not on any drugs that could cause lactic acidosis, but will order salicylate level.   Expect improvement with IV fluids  Continue to trend

## 2022-07-24 NOTE — ASSESSMENT & PLAN NOTE
Patient reports that she overdosed on pills and fentanyl. But does not remember what. Labs pending. UDS pending
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
## 2022-07-24 NOTE — CONSULTS
DATE:  2022    REQUESTING PHYSICIAN: Trino Gould MD    CHIEF COMPLAINT:  Bilateral wrist wounds    HISTORY OF PRESENT ILLNESS:  Katerina is a 36-year-old schizophrenic woman who was in the emergency room yesterday with suicidal ideation.  She was discharged.  She then cut her wrists at home and came back in to the ER.  Right side appeared to involve skin only whereas the left side at obvious lacerations of the tendons.  Orthopedic consultation was requested.    ALLERGIES: Morphine, Vicodin    MEDICATIONS: Adderall, Lexapro    PAST MEDICAL HISTORY: Schizophrenia, PTSD, depression, migraines, GERD    PAST SURGICAL HISTORY: Sinus surgery, breast biopsy, , ventral hernia repair, laparoscopic appendectomy    SOCIAL HISTORY: She does not currently smoke but vapes daily.  No alcohol use.  She does use methamphetamine.    FAMILY HISTORY: Heart disease and hypertension in father, pancreatic cancer in maternal grandfather, heart disease in paternal grandfather and diabetes in brother    REVIEW OF SYSTEMS:  No headaches, nausea, vomiting, diarrhea, constipation, polyuria, dysuria, fevers, chills, weight loss, weight gain, abdominal pain, chest pain, shortness of breath.  He does hear voices    EXAMINATION:    General: No acute distress  Vitals: Blood pressure 162/80, heart rate 86, respirations 16, temperature 97.5  HEENT: Normocephalic, atraumatic  Neck: Nontender  Chest: Nontender  Lungs: No labored breathing  Heart: Regular  Abdomen:  Soft  Pelvis:  Stable  Extremities: No deformity of the lower extremities.  There are transverse lacerations of both wrist that I did not inspect today as they have been sutured closed.  Dressings are in place.  She fires FDS and FDP to all of her fingers and FPL to the both thumbs.    Vascular: Good capillary refill to all fingers  Neurological: She has questionable sensory deficits to light touch, pinprick, and two-point discrimination in the median nerve distribution on  the left side.    LAB:  White blood cell count 13,900, hematocrit 41.6%, platelet count 296,000.  Sodium 133, potassium 3.4, creatinine 0.82    IMAGING: No imaging    ASSESSMENT:     1.  Bilateral wrist lacerations with tendon involvement on left side and possible median nerve injury  2.  Schizophrenia    PLAN: We will leave wrist brace on the left side.  We will plan for exploration and repairs as indicated sometime in the next few days, hopefully with hand surgeon if one is available.
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
## 2022-07-24 NOTE — ASSESSMENT & PLAN NOTE
This is Sepsis Present on admission  SIRS criteria identified on my evaluation include: Tachycardia, with heart rate greater than 90 BPM, Tachypnea, with respirations greater than 20 per minute and Leukocytosis, with WBC greater than 12,000  Source is likely urinary, UA positive  Sepsis protocol initiated  Fluid resuscitation ordered per protocol  Crystalloid Fluid Administration: Fluid resuscitation ordered per standard protocol - 30 mL/kg per current or ideal body weight  IV antibiotics as appropriate for source of sepsis  Reassessment: I have reassessed the patient's hemodynamic status  Lactic acidosis improved with fluid  Urine culture growing probable gardnerella vaginalis, flagyl initiated

## 2022-07-24 NOTE — ASSESSMENT & PLAN NOTE
This is Sepsis Present on admission  SIRS criteria identified on my evaluation include: Tachycardia, with heart rate greater than 90 BPM, Tachypnea, with respirations greater than 20 per minute and Leukocytosis, with WBC greater than 12,000  Source is unk. Maybe UTI. Positive UA  Sepsis protocol initiated  Fluid resuscitation ordered per protocol  Crystalloid Fluid Administration: Fluid resuscitation ordered per standard protocol - 30 mL/kg per current or ideal body weight  IV antibiotics as appropriate for source of sepsis  Reassessment: I have reassessed the patient's hemodynamic status  Sepsis bolus given. Expect improvement of lactic acidosis  Ceftriaxone  CXR pending

## 2022-07-24 NOTE — ASSESSMENT & PLAN NOTE
Patient attempted suicide by cutting wrists and overdosing. Resulted in wrist drop. Legal hold placed by police prior to arrival to ED   Psych consulted  Legal hold extended  1:1 sitter  Abilify started 7/26  Patient stable for discharge to inpatient psychiatric facility

## 2022-07-24 NOTE — ASSESSMENT & PLAN NOTE
Patient diagnosed with UTI yesterday, and discharged with abx. Now presenting with severe sepsis and leukocytosis. Unable to assess for pyelonephritis on examination, as patient declined.

## 2022-07-24 NOTE — ED NOTES
"Pt. Awake at this time, however when asked questions related to orientation, she states \"you're asking really, really hard questions.\" But when asked how she is feeling she states \"better.\"     Dressings to bilateral wrists clean, dry and intact. Cap refill to fingers < 3 sec. VSS.     Pt. Remains in view of 1:1 sitter. Denies further needs at this time.   "

## 2022-07-24 NOTE — H&P
[facility] Internal Medicine History & Physical Note    Date of Service  7/24/2022    [facility] Team  Attending: Dr. Bear  Resident: Dr. Cook  Contact Number: [phone]    Primary Care Physician  DELMA Vines    Consultants  Orthopedic surgery is following. MARII contacted by ED    Code Status  Full Code    Chief Complaint  Chief Complaint   Patient presents with   • Suicide Attempt   • Wrist Laceration     History of Presenting Illness (HPI):   Katerina Owen is a 36 y.o. female who presented 7/23/2022 following suicide attempt. She reports that she was trying to kill herself to get to [facility]. Patient reports that she tried to overdose with several illegal and legal pills as well as by cutting her wrists. She became tearful while describing the incident. Patient was reluctant to answer questions. Overall, she was A&Ox1. Will need to call patient's brother to determine if this is different from her baseline.     I discussed the plan of care with patient.    Review of Systems  Review of Systems   Unable to perform ROS: Acuity of condition   Constitutional: Negative for chills and fever.   Gastrointestinal: Negative for abdominal pain.   Genitourinary: Negative for dysuria, frequency and urgency.   Psychiatric/Behavioral: Positive for suicidal ideas. Negative for depression.   All other systems reviewed and are negative.    Past Medical History   has a past medical history of ADHD, Arthritis, Bowel habit changes, Chlamydia (5/11/2011), depression, Heart burn, HTN (hypertension), Migraine, Pain, Pre-eclampsia, PTSD (post-traumatic stress disorder), and Staph infection (2011).    Surgical History   has a past surgical history that includes septoturbinoplasty (4/21/2009); lumpectomy (2014); breast biopsy (Left, 2/26/2016); primary c section (Aug 7, 2007); repeat c section w tubal ligation (9/7/2011); ventral hernia repair (Right, 1/10/2018); and appendectomy laparoscopic (6/5/2018).     Family  "History  family history includes Cancer in her maternal grandfather; Diabetes in her brother; Heart Disease in her father and paternal grandfather; Hypertension in her father.   Family history reviewed with patient.     Social History  Tobacco: yes. Unk amount  Alcohol: yes  Recreational drugs (illegal or prescription): yes, meth  Employment: patient declined  Living Situation: lives with brother  Recent Travel: no    Allergies  Allergies   Allergen Reactions   • Morphine Nausea and Unspecified     \"feel sick for a whole day after being given morphine\"  RXN=2/2016     • Vicodin [Hydrocodone-Acetaminophen] Vomiting and Nausea     PNM=2397     Medications  Prior to Admission Medications   Prescriptions Last Dose Informant Patient Reported? Taking?   amphetamine-dextroamphetamine (ADDERALL) 15 MG tablet UNK at UNK Historical No No   Sig: Take 1 Tablet by mouth 2 times a day for 30 days.   amphetamine-dextroamphetamine (ADDERALL) 15 MG tablet UNK at UNK Historical No No   Sig: Take 1 Tablet by mouth 2 times a day for 30 days.   cefdinir (OMNICEF) 300 MG Cap UNK at UNK Historical No No   Sig: Take 1 Capsule by mouth 2 times a day for 7 days.   escitalopram (LEXAPRO) 10 MG Tab UNK at UNK Historical No No   Sig: TAKE 1 TABLET BY MOUTH EVERY DAY      Facility-Administered Medications: None     Physical Exam  Temp:  [36.1 °C (97 °F)-36.4 °C (97.5 °F)] 36.4 °C (97.5 °F)  Pulse:  [] 99  Resp:  [16-22] 19  BP: (168-198)/(106-110) 198/107  SpO2:  [96 %-99 %] 98 %  Blood Pressure: (!) 189/110   Temperature: 36.4 °C (97.5 °F)   Pulse: 94   Respiration: (!) 22   Pulse Oximetry: 96 %     Physical Exam  Constitutional:       General: She is in acute distress.      Appearance: She is normal weight. She is ill-appearing and toxic-appearing.   HENT:      Mouth/Throat:      Mouth: Mucous membranes are moist.   Cardiovascular:      Rate and Rhythm: Regular rhythm. Tachycardia present.   Pulmonary:      Effort: Pulmonary effort is " normal.      Breath sounds: Normal breath sounds. No wheezing or rales.   Abdominal:      General: Abdomen is flat.      Palpations: Abdomen is soft.   Musculoskeletal:         General: No swelling.      Left wrist: Deformity present. Decreased range of motion.        Arms:       Right lower leg: No edema.      Left lower leg: No edema.   Skin:     Capillary Refill: Capillary refill takes less than 2 seconds.      Comments: Left wrist with large gaping lacerations approximately 5 cm long, with tendon involvement. Lacerations to right wrist. Abrasions to neck   Neurological:      Mental Status: She is disoriented.     Laboratory:  Recent Labs     07/24/22  0010   WBC 13.9*   RBC 4.96   HEMOGLOBIN 14.3   HEMATOCRIT 41.6   MCV 83.9   MCH 28.8   MCHC 34.4   RDW 47.5   PLATELETCT 296   MPV 11.8     Recent Labs     07/24/22  0010   SODIUM 133*   POTASSIUM 3.4*   CHLORIDE 100   CO2 14*   GLUCOSE 188*   BUN 11   CREATININE 0.82   CALCIUM 9.5     Recent Labs     07/24/22  0010   ALTSGPT 31   ASTSGOT 32   ALKPHOSPHAT 79   TBILIRUBIN 0.5   GLUCOSE 188*         No results for input(s): NTPROBNP in the last 72 hours.      No results for input(s): TROPONINT in the last 72 hours.    Imaging:  DX-CHEST-PORTABLE (1 VIEW)   Final Result      No acute cardiac or pulmonary abnormalities are identified.        X-Ray:  No film today    Assessment/Plan:  Problem Representation:   Patient is a 36 year old female presenting following suicide attempt. Found to have lacerated left wrist flexor tendons, resulting in wrist drop, and inability to flex wrist. Orthopedic surgery is following. Admitted for sepsis 2/2 UTI.    I anticipate this patient will require at least two midnights for appropriate medical management, necessitating inpatient admission because suicide attempt    * Sepsis (HCC)- (present on admission)  Assessment & Plan  This is Sepsis Present on admission  SIRS criteria identified on my evaluation include: Tachycardia, with heart  rate greater than 90 BPM, Tachypnea, with respirations greater than 20 per minute and Leukocytosis, with WBC greater than 12,000  Source is unk. Maybe UTI. Positive UA  Sepsis protocol initiated  Fluid resuscitation ordered per protocol  Crystalloid Fluid Administration: Fluid resuscitation ordered per standard protocol - 30 mL/kg per current or ideal body weight  IV antibiotics as appropriate for source of sepsis  Reassessment: I have reassessed the patient's hemodynamic status  Sepsis bolus given. Expect improvement of lactic acidosis  Ceftriaxone  CXR pending    Suicide and self-inflicted injury (HCC)- (present on admission)  Assessment & Plan  Patient attempted suicide by cutting wrists. Resulted in wrist drop. Legal hold placed by police prior to arrival to ED. Will need psych consult in AM    Lactic acidosis- (present on admission)  Assessment & Plan  Unclear etiology. Possibly due to septic shock? No signs of hemorrhage, as HGB 14. Low suspicion for carbon monoxide poisoning. Liver enzymes normal. She is not on any drugs that could cause lactic acidosis, but will order salicylate level.   Expect improvement with IV fluids  Continue to trend    Overdose- (present on admission)  Assessment & Plan  Patient reports that she overdosed on pills and fentanyl. But does not remember what. Labs pending. UDS pending    Left wrist drop  Assessment & Plan  Patient cut tendon while attempting suicide. Orthopedic surgery is following.    Hypertensive emergency  Assessment & Plan  Patient presented with BP 190s/100s.     Leukocytosis- (present on admission)  Assessment & Plan  See sepsis plan    Acute cystitis- (present on admission)  Assessment & Plan  Patient diagnosed with UTI yesterday, and discharged with abx. Now presenting with severe sepsis and leukocytosis. Unable to assess for pyelonephritis on examination, as patient declined.     Hypokalemia- (present on admission)  Assessment & Plan  K 3.4. Will replete with  k-lyte and recheck BMP in AM    VTE prophylaxis: pharmacologic prophylaxis contraindicated due to surgery tomorrow maybe

## 2022-07-24 NOTE — ED PROVIDER NOTES
"ED Provider Note    Scribed for Trino Gould by Filomena Ortiz. 7/24/2022  12:08 AM    Primary care provider: DELMA Vines  Means of arrival: EMS  History obtained from: Patient  History limited by: None    CHIEF COMPLAINT  Chief Complaint   Patient presents with   • Suicide Attempt   • Wrist Laceration     HPI  Katerina Owen is a 36 y.o. female with a history of schizophrenia who presents to the Emergency Department for suicidal ideation onset prior to arrival. The patient was seen here yesterday because she said she \"was trying to kill herself but lied.\" She said she was trying to cut her wrist today in order to save her family because she wanted to poison them. The patient additionally said she wanted to kill herself because she wanted to go to [facility] since \"the angulo are open.\" She has associated pain on her left arm, but denies any other injuries. The patient notes she takes \"a lot of\" methamphetamine. Today, the patient noted that she took Fentanyl. Additionally, she notes she is on medication for her schizophrenia. She did not give any specifications on where she lives other than that [place] \"[place]\" [place].     Quality: Wrist sharp pain  Duration: 4 hours  Severity: Severe  Associated sx: pain on left arm      REVIEW OF SYSTEMS  As above, all other systems reviewed and are negative.   See HPI for further details.     PAST MEDICAL HISTORY   has a past medical history of ADHD, Arthritis, Bowel habit changes, Chlamydia (5/11/2011), depression, Heart burn, HTN (hypertension), Migraine, Pain, Pre-eclampsia, PTSD (post-traumatic stress disorder), and Staph infection (2011).  SURGICAL HISTORY   has a past surgical history that includes septoturbinoplasty (4/21/2009); lumpectomy (2014); breast biopsy (Left, 2/26/2016); primary c section (Aug 7, 2007); repeat c section w tubal ligation (9/7/2011); ventral hernia repair (Right, 1/10/2018); and appendectomy laparoscopic " "(2018).  SOCIAL HISTORY  Social History     Tobacco Use   • Smoking status: Former Smoker     Packs/day: 0.50     Years: 6.00     Pack years: 3.00     Quit date: 2014     Years since quittin.5   • Smokeless tobacco: Never Used   • Tobacco comment: 2017 currently Vape   Vaping Use   • Vaping Use: Every day   • Substances: Nicotine   • Devices: Disposable   Substance Use Topics   • Alcohol use: No     Alcohol/week: 0.0 oz   • Drug use: No      Social History     Substance and Sexual Activity   Drug Use No     FAMILY HISTORY  Family History   Problem Relation Age of Onset   • Heart Disease Father         heart failure smoker    • Hypertension Father    • Cancer Maternal Grandfather         pancreastic   • Heart Disease Paternal Grandfather         heart attack   • Diabetes Brother      CURRENT MEDICATIONS  Current Outpatient Medications   Medication Instructions   • [START ON 2022] amphetamine-dextroamphetamine (ADDERALL) 15 MG tablet 15 mg, Oral, 2 TIMES DAILY   • amphetamine-dextroamphetamine (ADDERALL) 15 MG tablet 15 mg, Oral, 2 TIMES DAILY   • cefdinir (OMNICEF) 300 mg, Oral, 2 TIMES DAILY   • escitalopram (LEXAPRO) 10 mg, Oral, DAILY     ALLERGIES  Allergies   Allergen Reactions   • Morphine Nausea and Unspecified     \"feel sick for a whole day after being given morphine\"  RXN=2016     • Vicodin [Hydrocodone-Acetaminophen] Vomiting and Nausea     QFE=0585       PHYSICAL EXAM    VITAL SIGNS:   Vitals:    22 0144 22 0249 22 0400 22 0500   BP:  (!) 198/107 (!) 186/91 (!) 184/92   Pulse: 94 99 97 80   Resp:  19  18   Temp:       TempSrc:       SpO2: 96% 98% 95% 98%   Weight:       Height:         Vitals: My interpretation: hypertensive, tachycardic, afebrile, not hypoxic    Reinterpretation of vitals: Unchanged    Cardiac Monitor Interpretation: The cardiac monitor revealed normal Sinus Rhythm as interpreted by me. The cardiac monitor was ordered secondary to the " patient's history of sedation and to monitor for dysrhythmia and/or tachycardia.    PE:   Constitutional: Well developed, Well nourished, No acute distress, Non-toxic appearance.   HENT: Normocephalic, Atraumatic, Bilateral external ears normal, Oropharynx is clear mucous membranes are moist. No oral exudates or nasal discharge.   Eyes: Pupils are equal round and reactive, EOMI, Conjunctiva normal, No discharge.   Neck: Normal range of motion, No tenderness, Supple, No stridor. No meningismus.  Lymphatic: No lymphadenopathy noted.   Cardiovascular: Regular rate and rhythm without murmur rub or gallop.  Thorax & Lungs: Clear breath sounds bilaterally without wheezes, rhonchi or rales. There is no chest wall tenderness.   Abdomen: Soft non-tender non-distended. There is no rebound or guarding. No organomegaly is appreciated. Bowel sounds are normal.  Skin: Multiple superficial and old lacerations and scrapes to left arm, Large gaping laceration to left wrist approximately 5 cm long with tendon involvement and unable to flex and extend at the wrist, 2 large lacerations to right wrist, right wrist is neurovascular intact, No pulsatile bleeding but wounds are deep, 2 superficial abrasions and scratches to neck that look self imposed and without underlying injury, No rash.   Back: No CVA or spinal tenderness.   Extremities: Intact distal pulses, No edema, No cyanosis, No clubbing. Capillary refill is less than 2 seconds.  Musculoskeletal: Good range of motion in all major joints. No tenderness to palpation or major deformities noted.   Neurologic:  Alert & oriented x 2, Normal motor function, Normal sensory function, No focal deficits noted. Reflexes are normal.  Psychiatric: Appears to have acute psychosis, psychotic affect, tangential thought process, rambling, delusions of paranoia    DIAGNOSTIC STUDIES / PROCEDURES  Laceration Repair Procedure Note  Indication: Laceration 2 lacerations to the right wrist, 1 laceration  to the left wrist  Procedure: The patient was placed in the appropriate position and anesthesia around the lacerations were obtained by infiltration using 1% Lidocaine with epinephrine. The area was then irrigated with normal saline. The laceration was closed in two layers. The subcutaneous layer was closed with 5-0 Vicryl using running sutures. The skin was closed with 4-0 Ethilon using running sutures. A second laceration was closed in two layers. The subcutaneous layer was closed with 5-0 Vicryl using running sutures. The skin was closed with 4-0 Ethilon using running sutures.  A third laceration was closed with 4-0 Ethilon using running sutures. The wound area was then dressed with a sterile dressing.    Total repaired wound length: All 3 lacerations were 6 cm each  Other Items: Suture count: 23  The patient tolerated the procedure well.  Complications: None    LABS  Results for orders placed or performed during the hospital encounter of 07/23/22   DIAGNOSTIC ALCOHOL   Result Value Ref Range    Diagnostic Alcohol <10.1 <10.1 mg/dL   CBC WITH DIFFERENTIAL   Result Value Ref Range    WBC 13.9 (H) 4.8 - 10.8 K/uL    RBC 4.96 4.20 - 5.40 M/uL    Hemoglobin 14.3 12.0 - 16.0 g/dL    Hematocrit 41.6 37.0 - 47.0 %    MCV 83.9 81.4 - 97.8 fL    MCH 28.8 27.0 - 33.0 pg    MCHC 34.4 33.6 - 35.0 g/dL    RDW 47.5 35.9 - 50.0 fL    Platelet Count 296 164 - 446 K/uL    MPV 11.8 9.0 - 12.9 fL    Neutrophils-Polys 79.20 (H) 44.00 - 72.00 %    Lymphocytes 12.70 (L) 22.00 - 41.00 %    Monocytes 7.30 0.00 - 13.40 %    Eosinophils 0.10 0.00 - 6.90 %    Basophils 0.20 0.00 - 1.80 %    Immature Granulocytes 0.50 0.00 - 0.90 %    Nucleated RBC 0.00 /100 WBC    Neutrophils (Absolute) 11.01 (H) 2.00 - 7.15 K/uL    Lymphs (Absolute) 1.77 1.00 - 4.80 K/uL    Monos (Absolute) 1.02 (H) 0.00 - 0.85 K/uL    Eos (Absolute) 0.01 0.00 - 0.51 K/uL    Baso (Absolute) 0.03 0.00 - 0.12 K/uL    Immature Granulocytes (abs) 0.07 0.00 - 0.11 K/uL    NRBC  (Absolute) 0.00 K/uL   Comp Metabolic Panel   Result Value Ref Range    Sodium 133 (L) 135 - 145 mmol/L    Potassium 3.4 (L) 3.6 - 5.5 mmol/L    Chloride 100 96 - 112 mmol/L    Co2 14 (L) 20 - 33 mmol/L    Anion Gap 19.0 (H) 7.0 - 16.0    Glucose 188 (H) 65 - 99 mg/dL    Bun 11 8 - 22 mg/dL    Creatinine 0.82 0.50 - 1.40 mg/dL    Calcium 9.5 8.5 - 10.5 mg/dL    AST(SGOT) 32 12 - 45 U/L    ALT(SGPT) 31 2 - 50 U/L    Alkaline Phosphatase 79 30 - 99 U/L    Total Bilirubin 0.5 0.1 - 1.5 mg/dL    Albumin 4.4 3.2 - 4.9 g/dL    Total Protein 7.4 6.0 - 8.2 g/dL    Globulin 3.0 1.9 - 3.5 g/dL    A-G Ratio 1.5 g/dL   CREATINE KINASE   Result Value Ref Range    CPK Total 42 0 - 154 U/L   LACTIC ACID   Result Value Ref Range    Lactic Acid 5.1 (HH) 0.5 - 2.0 mmol/L   ESTIMATED GFR   Result Value Ref Range    GFR (CKD-EPI) 93 >60 mL/min/1.73 m 2   LACTIC ACID   Result Value Ref Range    Lactic Acid 2.4 (H) 0.5 - 2.0 mmol/L   ACETAMINOPHEN   Result Value Ref Range    Acetaminophen -Tylenol <5.0 (L) 10.0 - 30.0 ug/mL   Salicylate   Result Value Ref Range    Salicylates, Quant. <1.0 (L) 15.0 - 25.0 mg/dL   BETA-HYDROXYBUTYRIC ACID   Result Value Ref Range    beta-Hydroxybutyric Acid 0.72 (H) 0.02 - 0.27 mmol/L   LACTIC ACID   Result Value Ref Range    Lactic Acid 1.3 0.5 - 2.0 mmol/L      All labs reviewed by me. Significant for alcohol negative, leukocytosis of 14, no anemia, electrolytes unremarkable bicarb is 14, mild anion gap of 19, renal function normal, liver enzymes normal, bilirubin normal, CK is unremarkable, lactic acid initially 5.1, improved to 2.4 after fluids, Tylenol and salicylate negative, beta hydroxybutyrate slightly elevated at 0.72, lactic acid of 1.3 on third repeat    COURSE & MEDICAL DECISION MAKING  Nursing notes, VS, PMSFHx, labs, imaging, EKG reviewed in chart.    MDM: 12:08 AM Katerina Owen is a 36 y.o. female who presented with acute delusional psychosis, polysubstance abuse, methamphetamine  abuse, paranoid delusions and self-inflicted harm.  Patient presents with deep severe lacerations to the wrist, 2 on the right, 1 on the left and has cut the flexor tendons on the left wrist resulting in inability to flex and extend at that wrist.  Fortunately there was no arterial damage to either wrist.  I discussed with orthopedics and they recommend loosely closing the left wrist after thorough irrigation which was done and splinting it, which was done.  Right wrist was thoroughly irrigated and closed and dressed in a sterile fashion.  Patient was put on a legal hold due to obvious psychosis likely secondary to her underlying acute schizophrenia with concomitant methamphetamine abuse.  She required multiple doses of sedation with Haldol, Versed and IV fluids.  She had severe dehydration and was given a full 30 cc/kg bolus of IV fluids.  Her lactic acid was concerning for initially at 5.1 and had a leukocytosis of 14, but is afebrile does not appear to have any acute signs of infection.  Her lactic acid improved to 1.3 after fluids.  Her Tylenol and salicylates were negative.  Her CMP is otherwise unremarkable and her CBC shows no anemia.  I discussed with orthopedic surgery who will have the patient evaluated in the morning for surgical fixation of her tendon injury.  Discussed with [facility] attending and resident who will plan for admission for continued monitoring of lab derangements and IV fluids.    HYDRATION: Based on the patient's presentation of Acute Vomiting, Dehydration and Inability to take oral fluids the patient was given IV fluids. IV Hydration was used because oral hydration was not adequate alone. Upon recheck following hydration, the patient was imroved.    CRITICAL CARE TIME 34 minutes: Acute psychosis required multiple redoses of Haldol, Versed.  Hydration for significant dehydration with a bicarb of 14 requiring 2 L of IV fluids for dehydration and tachycardia  There was a very real possibility of  deterioration of the patient's condition.  This patient required the highest level of care.  I provided critical care services which included: review of the medical record, treatment orders, ordering and reviewing test results, frequent reevaluation of the patient's condition and response to treatment, as well as discussing the case with appropriate personnel and various consultants. The critical care time associated with the care of this patient is exclusive of any procedures or specific interventions.    FINAL IMPRESSION  1. Flexor tendon laceration of left wrist with open wound, initial encounter Acute   2. Suicidal ideation Acute   3. Polysubstance abuse (HCC) Acute   4. Wrist laceration, right, initial encounter Acute   5. Wrist laceration, left, initial encounter Acute   6. Dehydration Acute   7. Acute psychosis (HCC) Acute   8. Agitation Acute      Filomena UNGER), am scribing for, and in the presence of, Trino Gould.    Electronically signed by: Filomena Ortiz (Oh), 7/24/2022    I, Trino Gould personally performed the services described in this documentation, as scribed by Filomena Ortiz in my presence, and it is both accurate and complete. C    The note accurately reflects work and decisions made by me.  Trino Gould  7/24/2022  5:17 AM

## 2022-07-24 NOTE — ED NOTES
This RN with MD to bedside. Pelvic exam completed per Dr. Malik; minimal bleeding noted. No objects identified. Pt. Provided with pad and underwear.

## 2022-07-24 NOTE — ASSESSMENT & PLAN NOTE
Patient presented with BP 190s/100s.   Now improved  Continue amlodipine  Continue labetalol as needed for SBP greater than 170

## 2022-07-24 NOTE — PROGRESS NOTES
"[facility] Medicine Daily Progress Note    Date of Service  7/24/2022    Chief Complaint  Katerina Owen is a 36 y.o. female admitted 7/23/2022 with suicide attempt with slashed wrists    Hospital Course  Patient is a 36-year-old female with a past medical history of schizophrenia and polysubstance abuse. She presented to [facility] on 7/24/2022 following a suicide attempt during which patient slit her wrists.  Patient AAO X1 and not answering questions.  Per chart review patient lacerated right wrist as well as left wrist for which left wrist has wrist drop.  EDP reporting that patient used methamphetamine prior to presentation.  Patient was placed on legal hold prior to presentation.     In the ER, she was found to be tachycardia in the 130s, tachypnea in the 20s, hypertension with SBP in 180s.  Patient saturating well on room air.  CBC with leukocytosis at 13.9 and elevated polys.  Chemistry with mild hyponatremia and hypokalemia as well as high anion gap metabolic acidosis with bicarbonate of 14.  Lactic acid at 5.1.  CPK diagnostic alcohol low.  ERP discussed the case with orthopedic surgery (MARII), who evaluated patient.  Patient received a sepsis bolus and was started on IV Rocephin.  Lactic acid is trending down.  Patient was admitted for sepsis secondary to urinary tract infection, suicide attempt on legal hold, and hypertensive emergency requiring telemetry monitoring and further work-up and management.     Interval Problem Update  Seen in the ER, vitals currently stable, normal sinus at rate of 80 and RR of 14. She is alert and cooperative but tangential. She tells me her name and that we are in [facility] but will not answer questions about date. She states she tried to kill herself \"with a knife\" but \"feels at peace now.\" She is tangential. Was noted by nursing to have vaginal bleeding, when asked about it patient answered \"I think I put something up there\", when I asked her what she replied \"I don't " "know maybe a coat \", she denied vaginal or abdominal pain, no evidence of external wounds or trauma. She agreed to a vaginal exam so I performed this with her nurse assisting and at bedside, patient tolerated this well, no evidence of foreign objects or trauma, blood was visualized coming from her cervix, consistent with normal menstruation.   She reports some pain 3/10 of L wrist with some associated numbness. She denies other pain. She denies hallucinations, ROS otherwise negative.      I have discussed this patient's plan of care and discharge plan at IDT rounds today with Case Management, Nursing, Nursing leadership, and other members of the IDT team.    Consultants/Specialty  Ortho [facility]    Code Status  Full Code    Disposition  Patient is not medically cleared for discharge.   Anticipate discharge to be determined    Review of Systems  Review of Systems   Constitutional: Negative.  Negative for chills, diaphoresis, fever, malaise/fatigue and weight loss.   HENT: Negative.  Negative for sore throat.    Eyes: Negative.  Negative for blurred vision.   Respiratory: Negative.  Negative for cough and shortness of breath.    Cardiovascular: Negative.  Negative for chest pain, palpitations and leg swelling.   Gastrointestinal: Negative.  Negative for abdominal pain, nausea and vomiting.   Genitourinary: Negative.  Negative for dysuria.   Musculoskeletal: Negative.  Negative for myalgias.   Skin: Negative.  Negative for itching and rash.   Neurological: Negative.  Negative for dizziness, focal weakness, weakness and headaches.   Endo/Heme/Allergies: Negative.  Does not bruise/bleed easily.   Psychiatric/Behavioral: Negative.  Negative for depression, substance abuse and suicidal ideas.   All other systems reviewed and are negative.       Physical Exam  Temp:  [36.4 °C (97.5 °F)] 36.4 °C (97.5 °F)  Pulse:  [] 88  Resp:  [16-22] 20  BP: (133-198)/() 163/85  SpO2:  [92 %-99 %] 93 %    Physical " Exam  Vitals and nursing note reviewed. Exam conducted with a chaperone present.   Constitutional:       General: She is not in acute distress.     Appearance: Normal appearance. She is not diaphoretic.   HENT:      Head: Normocephalic.      Nose: Nose normal.      Mouth/Throat:      Mouth: Mucous membranes are moist.   Eyes:      Pupils: Pupils are equal, round, and reactive to light.   Cardiovascular:      Rate and Rhythm: Normal rate and regular rhythm.      Pulses: Normal pulses.      Heart sounds: Normal heart sounds.   Pulmonary:      Effort: Pulmonary effort is normal.      Breath sounds: Normal breath sounds.   Abdominal:      General: Abdomen is flat. Bowel sounds are normal.      Palpations: Abdomen is soft.   Genitourinary:     Comments: Vaginal exam as described above, both manual and exam with speculum performed, no FB and no s/o trauma  Musculoskeletal:         General: No swelling or deformity. Normal range of motion.      Comments: L wrist drop and decreased sensation, bandages in place, small amount of dried blood, R wrist bandages cdi, neurovascular exam intact on R   Skin:     General: Skin is warm and dry.      Capillary Refill: Capillary refill takes less than 2 seconds.   Neurological:      General: No focal deficit present.      Mental Status: She is alert.      Cranial Nerves: No cranial nerve deficit.      Sensory: Sensory deficit (L hand) present.      Motor: Weakness (L wrist drop) present.   Psychiatric:         Attention and Perception: She does not perceive auditory or visual hallucinations.         Mood and Affect: Mood normal.         Speech: Speech is tangential.         Behavior: Behavior is cooperative.         Fluids    Intake/Output Summary (Last 24 hours) at 7/24/2022 1218  Last data filed at 7/24/2022 0700  Gross per 24 hour   Intake 2313 ml   Output --   Net 2313 ml       Laboratory  Recent Labs     07/24/22  0010   WBC 13.9*   RBC 4.96   HEMOGLOBIN 14.3   HEMATOCRIT 41.6    MCV 83.9   MCH 28.8   MCHC 34.4   RDW 47.5   PLATELETCT 296   MPV 11.8     Recent Labs     07/24/22  0010 07/24/22  0245 07/24/22  0435   SODIUM 133* 138 139   POTASSIUM 3.4* 4.2 3.5*   CHLORIDE 100 106 106   CO2 14* 17* 18*   GLUCOSE 188* 72 76   BUN 11 9 8   CREATININE 0.82 0.61 0.62   CALCIUM 9.5 8.5 8.0*                   Imaging  DX-CHEST-PORTABLE (1 VIEW)   Final Result      No acute cardiac or pulmonary abnormalities are identified.           Assessment/Plan  * Sepsis (HCC)- (present on admission)  Assessment & Plan  This is Sepsis Present on admission  SIRS criteria identified on my evaluation include: Tachycardia, with heart rate greater than 90 BPM, Tachypnea, with respirations greater than 20 per minute and Leukocytosis, with WBC greater than 12,000  Source is unk. Maybe UTI. Positive UA  Sepsis protocol initiated  Fluid resuscitation ordered per protocol  Crystalloid Fluid Administration: Fluid resuscitation ordered per standard protocol - 30 mL/kg per current or ideal body weight  IV antibiotics as appropriate for source of sepsis  Reassessment: I have reassessed the patient's hemodynamic status  Sepsis bolus given. Expect improvement of lactic acidosis  Ceftriaxone  improving    Left wrist drop  Assessment & Plan  Patient cut tendon while attempting suicide. Orthopedic surgery is following.  Surgery pending    Hypertensive emergency  Assessment & Plan  Patient presented with BP 190s/100s.   Much improved continue prn meds  following    Leukocytosis- (present on admission)  Assessment & Plan  Mild  Following  Continue ceftriaxone    Acute cystitis- (present on admission)  Assessment & Plan  Patient diagnosed with UTI  Continue abx ceftriaxone  following    Suicide and self-inflicted injury (HCC)- (present on admission)  Assessment & Plan  Patient attempted suicide by cutting wrists. Resulted in wrist drop. Legal hold placed by police prior to arrival to ED.   Psych to eval  1:1 sitter    Lactic  acidosis- (present on admission)  Assessment & Plan  Resolved      Overdose- (present on admission)  Assessment & Plan  Patient reports that she overdosed on pills and fentanyl. But does not remember what.  UDS pending  Salicylate pending  No clinical s/o overdose  Following   Has 1:1 sitter  Will check tylenol levels    Hypokalemia- (present on admission)  Assessment & Plan  Replacing  Will check mag  following       VTE prophylaxis: SCDs/TEDs    I have performed a physical exam and reviewed and updated ROS and Plan today (7/24/2022). In review of yesterday's note (7/23/2022), there are no changes except as documented above.

## 2022-07-25 ENCOUNTER — ANESTHESIA EVENT (OUTPATIENT)
Dept: SURGERY | Facility: MEDICAL CENTER | Age: 37
DRG: 854 | End: 2022-07-25
Payer: COMMERCIAL

## 2022-07-25 ENCOUNTER — ANESTHESIA (OUTPATIENT)
Dept: SURGERY | Facility: MEDICAL CENTER | Age: 37
DRG: 854 | End: 2022-07-25
Payer: COMMERCIAL

## 2022-07-25 LAB
ALBUMIN SERPL BCP-MCNC: 3.7 G/DL (ref 3.2–4.9)
ALBUMIN/GLOB SERPL: 1.5 G/DL
ALP SERPL-CCNC: 67 U/L (ref 30–99)
ALT SERPL-CCNC: 24 U/L (ref 2–50)
ANION GAP SERPL CALC-SCNC: 9 MMOL/L (ref 7–16)
AST SERPL-CCNC: 23 U/L (ref 12–45)
BASOPHILS # BLD AUTO: 0.4 % (ref 0–1.8)
BASOPHILS # BLD: 0.03 K/UL (ref 0–0.12)
BILIRUB SERPL-MCNC: 0.4 MG/DL (ref 0.1–1.5)
BUN SERPL-MCNC: 9 MG/DL (ref 8–22)
CALCIUM SERPL-MCNC: 8.7 MG/DL (ref 8.5–10.5)
CHLORIDE SERPL-SCNC: 106 MMOL/L (ref 96–112)
CO2 SERPL-SCNC: 23 MMOL/L (ref 20–33)
CREAT SERPL-MCNC: 0.74 MG/DL (ref 0.5–1.4)
EOSINOPHIL # BLD AUTO: 0.09 K/UL (ref 0–0.51)
EOSINOPHIL NFR BLD: 1.3 % (ref 0–6.9)
ERYTHROCYTE [DISTWIDTH] IN BLOOD BY AUTOMATED COUNT: 50.5 FL (ref 35.9–50)
GFR SERPLBLD CREATININE-BSD FMLA CKD-EPI: 106 ML/MIN/1.73 M 2
GLOBULIN SER CALC-MCNC: 2.5 G/DL (ref 1.9–3.5)
GLUCOSE SERPL-MCNC: 95 MG/DL (ref 65–99)
HCT VFR BLD AUTO: 37.4 % (ref 37–47)
HGB BLD-MCNC: 12.4 G/DL (ref 12–16)
IMM GRANULOCYTES # BLD AUTO: 0.02 K/UL (ref 0–0.11)
IMM GRANULOCYTES NFR BLD AUTO: 0.3 % (ref 0–0.9)
LYMPHOCYTES # BLD AUTO: 1.97 K/UL (ref 1–4.8)
LYMPHOCYTES NFR BLD: 27.6 % (ref 22–41)
MAGNESIUM SERPL-MCNC: 1.9 MG/DL (ref 1.5–2.5)
MCH RBC QN AUTO: 28.4 PG (ref 27–33)
MCHC RBC AUTO-ENTMCNC: 33.2 G/DL (ref 33.6–35)
MCV RBC AUTO: 85.6 FL (ref 81.4–97.8)
MONOCYTES # BLD AUTO: 0.73 K/UL (ref 0–0.85)
MONOCYTES NFR BLD AUTO: 10.2 % (ref 0–13.4)
NEUTROPHILS # BLD AUTO: 4.31 K/UL (ref 2–7.15)
NEUTROPHILS NFR BLD: 60.2 % (ref 44–72)
NRBC # BLD AUTO: 0 K/UL
NRBC BLD-RTO: 0 /100 WBC
PLATELET # BLD AUTO: 208 K/UL (ref 164–446)
PMV BLD AUTO: 11.4 FL (ref 9–12.9)
POTASSIUM SERPL-SCNC: 3.9 MMOL/L (ref 3.6–5.5)
PROT SERPL-MCNC: 6.2 G/DL (ref 6–8.2)
RBC # BLD AUTO: 4.37 M/UL (ref 4.2–5.4)
SODIUM SERPL-SCNC: 138 MMOL/L (ref 135–145)
WBC # BLD AUTO: 7.2 K/UL (ref 4.8–10.8)

## 2022-07-25 PROCEDURE — 700102 HCHG RX REV CODE 250 W/ 637 OVERRIDE(OP): Performed by: STUDENT IN AN ORGANIZED HEALTH CARE EDUCATION/TRAINING PROGRAM

## 2022-07-25 PROCEDURE — 160028 HCHG SURGERY MINUTES - 1ST 30 MINS LEVEL 3: Performed by: STUDENT IN AN ORGANIZED HEALTH CARE EDUCATION/TRAINING PROGRAM

## 2022-07-25 PROCEDURE — 700102 HCHG RX REV CODE 250 W/ 637 OVERRIDE(OP)

## 2022-07-25 PROCEDURE — 700111 HCHG RX REV CODE 636 W/ 250 OVERRIDE (IP): Performed by: STUDENT IN AN ORGANIZED HEALTH CARE EDUCATION/TRAINING PROGRAM

## 2022-07-25 PROCEDURE — 160035 HCHG PACU - 1ST 60 MINS PHASE I: Performed by: STUDENT IN AN ORGANIZED HEALTH CARE EDUCATION/TRAINING PROGRAM

## 2022-07-25 PROCEDURE — 25260 REPAIR FOREARM TENDON/MUSCLE: CPT | Mod: LT | Performed by: STUDENT IN AN ORGANIZED HEALTH CARE EDUCATION/TRAINING PROGRAM

## 2022-07-25 PROCEDURE — 160038 HCHG SURGERY MINUTES - EA ADDL 1 MIN LEVEL 2: Performed by: STUDENT IN AN ORGANIZED HEALTH CARE EDUCATION/TRAINING PROGRAM

## 2022-07-25 PROCEDURE — 85025 COMPLETE CBC W/AUTO DIFF WBC: CPT

## 2022-07-25 PROCEDURE — 80053 COMPREHEN METABOLIC PANEL: CPT

## 2022-07-25 PROCEDURE — 64912 NRV RPR W/NRV ALGRFT 1ST: CPT | Performed by: STUDENT IN AN ORGANIZED HEALTH CARE EDUCATION/TRAINING PROGRAM

## 2022-07-25 PROCEDURE — 700105 HCHG RX REV CODE 258: Performed by: STUDENT IN AN ORGANIZED HEALTH CARE EDUCATION/TRAINING PROGRAM

## 2022-07-25 PROCEDURE — 700101 HCHG RX REV CODE 250: Performed by: HOSPITALIST

## 2022-07-25 PROCEDURE — 700111 HCHG RX REV CODE 636 W/ 250 OVERRIDE (IP): Performed by: HOSPITALIST

## 2022-07-25 PROCEDURE — 160027 HCHG SURGERY MINUTES - 1ST 30 MINS LEVEL 2: Performed by: STUDENT IN AN ORGANIZED HEALTH CARE EDUCATION/TRAINING PROGRAM

## 2022-07-25 PROCEDURE — 99233 SBSQ HOSP IP/OBS HIGH 50: CPT | Performed by: STUDENT IN AN ORGANIZED HEALTH CARE EDUCATION/TRAINING PROGRAM

## 2022-07-25 PROCEDURE — 700102 HCHG RX REV CODE 250 W/ 637 OVERRIDE(OP): Performed by: HOSPITALIST

## 2022-07-25 PROCEDURE — 700101 HCHG RX REV CODE 250: Performed by: STUDENT IN AN ORGANIZED HEALTH CARE EDUCATION/TRAINING PROGRAM

## 2022-07-25 PROCEDURE — A9270 NON-COVERED ITEM OR SERVICE: HCPCS | Performed by: STUDENT IN AN ORGANIZED HEALTH CARE EDUCATION/TRAINING PROGRAM

## 2022-07-25 PROCEDURE — 160002 HCHG RECOVERY MINUTES (STAT): Performed by: STUDENT IN AN ORGANIZED HEALTH CARE EDUCATION/TRAINING PROGRAM

## 2022-07-25 PROCEDURE — A9270 NON-COVERED ITEM OR SERVICE: HCPCS | Performed by: HOSPITALIST

## 2022-07-25 PROCEDURE — 160009 HCHG ANES TIME/MIN: Performed by: STUDENT IN AN ORGANIZED HEALTH CARE EDUCATION/TRAINING PROGRAM

## 2022-07-25 PROCEDURE — 36415 COLL VENOUS BLD VENIPUNCTURE: CPT

## 2022-07-25 PROCEDURE — 770020 HCHG ROOM/CARE - TELE (206)

## 2022-07-25 PROCEDURE — 160039 HCHG SURGERY MINUTES - EA ADDL 1 MIN LEVEL 3: Performed by: STUDENT IN AN ORGANIZED HEALTH CARE EDUCATION/TRAINING PROGRAM

## 2022-07-25 PROCEDURE — C1763 CONN TISS, NON-HUMAN: HCPCS | Performed by: STUDENT IN AN ORGANIZED HEALTH CARE EDUCATION/TRAINING PROGRAM

## 2022-07-25 PROCEDURE — 83735 ASSAY OF MAGNESIUM: CPT

## 2022-07-25 PROCEDURE — 160048 HCHG OR STATISTICAL LEVEL 1-5: Performed by: STUDENT IN AN ORGANIZED HEALTH CARE EDUCATION/TRAINING PROGRAM

## 2022-07-25 PROCEDURE — 01Q50ZZ REPAIR MEDIAN NERVE, OPEN APPROACH: ICD-10-PCS | Performed by: STUDENT IN AN ORGANIZED HEALTH CARE EDUCATION/TRAINING PROGRAM

## 2022-07-25 PROCEDURE — 0LQ60ZZ REPAIR LEFT LOWER ARM AND WRIST TENDON, OPEN APPROACH: ICD-10-PCS | Performed by: STUDENT IN AN ORGANIZED HEALTH CARE EDUCATION/TRAINING PROGRAM

## 2022-07-25 PROCEDURE — 0LU60KZ SUPPLEMENT LEFT LOWER ARM AND WRIST TENDON WITH NONAUTOLOGOUS TISSUE SUBSTITUTE, OPEN APPROACH: ICD-10-PCS | Performed by: STUDENT IN AN ORGANIZED HEALTH CARE EDUCATION/TRAINING PROGRAM

## 2022-07-25 PROCEDURE — A9270 NON-COVERED ITEM OR SERVICE: HCPCS

## 2022-07-25 PROCEDURE — 01810 ANES PX NRV MUSC F/ARM WRST: CPT | Performed by: STUDENT IN AN ORGANIZED HEALTH CARE EDUCATION/TRAINING PROGRAM

## 2022-07-25 DEVICE — IMPLANTABLE DEVICE: Type: IMPLANTABLE DEVICE | Site: WRIST | Status: FUNCTIONAL

## 2022-07-25 RX ORDER — BUPIVACAINE HYDROCHLORIDE AND EPINEPHRINE 5; 5 MG/ML; UG/ML
INJECTION, SOLUTION EPIDURAL; INTRACAUDAL; PERINEURAL
Status: DISCONTINUED | OUTPATIENT
Start: 2022-07-25 | End: 2022-07-25 | Stop reason: HOSPADM

## 2022-07-25 RX ORDER — MIDAZOLAM HYDROCHLORIDE 1 MG/ML
1 INJECTION INTRAMUSCULAR; INTRAVENOUS
Status: DISCONTINUED | OUTPATIENT
Start: 2022-07-25 | End: 2022-07-25 | Stop reason: HOSPADM

## 2022-07-25 RX ORDER — LABETALOL HYDROCHLORIDE 5 MG/ML
5 INJECTION, SOLUTION INTRAVENOUS
Status: DISCONTINUED | OUTPATIENT
Start: 2022-07-25 | End: 2022-07-25 | Stop reason: HOSPADM

## 2022-07-25 RX ORDER — METOPROLOL TARTRATE 1 MG/ML
1 INJECTION, SOLUTION INTRAVENOUS
Status: DISCONTINUED | OUTPATIENT
Start: 2022-07-25 | End: 2022-07-25 | Stop reason: HOSPADM

## 2022-07-25 RX ORDER — DIPHENHYDRAMINE HYDROCHLORIDE 50 MG/ML
12.5 INJECTION INTRAMUSCULAR; INTRAVENOUS
Status: DISCONTINUED | OUTPATIENT
Start: 2022-07-25 | End: 2022-07-25 | Stop reason: HOSPADM

## 2022-07-25 RX ORDER — BUPIVACAINE HYDROCHLORIDE AND EPINEPHRINE 5; 5 MG/ML; UG/ML
INJECTION, SOLUTION EPIDURAL; INTRACAUDAL; PERINEURAL
Status: COMPLETED
Start: 2022-07-25 | End: 2022-07-25

## 2022-07-25 RX ORDER — MEPERIDINE HYDROCHLORIDE 25 MG/ML
12.5 INJECTION INTRAMUSCULAR; INTRAVENOUS; SUBCUTANEOUS
Status: DISCONTINUED | OUTPATIENT
Start: 2022-07-25 | End: 2022-07-25 | Stop reason: HOSPADM

## 2022-07-25 RX ORDER — DEXAMETHASONE SODIUM PHOSPHATE 4 MG/ML
INJECTION, SOLUTION INTRA-ARTICULAR; INTRALESIONAL; INTRAMUSCULAR; INTRAVENOUS; SOFT TISSUE PRN
Status: DISCONTINUED | OUTPATIENT
Start: 2022-07-25 | End: 2022-07-25 | Stop reason: SURG

## 2022-07-25 RX ORDER — SODIUM CHLORIDE, SODIUM LACTATE, POTASSIUM CHLORIDE, CALCIUM CHLORIDE 600; 310; 30; 20 MG/100ML; MG/100ML; MG/100ML; MG/100ML
INJECTION, SOLUTION INTRAVENOUS
Status: DISCONTINUED | OUTPATIENT
Start: 2022-07-25 | End: 2022-07-25 | Stop reason: SURG

## 2022-07-25 RX ORDER — AMLODIPINE BESYLATE 5 MG/1
5 TABLET ORAL
Status: DISCONTINUED | OUTPATIENT
Start: 2022-07-25 | End: 2022-07-27 | Stop reason: HOSPADM

## 2022-07-25 RX ORDER — ONDANSETRON 2 MG/ML
4 INJECTION INTRAMUSCULAR; INTRAVENOUS
Status: DISCONTINUED | OUTPATIENT
Start: 2022-07-25 | End: 2022-07-25 | Stop reason: HOSPADM

## 2022-07-25 RX ORDER — ONDANSETRON 2 MG/ML
INJECTION INTRAMUSCULAR; INTRAVENOUS PRN
Status: DISCONTINUED | OUTPATIENT
Start: 2022-07-25 | End: 2022-07-25 | Stop reason: SURG

## 2022-07-25 RX ORDER — HALOPERIDOL 5 MG/ML
1 INJECTION INTRAMUSCULAR
Status: DISCONTINUED | OUTPATIENT
Start: 2022-07-25 | End: 2022-07-25 | Stop reason: HOSPADM

## 2022-07-25 RX ORDER — LIDOCAINE HYDROCHLORIDE 20 MG/ML
INJECTION, SOLUTION EPIDURAL; INFILTRATION; INTRACAUDAL; PERINEURAL PRN
Status: DISCONTINUED | OUTPATIENT
Start: 2022-07-25 | End: 2022-07-25 | Stop reason: SURG

## 2022-07-25 RX ORDER — HYDRALAZINE HYDROCHLORIDE 20 MG/ML
5 INJECTION INTRAMUSCULAR; INTRAVENOUS
Status: DISCONTINUED | OUTPATIENT
Start: 2022-07-25 | End: 2022-07-25 | Stop reason: HOSPADM

## 2022-07-25 RX ORDER — SODIUM CHLORIDE, SODIUM LACTATE, POTASSIUM CHLORIDE, CALCIUM CHLORIDE 600; 310; 30; 20 MG/100ML; MG/100ML; MG/100ML; MG/100ML
INJECTION, SOLUTION INTRAVENOUS CONTINUOUS
Status: ACTIVE | OUTPATIENT
Start: 2022-07-25 | End: 2022-07-25

## 2022-07-25 RX ORDER — CEFAZOLIN SODIUM 1 G/3ML
INJECTION, POWDER, FOR SOLUTION INTRAMUSCULAR; INTRAVENOUS PRN
Status: DISCONTINUED | OUTPATIENT
Start: 2022-07-25 | End: 2022-07-25 | Stop reason: SURG

## 2022-07-25 RX ADMIN — LIDOCAINE HYDROCHLORIDE 100 MG: 20 INJECTION, SOLUTION EPIDURAL; INFILTRATION; INTRACAUDAL at 12:44

## 2022-07-25 RX ADMIN — HYDROMORPHONE HYDROCHLORIDE 0.5 MG: 1 INJECTION, SOLUTION INTRAMUSCULAR; INTRAVENOUS; SUBCUTANEOUS at 19:52

## 2022-07-25 RX ADMIN — SODIUM CHLORIDE, POTASSIUM CHLORIDE, SODIUM LACTATE AND CALCIUM CHLORIDE: 600; 310; 30; 20 INJECTION, SOLUTION INTRAVENOUS at 09:05

## 2022-07-25 RX ADMIN — SODIUM CHLORIDE, POTASSIUM CHLORIDE, SODIUM LACTATE AND CALCIUM CHLORIDE: 600; 310; 30; 20 INJECTION, SOLUTION INTRAVENOUS at 12:40

## 2022-07-25 RX ADMIN — OXYCODONE 5 MG: 5 TABLET ORAL at 09:33

## 2022-07-25 RX ADMIN — CEFAZOLIN 2 G: 330 INJECTION, POWDER, FOR SOLUTION INTRAMUSCULAR; INTRAVENOUS at 12:46

## 2022-07-25 RX ADMIN — AMLODIPINE BESYLATE 5 MG: 5 TABLET ORAL at 17:31

## 2022-07-25 RX ADMIN — OXYCODONE 5 MG: 5 TABLET ORAL at 00:40

## 2022-07-25 RX ADMIN — CEFTRIAXONE SODIUM 1 G: 10 INJECTION, POWDER, FOR SOLUTION INTRAVENOUS at 05:27

## 2022-07-25 RX ADMIN — OXYCODONE 5 MG: 5 TABLET ORAL at 05:27

## 2022-07-25 RX ADMIN — Medication 5 MG: at 00:39

## 2022-07-25 RX ADMIN — DEXAMETHASONE SODIUM PHOSPHATE 4 MG: 4 INJECTION, SOLUTION INTRA-ARTICULAR; INTRALESIONAL; INTRAMUSCULAR; INTRAVENOUS; SOFT TISSUE at 12:44

## 2022-07-25 RX ADMIN — OXYCODONE 5 MG: 5 TABLET ORAL at 15:33

## 2022-07-25 RX ADMIN — PROPOFOL 100 MG: 10 INJECTION, EMULSION INTRAVENOUS at 12:44

## 2022-07-25 RX ADMIN — POTASSIUM CHLORIDE AND SODIUM CHLORIDE: 900; 150 INJECTION, SOLUTION INTRAVENOUS at 17:33

## 2022-07-25 RX ADMIN — ONDANSETRON 4 MG: 2 INJECTION INTRAMUSCULAR; INTRAVENOUS at 13:01

## 2022-07-25 RX ADMIN — SENNOSIDES AND DOCUSATE SODIUM 2 TABLET: 50; 8.6 TABLET ORAL at 15:33

## 2022-07-25 RX ADMIN — FENTANYL CITRATE 100 MCG: 50 INJECTION, SOLUTION INTRAMUSCULAR; INTRAVENOUS at 12:42

## 2022-07-25 RX ADMIN — FENTANYL CITRATE 50 MCG: 50 INJECTION, SOLUTION INTRAMUSCULAR; INTRAVENOUS at 14:18

## 2022-07-25 ASSESSMENT — PAIN SCALES - WONG BAKER: WONGBAKER_NUMERICALRESPONSE: HURTS A WHOLE LOT

## 2022-07-25 ASSESSMENT — COGNITIVE AND FUNCTIONAL STATUS - GENERAL
SUGGESTED CMS G CODE MODIFIER MOBILITY: CH
SUGGESTED CMS G CODE MODIFIER DAILY ACTIVITY: CH
DAILY ACTIVITIY SCORE: 24
MOBILITY SCORE: 24

## 2022-07-25 ASSESSMENT — LIFESTYLE VARIABLES
HOW MANY TIMES IN THE PAST YEAR HAVE YOU HAD 5 OR MORE DRINKS IN A DAY: 0
TOTAL SCORE: 0
ALCOHOL_USE: YES
ON A TYPICAL DAY WHEN YOU DRINK ALCOHOL HOW MANY DRINKS DO YOU HAVE: 2
AVERAGE NUMBER OF DAYS PER WEEK YOU HAVE A DRINK CONTAINING ALCOHOL: 1
SUBSTANCE_ABUSE: 0
TOTAL SCORE: 0
HAVE YOU EVER FELT YOU SHOULD CUT DOWN ON YOUR DRINKING: NO
CONSUMPTION TOTAL: NEGATIVE
HAVE PEOPLE ANNOYED YOU BY CRITICIZING YOUR DRINKING: NO
EVER HAD A DRINK FIRST THING IN THE MORNING TO STEADY YOUR NERVES TO GET RID OF A HANGOVER: NO
EVER FELT BAD OR GUILTY ABOUT YOUR DRINKING: NO
TOTAL SCORE: 0
DOES PATIENT WANT TO STOP DRINKING: NO

## 2022-07-25 ASSESSMENT — ENCOUNTER SYMPTOMS
DIZZINESS: 0
ABDOMINAL PAIN: 0
WEIGHT LOSS: 0
CONSTITUTIONAL NEGATIVE: 1
NAUSEA: 0
FOCAL WEAKNESS: 0
SHORTNESS OF BREATH: 0
HEADACHES: 0
PALPITATIONS: 0
RESPIRATORY NEGATIVE: 1
SORE THROAT: 0
BLURRED VISION: 0
NEUROLOGICAL NEGATIVE: 1
CHILLS: 0
DIAPHORESIS: 0
DEPRESSION: 1
CARDIOVASCULAR NEGATIVE: 1
WEAKNESS: 0
VOMITING: 0
BRUISES/BLEEDS EASILY: 0
EYES NEGATIVE: 1
COUGH: 0
MYALGIAS: 0
FEVER: 0
GASTROINTESTINAL NEGATIVE: 1

## 2022-07-25 ASSESSMENT — PAIN SCALES - PAIN ASSESSMENT IN ADVANCED DEMENTIA (PAINAD)
TOTALSCORE: 2
CONSOLABILITY: NO NEED TO CONSOLE
BREATHING: NORMAL
TOTALSCORE: 1
CONSOLABILITY: NO NEED TO CONSOLE
BREATHING: NORMAL
FACIALEXPRESSION: SMILING OR INEXPRESSIVE
FACIALEXPRESSION: SMILING OR INEXPRESSIVE
TOTALSCORE: 0
FACIALEXPRESSION: SAD, FRIGHTENED, FROWN
NEGVOCALIZATION: OCCASIONAL MOAN/GROAN, LOW SPEECH, NEGATIVE/DISAPPROVING QUALITY
BREATHING: NORMAL
BODYLANGUAGE: TENSE, DISTRESSED PACING, FIDGETING
CONSOLABILITY: NO NEED TO CONSOLE
BODYLANGUAGE: RELAXED
BODYLANGUAGE: RELAXED

## 2022-07-25 ASSESSMENT — PATIENT HEALTH QUESTIONNAIRE - PHQ9
SUM OF ALL RESPONSES TO PHQ9 QUESTIONS 1 AND 2: 2
SUM OF ALL RESPONSES TO PHQ QUESTIONS 1-9: 9
3. TROUBLE FALLING OR STAYING ASLEEP OR SLEEPING TOO MUCH: NOT AT ALL
7. TROUBLE CONCENTRATING ON THINGS, SUCH AS READING THE NEWSPAPER OR WATCHING TELEVISION: SEVERAL DAYS
1. LITTLE INTEREST OR PLEASURE IN DOING THINGS: SEVERAL DAYS
6. FEELING BAD ABOUT YOURSELF - OR THAT YOU ARE A FAILURE OR HAVE LET YOURSELF OR YOUR FAMILY DOWN: NEARLY EVERY DAY
4. FEELING TIRED OR HAVING LITTLE ENERGY: SEVERAL DAYS
5. POOR APPETITE OR OVEREATING: NOT AT ALL
2. FEELING DOWN, DEPRESSED, IRRITABLE, OR HOPELESS: SEVERAL DAYS
9. THOUGHTS THAT YOU WOULD BE BETTER OFF DEAD, OR OF HURTING YOURSELF: SEVERAL DAYS
8. MOVING OR SPEAKING SO SLOWLY THAT OTHER PEOPLE COULD HAVE NOTICED. OR THE OPPOSITE, BEING SO FIGETY OR RESTLESS THAT YOU HAVE BEEN MOVING AROUND A LOT MORE THAN USUAL: SEVERAL DAYS

## 2022-07-25 ASSESSMENT — PAIN SCALES - GENERAL: PAIN_LEVEL: 8

## 2022-07-25 ASSESSMENT — PAIN DESCRIPTION - PAIN TYPE
TYPE: ACUTE PAIN
TYPE: SURGICAL PAIN

## 2022-07-25 ASSESSMENT — FIBROSIS 4 INDEX: FIB4 SCORE: 0.86

## 2022-07-25 NOTE — ANESTHESIA PROCEDURE NOTES
Airway    Date/Time: 7/25/2022 12:45 PM  Performed by: Keo Cm M.D.  Authorized by: Keo Cm M.D.     Location:  OR  Urgency:  Elective  Indications for Airway Management:  Anesthesia      Spontaneous Ventilation: absent    Sedation Level:  Deep  Preoxygenated: Yes    Final Airway Type:  Supraglottic airway  Final Supraglottic Airway:  Standard LMA    SGA Size:  4  Number of Attempts at Approach:  1  Ventilation Between Attempts:  None  Number of Other Approaches Attempted:  0

## 2022-07-25 NOTE — CONSULTS
Attempted to interview pt again - still away, currently in PACU. Will defer intake until tomorrow. MD and RN aware.

## 2022-07-25 NOTE — PROGRESS NOTES
"[facility] Medicine Daily Progress Note    Date of Service  7/25/2022    Chief Complaint  Katerina Owen is a 36 y.o. female admitted 7/23/2022 with suicide attempt with slashed wrists    Hospital Course  Patient is a 36-year-old female with a past medical history of schizophrenia and polysubstance abuse. She presented to [facility] on 7/24/2022 following a suicide attempt during which patient slit her wrists.  Patient lacerated right wrist as well as left wrist for which left wrist has wrist drop.  EDP reporting that patient used methamphetamine prior to presentation.  Patient was placed on legal hold prior to presentation.     In the ER, she was found to be tachycardia in the 130s, tachypnea in the 20s, hypertension with SBP in 180s.  Patient saturating well on room air.  CBC with leukocytosis at 13.9 and elevated polys.  Chemistry with mild hyponatremia and hypokalemia as well as high anion gap metabolic acidosis with bicarbonate of 14.  Lactic acid at 5.1.  CPK diagnostic alcohol low.  ERP discussed the case with orthopedic surgery (MARII), who evaluated patient.  Patient received a sepsis bolus and was started on IV Rocephin.  Lactic acid is trending down.  Patient was admitted for sepsis secondary to urinary tract infection, suicide attempt on legal hold, and hypertensive emergency requiring telemetry monitoring and further work-up and management.     Ortho plans surgery 7/25    Interval Problem Update  7/24 in the ER, vitals currently stable, normal sinus at rate of 80 and RR of 14. She is alert and cooperative but tangential. She tells me her name and that we are in [facility] but will not answer questions about date. She states she tried to kill herself \"with a knife\" but \"feels at peace now.\" She is tangential. Was noted by nursing to have vaginal bleeding, when asked about it patient answered \"I think I put something up there\", when I asked her what she replied \"I don't know maybe a coat \", she denied " vaginal or abdominal pain, no evidence of external wounds or trauma. She agreed to a vaginal exam so I performed this with her nurse assisting and at bedside, patient tolerated this well, no evidence of foreign objects or trauma, blood was visualized coming from her cervix, consistent with normal menstruation.   She reports some pain 3/10 of L wrist with some associated numbness. She denies other pain. She denies hallucinations, ROS otherwise negative.    7/25: Seen at bedside.   AOx3 and tell me she is in the hospital as she cut her bilateral wrists.   On legal hold, sitter at bedside.   Ortho plans surgery 7/25.  Denies hallucinations, chest pain, sob. Mild pain in bilateral wrists    I have discussed this patient's plan of care and discharge plan at IDT rounds today with Case Management, Nursing, Nursing leadership, and other members of the IDT team.    Consultants/Specialty  Ortho     Code Status  Full Code    Disposition  Patient is not medically cleared for discharge.   Anticipate discharge to be determined    Review of Systems  Review of Systems   Constitutional: Negative.  Negative for chills, diaphoresis, fever, malaise/fatigue and weight loss.   HENT: Negative.  Negative for sore throat.    Eyes: Negative.  Negative for blurred vision.   Respiratory: Negative.  Negative for cough and shortness of breath.    Cardiovascular: Negative.  Negative for chest pain, palpitations and leg swelling.   Gastrointestinal: Negative.  Negative for abdominal pain, nausea and vomiting.   Genitourinary: Negative.  Negative for dysuria.   Musculoskeletal: Positive for joint pain. Negative for myalgias.   Skin: Negative.  Negative for itching and rash.   Neurological: Negative.  Negative for dizziness, focal weakness, weakness and headaches.   Endo/Heme/Allergies: Negative.  Does not bruise/bleed easily.   Psychiatric/Behavioral: Positive for depression and suicidal ideas. Negative for substance abuse.   All other systems  reviewed and are negative.       Physical Exam  Temp:  [36.1 °C (96.9 °F)-36.7 °C (98 °F)] 36.5 °C (97.7 °F)  Pulse:  [61-88] 88  Resp:  [14-18] 18  BP: (137-184)/(82-99) 142/82  SpO2:  [94 %-100 %] 97 %    Physical Exam  Vitals and nursing note reviewed. Exam conducted with a chaperone present.   Constitutional:       General: She is not in acute distress.     Appearance: Normal appearance. She is not diaphoretic.   HENT:      Head: Normocephalic.      Nose: Nose normal.      Mouth/Throat:      Mouth: Mucous membranes are moist.   Eyes:      Pupils: Pupils are equal, round, and reactive to light.   Cardiovascular:      Rate and Rhythm: Normal rate and regular rhythm.      Pulses: Normal pulses.      Heart sounds: Normal heart sounds.   Pulmonary:      Effort: Pulmonary effort is normal.      Breath sounds: Normal breath sounds.   Abdominal:      General: Abdomen is flat. Bowel sounds are normal.      Palpations: Abdomen is soft.   Genitourinary:     Comments: Vaginal exam as described above, both manual and exam with speculum performed, no FB and no s/o trauma  Musculoskeletal:         General: No swelling or deformity. Normal range of motion.      Comments: L wrist drop and decreased sensation, bandages in place, small amount of dried blood, R wrist bandages cdi, neurovascular exam intact on R   Skin:     General: Skin is warm and dry.      Capillary Refill: Capillary refill takes less than 2 seconds.   Neurological:      General: No focal deficit present.      Mental Status: She is alert.      Cranial Nerves: No cranial nerve deficit.      Sensory: Sensory deficit (L hand) present.      Motor: Weakness (L wrist drop) present.   Psychiatric:         Attention and Perception: She does not perceive auditory or visual hallucinations.         Mood and Affect: Mood normal.         Speech: Speech is tangential.         Behavior: Behavior is cooperative.         Fluids    Intake/Output Summary (Last 24 hours) at  7/25/2022 1539  Last data filed at 7/25/2022 1252  Gross per 24 hour   Intake 590 ml   Output --   Net 590 ml       Laboratory  Recent Labs     07/24/22  0010 07/25/22  0144   WBC 13.9* 7.2   RBC 4.96 4.37   HEMOGLOBIN 14.3 12.4   HEMATOCRIT 41.6 37.4   MCV 83.9 85.6   MCH 28.8 28.4   MCHC 34.4 33.2*   RDW 47.5 50.5*   PLATELETCT 296 208   MPV 11.8 11.4     Recent Labs     07/24/22  0245 07/24/22  0435 07/25/22  0144   SODIUM 138 139 138   POTASSIUM 4.2 3.5* 3.9   CHLORIDE 106 106 106   CO2 17* 18* 23   GLUCOSE 72 76 95   BUN 9 8 9   CREATININE 0.61 0.62 0.74   CALCIUM 8.5 8.0* 8.7                   Imaging  DX-CHEST-PORTABLE (1 VIEW)   Final Result      No acute cardiac or pulmonary abnormalities are identified.           Assessment/Plan  * Suicide and self-inflicted injury (HCC)- (present on admission)  Assessment & Plan  Patient attempted suicide by cutting wrists. Resulted in wrist drop. Legal hold placed by police prior to arrival to ED.   Psych to eval  1:1 sitter    Left wrist drop  Assessment & Plan  Patient cut tendon while attempting suicide. Orthopedic surgery is following and plans surgery 7/25  Surgery pending    Overdose- (present on admission)  Assessment & Plan  Patient reports that she overdosed on pills and fentanyl. But does not remember what.  UDS ps amphetamine and benzo  Salicylate and tylenol level negative  Has 1:1 sitter  Legal hold  Psych following    Hypertensive emergency  Assessment & Plan  Patient presented with BP 190s/100s.   Much improved continue prn meds  Add amlodipine     Leukocytosis- (present on admission)  Assessment & Plan  Mild  Following  Continue ceftriaxone  Resolved     Acute cystitis- (present on admission)  Assessment & Plan  Patient diagnosed with UTI  Continue abx ceftriaxone  Culture pending    Lactic acidosis- (present on admission)  Assessment & Plan  Resolved      Sepsis (HCC)- (present on admission)  Assessment & Plan  This is Sepsis Present on admission  SIRS  criteria identified on my evaluation include: Tachycardia, with heart rate greater than 90 BPM, Tachypnea, with respirations greater than 20 per minute and Leukocytosis, with WBC greater than 12,000  Source is unk. Maybe UTI. Positive UA  Sepsis protocol initiated  Fluid resuscitation ordered per protocol  Crystalloid Fluid Administration: Fluid resuscitation ordered per standard protocol - 30 mL/kg per current or ideal body weight  IV antibiotics as appropriate for source of sepsis  Reassessment: I have reassessed the patient's hemodynamic status  Sepsis bolus given. Expect improvement of lactic acidosis  Ceftriaxone  improving    Hypokalemia- (present on admission)  Assessment & Plan  Replacing  Will check mag  following       VTE prophylaxis: SCDs/TEDs    I have performed a physical exam and reviewed and updated ROS and Plan today (7/25/2022). In review of yesterday's note (7/24/2022), there are no changes except as documented above.

## 2022-07-25 NOTE — CONSULTS
"Attempted to see pt for inpt psych consult interview - she has been in the OR all day - per note by Ortho surgeon this morning, \"OR for washout exploration of left volar wrist laceration, possible tendon repair, possible nerve repair.\"    Will reattempt consult later today, otherwise will see patient tomorrow morning. TIAN and Dr. harrison.  "

## 2022-07-25 NOTE — ANESTHESIA TIME REPORT
Anesthesia Start and Stop Event Times     Date Time Event    7/25/2022 1232 Ready for Procedure     1240 Anesthesia Start     1400 Anesthesia Stop        Responsible Staff  07/25/22    Name Role Begin End    Keo Cm M.D. Anesth 1240 1400        Overtime Reason:  no overtime (within assigned shift)    Comments:

## 2022-07-25 NOTE — ANESTHESIA POSTPROCEDURE EVALUATION
Patient: Katerina Owen    Procedure Summary     Date: 07/25/22 Room / Location: [facility] ROOM 21 / SURGERY SAME DAY [facility]    Anesthesia Start: 1240 Anesthesia Stop: 1400    Procedure: INCISION AND DRAINAGE-washout and exploration left wrist, tendon repair,  nerve repair, proceed as indicated (Left Wrist) Diagnosis: (Left wrist laceration)    Surgeons: Tia Elizabeth M.D. Responsible Provider: Keo Cm M.D.    Anesthesia Type: general ASA Status: 4          Final Anesthesia Type: general  Last vitals  BP   Blood Pressure: 129/87    Temp   36.7 °C (98.1 °F)    Pulse   88   Resp   18    SpO2   97 %      Anesthesia Post Evaluation    Patient location during evaluation: PACU  Patient participation: complete - patient participated  Level of consciousness: awake and alert  Pain score: 8    Airway patency: patent  Anesthetic complications: no  Cardiovascular status: hemodynamically stable  Respiratory status: acceptable  Hydration status: euvolemic    PONV: none          No complications documented.     Nurse Pain Score: 8 (NPRS)

## 2022-07-25 NOTE — CARE PLAN
The patient is Stable - Low risk of patient condition declining or worsening    Shift Goals  Clinical Goals: Safety  Patient Goals: Rest  Family Goals: DAKOTAH    Progress made toward(s) clinical / shift goals:    Problem: Provide Safe Environment  Goal: Suicide environmental safety, protocols, policies, and practices will be implemented  Outcome: Progressing     Problem: Psychosocial  Goal: Patient's ability to identify and develop effective coping behaviors will improve  Outcome: Progressing       Patient is not progressing towards the following goals:

## 2022-07-25 NOTE — OP REPORT
OPERATIVE NOTE  Date of procedure: 07/25/22    Pre-operative Diagnosis   1.  Left volar wrist laceration           Post-operative diagnosis   1.  Left volar wrist laceration  2.  Complete laceration of left flexor carpi radialis tendon  3.  Partial laceration of left flexor carpi ulnaris tendon, 95% laceration  4.  Complete laceration of left palmaris longus tendon  5.  Partial laceration left median nerve, less than 5%      Procedure   1.  Wash-in exploration left volar wrist laceration  2.  Primary repair of left flexor carpi radialis, flexor carpi ulnaris, and palmaris longus tendons  3.  Repair of left median nerve with allograft        Surgeon   1. Tia Tran MD       Stacey Owen is a 36 y.o. female admitted with reported self-inflicted bilateral volar wrist lacerations.  Physical exam  revealed very weak wrist flexion, pain with flexion of the middle finger and ring finger FDS tendons, and endorsement of diminished sensation to the left hand diffusely.  The patient is indicated for the above stated procedure.       Anesthesia   General      Tourniquet Time   Tourniquet was inflated to 250 mm Hg for 18 minutes     Implants  Axogen nerve connector      Complications   None          Informed Consent   Patient was told of the risks, benefits, alternative to the procedure.  Risks included, but not limited to, infection, wound healing issues, injury to neurovascular and tendinous structures, incomplete resolution of their symptoms, the need for subsequent surgeries.  Advanced directive were discussed, team approach explained, they understand the role of overlapping surgeries, the use of medical photography was reviewed.  The patient consented and wished to proceed.         Procedural Pause   The patient's correct identity, side, site, procedure to be performed was verified.  Intravenous antibiotics were administered prior to skin incision.         Procedural Note   The patient was  brought to the operating room where they were transferred to the operating room table and were secured with safety straps.  Our anesthesia colleagues then placed the patient under general anesthesia.  At which point the operative extremity was prepped and draped in standard sterile fashion.  A sterile tourniquet was applied to the volar forearm, as there was an IV in the antecubital fossa.  The upper extremity was elevated and tourniquet was inflated to 250 mm of Hg.        The sutures in place over the volar wrist laceration removed.  Blunt dissection revealed completely transected flexor carpi radialis tendon, nearly complete transection of the flexor carpi ulnaris tendon, complete transection of palmaris longus, and a partial laceration to the median nerve.  There was a small nick into the flexor digitorum superficialis tendon to the middle finger, otherwise the remainder of the flexor tendons, nerves, and both the radial and ulnar arteries were intact.  The palmar cutaneous branch of the median nerve was identified and also intact.  The wound was copiously irrigated with sterile saline.  Time was spent trying to retrieve the proximal and flexor carpi radialis tendon, but it was unsuccessful.  Therefore the forearm tourniquet was removed.  Hemostasis was obtained with direct pressure and bipolar cautery.    A few centimeter transverse incision was made more proximally over the origin of the flexor carpi radialis tendon.  Tenotomy dissection was performed down to the antebrachial fascia, and the FCR tendon was identified.  The tendon was passed distal to proximal.  FCR and FCU were repaired with modified Tsuge 3-0 Supramid sutures, and 2 simple horizontal mattress suture, for a total of a 8 strand repair each.  Epitendinous repairs were performed with running 4-0 Prolene sutures.  Palmaris longus was repaired with simple interrupted figure of eight 3-0 Supramid sutures x 2.  Given the very small cut in the median  nerve, and close proximity to flexor tendon repairs, the decision was made to proceed with an allograft nerve wrap.    A local field block was performed with 22 mL of 0.5% Marcaine with epi.      The skin incisions were closed with simple interrupted 3-0 Monocryl deep dermal sutures, and running 4-0 Monocryl subcuticular sutures.  The incisions were dressed with Steri-Strips and Xeroform.      The final dressing consisted of 4 x 4 gauze, soft roll, and an extension blocking orthosis.        The patient tolerated the procedure well and was awakened from general anesthesia without any known difficulties. They were transferred to the PACU in stable condition without any known complications.  All needle counts were correct.       Post-operative Plan     -Okay to discharge home from my standpoint, however will defer to the primary team given the psychiatric hold.  We will arrange for follow-up as an outpatient in 2 weeks.  -Keep splint clean dry and intact until follow-up appointment in 10 to 14 days.   -The sutures are dissolvable.  -Please do not hesitate to contact me with any questions or concerns in the meantime.

## 2022-07-25 NOTE — OR NURSING
Patient arrived from OR to PACU 5 at 1357. 2 patient identifiers verified. Bedside report completed by anesthesia and OR RN. Patient requiring 4L via simple mask and OPA, respirations equal and unlabored. Patient attached to PACU monitors, VSS. Pt on suicide precautions and under constant supervision.    1408: Pt arousable, oral airway dc'd & titrated to RA. Tolerating PO intake    1416: pt co 8 out of 10 pain, pain medication given, see MAR.    1433: Report given to Inpatient nurseMargarette. Pt resting comfortably.     1437: Pt meets Phase II criteria.    1445: pt taken back to inpatient room on telemetry monitor via gurney with RN transport    1500: pt under constant supervision during stay in PACU due to suicide precautions. Upon arrival to inpatient room, sitter and inpatient RN at bedside.     Jacinda Castro R.N.

## 2022-07-25 NOTE — CONSULTS
HAND & UPPER EXTREMITY  INPATIENT CONSULTATION       REASON FOR CONSULT:   Bilateral wrist lacerations    HISTORY:Katerina Owen is a 36 y.o. right hand dominant female reportedly admitted for intentional self-inflicted bilateral wrist lacerations following a suicide attempt.Patient does not recall much of the event. She currently endorses numbness throughout the entire left hand, and weakness.  She also endorses pain in her left and right wrist. She states she does have a history of a suicide attempt in the past, but does not elaborate further on this for me.She does not know her tetanus status, Review of records suggest she receive the immunization in May 2022. She was reportedly using methamphetamine prior to presentation.      PAST MEDICAL HISTORY:   Past Medical History:   Diagnosis Date   • ADHD    • Arthritis     back   • Bowel habit changes     constipation   • Chlamydia 5/11/2011   • depression    • Heart burn    • HTN (hypertension)     related to pregnancy   • Migraine    • Pain     incisional hernia pain; back (arthritis)   • Pre-eclampsia     2007    • PTSD (post-traumatic stress disorder)    • Staph infection 2011    after C section       PAST SURGICAL HISTORY:   Past Surgical History:   Procedure Laterality Date   • APPENDECTOMY LAPAROSCOPIC  6/5/2018    Procedure: APPENDECTOMY LAPAROSCOPIC;  Surgeon: Izabel Salomon M.D.;  Location: SURGERY [facility];  Service: Gen Robotic   • VENTRAL HERNIA REPAIR Right 1/10/2018    Procedure: VENTRAL HERNIA REPAIR- FOR INCISIONAL HERNIA REDUCIBLE  WITH MESH;  Surgeon: Gisele Delcid M.D.;  Location: [facility];  Service: General   • BREAST BIOPSY Left 2/26/2016    Procedure: BREAST BIOPSY Excisional;  Surgeon: Gisele Delcid M.D.;  Location: [facility];  Service:    • LUMPECTOMY  2014    both breasts - Dr. Garsia - [facility] 10/2014   • REPEAT C SECTION W TUBAL LIGATION  9/7/2011    Performed by AYAAN MERCHANT at  LAB[facility] AND DELIVERY   • SEPTOTURBINOPLASTY  2009    Performed by CHRISTIAN YEN at SURGERY SAME DAY [facility]   • PRIMARY C SECTION  Aug 7, 2007     preeclampsia       SOCIAL HISTORY:   Social History     Tobacco Use   Smoking Status Former Smoker   • Packs/day: 0.50   • Years: 6.00   • Pack years: 3.00   • Quit date: 2014   • Years since quittin.5   Smokeless Tobacco Never Used   Tobacco Comment    2017 currently Vape       MEDICATIONS:    Current Facility-Administered Medications:   •  lidocaine (XYLOCAINE) 1 % injection 0.5 mL, 0.5 mL, Intradermal, Once PRN, Tia Elizabeth M.D.  •  lactated ringers infusion, , Intravenous, Continuous, Tia Elizabeth M.D., Last Rate: 10 mL/hr at 22 0905, New Bag at 22 09  •  senna-docusate (PERICOLACE or SENOKOT S) 8.6-50 MG per tablet 2 Tablet, 2 Tablet, Oral, BID, 2 Tablet at 22 1609 **AND** polyethylene glycol/lytes (MIRALAX) PACKET 1 Packet, 1 Packet, Oral, QDAY PRN **AND** magnesium hydroxide (MILK OF MAGNESIA) suspension 30 mL, 30 mL, Oral, QDAY PRN **AND** bisacodyl (DULCOLAX) suppository 10 mg, 10 mg, Rectal, QDAY PRN, Vilma Cook M.D.  •  cefTRIAXone (Rocephin) syringe 1 g, 1 g, Intravenous, Q24HRS, Vilma Cook M.D., 1 g at 22 0527  •  oxyCODONE immediate-release (ROXICODONE) tablet 5 mg, 5 mg, Oral, Q4HRS PRN, Izabel Malik M.D., 5 mg at 22 0933  •  HYDROmorphone (Dilaudid) injection 0.5 mg, 0.5 mg, Intravenous, Q4HRS PRN, Izabel Malik M.D., 0.5 mg at 22  •  labetalol (NORMODYNE/TRANDATE) injection 10 mg, 10 mg, Intravenous, Q6HRS PRN, Izabel Malik M.D.  •  0.9 % NaCl with KCl 20 mEq infusion, , Intravenous, Continuous, Izabel Malik M.D., Last Rate: 50 mL/hr at 22 1447, New Bag at 22 144    ALLERGIES:  Morphine and Vicodin [hydrocodone-acetaminophen]    PHYSICAL EXAM:   General: No acute distress  Respiratory: Unlabored on room air    Upper  extremities:   Transverse lacerations of bilateral volar wrists.In the right hand she is able to fire wrist flexors, wrist extensors, FPL, EPL, FDS and FDP to all digits without difficulty or pain.  Fingertips are pink and well-perfused, 2-second capillary refill.  Sensation intact to light touch throughout, and is 5 mm two-point each digit. On the left hand she is able to fire FPL and wrist extensors.  She has pain when firing FDS to the middle and ring finger, and it is weaker when compared to the other digits.  FDP is intact to all digits.  Endorses diminished sensation to light touch throughout the entire left hand, however her two-point is 5 mm throughout.  Sensation is intact to the palmar cutaneous nerve.  Fingertips are pink and well-perfused.  Wrist flexion is weak.      IMAGING: No new imaging today.    ASSESSMENT/PLAN: Katerina Owen is a 36 y.o. female who Was admitted to the emergency department on 7- after reported self-inflicted bilateral wrist lacerations.Physical exam is significant for weak wrist flexion on the left, and pain with FDS to the ring and middle finger. She also endorses numbness in the left hand, but on exam has normal two-point throughout.  Regardless recommend OR for washout exploration of left volar wrist laceration, possible tendon repair, possible nerve repair, proceed as indicated.    The patient was told of the risks, benefits, and alternatives to the procedure. Risks included but not limited to bleeding, infection,Allen, tendon rerupture, injury to neurovascular and tendinous structures, risk of exposure to COVID-19 within the facility, and the need for subsequent surgeries. The patient consented and wished to proceed. All questions pertaining to the procedure and these risks were answered and the patient agreed to proceed.      1. To OR for The above procedure  2. NPO   3. Pt marked  4. We will continue to follow, please do not hesitate to contact me with any  questions or concerns    Tia Elizabeth M.D.

## 2022-07-25 NOTE — ANESTHESIA PREPROCEDURE EVALUATION
Case: [id number] Date/Time: 07/25/22 1323    Procedure: INCISION AND DRAINAGE-washout and exploration left wrist, possible tendon repair, possible nerve repair, proceed as indicated (Left )    Location: [facility] ROOM 21 / SURGERY SAME DAY [facility]    Surgeons: Tia Elizabeth M.D.          Relevant Problems   NEURO   (positive) Chronic headaches   (positive) Persistent migraine aura without cerebral infarction and without status migrainosus, not intractable      CARDIAC   (positive) HTN (hypertension)   (positive) Hypertensive emergency   (positive) Persistent migraine aura without cerebral infarction and without status migrainosus, not intractable       Physical Exam    Airway   Mallampati: II  TM distance: >3 FB  Neck ROM: full       Cardiovascular - normal exam  Rhythm: regular  Rate: normal  (-) murmur     Dental - normal exam           Pulmonary - normal exam  Breath sounds clear to auscultation     Abdominal    Neurological - normal exam                 Anesthesia Plan    ASA 4   ASA physical status 4 criteria: acute alcohol or substance abuse withdrawal and respiratory failure    Plan - general       Airway plan will be LMA          Induction: intravenous    Postoperative Plan: Postoperative administration of opioids is intended.    Pertinent diagnostic labs and testing reviewed    Informed Consent:    Anesthetic plan and risks discussed with patient.    Use of blood products discussed with: patient whom consented to blood products.

## 2022-07-26 LAB
ANION GAP SERPL CALC-SCNC: 10 MMOL/L (ref 7–16)
BACTERIA UR CULT: ABNORMAL
BACTERIA UR CULT: ABNORMAL
BASOPHILS # BLD AUTO: 0.2 % (ref 0–1.8)
BASOPHILS # BLD: 0.02 K/UL (ref 0–0.12)
BUN SERPL-MCNC: 9 MG/DL (ref 8–22)
CALCIUM SERPL-MCNC: 8.6 MG/DL (ref 8.5–10.5)
CHLORIDE SERPL-SCNC: 105 MMOL/L (ref 96–112)
CO2 SERPL-SCNC: 24 MMOL/L (ref 20–33)
CREAT SERPL-MCNC: 0.61 MG/DL (ref 0.5–1.4)
EKG IMPRESSION: NORMAL
EOSINOPHIL # BLD AUTO: 0 K/UL (ref 0–0.51)
EOSINOPHIL NFR BLD: 0 % (ref 0–6.9)
ERYTHROCYTE [DISTWIDTH] IN BLOOD BY AUTOMATED COUNT: 47.6 FL (ref 35.9–50)
GFR SERPLBLD CREATININE-BSD FMLA CKD-EPI: 117 ML/MIN/1.73 M 2
GLUCOSE SERPL-MCNC: 111 MG/DL (ref 65–99)
HCT VFR BLD AUTO: 35.6 % (ref 37–47)
HGB BLD-MCNC: 12 G/DL (ref 12–16)
IMM GRANULOCYTES # BLD AUTO: 0.05 K/UL (ref 0–0.11)
IMM GRANULOCYTES NFR BLD AUTO: 0.4 % (ref 0–0.9)
LYMPHOCYTES # BLD AUTO: 2.05 K/UL (ref 1–4.8)
LYMPHOCYTES NFR BLD: 16 % (ref 22–41)
MCH RBC QN AUTO: 28.6 PG (ref 27–33)
MCHC RBC AUTO-ENTMCNC: 33.7 G/DL (ref 33.6–35)
MCV RBC AUTO: 85 FL (ref 81.4–97.8)
MONOCYTES # BLD AUTO: 1.02 K/UL (ref 0–0.85)
MONOCYTES NFR BLD AUTO: 8 % (ref 0–13.4)
NEUTROPHILS # BLD AUTO: 9.64 K/UL (ref 2–7.15)
NEUTROPHILS NFR BLD: 75.4 % (ref 44–72)
NRBC # BLD AUTO: 0 K/UL
NRBC BLD-RTO: 0 /100 WBC
PLATELET # BLD AUTO: 228 K/UL (ref 164–446)
PMV BLD AUTO: 11.5 FL (ref 9–12.9)
POTASSIUM SERPL-SCNC: 4 MMOL/L (ref 3.6–5.5)
PROCALCITONIN SERPL-MCNC: <0.05 NG/ML
RBC # BLD AUTO: 4.19 M/UL (ref 4.2–5.4)
SIGNIFICANT IND 70042: ABNORMAL
SITE SITE: ABNORMAL
SODIUM SERPL-SCNC: 139 MMOL/L (ref 135–145)
SOURCE SOURCE: ABNORMAL
WBC # BLD AUTO: 12.8 K/UL (ref 4.8–10.8)

## 2022-07-26 PROCEDURE — 700102 HCHG RX REV CODE 250 W/ 637 OVERRIDE(OP): Performed by: PSYCHIATRY & NEUROLOGY

## 2022-07-26 PROCEDURE — 85025 COMPLETE CBC W/AUTO DIFF WBC: CPT

## 2022-07-26 PROCEDURE — A9270 NON-COVERED ITEM OR SERVICE: HCPCS | Performed by: STUDENT IN AN ORGANIZED HEALTH CARE EDUCATION/TRAINING PROGRAM

## 2022-07-26 PROCEDURE — 700102 HCHG RX REV CODE 250 W/ 637 OVERRIDE(OP): Performed by: HOSPITALIST

## 2022-07-26 PROCEDURE — 93005 ELECTROCARDIOGRAM TRACING: CPT | Performed by: HOSPITALIST

## 2022-07-26 PROCEDURE — 80048 BASIC METABOLIC PNL TOTAL CA: CPT

## 2022-07-26 PROCEDURE — 700102 HCHG RX REV CODE 250 W/ 637 OVERRIDE(OP): Performed by: STUDENT IN AN ORGANIZED HEALTH CARE EDUCATION/TRAINING PROGRAM

## 2022-07-26 PROCEDURE — 87040 BLOOD CULTURE FOR BACTERIA: CPT | Mod: 91

## 2022-07-26 PROCEDURE — 36415 COLL VENOUS BLD VENIPUNCTURE: CPT

## 2022-07-26 PROCEDURE — 700101 HCHG RX REV CODE 250: Performed by: STUDENT IN AN ORGANIZED HEALTH CARE EDUCATION/TRAINING PROGRAM

## 2022-07-26 PROCEDURE — A9270 NON-COVERED ITEM OR SERVICE: HCPCS | Performed by: HOSPITALIST

## 2022-07-26 PROCEDURE — 99232 SBSQ HOSP IP/OBS MODERATE 35: CPT | Mod: FS | Performed by: NURSE PRACTITIONER

## 2022-07-26 PROCEDURE — A9270 NON-COVERED ITEM OR SERVICE: HCPCS | Performed by: PSYCHIATRY & NEUROLOGY

## 2022-07-26 PROCEDURE — 700111 HCHG RX REV CODE 636 W/ 250 OVERRIDE (IP): Performed by: HOSPITALIST

## 2022-07-26 PROCEDURE — 84145 PROCALCITONIN (PCT): CPT

## 2022-07-26 PROCEDURE — 700111 HCHG RX REV CODE 636 W/ 250 OVERRIDE (IP): Performed by: STUDENT IN AN ORGANIZED HEALTH CARE EDUCATION/TRAINING PROGRAM

## 2022-07-26 PROCEDURE — 93010 ELECTROCARDIOGRAM REPORT: CPT | Performed by: INTERNAL MEDICINE

## 2022-07-26 PROCEDURE — 770001 HCHG ROOM/CARE - MED/SURG/GYN PRIV*

## 2022-07-26 PROCEDURE — 99223 1ST HOSP IP/OBS HIGH 75: CPT | Performed by: PSYCHIATRY & NEUROLOGY

## 2022-07-26 RX ORDER — ARIPIPRAZOLE 10 MG/1
5 TABLET ORAL DAILY
Status: DISCONTINUED | OUTPATIENT
Start: 2022-07-26 | End: 2022-07-27 | Stop reason: HOSPADM

## 2022-07-26 RX ADMIN — HYDROMORPHONE HYDROCHLORIDE 0.5 MG: 1 INJECTION, SOLUTION INTRAMUSCULAR; INTRAVENOUS; SUBCUTANEOUS at 06:43

## 2022-07-26 RX ADMIN — HYDROMORPHONE HYDROCHLORIDE 0.5 MG: 1 INJECTION, SOLUTION INTRAMUSCULAR; INTRAVENOUS; SUBCUTANEOUS at 21:14

## 2022-07-26 RX ADMIN — ARIPIPRAZOLE 5 MG: 10 TABLET ORAL at 16:02

## 2022-07-26 RX ADMIN — OXYCODONE 5 MG: 5 TABLET ORAL at 15:47

## 2022-07-26 RX ADMIN — SENNOSIDES AND DOCUSATE SODIUM 2 TABLET: 50; 8.6 TABLET ORAL at 05:32

## 2022-07-26 RX ADMIN — CEFTRIAXONE SODIUM 1 G: 10 INJECTION, POWDER, FOR SOLUTION INTRAVENOUS at 05:33

## 2022-07-26 RX ADMIN — SENNOSIDES AND DOCUSATE SODIUM 2 TABLET: 50; 8.6 TABLET ORAL at 16:02

## 2022-07-26 RX ADMIN — AMLODIPINE BESYLATE 5 MG: 5 TABLET ORAL at 05:30

## 2022-07-26 RX ADMIN — OXYCODONE 5 MG: 5 TABLET ORAL at 05:30

## 2022-07-26 ASSESSMENT — PAIN SCALES - PAIN ASSESSMENT IN ADVANCED DEMENTIA (PAINAD)
BREATHING: NORMAL
CONSOLABILITY: NO NEED TO CONSOLE
FACIALEXPRESSION: SMILING OR INEXPRESSIVE
BODYLANGUAGE: RELAXED
TOTALSCORE: 1
NEGVOCALIZATION: OCCASIONAL MOAN/GROAN, LOW SPEECH, NEGATIVE/DISAPPROVING QUALITY

## 2022-07-26 ASSESSMENT — COGNITIVE AND FUNCTIONAL STATUS - GENERAL
SUGGESTED CMS G CODE MODIFIER DAILY ACTIVITY: CH
SUGGESTED CMS G CODE MODIFIER MOBILITY: CH
MOBILITY SCORE: 24
DAILY ACTIVITIY SCORE: 24

## 2022-07-26 ASSESSMENT — ENCOUNTER SYMPTOMS
VOMITING: 0
FEVER: 0
DOUBLE VISION: 0
FOCAL WEAKNESS: 0
COUGH: 0
SHORTNESS OF BREATH: 0
SORE THROAT: 0
NAUSEA: 0
HEADACHES: 0
DEPRESSION: 1
CHILLS: 0
BLURRED VISION: 0
DIZZINESS: 0

## 2022-07-26 ASSESSMENT — PAIN DESCRIPTION - PAIN TYPE
TYPE: ACUTE PAIN
TYPE: ACUTE PAIN

## 2022-07-26 NOTE — CARE PLAN
The patient is Stable - Low risk of patient condition declining or worsening    Shift Goals  Clinical Goals: safty  Patient Goals: pain managment  Family Goals: DAKOTAH    Progress made toward(s) clinical / shift goals:  see above     Patient is not progressing towards the following goals:

## 2022-07-26 NOTE — CARE PLAN
The patient is Stable - Low risk of patient condition declining or worsening    Shift Goals  Clinical Goals: Pain management  Patient Goals: Rest  Family Goals: DAKOTAH    Progress made toward(s) clinical / shift goals:    Problem: Provide Safe Environment  Goal: Suicide environmental safety, protocols, policies, and practices will be implemented  Outcome: Progressing     Problem: Psychosocial  Goal: Patient's ability to identify and develop effective coping behaviors will improve  Outcome: Progressing

## 2022-07-26 NOTE — CONSULTS
BRIEF PSYCHIATRY NOTE:  Dx: unspecified psychotic disorder    Legal hold extended  Start abilify 5mg PO daily for psychosis and mood  Please transfer patient to psychiatric hospital once she is medically cleared  Psychiatry will follow    Sitter: Yes  Visitors, personal belongings and phone: no    '

## 2022-07-26 NOTE — HOSPITAL COURSE
Katerina Owen is a 36-year-old female with a past medical history of schizophrenia and polysubstance abuse. She presented to [facility] on 7/24/2022 following a suicide attempt during which patient slit her wrists.  She also reports taking legal and illegal drugs as a means of suicide. Patient lacerated right wrist as well as left wrist for which left wrist has wrist drop.  Patient reportedly used methamphetamine prior to presentation.  Patient was placed on legal hold.     In the ER, she was found to be tachycardia in the 130s, tachypnea in the 20s, hypertension with SBP in 180s.  Patient saturating well on room air.  CBC with leukocytosis at 13.9 and elevated polys.  Chemistry with mild hyponatremia and hypokalemia as well as high anion gap metabolic acidosis with bicarbonate of 14.  Lactic acid at 5.1.  CPK diagnostic alcohol low.  ERP discussed the case with orthopedic surgery (MARII), who evaluated patient.  Patient received a sepsis bolus and was started on IV Rocephin.  Lactic acid is trending down.  Patient was admitted for sepsis secondary to urinary tract infection, suicide attempt on legal hold, and hypertensive emergency requiring telemetry monitoring and further work-up and management.  Patient underwent tendon and nerve repair with Ortho on 7/25/2022.

## 2022-07-26 NOTE — CARE PLAN
The patient is Stable - Low risk of patient condition declining or worsening    Shift Goals  Clinical Goals: safty  Patient Goals: rest  Family Goals: DAKOTAH    Progress made toward(s) clinical / shift goals:  see above     Patient is not progressing towards the following goals:

## 2022-07-26 NOTE — PROGRESS NOTES
[facility] Medicine Daily Progress Note    Date of Service  7/26/2022    Chief Complaint  Katerina Owen is a 36 y.o. female admitted 7/23/2022 with suicide attempt with slashed wrists    Hospital Course  Katerina Owen is a 36-year-old female with a past medical history of schizophrenia and polysubstance abuse. She presented to [facility] on 7/24/2022 following a suicide attempt during which patient slit her wrists.  Patient lacerated right wrist as well as left wrist for which left wrist has wrist drop.  Patient reportedly used methamphetamine prior to presentation.  Patient was placed on legal hold.     In the ER, she was found to be tachycardia in the 130s, tachypnea in the 20s, hypertension with SBP in 180s.  Patient saturating well on room air.  CBC with leukocytosis at 13.9 and elevated polys.  Chemistry with mild hyponatremia and hypokalemia as well as high anion gap metabolic acidosis with bicarbonate of 14.  Lactic acid at 5.1.  CPK diagnostic alcohol low.  ERP discussed the case with orthopedic surgery (MARII), who evaluated patient.  Patient received a sepsis bolus and was started on IV Rocephin.  Lactic acid is trending down.  Patient was admitted for sepsis secondary to urinary tract infection, suicide attempt on legal hold, and hypertensive emergency requiring telemetry monitoring and further work-up and management.  Patient underwent tendon and nerve repair with Ortho on 7/25/2022.      Interval Problem Update  Patient seen and examined this morning.  Sitter at bedside.  Patient appears sad and anxious, she is asked about her family and where she will go after this hospitalization.  She is complaining of some pain in her left wrist but denying need for medication at this time.  Patient reports other suicide attempts, she states the most recent one was in 2012.  She reports she has tried overdosing on medications and has cut herself as a suicide attempt in the past.  Legal hold extended, pending  psych consult.     I have discussed this patient's plan of care and discharge plan at IDT rounds today with Case Management, Nursing, Nursing leadership, and other members of the IDT team.    Consultants/Specialty  orthopedics and psychiatry    Code Status  Full Code    Disposition  Patient is not medically cleared for discharge.   Anticipate discharge to to a psychiatric hospital.  I have placed the appropriate orders for post-discharge needs.    Review of Systems  Review of Systems   Constitutional: Negative for chills and fever.   HENT: Negative for congestion and sore throat.    Eyes: Negative for blurred vision and double vision.   Respiratory: Negative for cough and shortness of breath.    Cardiovascular: Negative for chest pain and leg swelling.   Gastrointestinal: Negative for nausea and vomiting.   Genitourinary: Negative.    Musculoskeletal:        Left arm pain     Skin: Negative.    Neurological: Negative for dizziness, focal weakness and headaches.   Psychiatric/Behavioral: Positive for depression and suicidal ideas.   All other systems reviewed and are negative.       Physical Exam  Temp:  [35.8 °C (96.5 °F)-36.7 °C (98.1 °F)] 36.5 °C (97.7 °F)  Pulse:  [61-95] 83  Resp:  [14-18] 18  BP: (121-184)/(65-99) 121/75  SpO2:  [94 %-100 %] 96 %    Physical Exam  Vitals and nursing note reviewed.   Constitutional:       General: She is not in acute distress.     Appearance: Normal appearance.   HENT:      Head: Normocephalic and atraumatic.      Right Ear: External ear normal.      Left Ear: External ear normal.      Nose: Nose normal.      Mouth/Throat:      Mouth: Mucous membranes are moist.      Pharynx: Oropharynx is clear.   Eyes:      General:         Right eye: No discharge.         Left eye: No discharge.      Pupils: Pupils are equal, round, and reactive to light.   Neck:      Comments: Superficial wounds/marks to neck    Cardiovascular:      Rate and Rhythm: Normal rate and regular rhythm.       Pulses: Normal pulses.      Heart sounds: Normal heart sounds. No murmur heard.  Pulmonary:      Effort: Pulmonary effort is normal.      Breath sounds: Normal breath sounds.   Abdominal:      General: Abdomen is flat. Bowel sounds are normal. There is no distension.      Palpations: Abdomen is soft.      Tenderness: There is no abdominal tenderness.   Musculoskeletal:         General: Signs of injury (left wrist) present.      Left wrist: Laceration present.      Cervical back: Neck supple. No tenderness.      Right lower leg: No edema.      Left lower leg: No edema.      Comments: Surgical dressing in place   Skin:     General: Skin is warm and dry.      Capillary Refill: Capillary refill takes less than 2 seconds.   Neurological:      General: No focal deficit present.      Mental Status: She is alert and oriented to person, place, and time. Mental status is at baseline.   Psychiatric:         Mood and Affect: Mood is anxious.         Behavior: Behavior is cooperative.         Thought Content: Thought content includes suicidal ideation.         Fluids    Intake/Output Summary (Last 24 hours) at 7/26/2022 1355  Last data filed at 7/25/2022 1707  Gross per 24 hour   Intake 240 ml   Output --   Net 240 ml       Laboratory  Recent Labs     07/24/22  0010 07/25/22  0144 07/26/22  0324   WBC 13.9* 7.2 12.8*   RBC 4.96 4.37 4.19*   HEMOGLOBIN 14.3 12.4 12.0   HEMATOCRIT 41.6 37.4 35.6*   MCV 83.9 85.6 85.0   MCH 28.8 28.4 28.6   MCHC 34.4 33.2* 33.7   RDW 47.5 50.5* 47.6   PLATELETCT 296 208 228   MPV 11.8 11.4 11.5     Recent Labs     07/24/22  0435 07/25/22  0144 07/26/22  0554   SODIUM 139 138 139   POTASSIUM 3.5* 3.9 4.0   CHLORIDE 106 106 105   CO2 18* 23 24   GLUCOSE 76 95 111*   BUN 8 9 9   CREATININE 0.62 0.74 0.61   CALCIUM 8.0* 8.7 8.6                   Imaging  DX-CHEST-PORTABLE (1 VIEW)   Final Result      No acute cardiac or pulmonary abnormalities are identified.           Assessment/Plan  * Suicide and  self-inflicted injury (HCC)- (present on admission)  Assessment & Plan  Patient attempted suicide by cutting wrists and overdosing. Resulted in wrist drop. Legal hold placed by police prior to arrival to ED   Psych to evaluate  Legal hold extended  1:1 sitter    Left wrist drop  Assessment & Plan  Patient cut tendon while attempting suicide  S/p repair with ortho surgery on 7/25  Cleared by Ortho for discharge    Hypertensive emergency  Assessment & Plan  Patient presented with BP 190s/100s.   Now improved  Continue amlodipine  Continue labetalol as needed for SBP greater than 170    Leukocytosis- (present on admission)  Assessment & Plan  Mild  WBC 12.8 today  Continue ceftriaxone  Continue to trend CBC    Acute cystitis- (present on admission)  Assessment & Plan  Patient diagnosed with UTI  Continue abx ceftriaxone  Culture pending    Lactic acidosis- (present on admission)  Assessment & Plan        Overdose- (present on admission)  Assessment & Plan  Patient reports that she overdosed on pills and fentanyl but does not remember what  UDS positive for amphetamine and benzo  Salicylate and tylenol level negative  Has 1:1 sitter  Legal hold  Psych consulted    Sepsis (HCC)- (present on admission)  Assessment & Plan  This is Sepsis Present on admission  SIRS criteria identified on my evaluation include: Tachycardia, with heart rate greater than 90 BPM, Tachypnea, with respirations greater than 20 per minute and Leukocytosis, with WBC greater than 12,000  Source is likely urinary, UA positive  Sepsis protocol initiated  Fluid resuscitation ordered per protocol  Crystalloid Fluid Administration: Fluid resuscitation ordered per standard protocol - 30 mL/kg per current or ideal body weight  IV antibiotics as appropriate for source of sepsis  Reassessment: I have reassessed the patient's hemodynamic status  Lactic acidosis improved with fluid  Continue ceftriaxone    Hypokalemia- (present on admission)  Assessment &  Plan  Resolved, K 4.0  Mag 1.9         VTE prophylaxis: SCDs/TEDs    I have performed a physical exam and reviewed and updated ROS and Plan today (7/26/2022). In review of yesterday's note (7/25/2022), there are no changes except as documented above.

## 2022-07-26 NOTE — DISCHARGE PLANNING
Case Management Discharge Planning    Admission Date: 7/23/2022  GMLOS: 5.3  ALOS: 2    6-Clicks ADL Score: 24  6-Clicks Mobility Score: 24      Anticipated Discharge Dispo: Discharge Disposition: D/T to psych hosp or distinct part unit (65)    DME Needed: No    Action(s) Taken: Patient discussed in IDDR. Patient on a legal hold. Legal hold certified. LSW faxed to [facility] with alert team. Patient is not medically clear.     Escalations Completed: None    Medically Clear: No    Next Steps: LSW to continue to follow and assist alert team as needed     Barriers to Discharge: Medical clearance    Is the patient up for discharge tomorrow: No
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
## 2022-07-27 ENCOUNTER — PHARMACY VISIT (OUTPATIENT)
Dept: PHARMACY | Facility: MEDICAL CENTER | Age: 37
End: 2022-07-27
Payer: MEDICARE

## 2022-07-27 ENCOUNTER — HOSPITAL ENCOUNTER (INPATIENT)
Facility: MEDICAL CENTER | Age: 37
LOS: 5 days | DRG: 880 | End: 2022-08-01
Attending: EMERGENCY MEDICINE | Admitting: STUDENT IN AN ORGANIZED HEALTH CARE EDUCATION/TRAINING PROGRAM
Payer: COMMERCIAL

## 2022-07-27 VITALS
WEIGHT: 169.75 LBS | DIASTOLIC BLOOD PRESSURE: 75 MMHG | TEMPERATURE: 97.4 F | SYSTOLIC BLOOD PRESSURE: 129 MMHG | HEART RATE: 72 BPM | RESPIRATION RATE: 16 BRPM | HEIGHT: 68 IN | OXYGEN SATURATION: 94 % | BODY MASS INDEX: 25.73 KG/M2

## 2022-07-27 DIAGNOSIS — K59.09 OTHER CONSTIPATION: ICD-10-CM

## 2022-07-27 DIAGNOSIS — I16.1 HYPERTENSIVE EMERGENCY: ICD-10-CM

## 2022-07-27 DIAGNOSIS — M21.332 LEFT WRIST DROP: ICD-10-CM

## 2022-07-27 DIAGNOSIS — S61.512A LACERATION OF LEFT WRIST, INITIAL ENCOUNTER: ICD-10-CM

## 2022-07-27 DIAGNOSIS — R45.851 SUICIDAL IDEATION: ICD-10-CM

## 2022-07-27 PROBLEM — B96.89 GARDNERELLA VAGINITIS: Status: ACTIVE | Noted: 2022-07-27

## 2022-07-27 PROBLEM — N76.0 GARDNERELLA VAGINITIS: Status: ACTIVE | Noted: 2022-07-27

## 2022-07-27 PROBLEM — E87.20 LACTIC ACIDOSIS: Status: RESOLVED | Noted: 2022-07-24 | Resolved: 2022-07-27

## 2022-07-27 PROBLEM — F20.9 SCHIZOPHRENIA (HCC): Status: ACTIVE | Noted: 2022-07-27

## 2022-07-27 PROBLEM — E87.6 HYPOKALEMIA: Status: RESOLVED | Noted: 2018-02-24 | Resolved: 2022-07-27

## 2022-07-27 LAB
AMPHET UR QL SCN: NEGATIVE
BARBITURATES UR QL SCN: NEGATIVE
BASOPHILS # BLD AUTO: 0.4 % (ref 0–1.8)
BASOPHILS # BLD: 0.03 K/UL (ref 0–0.12)
BENZODIAZ UR QL SCN: NEGATIVE
BZE UR QL SCN: NEGATIVE
CANNABINOIDS UR QL SCN: NEGATIVE
EOSINOPHIL # BLD AUTO: 0.13 K/UL (ref 0–0.51)
EOSINOPHIL NFR BLD: 1.6 % (ref 0–6.9)
ERYTHROCYTE [DISTWIDTH] IN BLOOD BY AUTOMATED COUNT: 48.5 FL (ref 35.9–50)
HCT VFR BLD AUTO: 35.8 % (ref 37–47)
HGB BLD-MCNC: 12.2 G/DL (ref 12–16)
IMM GRANULOCYTES # BLD AUTO: 0.01 K/UL (ref 0–0.11)
IMM GRANULOCYTES NFR BLD AUTO: 0.1 % (ref 0–0.9)
LYMPHOCYTES # BLD AUTO: 3.1 K/UL (ref 1–4.8)
LYMPHOCYTES NFR BLD: 39.3 % (ref 22–41)
MCH RBC QN AUTO: 29 PG (ref 27–33)
MCHC RBC AUTO-ENTMCNC: 34.1 G/DL (ref 33.6–35)
MCV RBC AUTO: 85.2 FL (ref 81.4–97.8)
METHADONE UR QL SCN: NEGATIVE
MONOCYTES # BLD AUTO: 0.69 K/UL (ref 0–0.85)
MONOCYTES NFR BLD AUTO: 8.7 % (ref 0–13.4)
NEUTROPHILS # BLD AUTO: 3.93 K/UL (ref 2–7.15)
NEUTROPHILS NFR BLD: 49.9 % (ref 44–72)
NRBC # BLD AUTO: 0 K/UL
NRBC BLD-RTO: 0 /100 WBC
OPIATES UR QL SCN: NEGATIVE
OXYCODONE UR QL SCN: POSITIVE
PCP UR QL SCN: NEGATIVE
PLATELET # BLD AUTO: 228 K/UL (ref 164–446)
PMV BLD AUTO: 11.7 FL (ref 9–12.9)
POC BREATHALIZER: 0 PERCENT (ref 0–0.01)
PROPOXYPH UR QL SCN: NEGATIVE
RBC # BLD AUTO: 4.2 M/UL (ref 4.2–5.4)
WBC # BLD AUTO: 7.9 K/UL (ref 4.8–10.8)

## 2022-07-27 PROCEDURE — A9270 NON-COVERED ITEM OR SERVICE: HCPCS | Performed by: PSYCHIATRY & NEUROLOGY

## 2022-07-27 PROCEDURE — 302970 POC BREATHALIZER: Performed by: EMERGENCY MEDICINE

## 2022-07-27 PROCEDURE — A9270 NON-COVERED ITEM OR SERVICE: HCPCS | Performed by: HOSPITALIST

## 2022-07-27 PROCEDURE — 99223 1ST HOSP IP/OBS HIGH 75: CPT | Performed by: STUDENT IN AN ORGANIZED HEALTH CARE EDUCATION/TRAINING PROGRAM

## 2022-07-27 PROCEDURE — 700111 HCHG RX REV CODE 636 W/ 250 OVERRIDE (IP): Performed by: HOSPITALIST

## 2022-07-27 PROCEDURE — A9270 NON-COVERED ITEM OR SERVICE: HCPCS | Performed by: STUDENT IN AN ORGANIZED HEALTH CARE EDUCATION/TRAINING PROGRAM

## 2022-07-27 PROCEDURE — 700102 HCHG RX REV CODE 250 W/ 637 OVERRIDE(OP)

## 2022-07-27 PROCEDURE — 85025 COMPLETE CBC W/AUTO DIFF WBC: CPT

## 2022-07-27 PROCEDURE — RXMED WILLOW AMBULATORY MEDICATION CHARGE: Performed by: NURSE PRACTITIONER

## 2022-07-27 PROCEDURE — 36415 COLL VENOUS BLD VENIPUNCTURE: CPT

## 2022-07-27 PROCEDURE — 99232 SBSQ HOSP IP/OBS MODERATE 35: CPT | Performed by: PSYCHIATRY & NEUROLOGY

## 2022-07-27 PROCEDURE — 700102 HCHG RX REV CODE 250 W/ 637 OVERRIDE(OP): Performed by: STUDENT IN AN ORGANIZED HEALTH CARE EDUCATION/TRAINING PROGRAM

## 2022-07-27 PROCEDURE — 99285 EMERGENCY DEPT VISIT HI MDM: CPT

## 2022-07-27 PROCEDURE — 700102 HCHG RX REV CODE 250 W/ 637 OVERRIDE(OP): Performed by: HOSPITALIST

## 2022-07-27 PROCEDURE — 80307 DRUG TEST PRSMV CHEM ANLYZR: CPT

## 2022-07-27 PROCEDURE — A9270 NON-COVERED ITEM OR SERVICE: HCPCS | Performed by: NURSE PRACTITIONER

## 2022-07-27 PROCEDURE — 700102 HCHG RX REV CODE 250 W/ 637 OVERRIDE(OP): Performed by: NURSE PRACTITIONER

## 2022-07-27 PROCEDURE — 700111 HCHG RX REV CODE 636 W/ 250 OVERRIDE (IP): Performed by: STUDENT IN AN ORGANIZED HEALTH CARE EDUCATION/TRAINING PROGRAM

## 2022-07-27 PROCEDURE — A9270 NON-COVERED ITEM OR SERVICE: HCPCS

## 2022-07-27 PROCEDURE — 700102 HCHG RX REV CODE 250 W/ 637 OVERRIDE(OP): Performed by: PSYCHIATRY & NEUROLOGY

## 2022-07-27 PROCEDURE — 770001 HCHG ROOM/CARE - MED/SURG/GYN PRIV*

## 2022-07-27 RX ORDER — METRONIDAZOLE 500 MG/1
500 TABLET ORAL EVERY 12 HOURS
Qty: 14 TABLET | Refills: 0 | Status: ON HOLD | OUTPATIENT
Start: 2022-07-27 | End: 2022-08-01

## 2022-07-27 RX ORDER — ARIPIPRAZOLE 5 MG/1
5 TABLET ORAL DAILY
Qty: 30 TABLET | Refills: 0 | Status: SHIPPED | OUTPATIENT
Start: 2022-07-28 | End: 2024-02-22

## 2022-07-27 RX ORDER — AMLODIPINE BESYLATE 5 MG/1
5 TABLET ORAL DAILY
Status: DISCONTINUED | OUTPATIENT
Start: 2022-07-28 | End: 2022-07-28

## 2022-07-27 RX ORDER — METRONIDAZOLE 500 MG/1
500 TABLET ORAL EVERY 12 HOURS
Status: DISCONTINUED | OUTPATIENT
Start: 2022-07-27 | End: 2022-07-27 | Stop reason: HOSPADM

## 2022-07-27 RX ORDER — AMLODIPINE BESYLATE 5 MG/1
5 TABLET ORAL DAILY
Qty: 30 TABLET | Refills: 0 | Status: SHIPPED | OUTPATIENT
Start: 2022-07-28 | End: 2024-02-22

## 2022-07-27 RX ORDER — HEPARIN SODIUM 5000 [USP'U]/ML
5000 INJECTION, SOLUTION INTRAVENOUS; SUBCUTANEOUS EVERY 8 HOURS
Status: DISCONTINUED | OUTPATIENT
Start: 2022-07-27 | End: 2022-08-01 | Stop reason: HOSPADM

## 2022-07-27 RX ORDER — METRONIDAZOLE 500 MG/1
500 TABLET ORAL EVERY 8 HOURS
Status: DISCONTINUED | OUTPATIENT
Start: 2022-07-27 | End: 2022-07-27

## 2022-07-27 RX ORDER — CHOLECALCIFEROL (VITAMIN D3) 125 MCG
5 CAPSULE ORAL NIGHTLY PRN
Status: DISCONTINUED | OUTPATIENT
Start: 2022-07-27 | End: 2022-07-27 | Stop reason: HOSPADM

## 2022-07-27 RX ORDER — ACETAMINOPHEN 325 MG/1
650 TABLET ORAL EVERY 4 HOURS PRN
Status: DISCONTINUED | OUTPATIENT
Start: 2022-07-27 | End: 2022-08-01 | Stop reason: HOSPADM

## 2022-07-27 RX ORDER — ARIPIPRAZOLE 10 MG/1
5 TABLET ORAL DAILY
Status: DISCONTINUED | OUTPATIENT
Start: 2022-07-28 | End: 2022-07-28

## 2022-07-27 RX ORDER — METRONIDAZOLE 500 MG/1
500 TABLET ORAL EVERY 12 HOURS
Status: DISPENSED | OUTPATIENT
Start: 2022-07-27 | End: 2022-07-31

## 2022-07-27 RX ADMIN — OXYCODONE 5 MG: 5 TABLET ORAL at 07:21

## 2022-07-27 RX ADMIN — HYDROMORPHONE HYDROCHLORIDE 0.5 MG: 1 INJECTION, SOLUTION INTRAMUSCULAR; INTRAVENOUS; SUBCUTANEOUS at 02:54

## 2022-07-27 RX ADMIN — OXYCODONE 5 MG: 5 TABLET ORAL at 17:35

## 2022-07-27 RX ADMIN — HYDROMORPHONE HYDROCHLORIDE 0.5 MG: 1 INJECTION, SOLUTION INTRAMUSCULAR; INTRAVENOUS; SUBCUTANEOUS at 12:45

## 2022-07-27 RX ADMIN — OXYCODONE 5 MG: 5 TABLET ORAL at 00:35

## 2022-07-27 RX ADMIN — SENNOSIDES AND DOCUSATE SODIUM 2 TABLET: 50; 8.6 TABLET ORAL at 07:23

## 2022-07-27 RX ADMIN — SENNOSIDES AND DOCUSATE SODIUM 2 TABLET: 50; 8.6 TABLET ORAL at 17:34

## 2022-07-27 RX ADMIN — ARIPIPRAZOLE 5 MG: 10 TABLET ORAL at 07:21

## 2022-07-27 RX ADMIN — AMLODIPINE BESYLATE 5 MG: 5 TABLET ORAL at 07:21

## 2022-07-27 RX ADMIN — METRONIDAZOLE 500 MG: 500 TABLET ORAL at 10:02

## 2022-07-27 RX ADMIN — CEFTRIAXONE SODIUM 1 G: 10 INJECTION, POWDER, FOR SOLUTION INTRAVENOUS at 07:22

## 2022-07-27 RX ADMIN — Medication 5 MG: at 03:29

## 2022-07-27 RX ADMIN — METRONIDAZOLE 500 MG: 500 TABLET ORAL at 17:35

## 2022-07-27 RX ADMIN — HYDROMORPHONE HYDROCHLORIDE 0.5 MG: 1 INJECTION, SOLUTION INTRAMUSCULAR; INTRAVENOUS; SUBCUTANEOUS at 08:39

## 2022-07-27 ASSESSMENT — ENCOUNTER SYMPTOMS
CARDIOVASCULAR NEGATIVE: 1
COUGH: 0
CHILLS: 0
HEADACHES: 0
SORE THROAT: 0
EYES NEGATIVE: 1
RESPIRATORY NEGATIVE: 1
PSYCHIATRIC NEGATIVE: 1
FEVER: 0
NAUSEA: 0
NEUROLOGICAL NEGATIVE: 1
MUSCULOSKELETAL NEGATIVE: 1
VOMITING: 0
FOCAL WEAKNESS: 0
SHORTNESS OF BREATH: 0
BLURRED VISION: 0
DIZZINESS: 0
DOUBLE VISION: 0
GASTROINTESTINAL NEGATIVE: 1
DEPRESSION: 1

## 2022-07-27 ASSESSMENT — FIBROSIS 4 INDEX: FIB4 SCORE: 0.78

## 2022-07-27 ASSESSMENT — PAIN SCALES - PAIN ASSESSMENT IN ADVANCED DEMENTIA (PAINAD)
BODYLANGUAGE: RELAXED
FACIALEXPRESSION: SMILING OR INEXPRESSIVE
TOTALSCORE: 1
BREATHING: NORMAL
CONSOLABILITY: NO NEED TO CONSOLE
CONSOLABILITY: NO NEED TO CONSOLE
FACIALEXPRESSION: SMILING OR INEXPRESSIVE
TOTALSCORE: 0
BREATHING: NORMAL
BODYLANGUAGE: RELAXED
NEGVOCALIZATION: OCCASIONAL MOAN/GROAN, LOW SPEECH, NEGATIVE/DISAPPROVING QUALITY

## 2022-07-27 NOTE — DISCHARGE PLANNING
Legal Hold Transfer     Referral: Legal hold transfer to Mental Health Facility     Intervention: Notified by  Don that pt has been accepted to [facility].     Pt's accepting physcian is Dr. Holcomb     Transport arranged through [facility] at [facility]     The pt will be picked up at 1900      Notified Bedside RN Elida and LSW Betina of the departure time as well as accepting facility.      Transfer packet and COBRA to be created by DARA with original legal hold and placed on chart.      Plan: Pt will be transferring to [facility] today at 1900 via [facility].

## 2022-07-27 NOTE — DISCHARGE PLANNING
[facility] ALERT TEAM DISCHARGE PLANNING NOTE    Date:  7/27/22  Patient Name:  Katerina Murry Brayden - 36 y.o. - Discharge Planning  MRN:  [id number]   YOB: 1985  ADMISSION DATE:  7/23/2022      Writer forwarded transfer packet for inpatient psychiatric care to the following community providers:  [facility], [facility], [facility]   Items  included in the transfer packet:   __x___Face Sheet   __x___Pages 1 and 2 of completed legal hold   __x___Alert Team/Psych Assessment   __x___H&P   __x___UDS   _____Blood Alcohol   __x___Vital signs   __n/a___Pregnancy Test (if applicable)   __x___Medications List   _____Covid Screen      Informed by Betina Adams that pt is medically clear for inpt psych.

## 2022-07-27 NOTE — PROGRESS NOTES
[facility] Medicine Daily Progress Note    Date of Service  7/27/2022    Chief Complaint  Katerina Owen is a 36 y.o. female admitted 7/23/2022 with suicide attempt with slashed wrists    Hospital Course  Katerina Owen is a 36-year-old female with a past medical history of schizophrenia and polysubstance abuse. She presented to [facility] on 7/24/2022 following a suicide attempt during which patient slit her wrists.  She also reports taking legal and illegal drugs as a means of suicide. Patient lacerated right wrist as well as left wrist for which left wrist has wrist drop.  Patient reportedly used methamphetamine prior to presentation.  Patient was placed on legal hold.     In the ER, she was found to be tachycardia in the 130s, tachypnea in the 20s, hypertension with SBP in 180s.  Patient saturating well on room air.  CBC with leukocytosis at 13.9 and elevated polys.  Chemistry with mild hyponatremia and hypokalemia as well as high anion gap metabolic acidosis with bicarbonate of 14.  Lactic acid at 5.1.  CPK diagnostic alcohol low.  ERP discussed the case with orthopedic surgery ([facility]), who evaluated patient.  Patient received a sepsis bolus and was started on IV Rocephin.  Lactic acid is trending down.  Patient was admitted for sepsis secondary to urinary tract infection, suicide attempt on legal hold, and hypertensive emergency requiring telemetry monitoring and further work-up and management.  Patient underwent tendon and nerve repair with Ortho on 7/25/2022.      Interval Problem Update  Patient was seen and examined at bedside.  No acute events overnight. Patient is resting comfortably in bed and in no acute distress.   Urine culture with probable gardnerella vaginalis, 7 day course of flagyl. Patient medically cleared for d/c to inpatient psychiatric hospital.      I have discussed this patient's plan of care and discharge plan at IDT rounds today with Case Management, Nursing, Nursing leadership, and  other members of the IDT team.    Consultants/Specialty  orthopedics and psychiatry    Code Status  Full Code    Disposition  Patient is medically cleared for discharge.   Anticipate discharge to to a psychiatric hospital.  I have placed the appropriate orders for post-discharge needs.    Review of Systems  Review of Systems   Constitutional: Negative for chills and fever.   HENT: Negative for congestion and sore throat.    Eyes: Negative for blurred vision and double vision.   Respiratory: Negative for cough and shortness of breath.    Cardiovascular: Negative for chest pain and leg swelling.   Gastrointestinal: Negative for nausea and vomiting.   Genitourinary: Negative for dysuria.   Musculoskeletal:        Left arm pain     Skin: Negative.    Neurological: Negative for dizziness, focal weakness and headaches.   Psychiatric/Behavioral: Positive for depression and suicidal ideas.   All other systems reviewed and are negative.       Physical Exam  Temp:  [36.2 °C (97.2 °F)-36.9 °C (98.4 °F)] 36.2 °C (97.2 °F)  Pulse:  [64-80] 64  Resp:  [14-18] 14  BP: (100-161)/(65-88) 136/88  SpO2:  [93 %-100 %] 93 %    Physical Exam  Vitals and nursing note reviewed.   Constitutional:       General: She is not in acute distress.     Appearance: Normal appearance.   HENT:      Head: Normocephalic and atraumatic.      Right Ear: External ear normal.      Left Ear: External ear normal.      Nose: Nose normal.      Mouth/Throat:      Mouth: Mucous membranes are moist.      Pharynx: Oropharynx is clear.   Eyes:      General:         Right eye: No discharge.         Left eye: No discharge.      Pupils: Pupils are equal, round, and reactive to light.   Neck:      Comments: Superficial wounds/marks to neck    Cardiovascular:      Rate and Rhythm: Normal rate and regular rhythm.      Pulses: Normal pulses.      Heart sounds: Normal heart sounds. No murmur heard.  Pulmonary:      Effort: Pulmonary effort is normal.      Breath sounds:  Normal breath sounds.   Abdominal:      General: Abdomen is flat. Bowel sounds are normal. There is no distension.      Palpations: Abdomen is soft.      Tenderness: There is no abdominal tenderness.   Musculoskeletal:         General: Signs of injury (left wrist) present.      Left wrist: Laceration present.      Cervical back: Neck supple. No tenderness.      Right lower leg: No edema.      Left lower leg: No edema.      Comments: Surgical dressing in place   Skin:     General: Skin is warm and dry.      Capillary Refill: Capillary refill takes less than 2 seconds.   Neurological:      General: No focal deficit present.      Mental Status: She is alert and oriented to person, place, and time. Mental status is at baseline.   Psychiatric:         Mood and Affect: Mood is anxious.         Behavior: Behavior is cooperative.         Thought Content: Thought content includes suicidal ideation.     Patient was seen and examined at bedside. No changes in physical exam from prior evaluation.      Fluids    Intake/Output Summary (Last 24 hours) at 7/27/2022 1409  Last data filed at 7/26/2022 2000  Gross per 24 hour   Intake 100 ml   Output --   Net 100 ml       Laboratory  Recent Labs     07/25/22  0144 07/26/22  0324 07/27/22  0018   WBC 7.2 12.8* 7.9   RBC 4.37 4.19* 4.20   HEMOGLOBIN 12.4 12.0 12.2   HEMATOCRIT 37.4 35.6* 35.8*   MCV 85.6 85.0 85.2   MCH 28.4 28.6 29.0   MCHC 33.2* 33.7 34.1   RDW 50.5* 47.6 48.5   PLATELETCT 208 228 228   MPV 11.4 11.5 11.7     Recent Labs     07/25/22  0144 07/26/22  0554   SODIUM 138 139   POTASSIUM 3.9 4.0   CHLORIDE 106 105   CO2 23 24   GLUCOSE 95 111*   BUN 9 9   CREATININE 0.74 0.61   CALCIUM 8.7 8.6                   Imaging  DX-CHEST-PORTABLE (1 VIEW)   Final Result      No acute cardiac or pulmonary abnormalities are identified.           Assessment/Plan  * Suicide and self-inflicted injury (HCC)- (present on admission)  Assessment & Plan  Patient attempted suicide by cutting  wrists and overdosing. Resulted in wrist drop. Legal hold placed by police prior to arrival to ED   Psych consulted  Legal hold extended  1:1 sitter  Abilify started 7/26  Patient stable for discharge to inpatient psychiatric facility    Left wrist drop  Assessment & Plan  Patient cut tendon while attempting suicide  S/p repair with ortho surgery on 7/25  Cleared by Ortho for discharge. Will need ortho follow up in 10-14 days  Keep splint clean dry and intact until follow-up appointment. Sutures are dissolvable     Hypertensive emergency  Assessment & Plan  Patient presented with BP 190s/100s.   Now improved  Continue amlodipine  Continue labetalol as needed for SBP greater than 170    Leukocytosis- (present on admission)  Assessment & Plan  Resolved, WBC 7.9    Acute cystitis- (present on admission)  Assessment & Plan  Sepsis on admission  Initially treated with ceftriaxone  Urine culture growing probable gardnerella vaginalis, rocephin discontinued, started on flagyl    Overdose- (present on admission)  Assessment & Plan  Patient reports that she overdosed on pills and fentanyl but does not remember what  UDS positive for amphetamine and benzo  Salicylate and tylenol level negative  Has 1:1 sitter  Legal hold  Psych consulted  Abilify started 7/26    Sepsis (HCC)- (present on admission)  Assessment & Plan  This is Sepsis Present on admission  SIRS criteria identified on my evaluation include: Tachycardia, with heart rate greater than 90 BPM, Tachypnea, with respirations greater than 20 per minute and Leukocytosis, with WBC greater than 12,000  Source is likely urinary, UA positive  Sepsis protocol initiated  Fluid resuscitation ordered per protocol  Crystalloid Fluid Administration: Fluid resuscitation ordered per standard protocol - 30 mL/kg per current or ideal body weight  IV antibiotics as appropriate for source of sepsis  Reassessment: I have reassessed the patient's hemodynamic status  Lactic acidosis  improved with fluid  Urine culture growing probable gardnerella vaginalis, flagyl initiated        VTE prophylaxis: SCDs/TEDs    I have performed a physical exam and reviewed and updated ROS and Plan today (7/27/2022). In review of yesterday's note (7/26/2022), there are no changes except as documented above.

## 2022-07-27 NOTE — CARE PLAN
The patient is Stable - Low risk of patient condition declining or worsening    Shift Goals  Clinical Goals: Pain management  Patient Goals: Sleep  Family Goals: DAKOTAH    Progress made toward(s) clinical / shift goals:  calm and comfortable. 1:1 sitter at bedside    Patient is not progressing towards the following goals:    Problem: Psychosocial  Goal: Patient's ability to identify and develop effective coping behaviors will improve  Outcome: Progressing       Problem: Provide Safe Environment  Goal: Suicide environmental safety, protocols, policies, and practices will be implemented  Outcome: Progressing

## 2022-07-27 NOTE — DISCHARGE PLANNING
Case Management Discharge Planning    Admission Date: 7/23/2022  GMLOS: 5.3  ALOS: 3    6-Clicks ADL Score: 24  6-Clicks Mobility Score: 24      Anticipated Discharge Dispo: Discharge Disposition: D/T to psych hosp or distinct part unit (65)    DME Needed: No    Action(s) Taken: Patient has a bed at [facility]. LSW completed transfer packet and placed legal hold inside. LSW notified RN and APRN via voalte. Alert team has arranged [facility] for 1900.    Escalations Completed: None    Medically Clear: Yes    Next Steps: dc @ 1900    Barriers to Discharge: None    Is the patient up for discharge tomorrow: No

## 2022-07-27 NOTE — DISCHARGE PLANNING
"Alert Team Note:    Contacted by [facility], spoke to Amie. She inquired if there was a police report done on this pt. Per EPHRAIM Moffett, a police report was not filed but the police initiated the legal hold and brought the pt here.   Writer informed Román at [facility]. Per Román, pt is still accepted.     Contacted by [facility], spoke to Amie. Per Amie, this needs to be reported under \"Duty to Warn.\" Pt will still be accepted without a report, but it is preferred that the person who heard her report HI report it to the police in case she doesn't say anything to the staff at [facility].     Writer informed Dr. Stone. Per Dr. Stone, she has been unable to contact the pt's family so far but will continue to try. Writer provided Dr. Stone with the police non emergency number.   "

## 2022-07-27 NOTE — DISCHARGE PLANNING
Alert Team Note:    Contacted by [facility], spoke to Román. Pt has been accepted. Accepting is Dr. Holcomb. Facility expects transport at 1900.  Informed LH DPA Casie Romero.

## 2022-07-27 NOTE — DISCHARGE SUMMARY
Discharge Summary    CHIEF COMPLAINT ON ADMISSION  Chief Complaint   Patient presents with   • Suicide Attempt   • Wrist Laceration       Reason for Admission  Suicide attempt    Admission Date  7/23/2022     CODE STATUS  Full Code    HPI & HOSPITAL COURSE  Katerina Owen is a 36-year-old female with a past medical history of schizophrenia and polysubstance abuse. She presented to [facility] on 7/24/2022 following a suicide attempt during which patient slit her wrists.  She also reports taking legal and illegal drugs as a means of suicide. Patient lacerated right wrist as well as left wrist for which left wrist has wrist drop.  Patient reportedly used methamphetamine prior to presentation.  Patient was placed on legal hold.     In the ER, she was found to be tachycardia in the 130s, tachypnea in the 20s, hypertension with SBP in 180s.  Patient saturating well on room air.  CBC with leukocytosis at 13.9 and elevated polys.  Chemistry with mild hyponatremia and hypokalemia as well as high anion gap metabolic acidosis with bicarbonate of 14.  Lactic acid at 5.1.  CPK diagnostic alcohol low.  ERP discussed the case with orthopedic surgery ([facility]), who evaluated patient.  Patient received a sepsis bolus and was started on IV Rocephin.  Lactic acid is trending down.  Patient was admitted for sepsis secondary to urinary tract infection, suicide attempt on legal hold, and hypertensive emergency requiring telemetry monitoring and further work-up and management.  Patient underwent tendon and nerve repair with Ortho on 7/25/2022. She was seen by psychiatry and started on Abilify. Patient is medically cleared for discharge to an inpatient psychiatric facility. She will need to follow up with ortho in 10-14 days and keep splint clean, dry, and intact until her follow up appointment.       Therefore, she is discharged in fair and stable condition to an inpatient rehabilitation hospital.    The patient met 2-midnight criteria for an  "inpatient stay at the time of discharge.      FOLLOW UP ITEMS POST DISCHARGE  7/27/2022    DISCHARGE DIAGNOSES  Principal Problem:    Suicide and self-inflicted injury (HCC) POA: Yes  Active Problems:    Sepsis (HCC) POA: Yes    Overdose POA: Yes    Acute cystitis POA: Yes    Leukocytosis POA: Yes    Hypertensive emergency POA: Unknown    Left wrist drop POA: Unknown  Resolved Problems:    Hypokalemia POA: Yes    Right wrist drop POA: Yes    Lactic acidosis POA: Yes      FOLLOW UP  Future Appointments   Date Time Provider Department Center   8/3/2022 11:15 AM Tia Elizabeth M.D. [facility] [address]   8/25/2022  2:00 PM Ronnie Montgomery M.D. [facility] [address]   11/21/2022  3:00 PM DELMA Vines [facility] None     No follow-up provider specified.    MEDICATIONS ON DISCHARGE     Medication List      START taking these medications      Instructions   amLODIPine 5 MG Tabs  Start taking on: July 28, 2022  Commonly known as: NORVASC   Take 1 Tablet by mouth every day.  Dose: 5 mg     ARIPiprazole 5 MG tablet  Start taking on: July 28, 2022  Commonly known as: Abilify   Take 1 Tablet by mouth every day.  Dose: 5 mg     metroNIDAZOLE 500 MG Tabs  Commonly known as: FLAGYL   Take 1 Tablet by mouth every 12 hours for 7 days.  Dose: 500 mg        STOP taking these medications    amphetamine-dextroamphetamine 15 MG tablet  Commonly known as: ADDERALL     cefdinir 300 MG Caps  Commonly known as: OMNICEF     escitalopram 10 MG Tabs  Commonly known as: Lexapro            Allergies  Allergies   Allergen Reactions   • Morphine Nausea and Unspecified     \"feel sick for a whole day after being given morphine\"  RXN=2/2016     • Vicodin [Hydrocodone-Acetaminophen] Vomiting and Nausea     CDV=3484       DIET  Orders Placed This Encounter   Procedures   • Diet Order Diet: Regular; Tray Modifications (optional): No Sharps (Paperware)     Standing Status:   Standing     Number of Occurrences:   1     Order Specific " Question:   Diet:     Answer:   Regular [1]     Order Specific Question:   Tray Modifications (optional)     Answer:   No Sharps (Paperware)       ACTIVITY  As tolerated.  Weight bearing as tolerated    LINES, DRAINS, AND WOUNDS  This is an automated list. Peripheral IVs will be removed prior to discharge.  Peripheral IV 07/24/22 20 G Left Antecubital (Active)   Site Assessment Clean;Dry;Intact 07/27/22 0800   Dressing Type Transparent 07/27/22 0800   Line Status Infusing;Scrubbed the hub prior to access;Flushed 07/27/22 0800   Dressing Status Clean;Dry;Intact 07/27/22 0800   Dressing Intervention Initial dressing 07/27/22 0800   Date Primary Tubing Changed 07/26/22 07/26/22 2100   Infiltration Grading ([facility]) 0 07/27/22 0800   Phlebitis Scale ([facility] Only) 0 07/27/22 0800       Wound 07/25/22 Incision Wrist Left (Active)   Site Assessment Clean;Dry;Intact 07/27/22 0800   Dressing Options Ace Wrap;Dry Roll Gauze 07/26/22 2000   Dressing Status Clean;Dry;Intact 07/26/22 2000       Peripheral IV 07/24/22 20 G Left Antecubital (Active)   Site Assessment Clean;Dry;Intact 07/27/22 0800   Dressing Type Transparent 07/27/22 0800   Line Status Infusing;Scrubbed the hub prior to access;Flushed 07/27/22 0800   Dressing Status Clean;Dry;Intact 07/27/22 0800   Dressing Intervention Initial dressing 07/27/22 0800   Date Primary Tubing Changed 07/26/22 07/26/22 2100   Infiltration Grading ([facility]) 0 07/27/22 0800   Phlebitis Scale ([facility] Only) 0 07/27/22 0800               MENTAL STATUS ON TRANSFER   AOx4     CONSULTATIONS  Orthopedic surgery  Psychiatry     PROCEDURES  7/25/22-  Wash-in exploration left volar wrist laceration, Primary repair of left flexor carpi radialis, flexor carpi ulnaris, and palmaris longus tendons, Repair of left median nerve with allograft    LABORATORY  Lab Results   Component Value Date    SODIUM 139 07/26/2022    POTASSIUM 4.0 07/26/2022    CHLORIDE 105 07/26/2022    CO2 24 07/26/2022     GLUCOSE 111 (H) 07/26/2022    BUN 9 07/26/2022    CREATININE 0.61 07/26/2022    CREATININE 0.7 10/29/2007        Lab Results   Component Value Date    WBC 7.9 07/27/2022    HEMOGLOBIN 12.2 07/27/2022    HEMATOCRIT 35.8 (L) 07/27/2022    PLATELETCT 228 07/27/2022        Total time of the discharge process exceeds 32 minutes.

## 2022-07-27 NOTE — DISCHARGE INSTRUCTIONS
Discharge Instructions per Concetta Ulrich, APRN    1.  Review your discharge Medication Reconciliation form. You may be provided with new prescriptions or changes to previously prescribed medications.  Be sure to take all of your prescribed medications exactly as directed.  Further questions can be directed to your local pharmacist or primary care provider (PCP).    2. Follow-up with your PCP.  An appointment may have already been scheduled for you.  Please review your discharge paperwork and locate this information.  Please be sure to call your PCP to cancel if you are unable to make this appointment or require schedule changes.  It is critical to to follow-up with your PCP to review hospital records and ensure any further diagnostic work-up, prescription refills, or referrals.     3. ACTIVITIES: After discharge from the hospital, you may resume routine activities including walking and going u/down stairs. However, no strenuous activity. For further clarification, call your PCP     4. DRIVING: You may drive whenever you are off pain medications and are able to perform the activities needed to drive, i.e. turning, bending, twisting, etc.     5. BOWEL FUNCTION: A few patients, after hospitalizations, will develop bowel irregularity. You could also experience constipation due to pain medication and decreased activity level. If you feel this is occurring, please take an over-the-counter laxative (Milk of Magnesia, Ex-Lax, Senokot, etc.) until the problem has resolved.     6. PAIN MEDICATION: You may be given a prescription for pain medication at discharge. Please take these as directed. It is important to remember not to take medications on an empty stomach as this may cause nausea/vomiting.  You can transition from the prescription-strength pain medication to over-the-counter medications as your pain improves, such as tylenol if you are medically cleared to do so. DO NOT TAKE MORE THAN 4,000 mg OF ACETAMINOPHEN IN A  24-HOUR PERIOD. KEEP A PAIN JOURNAL; RECORD YOUR PAIN SCORE, PAIN MEDICATIONS ADMINISTERED, AND TIME. YOU SHOULD BE TAKING PAIN MEDICINE (OTC OR PRESCRIBED) EVERY 4-6 HOURS UNTIL YOU NO LONGER NEED MEDICINE FOR PAIN.    7. LABS/IMAGING: You may be asked to complete labs or imaging prior to your next provider appointment. If you use [facility], a paper order is not required. If you use another lab or imaging center, be sure you have your order requisition form at discharge. Contact scheduling or use [employer] ([employer]) to arrange a lab appointment.    8. BLOOD PRESSURE: Measure your blood pressure and pulse every day and write it down on a piece of paper or record it in a smart phone germaine.  Bring this to your next PCP appointment.  If you feel dizzy, take your blood pressure and pulse and call your PCP.    9. RETURN TO THE ER: Return to the ER if you develop recurrent chest pain, shortness of breath, bloody sputum, fever of 101.5 or greater, intractable nausea or vomiting, blood in the vomit or urine, intractable pain, new onset inability to ambulate, focal motor weakness, acute vision disturbance, acute speech disturbance, intractable abdominal pain, diffuse watery diarrhea or any other worrisome symptoms.      Discharge Instructions    Discharged to other by ambulance with escort. Discharged via ambulance, hospital escort: Yes.  Special equipment needed: Not Applicable    Be sure to schedule a follow-up appointment with your primary care doctor or any specialists as instructed.     Discharge Plan:   Diet Plan: Discussed  Activity Level: Discussed  Confirmed Follow up Appointment: Patient to Call and Schedule Appointment  Confirmed Symptoms Management: Discussed  Medication Reconciliation Updated: Yes    I understand that a diet low in cholesterol, fat, and sodium is recommended for good health. Unless I have been given specific instructions below for another diet, I accept this instruction as my diet prescription.   Other diet:  regular    Special Instructions: Patient aware that we are treating for gardnerella vaginalis.     Patient unwilling to give information regarding symptoms such as vaginal discharge.   Flagyl 500mg bid x 7 days.   Mood improving.   Follow up with ortho in 10-14 days for suture removal left wrist. Flexion of left thumb intact     -Is this patient being discharged with medication to prevent blood clots?  No    Is patient discharged on Warfarin / Coumadin?   no

## 2022-07-27 NOTE — CARE PLAN
The patient is Stable - Low risk of patient condition declining or worsening    Shift Goals  Clinical Goals: Pain management  Patient Goals: Sleep  Family Goals: DAKOTAH    Progress made toward(s) clinical / shift goals:    Problem: Provide Safe Environment  Goal: Suicide environmental safety, protocols, policies, and practices will be implemented  Outcome: Progressing     Problem: Psychosocial  Goal: Patient's ability to identify and develop effective coping behaviors will improve  Outcome: Progressing

## 2022-07-28 PROBLEM — R45.851 SUICIDAL IDEATION: Status: ACTIVE | Noted: 2022-07-28

## 2022-07-28 PROCEDURE — 770001 HCHG ROOM/CARE - MED/SURG/GYN PRIV*

## 2022-07-28 PROCEDURE — 700102 HCHG RX REV CODE 250 W/ 637 OVERRIDE(OP): Performed by: HOSPITALIST

## 2022-07-28 PROCEDURE — 700111 HCHG RX REV CODE 636 W/ 250 OVERRIDE (IP): Performed by: STUDENT IN AN ORGANIZED HEALTH CARE EDUCATION/TRAINING PROGRAM

## 2022-07-28 PROCEDURE — 99233 SBSQ HOSP IP/OBS HIGH 50: CPT | Performed by: PSYCHIATRY & NEUROLOGY

## 2022-07-28 PROCEDURE — 99231 SBSQ HOSP IP/OBS SF/LOW 25: CPT | Mod: FS | Performed by: NURSE PRACTITIONER

## 2022-07-28 PROCEDURE — 700102 HCHG RX REV CODE 250 W/ 637 OVERRIDE(OP): Performed by: STUDENT IN AN ORGANIZED HEALTH CARE EDUCATION/TRAINING PROGRAM

## 2022-07-28 PROCEDURE — A9270 NON-COVERED ITEM OR SERVICE: HCPCS | Performed by: STUDENT IN AN ORGANIZED HEALTH CARE EDUCATION/TRAINING PROGRAM

## 2022-07-28 PROCEDURE — A9270 NON-COVERED ITEM OR SERVICE: HCPCS | Performed by: HOSPITALIST

## 2022-07-28 RX ORDER — ARIPIPRAZOLE 10 MG/1
5 TABLET ORAL DAILY
Status: DISCONTINUED | OUTPATIENT
Start: 2022-07-29 | End: 2022-08-01 | Stop reason: HOSPADM

## 2022-07-28 RX ORDER — AMLODIPINE BESYLATE 5 MG/1
10 TABLET ORAL DAILY
Status: DISCONTINUED | OUTPATIENT
Start: 2022-07-29 | End: 2022-08-01 | Stop reason: HOSPADM

## 2022-07-28 RX ORDER — OXYCODONE HYDROCHLORIDE 5 MG/1
5 TABLET ORAL EVERY 4 HOURS PRN
Status: DISCONTINUED | OUTPATIENT
Start: 2022-07-28 | End: 2022-08-01 | Stop reason: HOSPADM

## 2022-07-28 RX ORDER — AMLODIPINE BESYLATE 5 MG/1
10 TABLET ORAL DAILY
Status: DISCONTINUED | OUTPATIENT
Start: 2022-07-29 | End: 2022-07-28

## 2022-07-28 RX ORDER — OXYCODONE HYDROCHLORIDE 5 MG/1
5 TABLET ORAL EVERY 4 HOURS PRN
Status: DISCONTINUED | OUTPATIENT
Start: 2022-07-28 | End: 2022-07-28

## 2022-07-28 RX ADMIN — OXYCODONE 5 MG: 5 TABLET ORAL at 11:24

## 2022-07-28 RX ADMIN — HEPARIN SODIUM 5000 UNITS: 5000 INJECTION, SOLUTION INTRAVENOUS; SUBCUTANEOUS at 22:12

## 2022-07-28 RX ADMIN — OXYCODONE 5 MG: 5 TABLET ORAL at 17:22

## 2022-07-28 RX ADMIN — HEPARIN SODIUM 5000 UNITS: 5000 INJECTION, SOLUTION INTRAVENOUS; SUBCUTANEOUS at 05:13

## 2022-07-28 RX ADMIN — ACETAMINOPHEN 650 MG: 325 TABLET, FILM COATED ORAL at 22:13

## 2022-07-28 RX ADMIN — ACETAMINOPHEN 650 MG: 325 TABLET, FILM COATED ORAL at 00:24

## 2022-07-28 RX ADMIN — ARIPIPRAZOLE 5 MG: 10 TABLET ORAL at 05:14

## 2022-07-28 RX ADMIN — AMLODIPINE BESYLATE 5 MG: 5 TABLET ORAL at 05:14

## 2022-07-28 RX ADMIN — METRONIDAZOLE 500 MG: 500 TABLET ORAL at 11:30

## 2022-07-28 RX ADMIN — METRONIDAZOLE 500 MG: 500 TABLET ORAL at 00:24

## 2022-07-28 RX ADMIN — HEPARIN SODIUM 5000 UNITS: 5000 INJECTION, SOLUTION INTRAVENOUS; SUBCUTANEOUS at 14:44

## 2022-07-28 ASSESSMENT — ENCOUNTER SYMPTOMS
SINUS PAIN: 0
CHILLS: 0
DIARRHEA: 0
WEAKNESS: 0
BLURRED VISION: 0
SORE THROAT: 0
PALPITATIONS: 0
FEVER: 0
VOMITING: 0
BACK PAIN: 0
COUGH: 0
HEADACHES: 0
DEPRESSION: 1
SHORTNESS OF BREATH: 0
MYALGIAS: 0
DOUBLE VISION: 0

## 2022-07-28 ASSESSMENT — LIFESTYLE VARIABLES
AVERAGE NUMBER OF DAYS PER WEEK YOU HAVE A DRINK CONTAINING ALCOHOL: 1
HAVE YOU EVER FELT YOU SHOULD CUT DOWN ON YOUR DRINKING: NO
EVER HAD A DRINK FIRST THING IN THE MORNING TO STEADY YOUR NERVES TO GET RID OF A HANGOVER: NO
TOTAL SCORE: 0
ALCOHOL_USE: YES
ON A TYPICAL DAY WHEN YOU DRINK ALCOHOL HOW MANY DRINKS DO YOU HAVE: 2
EVER FELT BAD OR GUILTY ABOUT YOUR DRINKING: NO
HAVE PEOPLE ANNOYED YOU BY CRITICIZING YOUR DRINKING: NO
CONSUMPTION TOTAL: NEGATIVE
TOTAL SCORE: 0
DOES PATIENT WANT TO STOP DRINKING: NO
TOTAL SCORE: 0
HOW MANY TIMES IN THE PAST YEAR HAVE YOU HAD 5 OR MORE DRINKS IN A DAY: 0

## 2022-07-28 ASSESSMENT — PATIENT HEALTH QUESTIONNAIRE - PHQ9
SUM OF ALL RESPONSES TO PHQ9 QUESTIONS 1 AND 2: 2
6. FEELING BAD ABOUT YOURSELF - OR THAT YOU ARE A FAILURE OR HAVE LET YOURSELF OR YOUR FAMILY DOWN: NEARLY EVERY DAY
2. FEELING DOWN, DEPRESSED, IRRITABLE, OR HOPELESS: SEVERAL DAYS
7. TROUBLE CONCENTRATING ON THINGS, SUCH AS READING THE NEWSPAPER OR WATCHING TELEVISION: SEVERAL DAYS
9. THOUGHTS THAT YOU WOULD BE BETTER OFF DEAD, OR OF HURTING YOURSELF: SEVERAL DAYS
8. MOVING OR SPEAKING SO SLOWLY THAT OTHER PEOPLE COULD HAVE NOTICED. OR THE OPPOSITE, BEING SO FIGETY OR RESTLESS THAT YOU HAVE BEEN MOVING AROUND A LOT MORE THAN USUAL: SEVERAL DAYS
SUM OF ALL RESPONSES TO PHQ QUESTIONS 1-9: 9
4. FEELING TIRED OR HAVING LITTLE ENERGY: SEVERAL DAYS
3. TROUBLE FALLING OR STAYING ASLEEP OR SLEEPING TOO MUCH: NOT AT ALL
1. LITTLE INTEREST OR PLEASURE IN DOING THINGS: SEVERAL DAYS
5. POOR APPETITE OR OVEREATING: NOT AT ALL

## 2022-07-28 ASSESSMENT — PAIN DESCRIPTION - PAIN TYPE
TYPE: ACUTE PAIN

## 2022-07-28 NOTE — PROGRESS NOTES
Report received from Basia OVERTON, pt care assumed, assessment completed. Pt is A&O 4, pain 8/10 tylenol to be given per MAR, sitter in place in the room, room devoid of any items that can allow harm to the pt, pt denies any SI HI or hallucinations at this time. Updated on POC, questions answered. Bed in lowest, locked position, treaded socks on, call light and belongings within reach. Fall precautions in place.

## 2022-07-28 NOTE — ED TRIAGE NOTES
"  Chief Complaint   Patient presents with   • Suicidal Ideation     BIB EMS from [facility]. Per EMS [facility] would not accept pt due to hard splint in place on left wrist which is covering surgical repair of self inflicted left wrist laceration. Pt denies ongoing SI at this time, calm and cooperative upon arrival.     .BP (!) 179/94   Pulse 75   Temp 36.9 °C (98.4 °F) (Temporal)   Resp 18   Ht 1.727 m (5' 8\")   Wt 76.7 kg (169 lb)   SpO2 98%   BMI 25.70 kg/m²     "
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
## 2022-07-28 NOTE — DISCHARGE PLANNING
Alert Team Note:     Contacted [facility], spoke to Anabel. Pt packet has been received and is being reviewed. Possibility of bed availability tomorrow.

## 2022-07-28 NOTE — ASSESSMENT & PLAN NOTE
Patient attempted suicide by cutting her wrists prior to previous admission   Legal hold, plan to discharge to inpatient psych hospital- could not go with hard splint, switched to wrist cockup brace  Psych consulted  Continue abilify  1:1 sitter

## 2022-07-28 NOTE — HOSPITAL COURSE
Katerina [facility] is a 36 y.o. female who presented 7/27/2022 to be transferred back to psychiatry unit.  Initially came to [facility] on July 24 for suicide attempt which she slit her wrist.  Reports taking legal and illegal drugs as a means of suicide.  Used methamphetamine prior to admission.  Patient was given Rocephin for his left wrist and was evaluated by orthopedic surgery and underwent tendon and nerve repair on July 25.  Seen by psychiatry and was started on Abilify.  She was discharged to inpatient psychiatric facility.  [facility], however [facility] would not accept the patient due to the hard splint in place on her left wrist as this potentially is a hazard.  She was then referred to the ED and readmitted.

## 2022-07-28 NOTE — ED PROVIDER NOTES
ED Provider Note     Scribed for Reanna Grant D.O. by Eva Winter. 7/27/2022, 8:41 PM.     Primary care provider: DELMA Vines  Means of arrival: EMS         History obtained from: Nursing Staff   History limited by: Bill; hitesh rowan     CHIEF COMPLAINT  Chief Complaint   Patient presents with   • Suicidal Ideation       HPI  Katerina Owen is a 36 y.o. female who presents to the emergency Department via EMS for evaluation of suicidal ideation onset 4 days prior to arrival. Per chart history, patient presented here on 7/22/22 for suicidal ideation and was seen by Dr. Kay. After discharge from legal hold, patient went home and slit her wrists on 7/24/22. She came here by EMS and went straight to orthopedic surgery. Patient underwent tendon and nerve repair with Ortho on 7/25/2022. She was discharged to [facility] earlier today by her last attending, Dr. Michela Lewis, and was told to follow up with Ortho in 10-14 days. However, per nursing notes, [facility] would not accept the patient due to the hard splint in place on her left wrist covering up the surgical repair, so she was sent to the ED afterwards. Patient has a past medical history of schizophrenia and polysubstance abuse.    HPI limited by hitesh rowan.     REVIEW OF SYSTEMS  Pertinent positives include suicidal ideation and left wrist laceration. Pertinent negatives include no hallucinations or fevers.   See HPI for further details. All other systems are negative.    ROS limited by Hitesh Rowan.     PAST MEDICAL HISTORY  Past Medical History:   Diagnosis Date   • ADHD    • Arthritis     back   • Bowel habit changes     constipation   • Chlamydia 5/11/2011   • depression    • Heart burn    • HTN (hypertension)     related to pregnancy   • Migraine    • Pain     incisional hernia pain; back (arthritis)   • Pre-eclampsia     2007    • PTSD (post-traumatic stress disorder)    • Staph infection      after C section       FAMILY HISTORY  Family History   Problem Relation Age of Onset   • Heart Disease Father         heart failure smoker    • Hypertension Father    • Cancer Maternal Grandfather         pancreastic   • Heart Disease Paternal Grandfather         heart attack   • Diabetes Brother        SOCIAL HISTORY  Social History     Tobacco Use   • Smoking status: Former Smoker     Packs/day: 0.50     Years: 6.00     Pack years: 3.00     Quit date: 2014     Years since quittin.5   • Smokeless tobacco: Never Used   • Tobacco comment: 2017 currently Vape   Vaping Use   • Vaping Use: Every day   • Substances: Nicotine   • Devices: Disposable   Substance Use Topics   • Alcohol use: No     Alcohol/week: 0.0 oz   • Drug use: No      Social History     Substance and Sexual Activity   Drug Use No       SURGICAL HISTORY  Past Surgical History:   Procedure Laterality Date   • INCISION AND DRAINAGE GENERAL Left 2022    Procedure: INCISION AND DRAINAGE-washout and exploration left wrist, tendon repair,  nerve repair, proceed as indicated;  Surgeon: Tia Elizabeth M.D.;  Location: SURGERY SAME DAY [facility];  Service: Orthopedics   • APPENDECTOMY LAPAROSCOPIC  2018    Procedure: APPENDECTOMY LAPAROSCOPIC;  Surgeon: Izabel Salomon M.D.;  Location: SURGERY [facility];  Service: Gen Robotic   • VENTRAL HERNIA REPAIR Right 1/10/2018    Procedure: VENTRAL HERNIA REPAIR- FOR INCISIONAL HERNIA REDUCIBLE  WITH MESH;  Surgeon: Gisele Delcid M.D.;  Location: SURGERY [facility];  Service: General   • BREAST BIOPSY Left 2016    Procedure: BREAST BIOPSY Excisional;  Surgeon: Gisele Delcid M.D.;  Location: SURGERY [facility];  Service:    • LUMPECTOMY  2014    both breasts - Dr. Garsia - [facility] 10/2014   • REPEAT C SECTION W TUBAL LIGATION  2011    Performed by AYAAN MERCHANT at LABOR AND DELIVERY   • SEPTOTURBINOPLASTY  2009    Performed by CHRISTIAN YEN at  "SURGERY SAME DAY [facility] ORS   • PRIMARY C SECTION  Aug 7, 2007     preeclampsia       CURRENT MEDICATIONS    Current Facility-Administered Medications:   •  [START ON 2022] amLODIPine (NORVASC) tablet 5 mg, 5 mg, Oral, DAILY, Trino Burgos M.D.  •  [START ON 2022] ARIPiprazole (Abilify) tablet 5 mg, 5 mg, Oral, DAILY, Trino Burgos M.D.  •  metroNIDAZOLE (FLAGYL) tablet 500 mg, 500 mg, Oral, Q12HRS, Trino Burgos M.D.  •  heparin injection 5,000 Units, 5,000 Units, Subcutaneous, Q8HRS, Trino Burgos M.D.  •  acetaminophen (Tylenol) tablet 650 mg, 650 mg, Oral, Q4HRS PRN, Trino Burgos M.D.    Current Outpatient Medications:   •  [START ON 2022] ARIPiprazole (ABILIFY) 5 MG tablet, Take 1 Tablet by mouth every day., Disp: 30 Tablet, Rfl: 0  •  metroNIDAZOLE (FLAGYL) 500 MG Tab, Take 1 Tablet by mouth every 12 hours for 7 days., Disp: 14 Tablet, Rfl: 0  •  [START ON 2022] amLODIPine (NORVASC) 5 MG Tab, Take 1 Tablet by mouth every day., Disp: 30 Tablet, Rfl: 0    ALLERGIES  Allergies   Allergen Reactions   • Morphine Nausea and Unspecified     \"feel sick for a whole day after being given morphine\"  RXN=2016     • Vicodin [Hydrocodone-Acetaminophen] Vomiting and Nausea     MVZ=[id number]       PHYSICAL EXAM  VITAL SIGNS: BP (!) 179/94   Pulse 75   Temp 36.9 °C (98.4 °F) (Temporal)   Resp 18   Ht 1.727 m (5' 8\")   Wt 76.7 kg (169 lb)   SpO2 98%   BMI 25.70 kg/m²     Constitutional: Patient is well developed, well nourished. No acute distress.   HENT: Normocephalic, atraumatic.Nose normal with no mucosal edema or drainage. Oropharynx moist without erythema or exudates.  Eyes: PERRL, EOMI  Neck: Supple   Lymphatic: No lymphadenopathy noted.   Cardiovascular: Normal heart rate and Regular rhythm. No murmur  Thorax & Lungs: Clear and equal breath sounds with good excursion. No respiratory distress  Abdomen: Normal bowel sounds by 4, nontender, soft.  Skin: Warm, Dry, " No erythema  Back: No cervical, thoracic, or lumbosacral tenderness. No CVA tenderness.   Extremities: Peripheral pulses 4/4  Left forearm in short arm volar splint, Neurovascular intact.   Musculoskeletal: Normal range of motion in all major joints. No tenderness to palpation or major deformities noted.   Neurologic: Alert & oriented x 3, Normal motor function, Normal sensory function,  DTR's 4/4 bilaterally.  Psychiatric: Affect odd, flat mood is depressed.. Still admits to .     DIAGNOSTICS/PROCEDURES    LABS  Results for orders placed or performed during the hospital encounter of 07/27/22   URINE DRUG SCREEN   Result Value Ref Range    Amphetamines Urine Negative Negative    Barbiturates Negative Negative    Benzodiazepines Negative Negative    Cocaine Metabolite Negative Negative    Methadone Negative Negative    Opiates Negative Negative    Oxycodone Positive (A) Negative    Phencyclidine -Pcp Negative Negative    Propoxyphene Negative Negative    Cannabinoid Metab Negative Negative   POC BREATHALIZER   Result Value Ref Range    POC Breathalizer 0.000 0.00 - 0.01 Percent     Labs reviewed by me    COURSE & MEDICAL DECISION MAKING  Pertinent Labs & Imaging studies reviewed. (See chart for details)    8:41 PM - Patient seen and evaluated at bedside. Ordered for POC breathalyzer and urine drug screen to evaluate. Discussed plan of care with patient. I informed them that labs will be ordered. Patient is understanding and agreeable with plan. I informed the patient of my plan to admit today given the patient's current presentation and diagnostic study results. Patient verbalizes understanding and support with my plan for admission.   Patient was supposed to be admitted directly back to the hospital after being refused at [facility] but there was miscommunication so patient will need to be admitted under hospitalist service with plans to transfer to [facility] in the morning.  She will be unable to go to  any outpatient psychiatric facility as long as she has the splint on her arm as this could be used as a weapon.  10: 04 PM - I paged and spoke with QUIQUE (Hosp consult).     10:33 PM - I paged and spoke with Dr. Burgos (Hospitalist). He agreed to admit patient.       DISPOSITION:  Patient will be hospitalized by Dr. Burgos in guarded condition.    FINAL IMPRESSION  1. Suicidal ideation         Eva UNGER (Scribe), am scribing for, and in the presence of, Reanna Grant D.O..    Electronically signed by: Eva Winter (Scribe), 7/27/2022    IReanna D.O. personally performed the services described in this documentation, as scribed by Eva Winter in my presence, and it is both accurate and complete. C.     The note accurately reflects work and decisions made by me.  Reanna Grant D.O.  7/28/2022  4:45 AM

## 2022-07-28 NOTE — DISCHARGE PLANNING
Received Stipulation and Order from the court continuing pt's legal hold until 8/4, scanned copy into pt's chart.

## 2022-07-28 NOTE — DISCHARGE PLANNING
Care Transition Team Assessment  Assessment completed via chart review. Patient is independent. Patient has support from family. Patient has a hx of meth abuse and a hx of schizophrenia.     Information Source  Orientation Level: Oriented X4  Information Given By: Other (Comments) (chart review)  Informant's Name: chart review  Who is responsible for making decisions for patient? : Patient    Readmission Evaluation  Is this a readmission?: Yes - unplanned readmission  Why do you think you were readmitted?: [facility] wouldnt [facility] her w/splint    Elopement Risk  Legal Hold: Elopement Risk  Ambulatory or Self Mobile in Wheelchair: Yes  Disoriented: No  Psychiatric Symptoms: None  History of Wandering: No  Elopement this Admit: No  Vocalizing Wanting to Leave: No  Displays Behaviors, Body Language Wanting to Leave: No-Not at Risk for Elopement  Time of Legal Hold: 2335  Date of Legal Hold: 07/23/22  Elopement Risk: Not at Risk for Elopement  Wanderguard On: Unavailable  Personal Belongings: Hospital Clothing Only, Anything Considered Risk for Security or Elopement, Removed and Stored in Secure Area (Specify Area), Possessions Checked for Security / Elopement Risk  Environmental Precautions: Sharp or Dangerous Items Removed, Dietary Notified for Plastic Utensils / Paperware Only    Interdisciplinary Discharge Planning  Patient or legal guardian wants to designate a caregiver: No    Discharge Preparedness  What is your plan after discharge?: Other (comment) (inpatient psych)  What are your discharge supports?: Sibling  Prior Functional Level: Ambulatory, Independent with Activities of Daily Living, Independent with Medication Management  Difficulity with ADLs: None  Difficulity with IADLs: None    Functional Assesment  Prior Functional Level: Ambulatory, Independent with Activities of Daily Living, Independent with Medication Management    Finances  Financial Barriers to Discharge: No  Prescription Coverage: Yes    Advance  Directive  Advance Directive?: None    Domestic Abuse  Have you ever been the victim of abuse or violence?: No  Physical Abuse or Sexual Abuse: No  Verbal Abuse or Emotional Abuse: No  Possible Abuse/Neglect Reported to:: Not Applicable    Psychological Assessment  History of Substance Abuse: Amphetamines  History of Psychiatric Problems: Yes  Non-compliant with Treatment: No  Newly Diagnosed Illness: No    Discharge Risks or Barriers  Discharge risks or barriers?: Mental health  Patient risk factors: Mental health    Anticipated Discharge Information  Discharge Disposition: D/T to psych hosp or distinct part unit (65)

## 2022-07-28 NOTE — PROGRESS NOTES
Discharge order received. PIV removed, tip intact, no issues noted.  Printed discharge instructions and prescriptions given to [facility]. All discharge education complete, specifically going to outside facility, all questions and concerns were addressed. [facility] transported pt upon discharge.

## 2022-07-28 NOTE — ED NOTES
Pt transferred out of ED at this time with transporter and pt sitter via wheelchair. Pt is A&Ox4, with stable vital signs and no apparent distress upon transfer.

## 2022-07-28 NOTE — ASSESSMENT & PLAN NOTE
S/p tendon repair on 7/25  Hard splint exchanged for wrist cockup brace 7/29 per Dr. Analisa Elizabeth  Brace to stay in place for 10-14 days per ortho surg  Will need ortho follow up at that time

## 2022-07-28 NOTE — PROGRESS NOTES
Hospital Medicine Daily Progress Note    Date of Service  7/28/2022    Chief Complaint  Katerina Owen is a 36 y.o. female admitted 7/27/2022 with suicidal ideation    Hospital Course  Katerina Owen is a 36 y.o. female who presented 7/27/2022 to be transferred back to psychiatry unit.  Initially came to [facility] on July 24 for suicide attempt which she slit her wrist.  Reports taking legal and illegal drugs as a means of suicide.  Used methamphetamine prior to admission.  Patient was given Rocephin for his left wrist and was evaluated by orthopedic surgery and underwent tendon and nerve repair on July 25.  Seen by psychiatry and was started on Abilify.  She was discharged to inpatient psychiatric facility.  She was discharged to [facility] behavioral health today, however [facility] would not accept the patient due to the hard splint in place on her left wrist as this potentially is a hazard.  She was then referred to the ED and readmitted.        Interval Problem Update  Patient seen and examined this morning. Denies pain in left wrist, splint in place. Continue legal hold, per notes patient cannot go to inpatient psych hospital until splint is removed 10-14 days after surgery (7/25). Patient remains medically cleared. Will continue 7 day course of flagyl.     I have discussed this patient's plan of care and discharge plan at IDT rounds today with Case Management, Nursing, Nursing leadership, and other members of the IDT team.    Consultants/Specialty  psychiatry    Code Status  Full Code    Disposition  Patient is medically cleared for discharge.   Anticipate discharge to to a psychiatric hospital.  I have placed the appropriate orders for post-discharge needs.    Review of Systems  Review of Systems   Constitutional: Positive for malaise/fatigue. Negative for chills and fever.   HENT: Negative for sinus pain and sore throat.    Eyes: Negative for blurred vision and double vision.    Respiratory: Negative for cough and shortness of breath.    Cardiovascular: Negative for chest pain, palpitations and leg swelling.   Gastrointestinal: Negative for diarrhea and vomiting.   Genitourinary: Negative for dysuria, frequency and urgency.   Musculoskeletal: Negative for back pain and myalgias.   Skin:        Lacerations neck and bilateral wrists     Neurological: Negative for weakness and headaches.   Psychiatric/Behavioral: Positive for depression and suicidal ideas.   All other systems reviewed and are negative.       Physical Exam  Temp:  [36.3 °C (97.4 °F)-36.9 °C (98.4 °F)] 36.5 °C (97.7 °F)  Pulse:  [66-86] 86  Resp:  [16-18] 16  BP: (129-179)/(75-94) 159/85  SpO2:  [94 %-98 %] 94 %    Physical Exam  Vitals and nursing note reviewed.   Constitutional:       General: She is not in acute distress.     Appearance: She is normal weight.   HENT:      Head: Normocephalic and atraumatic.      Right Ear: External ear normal.      Left Ear: External ear normal.      Nose: Nose normal. No congestion.      Mouth/Throat:      Mouth: Mucous membranes are moist.      Pharynx: Oropharynx is clear.   Eyes:      General:         Right eye: No discharge.         Left eye: No discharge.   Cardiovascular:      Rate and Rhythm: Normal rate.      Pulses: Normal pulses.      Heart sounds: Normal heart sounds. No murmur heard.  Pulmonary:      Effort: Pulmonary effort is normal.      Breath sounds: Normal breath sounds.   Abdominal:      General: Bowel sounds are normal. There is no distension.      Palpations: Abdomen is soft.      Tenderness: There is no abdominal tenderness.   Musculoskeletal:         General: Signs of injury (left wrist) present.      Cervical back: Neck supple. No tenderness.   Skin:     General: Skin is warm and dry.      Capillary Refill: Capillary refill takes less than 2 seconds.      Comments: Left wrist surgical site- splint and ace wrap in place, laceration to right wrist and neck    Neurological:      General: No focal deficit present.      Mental Status: She is alert and oriented to person, place, and time.   Psychiatric:         Attention and Perception: Attention normal.         Mood and Affect: Mood is depressed.         Speech: Speech is delayed.         Behavior: Behavior is slowed and withdrawn. Behavior is cooperative.         Thought Content: Thought content includes suicidal ideation.         Fluids    Intake/Output Summary (Last 24 hours) at 7/28/2022 0828  Last data filed at 7/28/2022 0600  Gross per 24 hour   Intake 240 ml   Output --   Net 240 ml       Laboratory  Recent Labs     07/26/22  0324 07/27/22  0018   WBC 12.8* 7.9   RBC 4.19* 4.20   HEMOGLOBIN 12.0 12.2   HEMATOCRIT 35.6* 35.8*   MCV 85.0 85.2   MCH 28.6 29.0   MCHC 33.7 34.1   RDW 47.6 48.5   PLATELETCT 228 228   MPV 11.5 11.7     Recent Labs     07/26/22  0554   SODIUM 139   POTASSIUM 4.0   CHLORIDE 105   CO2 24   GLUCOSE 111*   BUN 9   CREATININE 0.61   CALCIUM 8.6                   Imaging  No orders to display        Assessment/Plan  Suicidal ideation- (present on admission)  Assessment & Plan  Patient attempted suicide by cutting her wrists prior to previous admission   Legal hold, needs inpatient psych hospital but they are unable to accept the patient while the splint is in place  Psych consulted  Continue abilify  1:1 sitter        Gardnerella vaginitis  Assessment & Plan  Continue seven day course of flagyl    Laceration of left wrist  Assessment & Plan  S/p tendon repair on 7/25  Splint to stay in place for 10-14 days per ortho surg  Will need ortho follow up at that time      Hypertension  Assessment & Plan  SBP 140s-170s  Amlodipine increased to 10 mg       VTE prophylaxis: heparin ppx    I have performed a physical exam and reviewed and updated ROS and Plan today (7/28/2022). In review of yesterday's note (7/27/2022), there are no changes except as documented above.

## 2022-07-28 NOTE — H&P
Hospital Medicine History & Physical Note    Date of Service  7/27/2022    Primary Care Physician  DELMA Vines    Consultants  None    Code Status  Full Code    Chief Complaint  Chief Complaint   Patient presents with   • Suicidal Ideation       History of Presenting Illness  Katerina Owen is a 36 y.o. female who presented 7/27/2022 to be transferred back to psychiatry unit.  Initially came to [facility] on July 24 for suicide attempt which she slit her wrist.  Reports taking legal and illegal drugs as a means of suicide.  Used methamphetamine prior to admission.  Patient was given Rocephin for his left wrist and was evaluated by orthopedic surgery and underwent tendon and nerve repair on July 25.  Seen by psychiatry and was started on Abilify.  She was discharged to inpatient psychiatric facility.  She was discharged to renal behavioral health today, however [facility] would not accept the patient due to the hard splint in place on her left wrist as this potentially is a hazard.  She was then referred to the ED.    In ED, found to have elevated blood pressure. No complaints at this time.     I discussed the plan of care with patient.    Review of Systems  Review of Systems   Constitutional: Positive for malaise/fatigue.   HENT: Negative.    Eyes: Negative.    Respiratory: Negative.    Cardiovascular: Negative.    Gastrointestinal: Negative.    Genitourinary: Negative.    Musculoskeletal: Negative.    Skin: Negative.    Neurological: Negative.    Endo/Heme/Allergies: Negative.    Psychiatric/Behavioral: Negative.        Past Medical History   has a past medical history of ADHD, Arthritis, Bowel habit changes, Chlamydia (5/11/2011), depression, Heart burn, HTN (hypertension), Migraine, Pain, Pre-eclampsia, PTSD (post-traumatic stress disorder), and Staph infection (2011).    Surgical History   has a past surgical history that includes septoturbinoplasty (4/21/2009); lumpectomy  "(2014); breast biopsy (Left, 2/26/2016); primary c section (Aug 7, 2007); repeat c section w tubal ligation (9/7/2011); ventral hernia repair (Right, 1/10/2018); appendectomy laparoscopic (6/5/2018); and incision and drainage general (Left, 7/25/2022).     Family History  family history includes Cancer in her maternal grandfather; Diabetes in her brother; Heart Disease in her father and paternal grandfather; Hypertension in her father.   Family history reviewed with patient. There is no family history that is pertinent to the chief complaint.     Social History   reports that she quit smoking about 7 years ago. She has a 3.00 pack-year smoking history. She has never used smokeless tobacco. She reports that she does not drink alcohol and does not use drugs.    Allergies  Allergies   Allergen Reactions   • Morphine Nausea and Unspecified     \"feel sick for a whole day after being given morphine\"  RXN=2/2016     • Vicodin [Hydrocodone-Acetaminophen] Vomiting and Nausea     YWA=[id number]       Medications  Prior to Admission Medications   Prescriptions Last Dose Informant Patient Reported? Taking?   ARIPiprazole (ABILIFY) 5 MG tablet UNK at UNK Historical No No   Sig: Take 1 Tablet by mouth every day.   amLODIPine (NORVASC) 5 MG Tab UNK at UNK Historical No No   Sig: Take 1 Tablet by mouth every day.   metroNIDAZOLE (FLAGYL) 500 MG Tab UNK at UNK Historical No No   Sig: Take 1 Tablet by mouth every 12 hours for 7 days.      Facility-Administered Medications: None       Physical Exam  Temp:  [36.2 °C (97.2 °F)-36.9 °C (98.4 °F)] 36.9 °C (98.4 °F)  Pulse:  [64-75] 75  Resp:  [14-18] 18  BP: (129-179)/(75-94) 179/94  SpO2:  [93 %-98 %] 98 %  Blood Pressure: (!) 179/94   Temperature: 36.9 °C (98.4 °F)   Pulse: 75   Respiration: 18   Pulse Oximetry: 98 %       Physical Exam  Constitutional:       Appearance: Normal appearance. She is normal weight.   HENT:      Head: Normocephalic.      Nose: Nose normal.      Mouth/Throat:      " Mouth: Mucous membranes are moist.   Eyes:      Pupils: Pupils are equal, round, and reactive to light.   Cardiovascular:      Rate and Rhythm: Normal rate and regular rhythm.   Pulmonary:      Effort: Pulmonary effort is normal.      Breath sounds: Normal breath sounds.   Abdominal:      General: Abdomen is flat. Bowel sounds are normal.      Palpations: Abdomen is soft.   Skin:     General: Skin is warm.      Comments: L hand covered in cast    Neurological:      General: No focal deficit present.      Mental Status: She is alert and oriented to person, place, and time. Mental status is at baseline.   Psychiatric:         Mood and Affect: Mood normal.         Behavior: Behavior normal.         Thought Content: Thought content normal.         Judgment: Judgment normal.         Laboratory:  Recent Labs     07/25/22  0144 07/26/22  0324 07/27/22  0018   WBC 7.2 12.8* 7.9   RBC 4.37 4.19* 4.20   HEMOGLOBIN 12.4 12.0 12.2   HEMATOCRIT 37.4 35.6* 35.8*   MCV 85.6 85.0 85.2   MCH 28.4 28.6 29.0   MCHC 33.2* 33.7 34.1   RDW 50.5* 47.6 48.5   PLATELETCT 208 228 228   MPV 11.4 11.5 11.7     Recent Labs     07/25/22  0144 07/26/22  0554   SODIUM 138 139   POTASSIUM 3.9 4.0   CHLORIDE 106 105   CO2 23 24   GLUCOSE 95 111*   BUN 9 9   CREATININE 0.74 0.61   CALCIUM 8.7 8.6     Recent Labs     07/25/22  0144 07/26/22  0554   ALTSGPT 24  --    ASTSGOT 23  --    ALKPHOSPHAT 67  --    TBILIRUBIN 0.4  --    GLUCOSE 95 111*         No results for input(s): NTPROBNP in the last 72 hours.      No results for input(s): TROPONINT in the last 72 hours.    Imaging:  No orders to display       no X-Ray or EKG requiring interpretation    Assessment/Plan:  Justification for Admission Status  I anticipate this patient will require at least two midnights for appropriate medical management, necessitating inpatient admission because she requires inpatient psych facility monitoring       * Schizophrenia ([facility])  Assessment & Plan  Admit patient to  Inpt psych facility  Continue Abilify  Constant observation  On legal hold     Gardnerella vaginitis  Assessment & Plan  Flagyl x 7 days     Laceration of left wrist  Assessment & Plan  S/p tendon repair  Has f/u appt with Orthopedic surgery for removal     Hypertension  Assessment & Plan  Continue norvasc       VTE prophylaxis: heparin ppx

## 2022-07-28 NOTE — DISCHARGE PLANNING
[facility] ALERT TEAM DISCHARGE PLANNING NOTE    Date:  7/28/22  Patient Name:  Katerina Murry Brayden - 36 y.o. - Discharge Planning  MRN:  [id number]   YOB: 1985  ADMISSION DATE:  7/27/2022      Writer forwarded transfer packet for inpatient psychiatric care to the following community providers:  [facility]   Items  included in the transfer packet:   __x___Face Sheet   __x___Pages 1 and 2 of completed legal hold   _____Alert Team/Psych Assessment   __x___H&P   _____UDS   _____Blood Alcohol   __x___Vital signs   _____Pregnancy Test (if applicable)   __x___Medications List   _____Covid Screen

## 2022-07-28 NOTE — DISCHARGE PLANNING
Meds-to-Beds: Discharge prescription orders listed below delivered to patient's bedside. RN Elida notified. Patient counseled. Patient elected to have co-payment billed to patient account.       Current Outpatient Medications   Medication Sig Dispense Refill   • [START ON 7/28/2022] ARIPiprazole (ABILIFY) 5 MG tablet Take 1 Tablet by mouth every day. 30 Tablet 0   • metroNIDAZOLE (FLAGYL) 500 MG Tab Take 1 Tablet by mouth every 12 hours for 7 days. 14 Tablet 0   • [START ON 7/28/2022] amLODIPine (NORVASC) 5 MG Tab Take 1 Tablet by mouth every day. 30 Tablet 0      Sue Hayes, PharmD
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
## 2022-07-28 NOTE — CARE PLAN
The patient is Watcher - Medium risk of patient condition declining or worsening    Shift Goals  Clinical Goals: Safety, pain management  Patient Goals: Safety, rest, alert staff to changes in SI    Progress made toward(s) clinical / shift goals:    Problem: Pain - Standard  Goal: Alleviation of pain or a reduction in pain to the patient’s comfort goal  Outcome: Progressing     Problem: Provide Safe Environment  Goal: Suicide environmental safety, protocols, policies, and practices will be implemented  Outcome: Progressing     Problem: Knowledge Deficit - Standard  Goal: Patient and family/care givers will demonstrate understanding of plan of care, disease process/condition, diagnostic tests and medications  Outcome: Progressing

## 2022-07-29 PROCEDURE — 700102 HCHG RX REV CODE 250 W/ 637 OVERRIDE(OP): Performed by: HOSPITALIST

## 2022-07-29 PROCEDURE — 770001 HCHG ROOM/CARE - MED/SURG/GYN PRIV*

## 2022-07-29 PROCEDURE — 700111 HCHG RX REV CODE 636 W/ 250 OVERRIDE (IP): Performed by: STUDENT IN AN ORGANIZED HEALTH CARE EDUCATION/TRAINING PROGRAM

## 2022-07-29 PROCEDURE — A9270 NON-COVERED ITEM OR SERVICE: HCPCS | Performed by: STUDENT IN AN ORGANIZED HEALTH CARE EDUCATION/TRAINING PROGRAM

## 2022-07-29 PROCEDURE — 700102 HCHG RX REV CODE 250 W/ 637 OVERRIDE(OP): Performed by: STUDENT IN AN ORGANIZED HEALTH CARE EDUCATION/TRAINING PROGRAM

## 2022-07-29 PROCEDURE — 99231 SBSQ HOSP IP/OBS SF/LOW 25: CPT | Mod: FS | Performed by: NURSE PRACTITIONER

## 2022-07-29 PROCEDURE — A9270 NON-COVERED ITEM OR SERVICE: HCPCS | Performed by: HOSPITALIST

## 2022-07-29 RX ADMIN — HEPARIN SODIUM 5000 UNITS: 5000 INJECTION, SOLUTION INTRAVENOUS; SUBCUTANEOUS at 05:22

## 2022-07-29 RX ADMIN — OXYCODONE 5 MG: 5 TABLET ORAL at 07:42

## 2022-07-29 RX ADMIN — OXYCODONE 5 MG: 5 TABLET ORAL at 18:20

## 2022-07-29 RX ADMIN — AMLODIPINE BESYLATE 10 MG: 5 TABLET ORAL at 05:23

## 2022-07-29 RX ADMIN — HEPARIN SODIUM 5000 UNITS: 5000 INJECTION, SOLUTION INTRAVENOUS; SUBCUTANEOUS at 14:02

## 2022-07-29 RX ADMIN — ARIPIPRAZOLE 5 MG: 10 TABLET ORAL at 05:22

## 2022-07-29 RX ADMIN — METRONIDAZOLE 500 MG: 500 TABLET ORAL at 23:06

## 2022-07-29 RX ADMIN — OXYCODONE 5 MG: 5 TABLET ORAL at 14:02

## 2022-07-29 RX ADMIN — HEPARIN SODIUM 5000 UNITS: 5000 INJECTION, SOLUTION INTRAVENOUS; SUBCUTANEOUS at 23:07

## 2022-07-29 RX ADMIN — METRONIDAZOLE 500 MG: 500 TABLET ORAL at 00:42

## 2022-07-29 RX ADMIN — METRONIDAZOLE 500 MG: 500 TABLET ORAL at 12:49

## 2022-07-29 RX ADMIN — OXYCODONE 5 MG: 5 TABLET ORAL at 23:06

## 2022-07-29 ASSESSMENT — ENCOUNTER SYMPTOMS
BLURRED VISION: 0
SORE THROAT: 0
CHILLS: 0
WEAKNESS: 0
SHORTNESS OF BREATH: 0
COUGH: 0
SINUS PAIN: 0
VOMITING: 0
FEVER: 0
PALPITATIONS: 0
DOUBLE VISION: 0
HEADACHES: 0
DEPRESSION: 1
DIARRHEA: 0

## 2022-07-29 NOTE — PROGRESS NOTES
[facility] Medicine Daily Progress Note    Date of Service  7/29/2022    Chief Complaint  Katerina Owen is a 36 y.o. female admitted 7/27/2022 with suicidal ideation    Hospital Course  Katerina Owen is a 36 y.o. female who presented 7/27/2022 to be transferred back to psychiatry unit.  Initially came to [facility] on July 24 for suicide attempt which she slit her wrist.  Reports taking legal and illegal drugs as a means of suicide.  Used methamphetamine prior to admission.  Patient was given Rocephin for his left wrist and was evaluated by orthopedic surgery and underwent tendon and nerve repair on July 25.  Seen by psychiatry and was started on Abilify.  She was discharged to inpatient psychiatric facility.  She was discharged to [facility] today, however [facility] would not accept the patient due to the hard splint in place on her left wrist as this potentially is a hazard.  She was then referred to the ED and readmitted.        Interval Problem Update  Patient was seen and examined at bedside.  No acute events overnight. Patient is resting comfortably in bed and in no acute distress. Patient mood less depressed today. Still having some left wrist pain. Psych following. Personally discussed with Dr. Analisa marshall to remove hard splint and replace it with wrist cockup brace, lower dressing and padding to remain in place.     I have discussed this patient's plan of care and discharge plan at IDT rounds today with Case Management, Nursing, Nursing leadership, and other members of the IDT team.    Consultants/Specialty  psychiatry    Code Status  Full Code    Disposition  Patient is medically cleared for discharge.   Anticipate discharge to to a psychiatric hospital.  I have placed the appropriate orders for post-discharge needs.    Review of Systems  Review of Systems   Constitutional: Negative for chills, fever and malaise/fatigue.   HENT: Negative for sinus  pain and sore throat.    Eyes: Negative for blurred vision and double vision.   Respiratory: Negative for cough and shortness of breath.    Cardiovascular: Negative for chest pain, palpitations and leg swelling.   Gastrointestinal: Negative for diarrhea and vomiting.   Genitourinary: Negative for dysuria, frequency and urgency.   Musculoskeletal:        Left wrist pain     Skin:        Lacerations neck and bilateral wrists     Neurological: Negative for weakness and headaches.   Psychiatric/Behavioral: Positive for depression and suicidal ideas.   All other systems reviewed and are negative.       Physical Exam  Temp:  [36.3 °C (97.3 °F)-36.3 °C (97.4 °F)] 36.3 °C (97.3 °F)  Pulse:  [71-82] 75  Resp:  [16-18] 16  BP: (138-154)/(76-87) 138/84  SpO2:  [95 %-97 %] 96 %    Physical Exam  Vitals and nursing note reviewed.   Constitutional:       General: She is not in acute distress.     Appearance: She is normal weight.   HENT:      Head: Normocephalic and atraumatic.      Right Ear: External ear normal.      Left Ear: External ear normal.      Nose: Nose normal. No congestion.      Mouth/Throat:      Mouth: Mucous membranes are moist.      Pharynx: Oropharynx is clear.   Eyes:      General:         Right eye: No discharge.         Left eye: No discharge.   Cardiovascular:      Rate and Rhythm: Normal rate.      Pulses: Normal pulses.      Heart sounds: Normal heart sounds. No murmur heard.  Pulmonary:      Effort: Pulmonary effort is normal.      Breath sounds: Normal breath sounds.   Abdominal:      General: Bowel sounds are normal. There is no distension.      Palpations: Abdomen is soft.      Tenderness: There is no abdominal tenderness.   Musculoskeletal:         General: Signs of injury (left wrist) present.      Cervical back: Neck supple. No tenderness.   Skin:     General: Skin is warm and dry.      Capillary Refill: Capillary refill takes less than 2 seconds.      Comments: Left wrist surgical site- splint and  ace wrap in place, laceration to right wrist and neck   Neurological:      General: No focal deficit present.      Mental Status: She is alert and oriented to person, place, and time.   Psychiatric:         Attention and Perception: Attention normal.         Mood and Affect: Mood is depressed.         Speech: Speech is delayed.         Behavior: Behavior is slowed and withdrawn. Behavior is cooperative.         Thought Content: Thought content includes suicidal ideation.     Patient was seen and examined at bedside. No changes in physical exam from prior evaluation.      Fluids    Intake/Output Summary (Last 24 hours) at 7/29/2022 1544  Last data filed at 7/29/2022 0754  Gross per 24 hour   Intake 240 ml   Output --   Net 240 ml       Laboratory  Recent Labs     07/27/22  0018   WBC 7.9   RBC 4.20   HEMOGLOBIN 12.2   HEMATOCRIT 35.8*   MCV 85.2   MCH 29.0   MCHC 34.1   RDW 48.5   PLATELETCT 228   MPV 11.7                       Imaging  No orders to display        Assessment/Plan  Suicidal ideation- (present on admission)  Assessment & Plan  Patient attempted suicide by cutting her wrists prior to previous admission   Legal hold, needs inpatient psych hospital but they are unable to accept the patient while the splint is in place  Psych consulted  Continue abilify  1:1 sitter        Gardnerella vaginitis  Assessment & Plan  Continue seven day course of flagyl    Laceration of left wrist  Assessment & Plan  S/p tendon repair on 7/25  Splint to stay in place for 10-14 days per ortho surg  Will need ortho follow up at that time      Hypertension  Assessment & Plan  SBP 140s-150s  Amlodipine increased to 10 mg- first dose 7/29       VTE prophylaxis: heparin ppx    I have performed a physical exam and reviewed and updated ROS and Plan today (7/29/2022). In review of yesterday's note (7/28/2022), there are no changes except as documented above.

## 2022-07-29 NOTE — DISCHARGE PLANNING
Alert Team/Behavioral Health   Note:    Talked to Lyssa @ [facility]. No beds currently available. There might be a discharge later in the day. [facility] will call back if there is a discharge. Patient is on waiting list.

## 2022-07-29 NOTE — CARE PLAN
The patient is Stable - Low risk of patient condition declining or worsening    Shift Goals  Clinical Goals: safety, comfort, pain management  Patient Goals: rest, safety    Progress made toward(s) clinical / shift goals:  Patient remains safe and comfortable throughout the whole shift. Pain is addressed adequately and in a timely manner with PRN pain med.    Patient is not progressing towards the following goals:

## 2022-07-29 NOTE — PROGRESS NOTES
[facility] Medicine Daily Progress Note    Date of Service  7/29/2022    Chief Complaint  Katerina Owen is a 36 y.o. female admitted 7/27/2022 with suicidal ideation    Hospital Course  Katerina Owen is a 36 y.o. female who presented 7/27/2022 to be transferred back to psychiatry unit.  Initially came to [facility] on July 24 for suicide attempt which she slit her wrist.  Reports taking legal and illegal drugs as a means of suicide.  Used methamphetamine prior to admission.  Patient was given Rocephin for his left wrist and was evaluated by orthopedic surgery and underwent tendon and nerve repair on July 25.  Seen by psychiatry and was started on Abilify.  She was discharged to inpatient psychiatric facility.  She was discharged to [facility] today, however [facility] would not accept the patient due to the hard splint in place on her left wrist as this potentially is a hazard.  She was then referred to the ED and readmitted.        Interval Problem Update  Patient was seen and examined at bedside.  No acute events overnight. Patient is resting comfortably in bed and in no acute distress.   Patient mood less depressed today. Still having some left wrist pain. Psych following. Plan to keep splint on until at least 8/4 then transfer to inpatient psychiatric hospital.     I have discussed this patient's plan of care and discharge plan at IDT rounds today with Case Management, Nursing, Nursing leadership, and other members of the IDT team.    Consultants/Specialty  psychiatry    Code Status  Full Code    Disposition  Patient is medically cleared for discharge.   Anticipate discharge to to a psychiatric hospital.  I have placed the appropriate orders for post-discharge needs.    Review of Systems  Review of Systems   Constitutional: Negative for chills, fever and malaise/fatigue.   HENT: Negative for sinus pain and sore throat.    Eyes: Negative for blurred vision and double  vision.   Respiratory: Negative for cough and shortness of breath.    Cardiovascular: Negative for chest pain, palpitations and leg swelling.   Gastrointestinal: Negative for diarrhea and vomiting.   Genitourinary: Negative for dysuria, frequency and urgency.   Musculoskeletal:        Left wrist pain     Skin:        Lacerations neck and bilateral wrists     Neurological: Negative for weakness and headaches.   Psychiatric/Behavioral: Positive for depression and suicidal ideas.   All other systems reviewed and are negative.       Physical Exam  Temp:  [35.8 °C (96.5 °F)-36.3 °C (97.4 °F)] 36.3 °C (97.3 °F)  Pulse:  [71-82] 75  Resp:  [16-18] 16  BP: (131-154)/(76-87) 138/84  SpO2:  [95 %-97 %] 96 %    Physical Exam  Vitals and nursing note reviewed.   Constitutional:       General: She is not in acute distress.     Appearance: She is normal weight.   HENT:      Head: Normocephalic and atraumatic.      Right Ear: External ear normal.      Left Ear: External ear normal.      Nose: Nose normal. No congestion.      Mouth/Throat:      Mouth: Mucous membranes are moist.      Pharynx: Oropharynx is clear.   Eyes:      General:         Right eye: No discharge.         Left eye: No discharge.   Cardiovascular:      Rate and Rhythm: Normal rate.      Pulses: Normal pulses.      Heart sounds: Normal heart sounds. No murmur heard.  Pulmonary:      Effort: Pulmonary effort is normal.      Breath sounds: Normal breath sounds.   Abdominal:      General: Bowel sounds are normal. There is no distension.      Palpations: Abdomen is soft.      Tenderness: There is no abdominal tenderness.   Musculoskeletal:         General: Signs of injury (left wrist) present.      Cervical back: Neck supple. No tenderness.   Skin:     General: Skin is warm and dry.      Capillary Refill: Capillary refill takes less than 2 seconds.      Comments: Left wrist surgical site- splint and ace wrap in place, laceration to right wrist and neck   Neurological:       General: No focal deficit present.      Mental Status: She is alert and oriented to person, place, and time.   Psychiatric:         Attention and Perception: Attention normal.         Mood and Affect: Mood is depressed.         Speech: Speech is delayed.         Behavior: Behavior is slowed and withdrawn. Behavior is cooperative.         Thought Content: Thought content includes suicidal ideation.     Patient was seen and examined at bedside. No changes in physical exam from prior evaluation.      Fluids    Intake/Output Summary (Last 24 hours) at 7/29/2022 0926  Last data filed at 7/28/2022 1410  Gross per 24 hour   Intake 480 ml   Output --   Net 480 ml       Laboratory  Recent Labs     07/27/22  0018   WBC 7.9   RBC 4.20   HEMOGLOBIN 12.2   HEMATOCRIT 35.8*   MCV 85.2   MCH 29.0   MCHC 34.1   RDW 48.5   PLATELETCT 228   MPV 11.7                       Imaging  No orders to display        Assessment/Plan  Suicidal ideation- (present on admission)  Assessment & Plan  Patient attempted suicide by cutting her wrists prior to previous admission   Legal hold, needs inpatient psych hospital but they are unable to accept the patient while the splint is in place  Psych consulted  Continue abilify  1:1 sitter        Gardnerella vaginitis  Assessment & Plan  Continue seven day course of flagyl    Laceration of left wrist  Assessment & Plan  S/p tendon repair on 7/25  Splint to stay in place for 10-14 days per ortho surg  Will need ortho follow up at that time      Hypertension  Assessment & Plan  SBP 140s-150s  Amlodipine increased to 10 mg- first dose 7/29       VTE prophylaxis: heparin ppx    I have performed a physical exam and reviewed and updated ROS and Plan today (7/29/2022). In review of yesterday's note (7/28/2022), there are no changes except as documented above.

## 2022-07-29 NOTE — CARE PLAN
The patient is Stable - Low risk of patient condition declining or worsening    Shift Goals  Clinical Goals: safety, pain management  Patient Goals: rest, pain free  Family Goals: edenilson    Progress made toward(s) clinical / shift goals:  Patient has PRN pain meds to alleviate pain, and has 1:1 sitter at bedside.      Patient is not progressing towards the following goals:

## 2022-07-30 PROCEDURE — 700102 HCHG RX REV CODE 250 W/ 637 OVERRIDE(OP): Performed by: STUDENT IN AN ORGANIZED HEALTH CARE EDUCATION/TRAINING PROGRAM

## 2022-07-30 PROCEDURE — 700102 HCHG RX REV CODE 250 W/ 637 OVERRIDE(OP): Performed by: HOSPITALIST

## 2022-07-30 PROCEDURE — A9270 NON-COVERED ITEM OR SERVICE: HCPCS | Performed by: STUDENT IN AN ORGANIZED HEALTH CARE EDUCATION/TRAINING PROGRAM

## 2022-07-30 PROCEDURE — 700111 HCHG RX REV CODE 636 W/ 250 OVERRIDE (IP): Performed by: STUDENT IN AN ORGANIZED HEALTH CARE EDUCATION/TRAINING PROGRAM

## 2022-07-30 PROCEDURE — 99231 SBSQ HOSP IP/OBS SF/LOW 25: CPT | Mod: FS | Performed by: NURSE PRACTITIONER

## 2022-07-30 PROCEDURE — 770001 HCHG ROOM/CARE - MED/SURG/GYN PRIV*

## 2022-07-30 PROCEDURE — A9270 NON-COVERED ITEM OR SERVICE: HCPCS | Performed by: HOSPITALIST

## 2022-07-30 RX ADMIN — HEPARIN SODIUM 5000 UNITS: 5000 INJECTION, SOLUTION INTRAVENOUS; SUBCUTANEOUS at 04:45

## 2022-07-30 RX ADMIN — AMLODIPINE BESYLATE 10 MG: 5 TABLET ORAL at 04:45

## 2022-07-30 RX ADMIN — OXYCODONE 5 MG: 5 TABLET ORAL at 08:43

## 2022-07-30 RX ADMIN — OXYCODONE 5 MG: 5 TABLET ORAL at 12:59

## 2022-07-30 RX ADMIN — OXYCODONE 5 MG: 5 TABLET ORAL at 04:45

## 2022-07-30 RX ADMIN — METRONIDAZOLE 500 MG: 500 TABLET ORAL at 13:00

## 2022-07-30 RX ADMIN — METRONIDAZOLE 500 MG: 500 TABLET ORAL at 23:20

## 2022-07-30 RX ADMIN — HEPARIN SODIUM 5000 UNITS: 5000 INJECTION, SOLUTION INTRAVENOUS; SUBCUTANEOUS at 13:00

## 2022-07-30 RX ADMIN — OXYCODONE 5 MG: 5 TABLET ORAL at 23:20

## 2022-07-30 RX ADMIN — ARIPIPRAZOLE 5 MG: 10 TABLET ORAL at 04:45

## 2022-07-30 RX ADMIN — HEPARIN SODIUM 5000 UNITS: 5000 INJECTION, SOLUTION INTRAVENOUS; SUBCUTANEOUS at 23:20

## 2022-07-30 RX ADMIN — OXYCODONE 5 MG: 5 TABLET ORAL at 18:08

## 2022-07-30 ASSESSMENT — ENCOUNTER SYMPTOMS
DEPRESSION: 1
SORE THROAT: 0
COUGH: 0
DIARRHEA: 0
SHORTNESS OF BREATH: 0
WEAKNESS: 0
DOUBLE VISION: 0
VOMITING: 0
FEVER: 0
BLURRED VISION: 0
CHILLS: 0
HEADACHES: 0
SINUS PAIN: 0
PALPITATIONS: 0

## 2022-07-30 ASSESSMENT — PAIN DESCRIPTION - PAIN TYPE: TYPE: ACUTE PAIN

## 2022-07-30 ASSESSMENT — FIBROSIS 4 INDEX: FIB4 SCORE: 0.78

## 2022-07-30 NOTE — PROGRESS NOTES
[facility] Medicine Daily Progress Note    Date of Service  7/30/2022    Chief Complaint  Katerina Owen is a 36 y.o. female admitted 7/27/2022 with suicidal ideation    Hospital Course  Katerina Owen is a 36 y.o. female who presented 7/27/2022 to be transferred back to psychiatry unit.  Initially came to [facility] on July 24 for suicide attempt which she slit her wrist.  Reports taking legal and illegal drugs as a means of suicide.  Used methamphetamine prior to admission.  Patient was given Rocephin for his left wrist and was evaluated by orthopedic surgery and underwent tendon and nerve repair on July 25.  Seen by psychiatry and was started on Abilify.  She was discharged to inpatient psychiatric facility.  She was discharged to [facility] today, however [facility] would not accept the patient due to the hard splint in place on her left wrist as this potentially is a hazard.  She was then referred to the ED and readmitted.        Interval Problem Update  Patient more interactive and joyful this morning. No overnight events. Wrist cockup brace in place. Patient medically clear for discharge to psych hospital.     I have discussed this patient's plan of care and discharge plan at IDT rounds today with Case Management, Nursing, Nursing leadership, and other members of the IDT team.    Consultants/Specialty  psychiatry    Code Status  Full Code    Disposition  Patient is medically cleared for discharge.   Anticipate discharge to to a psychiatric hospital.  I have placed the appropriate orders for post-discharge needs.    Review of Systems  Review of Systems   Constitutional: Negative for chills, fever and malaise/fatigue.   HENT: Negative for sinus pain and sore throat.    Eyes: Negative for blurred vision and double vision.   Respiratory: Negative for cough and shortness of breath.    Cardiovascular: Negative for chest pain, palpitations and leg swelling.   Gastrointestinal:  Negative for diarrhea and vomiting.   Genitourinary: Negative for dysuria, frequency and urgency.   Musculoskeletal:        Left wrist pain     Skin:        Lacerations neck and bilateral wrists     Neurological: Negative for weakness and headaches.   Psychiatric/Behavioral: Positive for depression and suicidal ideas.   All other systems reviewed and are negative.       Physical Exam  Temp:  [36.2 °C (97.2 °F)-37.1 °C (98.7 °F)] 37.1 °C (98.7 °F)  Pulse:  [74-97] 74  Resp:  [18-20] 18  BP: (131-150)/(79-86) 131/80  SpO2:  [95 %-99 %] 95 %    Physical Exam  Vitals and nursing note reviewed.   Constitutional:       General: She is not in acute distress.     Appearance: She is normal weight.   HENT:      Head: Normocephalic and atraumatic.      Right Ear: External ear normal.      Left Ear: External ear normal.      Nose: Nose normal. No congestion.      Mouth/Throat:      Mouth: Mucous membranes are moist.      Pharynx: Oropharynx is clear.   Eyes:      General:         Right eye: No discharge.         Left eye: No discharge.   Cardiovascular:      Rate and Rhythm: Normal rate.      Pulses: Normal pulses.      Heart sounds: Normal heart sounds. No murmur heard.  Pulmonary:      Effort: Pulmonary effort is normal.      Breath sounds: Normal breath sounds.   Abdominal:      General: Bowel sounds are normal. There is no distension.      Palpations: Abdomen is soft.      Tenderness: There is no abdominal tenderness.   Musculoskeletal:         General: Signs of injury (left wrist) present.      Cervical back: Neck supple. No tenderness.   Skin:     General: Skin is warm and dry.      Capillary Refill: Capillary refill takes less than 2 seconds.      Comments: Left wrist surgical site- splint in place, laceration to right wrist and neck   Neurological:      General: No focal deficit present.      Mental Status: She is alert and oriented to person, place, and time.   Psychiatric:         Attention and Perception: Attention  normal.         Behavior: Behavior is cooperative.     Patient was seen and examined at bedside. No changes in physical exam from prior evaluation.      Fluids    Intake/Output Summary (Last 24 hours) at 7/30/2022 1045  Last data filed at 7/30/2022 0900  Gross per 24 hour   Intake 600 ml   Output --   Net 600 ml       Laboratory                        Imaging  No orders to display        Assessment/Plan  Suicidal ideation- (present on admission)  Assessment & Plan  Patient attempted suicide by cutting her wrists prior to previous admission   Legal hold, plan to discharge to inpatient psych hospital- could not go with her splint  Psych consulted  Continue abilify  1:1 sitter        Gardnerella vaginitis  Assessment & Plan  Continue seven day course of flagyl    Laceration of left wrist  Assessment & Plan  S/p tendon repair on 7/25  Hard splint exchanged for wrist cockup brace 7/29 per Dr. Analisa Elizabeth  Brace to stay in place for 10-14 days per ortho surg  Will need ortho follow up at that time      Hypertension  Assessment & Plan  Improved,   Continue amlodpine 10 mg daily          VTE prophylaxis: heparin ppx    I have performed a physical exam and reviewed and updated ROS and Plan today (7/30/2022). In review of yesterday's note (7/29/2022), there are no changes except as documented above.

## 2022-07-30 NOTE — PROGRESS NOTES
Pt A&Ox4. Room air. Ambulates independently. Oxycodone given for left hand pain. Safety sitter for SI at bedside. Pt denies SI today.

## 2022-07-30 NOTE — DISCHARGE PLANNING
Alert Team/Behavioral Health   Note:     Talked to Lyssa @ [facility]. No beds currently available.

## 2022-07-30 NOTE — CARE PLAN
The patient is Stable - Low risk of patient condition declining or worsening    Shift Goals  Clinical Goals: discharge planning  Patient Goals: rest  Family Goals: DAKOTAH    Progress made toward(s) clinical / shift goals:      Problem: Pain - Standard  Goal: Alleviation of pain or a reduction in pain to the patient’s comfort goal  Outcome: Progressing     Problem: Provide Safe Environment  Goal: Suicide environmental safety, protocols, policies, and practices will be implemented  Outcome: Progressing       Patient is not progressing towards the following goals:

## 2022-07-30 NOTE — CARE PLAN
The patient is Watcher - Medium risk of patient condition declining or worsening    Shift Goals  Clinical Goals: Remain safe and free from falls  Patient Goals: Rest  Family Goals: DAKOTAH    Progress made toward(s) clinical / shift goals:    Patient is not progressing towards the following goals:  `  Continue L2k with sitter in room.

## 2022-07-31 LAB
BACTERIA BLD CULT: NORMAL
BACTERIA BLD CULT: NORMAL
SIGNIFICANT IND 70042: NORMAL
SIGNIFICANT IND 70042: NORMAL
SITE SITE: NORMAL
SITE SITE: NORMAL
SOURCE SOURCE: NORMAL
SOURCE SOURCE: NORMAL

## 2022-07-31 PROCEDURE — A9270 NON-COVERED ITEM OR SERVICE: HCPCS | Performed by: NURSE PRACTITIONER

## 2022-07-31 PROCEDURE — 770001 HCHG ROOM/CARE - MED/SURG/GYN PRIV*

## 2022-07-31 PROCEDURE — 700102 HCHG RX REV CODE 250 W/ 637 OVERRIDE(OP): Performed by: HOSPITALIST

## 2022-07-31 PROCEDURE — 700111 HCHG RX REV CODE 636 W/ 250 OVERRIDE (IP): Performed by: STUDENT IN AN ORGANIZED HEALTH CARE EDUCATION/TRAINING PROGRAM

## 2022-07-31 PROCEDURE — A9270 NON-COVERED ITEM OR SERVICE: HCPCS | Performed by: HOSPITALIST

## 2022-07-31 PROCEDURE — 700102 HCHG RX REV CODE 250 W/ 637 OVERRIDE(OP): Performed by: NURSE PRACTITIONER

## 2022-07-31 PROCEDURE — 99231 SBSQ HOSP IP/OBS SF/LOW 25: CPT | Mod: FS | Performed by: NURSE PRACTITIONER

## 2022-07-31 RX ORDER — BISACODYL 10 MG
10 SUPPOSITORY, RECTAL RECTAL
Status: DISCONTINUED | OUTPATIENT
Start: 2022-07-31 | End: 2022-08-01 | Stop reason: HOSPADM

## 2022-07-31 RX ORDER — POLYETHYLENE GLYCOL 3350 17 G/17G
1 POWDER, FOR SOLUTION ORAL
Status: DISCONTINUED | OUTPATIENT
Start: 2022-07-31 | End: 2022-08-01 | Stop reason: HOSPADM

## 2022-07-31 RX ORDER — AMOXICILLIN 250 MG
2 CAPSULE ORAL 2 TIMES DAILY
Status: DISCONTINUED | OUTPATIENT
Start: 2022-07-31 | End: 2022-08-01 | Stop reason: HOSPADM

## 2022-07-31 RX ADMIN — SENNOSIDES AND DOCUSATE SODIUM 2 TABLET: 50; 8.6 TABLET ORAL at 17:40

## 2022-07-31 RX ADMIN — AMLODIPINE BESYLATE 10 MG: 5 TABLET ORAL at 04:42

## 2022-07-31 RX ADMIN — POLYETHYLENE GLYCOL 3350 1 PACKET: 17 POWDER, FOR SOLUTION ORAL at 13:04

## 2022-07-31 RX ADMIN — ARIPIPRAZOLE 5 MG: 10 TABLET ORAL at 04:42

## 2022-07-31 RX ADMIN — HEPARIN SODIUM 5000 UNITS: 5000 INJECTION, SOLUTION INTRAVENOUS; SUBCUTANEOUS at 04:41

## 2022-07-31 RX ADMIN — METOPROLOL TARTRATE 25 MG: 25 TABLET, FILM COATED ORAL at 17:40

## 2022-07-31 RX ADMIN — HEPARIN SODIUM 5000 UNITS: 5000 INJECTION, SOLUTION INTRAVENOUS; SUBCUTANEOUS at 21:01

## 2022-07-31 RX ADMIN — METOPROLOL TARTRATE 25 MG: 25 TABLET, FILM COATED ORAL at 09:18

## 2022-07-31 RX ADMIN — OXYCODONE 5 MG: 5 TABLET ORAL at 04:42

## 2022-07-31 RX ADMIN — OXYCODONE 5 MG: 5 TABLET ORAL at 13:10

## 2022-07-31 RX ADMIN — HEPARIN SODIUM 5000 UNITS: 5000 INJECTION, SOLUTION INTRAVENOUS; SUBCUTANEOUS at 13:04

## 2022-07-31 RX ADMIN — OXYCODONE 5 MG: 5 TABLET ORAL at 17:40

## 2022-07-31 ASSESSMENT — ENCOUNTER SYMPTOMS
DIARRHEA: 0
HEADACHES: 0
CHILLS: 0
SHORTNESS OF BREATH: 0
COUGH: 0
WEAKNESS: 0
FEVER: 0
PALPITATIONS: 0
DOUBLE VISION: 0
DEPRESSION: 0
VOMITING: 0
SORE THROAT: 0
BLURRED VISION: 0
SINUS PAIN: 0

## 2022-07-31 ASSESSMENT — PAIN DESCRIPTION - PAIN TYPE: TYPE: ACUTE PAIN

## 2022-07-31 NOTE — CARE PLAN
The patient is Stable - Low risk of patient condition declining or worsening    Shift Goals: Pain control; constipation; discharge planning   Clinical Goals: pain and constipation   Patient Goals: rest, comfort, discharge   Family Goals: DAKOTAH    Progress made toward(s) clinical / shift goals:  see above    Patient is not progressing towards the following goals:

## 2022-07-31 NOTE — PROGRESS NOTES
[facility] Medicine Daily Progress Note    Date of Service  7/31/2022    Chief Complaint  Katerina Owen is a 36 y.o. female admitted 7/27/2022 with suicidal ideation    Hospital Course  Katerina Owen is a 36 y.o. female who presented 7/27/2022 to be transferred back to psychiatry unit.  Initially came to [facility] on July 24 for suicide attempt which she slit her wrist.  Reports taking legal and illegal drugs as a means of suicide.  Used methamphetamine prior to admission.  Patient was given Rocephin for his left wrist and was evaluated by orthopedic surgery and underwent tendon and nerve repair on July 25.  Seen by psychiatry and was started on Abilify.  She was discharged to inpatient psychiatric facility.  She was [facility], however [facility] would not accept the patient due to the hard splint in place on her left wrist as this potentially is a hazard.  She was then referred to the ED and readmitted.        Interval Problem Update  Patient seen and examined this morning. She is more cheerful and interactive this morning. She wants to know her her pug is doing. She states she feels more present and is not feeling depressed or suicidal today. She remains medical clear for discharge to inpatient psychiatric hospital pending bed availability.     I have discussed this patient's plan of care and discharge plan at IDT rounds today with Case Management, Nursing, Nursing leadership, and other members of the IDT team.    Consultants/Specialty  psychiatry    Code Status  Full Code    Disposition  Patient is medically cleared for discharge.   Anticipate discharge to to a psychiatric hospital.  I have placed the appropriate orders for post-discharge needs.    Review of Systems  Review of Systems   Constitutional: Negative for chills, fever and malaise/fatigue.   HENT: Negative for sinus pain and sore throat.    Eyes: Negative for blurred vision and double  vision.   Respiratory: Negative for cough and shortness of breath.    Cardiovascular: Negative for chest pain, palpitations and leg swelling.   Gastrointestinal: Negative for diarrhea and vomiting.   Genitourinary: Negative for dysuria, frequency and urgency.   Musculoskeletal:             Skin:        Lacerations neck and bilateral wrists     Neurological: Negative for weakness and headaches.   Psychiatric/Behavioral: Negative for depression and suicidal ideas.   All other systems reviewed and are negative.       Physical Exam  Temp:  [36.2 °C (97.2 °F)-36.4 °C (97.5 °F)] 36.2 °C (97.2 °F)  Pulse:  [69-82] 82  Resp:  [17-18] 18  BP: (131-151)/(81-93) 146/84  SpO2:  [94 %-98 %] 94 %    Physical Exam  Vitals and nursing note reviewed.   Constitutional:       General: She is not in acute distress.     Appearance: She is normal weight.   HENT:      Head: Normocephalic and atraumatic.      Right Ear: External ear normal.      Left Ear: External ear normal.      Nose: Nose normal. No congestion.      Mouth/Throat:      Mouth: Mucous membranes are moist.      Pharynx: Oropharynx is clear.   Eyes:      General:         Right eye: No discharge.         Left eye: No discharge.   Cardiovascular:      Rate and Rhythm: Normal rate.      Pulses: Normal pulses.      Heart sounds: Normal heart sounds. No murmur heard.  Pulmonary:      Effort: Pulmonary effort is normal.      Breath sounds: Normal breath sounds.   Abdominal:      General: Bowel sounds are normal. There is no distension.      Palpations: Abdomen is soft.      Tenderness: There is no abdominal tenderness.   Musculoskeletal:         General: Signs of injury (left wrist) present.      Cervical back: Neck supple. No tenderness.   Skin:     General: Skin is warm and dry.      Capillary Refill: Capillary refill takes less than 2 seconds.      Comments: Left wrist surgical site- wrist cockup brace in place, laceration to right wrist and neck   Neurological:      General:  No focal deficit present.      Mental Status: She is alert and oriented to person, place, and time.   Psychiatric:         Attention and Perception: Attention normal.         Behavior: Behavior is cooperative.     Patient was seen and examined at bedside. No changes in physical exam from prior evaluation.      Fluids    Intake/Output Summary (Last 24 hours) at 7/31/2022 1108  Last data filed at 7/30/2022 1230  Gross per 24 hour   Intake 360 ml   Output --   Net 360 ml       Laboratory                        Imaging  No orders to display        Assessment/Plan  Suicidal ideation- (present on admission)  Assessment & Plan  Patient attempted suicide by cutting her wrists prior to previous admission   Legal hold, plan to discharge to inpatient psych hospital- could not go with hard splint  Psych consulted  Continue abilify  1:1 sitter        Gardnerella vaginitis  Assessment & Plan  Continue seven day course of flagyl    Laceration of left wrist  Assessment & Plan  S/p tendon repair on 7/25  Hard splint exchanged for wrist cockup brace 7/29 per Dr. Analisa Elizabeth  Brace to stay in place for 10-14 days per ortho surg  Will need ortho follow up at that time      Hypertension  Assessment & Plan  Continue amlodpine 10 mg daily   Added metoprolol 25 mg  Left wrist pain control         VTE prophylaxis: heparin ppx    I have performed a physical exam and reviewed and updated ROS and Plan today (7/31/2022). In review of yesterday's note (7/30/2022), there are no changes except as documented above.

## 2022-07-31 NOTE — DISCHARGE PLANNING
Alert Team/Behavioral Health   Note:    Talked to Lyssa CardRN) @ [facility]. No beds currently available. Patient is on waiting list.

## 2022-07-31 NOTE — CARE PLAN
The patient is Watcher - Medium risk of patient condition declining or worsening    Shift Goals  Clinical Goals: discharge planning  Patient Goals: rest  Family Goals: DAKOTAH    Progress made toward(s) clinical / shift goals:      Patient is not progressing towards the following goals:    Patient has safety sitter in room. Continue on 24 hour monitoring. Patient able to ambulate independently in room.

## 2022-08-01 VITALS
BODY MASS INDEX: 24.73 KG/M2 | WEIGHT: 163.14 LBS | SYSTOLIC BLOOD PRESSURE: 134 MMHG | TEMPERATURE: 97.1 F | DIASTOLIC BLOOD PRESSURE: 79 MMHG | RESPIRATION RATE: 17 BRPM | HEIGHT: 68 IN | OXYGEN SATURATION: 97 % | HEART RATE: 76 BPM

## 2022-08-01 PROBLEM — N76.0 GARDNERELLA VAGINITIS: Status: RESOLVED | Noted: 2022-07-27 | Resolved: 2022-08-01

## 2022-08-01 PROBLEM — B96.89 GARDNERELLA VAGINITIS: Status: RESOLVED | Noted: 2022-07-27 | Resolved: 2022-08-01

## 2022-08-01 LAB
FLUAV RNA SPEC QL NAA+PROBE: NEGATIVE
FLUBV RNA SPEC QL NAA+PROBE: NEGATIVE
RSV RNA SPEC QL NAA+PROBE: NEGATIVE
SARS-COV-2 RNA RESP QL NAA+PROBE: NOTDETECTED
SPECIMEN SOURCE: NORMAL

## 2022-08-01 PROCEDURE — A9270 NON-COVERED ITEM OR SERVICE: HCPCS | Performed by: NURSE PRACTITIONER

## 2022-08-01 PROCEDURE — 700111 HCHG RX REV CODE 636 W/ 250 OVERRIDE (IP): Performed by: STUDENT IN AN ORGANIZED HEALTH CARE EDUCATION/TRAINING PROGRAM

## 2022-08-01 PROCEDURE — 700102 HCHG RX REV CODE 250 W/ 637 OVERRIDE(OP): Performed by: STUDENT IN AN ORGANIZED HEALTH CARE EDUCATION/TRAINING PROGRAM

## 2022-08-01 PROCEDURE — C9803 HOPD COVID-19 SPEC COLLECT: HCPCS | Performed by: NURSE PRACTITIONER

## 2022-08-01 PROCEDURE — 0241U HCHG SARS-COV-2 COVID-19 NFCT DS RESP RNA 4 TRGT MIC: CPT

## 2022-08-01 PROCEDURE — A9270 NON-COVERED ITEM OR SERVICE: HCPCS | Performed by: HOSPITALIST

## 2022-08-01 PROCEDURE — 700102 HCHG RX REV CODE 250 W/ 637 OVERRIDE(OP): Performed by: NURSE PRACTITIONER

## 2022-08-01 PROCEDURE — A9270 NON-COVERED ITEM OR SERVICE: HCPCS | Performed by: STUDENT IN AN ORGANIZED HEALTH CARE EDUCATION/TRAINING PROGRAM

## 2022-08-01 PROCEDURE — 700102 HCHG RX REV CODE 250 W/ 637 OVERRIDE(OP): Performed by: HOSPITALIST

## 2022-08-01 PROCEDURE — 99232 SBSQ HOSP IP/OBS MODERATE 35: CPT | Performed by: PSYCHIATRY & NEUROLOGY

## 2022-08-01 PROCEDURE — 99239 HOSP IP/OBS DSCHRG MGMT >30: CPT | Mod: FS | Performed by: NURSE PRACTITIONER

## 2022-08-01 RX ORDER — POLYETHYLENE GLYCOL 3350 17 G/17G
17 POWDER, FOR SOLUTION ORAL
Refills: 3 | Status: SHIPPED
Start: 2022-08-01 | End: 2024-02-22

## 2022-08-01 RX ORDER — OXYCODONE HYDROCHLORIDE 5 MG/1
5 TABLET ORAL EVERY 4 HOURS PRN
Qty: 30 TABLET | Refills: 0 | Status: SHIPPED
Start: 2022-08-01 | End: 2022-08-04

## 2022-08-01 RX ADMIN — ARIPIPRAZOLE 5 MG: 10 TABLET ORAL at 05:43

## 2022-08-01 RX ADMIN — OXYCODONE 5 MG: 5 TABLET ORAL at 10:31

## 2022-08-01 RX ADMIN — OXYCODONE 5 MG: 5 TABLET ORAL at 14:55

## 2022-08-01 RX ADMIN — SENNOSIDES AND DOCUSATE SODIUM 2 TABLET: 50; 8.6 TABLET ORAL at 05:44

## 2022-08-01 RX ADMIN — ACETAMINOPHEN 650 MG: 325 TABLET, FILM COATED ORAL at 04:22

## 2022-08-01 RX ADMIN — HEPARIN SODIUM 5000 UNITS: 5000 INJECTION, SOLUTION INTRAVENOUS; SUBCUTANEOUS at 05:42

## 2022-08-01 RX ADMIN — HEPARIN SODIUM 5000 UNITS: 5000 INJECTION, SOLUTION INTRAVENOUS; SUBCUTANEOUS at 13:25

## 2022-08-01 RX ADMIN — POLYETHYLENE GLYCOL 3350 1 PACKET: 17 POWDER, FOR SOLUTION ORAL at 07:31

## 2022-08-01 RX ADMIN — OXYCODONE 5 MG: 5 TABLET ORAL at 05:42

## 2022-08-01 ASSESSMENT — ENCOUNTER SYMPTOMS
DOUBLE VISION: 0
PALPITATIONS: 0
SINUS PAIN: 0
CHILLS: 0
DEPRESSION: 0
FEVER: 0
BLURRED VISION: 0
HEADACHES: 0
COUGH: 0
SORE THROAT: 0
DIARRHEA: 0
VOMITING: 0
WEAKNESS: 0
SHORTNESS OF BREATH: 0

## 2022-08-01 ASSESSMENT — PAIN DESCRIPTION - PAIN TYPE: TYPE: ACUTE PAIN

## 2022-08-01 NOTE — CARE PLAN
The patient is Stable - Low risk of patient condition declining or worsening    Shift Goals  Clinical Goals: legal hold, SI  Patient Goals: rest, comfort  Family Goals: DAKOTAH    Progress made toward(s) clinical / shift goals:    Problem: Pain - Standard  Goal: Alleviation of pain or a reduction in pain to the patient’s comfort goal  Outcome: Progressing     Problem: Provide Safe Environment  Goal: Suicide environmental safety, protocols, policies, and practices will be implemented  Outcome: Progressing     Problem: Psychosocial  Goal: Patient's ability to identify and develop effective coping behaviors will improve  Outcome: Progressing  Goal: Patient's ability to identify and utilize available support systems will improve  Outcome: Progressing     Problem: Knowledge Deficit - Standard  Goal: Patient and family/care givers will demonstrate understanding of plan of care, disease process/condition, diagnostic tests and medications  Outcome: Progressing

## 2022-08-01 NOTE — CARE PLAN
The patient is Stable - Low risk of patient condition declining or worsening    Shift Goals: pain control; constipation; legal hold; SI precautions  Clinical Goals: legal hold, SI  Patient Goals: rest, comfort; plan with legal hold and visitor and phone privileges  Family Goals: DAKOTAH    Progress made toward(s) clinical / shift goals:  see above    Patient is not progressing towards the following goals:

## 2022-08-01 NOTE — DISCHARGE PLANNING
Alert Team Note:     Contacted by [facility], spoke to Anabel. Pt has been accepted PENDING a negative rapid covid test. Transport time TBD.  Informed TIAN Randall and ARCELIA Jason.

## 2022-08-01 NOTE — DISCHARGE PLANNING
Alert Team Note:    Contacted by [facility], spoke to Anabel. Pt is on the wait list. No beds available at this time.     Writer has sent updated Consult notes, MAR, labs, and LH extension.    Anabel has requested a nurse to nurse report. Informed ITAN April.

## 2022-08-01 NOTE — DISCHARGE PLANNING
Legal Hold Transfer     Referral: Legal hold transfer to Mental Health Facility     Intervention: Notified by  Don that pt has been accepted to [facility].     Pt's accepting physcian is Dr. Sagastume     [facility]     The pt will be picked up at 1700     Notified Bedside TIAN Randall and TIAN Mccarty of the departure time as well as accepting facility.      Transfer packet created with original legal hold and placed on chart.      Plan: Pt will be transferring to [facility] today at 1700 via [facility].

## 2022-08-01 NOTE — DISCHARGE PLANNING
Case Management Discharge Planning    Admission Date: 7/27/2022  GMLOS: 2.6  ALOS: 5    6-Clicks ADL Score: 24  6-Clicks Mobility Score: 24      Anticipated Discharge Dispo: Discharge Disposition: D/T to psych hosp or distinct part unit (65)    DME Needed: No    Action(s) Taken: Acceptance Received    Escalations Completed: None    Medically Clear: Yes    Next Steps: Notified by DANIEL Jason for Alert team, pt accepted to [facility]. Jenna to  pt at 17:00. 72 hr chart copy and all psych md notes and original legal hold placed in DC Packet and given to BSN April.       Barriers to Discharge: None    Is the patient up for discharge tomorrow: No

## 2022-08-01 NOTE — DISCHARGE INSTRUCTIONS
Discharge Instructions per Concetta Ulrich, APRN    1.  Review your discharge Medication Reconciliation form. You may be provided with new prescriptions or changes to previously prescribed medications.  Be sure to take all of your prescribed medications exactly as directed.  Further questions can be directed to your local pharmacist or primary care provider (PCP).    2. Follow-up with your PCP.  An appointment may have already been scheduled for you.  Please review your discharge paperwork and locate this information.  Please be sure to call your PCP to cancel if you are unable to make this appointment or require schedule changes.  It is critical to to follow-up with your PCP to review hospital records and ensure any further diagnostic work-up, prescription refills, or referrals.     3. ACTIVITIES: After discharge from the hospital, you may resume routine activities including walking and going u/down stairs. However, no strenuous activity. For further clarification, call your PCP     4. DRIVING: You may drive whenever you are off pain medications and are able to perform the activities needed to drive, i.e. turning, bending, twisting, etc.     5. BOWEL FUNCTION: A few patients, after hospitalizations, will develop bowel irregularity. You could also experience constipation due to pain medication and decreased activity level. If you feel this is occurring, please take an over-the-counter laxative (Milk of Magnesia, Ex-Lax, Senokot, etc.) until the problem has resolved.     6. PAIN MEDICATION: You may be given a prescription for pain medication at discharge. Please take these as directed. It is important to remember not to take medications on an empty stomach as this may cause nausea/vomiting.  You can transition from the prescription-strength pain medication to over-the-counter medications as your pain improves, such as tylenol if you are medically cleared to do so. DO NOT TAKE MORE THAN 4,000 mg OF ACETAMINOPHEN IN A  24-HOUR PERIOD. KEEP A PAIN JOURNAL; RECORD YOUR PAIN SCORE, PAIN MEDICATIONS ADMINISTERED, AND TIME. YOU SHOULD BE TAKING PAIN MEDICINE (OTC OR PRESCRIBED) EVERY 4-6 HOURS UNTIL YOU NO LONGER NEED MEDICINE FOR PAIN.    7. LABS/IMAGING: You may be asked to complete labs or imaging prior to your next provider appointment. If you use [facility], a paper order is not required. If you use another lab or imaging center, be sure you have your order requisition form at discharge. Contact scheduling or use [employer] to arrange a lab appointment.    8. BLOOD PRESSURE: Measure your blood pressure and pulse every day and write it down on a piece of paper or record it in a smart phone germaine.  Bring this to your next PCP appointment.  If you feel dizzy, take your blood pressure and pulse and call your PCP.    9. RETURN TO THE ER: Return to the ER if you develop recurrent chest pain, shortness of breath, bloody sputum, fever of 101.5 or greater, intractable nausea or vomiting, blood in the vomit or urine, intractable pain, new onset inability to ambulate, focal motor weakness, acute vision disturbance, acute speech disturbance, intractable abdominal pain, diffuse watery diarrhea or any other worrisome symptoms.    Please leave brace and surgical dressing and padding in place until follow up with Dr. Analisa Elizabeth 10-14 days following surgery.

## 2022-08-01 NOTE — PROGRESS NOTES
[facility] Medicine Daily Progress Note    Date of Service  8/1/2022    Chief Complaint  Katerina Owen is a 36 y.o. female admitted 7/27/2022 with suicidal ideation    Hospital Course  Katerina Owen is a 36 y.o. female who presented 7/27/2022 to be transferred back to psychiatry unit.  Initially came to [facility] on July 24 for suicide attempt which she slit her wrist.  Reports taking legal and illegal drugs as a means of suicide.  Used methamphetamine prior to admission.  Patient was given Rocephin for his left wrist and was evaluated by orthopedic surgery and underwent tendon and nerve repair on July 25.  Seen by psychiatry and was started on Abilify.  She was discharged to inpatient psychiatric facility.  She was discharged to [facility] today, however [facility] would not accept the patient due to the hard splint in place on her left wrist as this potentially is a hazard.  She was then referred to the ED and readmitted.        Interval Problem Update  Patient was seen and examined at bedside.  No acute events overnight. Patient is resting comfortably in bed and in no acute distress. She denies depression and SI, interactive and appears to be in a good mood today. Medically clear, pending discharge to inpatient psych hospital.     I have discussed this patient's plan of care and discharge plan at IDT rounds today with Case Management, Nursing, Nursing leadership, and other members of the IDT team.    Consultants/Specialty  psychiatry    Code Status  Full Code    Disposition  Patient is medically cleared for discharge.   Anticipate discharge to to a psychiatric hospital.  I have placed the appropriate orders for post-discharge needs.    Review of Systems  Review of Systems   Constitutional: Negative for chills, fever and malaise/fatigue.   HENT: Negative for sinus pain and sore throat.    Eyes: Negative for blurred vision and double vision.   Respiratory: Negative for  cough and shortness of breath.    Cardiovascular: Negative for chest pain, palpitations and leg swelling.   Gastrointestinal: Negative for diarrhea and vomiting.   Genitourinary: Negative for dysuria, frequency and urgency.   Musculoskeletal:             Skin:        Lacerations neck and bilateral wrists     Neurological: Negative for weakness and headaches.   Psychiatric/Behavioral: Negative for depression and suicidal ideas.   All other systems reviewed and are negative.       Physical Exam  Temp:  [36.3 °C (97.4 °F)-36.6 °C (97.9 °F)] 36.3 °C (97.4 °F)  Pulse:  [58-91] 63  Resp:  [16-17] 17  BP: ()/(53-79) 106/63  SpO2:  [95 %-98 %] 97 %    Physical Exam  Vitals and nursing note reviewed.   Constitutional:       General: She is not in acute distress.     Appearance: She is normal weight.   HENT:      Head: Normocephalic and atraumatic.      Right Ear: External ear normal.      Left Ear: External ear normal.      Nose: Nose normal. No congestion.      Mouth/Throat:      Mouth: Mucous membranes are moist.      Pharynx: Oropharynx is clear.   Eyes:      General:         Right eye: No discharge.         Left eye: No discharge.   Cardiovascular:      Rate and Rhythm: Normal rate.      Pulses: Normal pulses.      Heart sounds: Normal heart sounds. No murmur heard.  Pulmonary:      Effort: Pulmonary effort is normal.      Breath sounds: Normal breath sounds.   Abdominal:      General: Bowel sounds are normal. There is no distension.      Palpations: Abdomen is soft.      Tenderness: There is no abdominal tenderness.   Musculoskeletal:         General: Signs of injury (left wrist) present.      Cervical back: Neck supple. No tenderness.   Skin:     General: Skin is warm and dry.      Capillary Refill: Capillary refill takes less than 2 seconds.      Comments: Left wrist surgical site- wrist cockup brace in place, laceration to right wrist and neck   Neurological:      General: No focal deficit present.      Mental  Status: She is alert and oriented to person, place, and time.   Psychiatric:         Attention and Perception: Attention normal.         Behavior: Behavior is cooperative.     Patient was seen and examined at bedside. No changes in physical exam from prior evaluation.      Fluids    Intake/Output Summary (Last 24 hours) at 8/1/2022 1243  Last data filed at 7/31/2022 1310  Gross per 24 hour   Intake 360 ml   Output --   Net 360 ml       Laboratory                        Imaging  No orders to display        Assessment/Plan  Suicidal ideation- (present on admission)  Assessment & Plan  Patient attempted suicide by cutting her wrists prior to previous admission   Legal hold, plan to discharge to inpatient psych hospital- could not go with hard splint, switched to wrist cockup brace  Psych consulted  Continue abilify  1:1 sitter        Gardnerella vaginitis  Assessment & Plan  Resolved  Flagyl completed    Laceration of left wrist  Assessment & Plan  S/p tendon repair on 7/25  Hard splint exchanged for wrist cockup brace 7/29 per Dr. Analisa Elizabeth  Brace to stay in place for 10-14 days per ortho surg  Will need ortho follow up at that time      Hypertension  Assessment & Plan  Continue amlodpine 10 mg daily   Continue metoprolol 25 mg  Left wrist pain control         VTE prophylaxis: heparin ppx    I have performed a physical exam and reviewed and updated ROS and Plan today (8/1/2022). In review of yesterday's note (7/31/2022), there are no changes except as documented above.

## 2022-08-01 NOTE — PROGRESS NOTES
Patient was in good spirits throughout the night and appeared to be in a good mood. Conversing with the sitter when awake.

## 2022-08-01 NOTE — CONSULTS
"PSYCHIATRIC FOLLOW-UP:(established)  *Reason for admission:        *Legal Hold Status on Admission:            Chart reviewed.         *HPI:          36-year-old female with a complex psychiatric history who presented to ED after overdose and SA by forearm laceration.  Today, she reports that she is doing well and slept moderately well overnight.  Engages more extensively in interview than previously documented.  Reports extensive history of childhood and adult trauma, stating \"my dad was a serial pedophile and drug children.  He should have been put in group home sooner\" and \"my brother killed himself while on the phone with me and I found his body\".  She also reports a history of believing she has \"multiple personality disorder\".      Patient does endorse that she has felt she has been \"misusing\" her Adderall prescription, sometimes taking 15 mg 3-4 times a day.  She typically takes 15 mg twice daily however.  She also reports that she works at [employer] and has had a past psychiatric diagnosis is of postpartum depression as well as PTSD, MDD, Bpd, ADHD and ADRIAN.  Does endorse history of auditory hallucinations, but were isolated to excessive use of Adderall.  Denies symptoms of psychosis in the absence of substances.  She also endorses that she has engaged in use of cocaine typically on weekends, but denies usage for the past 3 months.    Endorses past suicide attempt by overdosing on Xanax.    Patient does endorse that she has had periods of time up to 1 month with decreased need for sleep, grandiosity, impulsive and reckless behavior, elevated mood and increased pressured speech.  Does state that these symptoms were in the absence of illicit narcotics.  States that she was taking Adderall at this time but within scheduled dosage.    Collateral contact obtained, sister Gissell Vasquez ([phone]) however, prefers to discuss her care and psychiatric history with Pau Coello, her \" baby's aunt\".  Does not have her number " "but states that her sister has her phone number.  Also states that Pau Coello is a [employer]  and also has given Gurmeet talks, which was confirmed by Internet search.    Also reports that Aaliyah may also be a good source of collateral information, does not have contact information        Medical ROS (as pertinent):     Endorses constipation, last bowel movement today denies akathisia        *Psychiatric Examination:  Vitals:   Vitals:    08/01/22 0744   BP: 106/63   Pulse: 63   Resp: 17   Temp: 36.3 °C (97.4 °F)   SpO2: 97%       General Appearance: Female is appears stated age in hospital attire.  Abnormal Movements: Initial interview negative for abnormal movements.  Second interview patient does present with abnormal restless legs  Gait and Posture: Not assessed  Speech: Pressured, regular volume  Thought processes: Circumstantial if not tangential  Associations: Mild loosening of associations present   abnormal or Psychotic Thoughts: Unclear if delusions present, particularly pertaining to association to [employer] .  Denying AVH  Judgement and Insight: Limited/limited  Orientation: Oriented x4  Recent and Remote Memory: Grossly intact  Attention Span and Concentration: Grossly intact  Language: Fluent English  Fund of Knowledge: Average  Mood and Affect: \"Good\" mildly elevated affect, somewhat labile  SI/HI: Denies      Lab Results   Component Value Date/Time    WBC 7.9 07/27/2022 12:18 AM    RBC 4.20 07/27/2022 12:18 AM    HEMOGLOBIN 12.2 07/27/2022 12:18 AM    HEMATOCRIT 35.8 (L) 07/27/2022 12:18 AM    MCV 85.2 07/27/2022 12:18 AM    MCH 29.0 07/27/2022 12:18 AM    MCHC 34.1 07/27/2022 12:18 AM    MPV 11.7 07/27/2022 12:18 AM    NEUTSPOLYS 49.90 07/27/2022 12:18 AM    LYMPHOCYTES 39.30 07/27/2022 12:18 AM    MONOCYTES 8.70 07/27/2022 12:18 AM    EOSINOPHILS 1.60 07/27/2022 12:18 AM    BASOPHILS 0.40 07/27/2022 12:18 AM       Lab Results   Component Value Date/Time    SODIUM 139 07/26/2022 05:54 AM    " POTASSIUM 4.0 07/26/2022 05:54 AM    CHLORIDE 105 07/26/2022 05:54 AM    CO2 24 07/26/2022 05:54 AM    GLUCOSE 111 (H) 07/26/2022 05:54 AM    BUN 9 07/26/2022 05:54 AM    CREATININE 0.61 07/26/2022 05:54 AM    CREATININE 0.7 10/29/2007 06:06 PM       Lab Results   Component Value Date/Time    CHOLSTRLTOT 160 02/25/2018 02:43 AM    LDL 93 02/25/2018 02:43 AM    HDL 32 (A) 02/25/2018 02:43 AM    TRIGLYCERIDE 177 (H) 02/25/2018 02:43 AM       Lab Results   Component Value Date/Time    HBA1C 5.6 01/08/2016 10:47 AM       Lab Results   Component Value Date/Time    CHOLSTRLTOT 160 02/25/2018 02:43 AM    LDL 93 02/25/2018 02:43 AM    HDL 32 (A) 02/25/2018 02:43 AM    TRIGLYCERIDE 177 (H) 02/25/2018 02:43 AM       Lab Results   Component Value Date/Time    ALKPHOSPHAT 67 07/25/2022 01:44 AM    ASTSGOT 23 07/25/2022 01:44 AM    ALTSGPT 24 07/25/2022 01:44 AM    TBILIRUBIN 0.4 07/25/2022 01:44 AM         No results found for: LITHIUM    Assessment:   This is a 36-year-old female with complex psychiatric history presented to the ER after suicide attempt.  Today she is far more cooperative and engaged than previous interviews.  Her initial presentation was somewhat hypomanic with pressured speech tangentiality and mild mood lability.  Further interview suggests patient may have had symptoms of underlying hypomanic like symptoms.  However unable to differentiate if symptoms were secondary to amphetamines versus underlying bipolar versus underlying borderline personality disorder traits.  Due to patient's complex trauma history, formal psychiatric diagnosis unable to be solidified.  Further collateral information will help substantiate symptoms and solidify diagnosis.  Unclear if patient's current tangential and pressured speech are transient or sustained    Due to patient's current psychiatric presentation, recent severe suicide attempt, lack of collateral information, and complex psychiatric history, she remains in immediate  danger to self and requires continued psychiatric evaluation and treatment.  Recommending transfer to locked inpatient psychiatric unit after medical clearance.       Dx:  -Stimulant use disorder  -Substance-induced psychosis  -Borderline personality disorder, rule out bipolar type II  -MDD      Medical:  -Per medical team      Plan:  • Legal hold: Extended  • Psychotropic medications  o Increase Abilify to 10 mg p.o. daily for mood and psychosis  • Please transfer pt to inpatient psychiatric hospital when medically cleared and bed is available  • Labsreviewed  • EKG reviewed  • Attempted to call collateral as listed above: Straight to voicemail no message left due to patient privacy  • Discussed the case with: Dr. Stone  • Psychiatry will follow up    Thank you for the consult.       Sitter: 1:1  Phone: no access  Visitors:no  Personal belongings:  no    This note was created using voice recognition software (Dragon). The accuracy of the dictation is limited by the abilities of the software. I have reviewed the note prior to signing. However, error related to voice recognition software and /or scribes may still exist and should be interpreted within the appropriate context.

## 2022-08-02 NOTE — PROGRESS NOTES
Patient was given cleared for discharge to [facility] facility. COVID swab was negative and faxed to [facility]. Report was given to Anat at [facility] and patient left with [facility] and a . Patient's home meds were given to [facility] as well as her discharge paperwork with legal hold information and COBRA paperwork. Patient had no IV access upon discharge and patient was able to walk out with [facility].

## 2023-02-21 ENCOUNTER — HOSPITAL ENCOUNTER (EMERGENCY)
Facility: MEDICAL CENTER | Age: 38
End: 2023-02-21
Attending: EMERGENCY MEDICINE
Payer: COMMERCIAL

## 2023-02-21 VITALS
DIASTOLIC BLOOD PRESSURE: 80 MMHG | TEMPERATURE: 98.2 F | OXYGEN SATURATION: 93 % | SYSTOLIC BLOOD PRESSURE: 143 MMHG | RESPIRATION RATE: 15 BRPM | HEART RATE: 88 BPM

## 2023-02-21 DIAGNOSIS — F13.10 BENZODIAZEPINE ABUSE (HCC): ICD-10-CM

## 2023-02-21 DIAGNOSIS — F15.10 METHAMPHETAMINE ABUSE (HCC): ICD-10-CM

## 2023-02-21 LAB
ALBUMIN SERPL BCP-MCNC: 3.9 G/DL (ref 3.2–4.9)
ALBUMIN/GLOB SERPL: 1.1 G/DL
ALP SERPL-CCNC: 110 U/L (ref 30–99)
ALT SERPL-CCNC: 18 U/L (ref 2–50)
AMPHET UR QL SCN: POSITIVE
ANION GAP SERPL CALC-SCNC: 8 MMOL/L (ref 7–16)
AST SERPL-CCNC: 20 U/L (ref 12–45)
BARBITURATES UR QL SCN: NEGATIVE
BASOPHILS # BLD AUTO: 0.7 % (ref 0–1.8)
BASOPHILS # BLD: 0.06 K/UL (ref 0–0.12)
BENZODIAZ UR QL SCN: POSITIVE
BILIRUB SERPL-MCNC: 0.2 MG/DL (ref 0.1–1.5)
BUN SERPL-MCNC: 15 MG/DL (ref 8–22)
BZE UR QL SCN: NEGATIVE
CALCIUM ALBUM COR SERPL-MCNC: 9.7 MG/DL (ref 8.5–10.5)
CALCIUM SERPL-MCNC: 9.6 MG/DL (ref 8.5–10.5)
CANNABINOIDS UR QL SCN: NEGATIVE
CHLORIDE SERPL-SCNC: 106 MMOL/L (ref 96–112)
CO2 SERPL-SCNC: 27 MMOL/L (ref 20–33)
CREAT SERPL-MCNC: 0.77 MG/DL (ref 0.5–1.4)
EOSINOPHIL # BLD AUTO: 0.12 K/UL (ref 0–0.51)
EOSINOPHIL NFR BLD: 1.3 % (ref 0–6.9)
ERYTHROCYTE [DISTWIDTH] IN BLOOD BY AUTOMATED COUNT: 48.8 FL (ref 35.9–50)
ETHANOL BLD-MCNC: <10.1 MG/DL
GFR SERPLBLD CREATININE-BSD FMLA CKD-EPI: 100 ML/MIN/1.73 M 2
GLOBULIN SER CALC-MCNC: 3.4 G/DL (ref 1.9–3.5)
GLUCOSE BLD STRIP.AUTO-MCNC: 88 MG/DL (ref 65–99)
GLUCOSE SERPL-MCNC: 114 MG/DL (ref 65–99)
HCG SERPL QL: NEGATIVE
HCT VFR BLD AUTO: 41.2 % (ref 37–47)
HGB BLD-MCNC: 13.4 G/DL (ref 12–16)
IMM GRANULOCYTES # BLD AUTO: 0.05 K/UL (ref 0–0.11)
IMM GRANULOCYTES NFR BLD AUTO: 0.5 % (ref 0–0.9)
LYMPHOCYTES # BLD AUTO: 3.07 K/UL (ref 1–4.8)
LYMPHOCYTES NFR BLD: 33.4 % (ref 22–41)
MCH RBC QN AUTO: 27.2 PG (ref 27–33)
MCHC RBC AUTO-ENTMCNC: 32.5 G/DL (ref 33.6–35)
MCV RBC AUTO: 83.7 FL (ref 81.4–97.8)
METHADONE UR QL SCN: NEGATIVE
MONOCYTES # BLD AUTO: 0.94 K/UL (ref 0–0.85)
MONOCYTES NFR BLD AUTO: 10.2 % (ref 0–13.4)
NEUTROPHILS # BLD AUTO: 4.95 K/UL (ref 2–7.15)
NEUTROPHILS NFR BLD: 53.9 % (ref 44–72)
NRBC # BLD AUTO: 0 K/UL
NRBC BLD-RTO: 0 /100 WBC
OPIATES UR QL SCN: NEGATIVE
OXYCODONE UR QL SCN: NEGATIVE
PCP UR QL SCN: NEGATIVE
PLATELET # BLD AUTO: 347 K/UL (ref 164–446)
PMV BLD AUTO: 10.3 FL (ref 9–12.9)
POTASSIUM SERPL-SCNC: 3.9 MMOL/L (ref 3.6–5.5)
PROPOXYPH UR QL SCN: NEGATIVE
PROT SERPL-MCNC: 7.3 G/DL (ref 6–8.2)
RBC # BLD AUTO: 4.92 M/UL (ref 4.2–5.4)
SODIUM SERPL-SCNC: 141 MMOL/L (ref 135–145)
WBC # BLD AUTO: 9.2 K/UL (ref 4.8–10.8)

## 2023-02-21 PROCEDURE — 99284 EMERGENCY DEPT VISIT MOD MDM: CPT

## 2023-02-21 PROCEDURE — 80307 DRUG TEST PRSMV CHEM ANLYZR: CPT

## 2023-02-21 PROCEDURE — 84703 CHORIONIC GONADOTROPIN ASSAY: CPT

## 2023-02-21 PROCEDURE — 82077 ASSAY SPEC XCP UR&BREATH IA: CPT

## 2023-02-21 PROCEDURE — 85025 COMPLETE CBC W/AUTO DIFF WBC: CPT

## 2023-02-21 PROCEDURE — 82962 GLUCOSE BLOOD TEST: CPT

## 2023-02-21 PROCEDURE — 80053 COMPREHEN METABOLIC PANEL: CPT

## 2023-02-21 PROCEDURE — 36415 COLL VENOUS BLD VENIPUNCTURE: CPT

## 2023-02-21 ASSESSMENT — LIFESTYLE VARIABLES
DO YOU DRINK ALCOHOL: NO
TOTAL SCORE: 0
CONSUMPTION TOTAL: INCOMPLETE
HAVE PEOPLE ANNOYED YOU BY CRITICIZING YOUR DRINKING: NO
HAVE YOU EVER FELT YOU SHOULD CUT DOWN ON YOUR DRINKING: NO
EVER HAD A DRINK FIRST THING IN THE MORNING TO STEADY YOUR NERVES TO GET RID OF A HANGOVER: NO
TOTAL SCORE: 0
EVER FELT BAD OR GUILTY ABOUT YOUR DRINKING: NO
TOTAL SCORE: 0

## 2023-02-21 NOTE — ED TRIAGE NOTES
Chief Complaint   Patient presents with    ALOC     Pt is not cooperative with triage assessment. Pt is flopped over in wheelchair, SO is rubbing pts back but unable/unwilling to provide further information on why pt is acting this way.       BP (!) 204/118   Pulse (!) 104   Temp 36.9 °C (98.4 °F) (Temporal)   Resp (!) 28   SpO2 100%

## 2023-02-21 NOTE — ED NOTES
While checking on pt, she suddenly sat up and stated she needed to use the restroom. Pt ambulated to restroom with assistance and provided sample. Pt placed back in room. Visitor at bedside.

## 2023-02-21 NOTE — ED NOTES
Brought back to room. Agree with triage note. Pt not answering questions, respirations unlabored. Pt fingertips cyanotic. Visitor stated pt has hx of self harm and did smoke meth today. ERP at bedside.

## 2023-02-21 NOTE — ED PROVIDER NOTES
ED Provider Note    CHIEF COMPLAINT  Chief Complaint   Patient presents with    ALOC       EXTERNAL RECORDS REVIEWED  Inpatient Notes discharge summary 8/1/2022, suicidal ideation and drug abuse, transferred to inpatient psychiatric facility, ER visit at [facility] 2/16/2023, cough, viral syndrome.    HPI/ROS  LIMITATION TO HISTORY   Select: Altered mental status / Confusion    Katerina Owen is a 37 y.o. female who presents really with unclear complaints.  She has a history of psychiatric problems with multiple ER visits with suicidal ideation, documented history of methamphetamine use, has also tested positive for opiates and benzodiazepines.  She is brought in today by an unknown male who really is unable to provide any sort of history whatsoever as he appears to be under the influence of some sort of intoxicant.  The patient is not providing any history whatsoever.  No other history is available at this time    PAST MEDICAL HISTORY   has a past medical history of ADHD, Arthritis, Bowel habit changes, Chlamydia (5/11/2011), depression, Heart burn, HTN (hypertension), Migraine, Pain, Pre-eclampsia, PTSD (post-traumatic stress disorder), and Staph infection (2011).    SURGICAL HISTORY   has a past surgical history that includes septoturbinoplasty (4/21/2009); lumpectomy (2014); breast biopsy (Left, 2/26/2016); primary c section (Aug 7, 2007); repeat c section w tubal ligation (9/7/2011); ventral hernia repair (Right, 1/10/2018); appendectomy laparoscopic (6/5/2018); and incision and drainage general (Left, 7/25/2022).    FAMILY HISTORY  Family History   Problem Relation Age of Onset    Heart Disease Father         heart failure smoker     Hypertension Father     Cancer Maternal Grandfather         pancreastic    Heart Disease Paternal Grandfather         heart attack    Diabetes Brother        SOCIAL HISTORY  Social History     Tobacco Use    Smoking status: Former     Packs/day: 0.50     Years: 6.00      "Pack years: 3.00     Types: Cigarettes     Quit date: 2014     Years since quittin.1    Smokeless tobacco: Never    Tobacco comments:     2017 currently Vape   Vaping Use    Vaping Use: Every day    Substances: Nicotine    Devices: Disposable   Substance and Sexual Activity    Alcohol use: No     Alcohol/week: 0.0 oz    Drug use: No    Sexual activity: Not Currently     Partners: Male     Birth control/protection: Surgical     Comment: three kids       CURRENT MEDICATIONS  Home Medications    **Home medications have not yet been reviewed for this encounter**         ALLERGIES  Allergies   Allergen Reactions    Morphine Nausea and Unspecified     \"feel sick for a whole day after being given morphine\"  RXN=2016      Vicodin [Hydrocodone-Acetaminophen] Vomiting and Nausea     DCI=9873       PHYSICAL EXAM  VITAL SIGNS: BP (!) 204/118   Pulse (!) 104   Temp 36.9 °C (98.4 °F) (Temporal)   Resp (!) 28   SpO2 100%    Constitutional: Dirty and disheveled, very sleepy but arousable to loud verbal or pain stimulus.  HENT: Normocephalic, no obvious evidence of acute trauma.  Eyes: No scleral icterus. Normal conjunctiva.  Pupils are equal and reactive.  Thorax & Lungs: Normal nonlabored respirations. I appreciate no wheezing, rhonchi or rales. There is normal air movement.  Upon cardiac ascultation I appreciate a regular heart rhythm and a tachycardic rate  Abdomen: The abdomen is not visibly distended. Upon palpation, I find it to be without tenderness.  No mass appreciated.  Skin: The exposed portions of skin reveal no obvious rash or other abnormalities.  Extremities/Musculoskeletal: No obvious sign of acute trauma. No asymmetric calf tenderness or edema.   Neurologic: Somnolent, arousable, no obvious focal deficits appreciated but does not follow commands    DIAGNOSTIC STUDIES / PROCEDURES    LABS  Results for orders placed or performed during the hospital encounter of 23   CBC WITH DIFFERENTIAL "   Result Value Ref Range    WBC 9.2 4.8 - 10.8 K/uL    RBC 4.92 4.20 - 5.40 M/uL    Hemoglobin 13.4 12.0 - 16.0 g/dL    Hematocrit 41.2 37.0 - 47.0 %    MCV 83.7 81.4 - 97.8 fL    MCH 27.2 27.0 - 33.0 pg    MCHC 32.5 (L) 33.6 - 35.0 g/dL    RDW 48.8 35.9 - 50.0 fL    Platelet Count 347 164 - 446 K/uL    MPV 10.3 9.0 - 12.9 fL    Neutrophils-Polys 53.90 44.00 - 72.00 %    Lymphocytes 33.40 22.00 - 41.00 %    Monocytes 10.20 0.00 - 13.40 %    Eosinophils 1.30 0.00 - 6.90 %    Basophils 0.70 0.00 - 1.80 %    Immature Granulocytes 0.50 0.00 - 0.90 %    Nucleated RBC 0.00 /100 WBC    Neutrophils (Absolute) 4.95 2.00 - 7.15 K/uL    Lymphs (Absolute) 3.07 1.00 - 4.80 K/uL    Monos (Absolute) 0.94 (H) 0.00 - 0.85 K/uL    Eos (Absolute) 0.12 0.00 - 0.51 K/uL    Baso (Absolute) 0.06 0.00 - 0.12 K/uL    Immature Granulocytes (abs) 0.05 0.00 - 0.11 K/uL    NRBC (Absolute) 0.00 K/uL   COMP METABOLIC PANEL   Result Value Ref Range    Sodium 141 135 - 145 mmol/L    Potassium 3.9 3.6 - 5.5 mmol/L    Chloride 106 96 - 112 mmol/L    Co2 27 20 - 33 mmol/L    Anion Gap 8.0 7.0 - 16.0    Glucose 114 (H) 65 - 99 mg/dL    Bun 15 8 - 22 mg/dL    Creatinine 0.77 0.50 - 1.40 mg/dL    Calcium 9.6 8.5 - 10.5 mg/dL    AST(SGOT) 20 12 - 45 U/L    ALT(SGPT) 18 2 - 50 U/L    Alkaline Phosphatase 110 (H) 30 - 99 U/L    Total Bilirubin 0.2 0.1 - 1.5 mg/dL    Albumin 3.9 3.2 - 4.9 g/dL    Total Protein 7.3 6.0 - 8.2 g/dL    Globulin 3.4 1.9 - 3.5 g/dL    A-G Ratio 1.1 g/dL   DIAGNOSTIC ALCOHOL   Result Value Ref Range    Diagnostic Alcohol <10.1 <10.1 mg/dL   HCG QUAL SERUM   Result Value Ref Range    Beta-Hcg Qualitative Serum Negative Negative   URINE DRUG SCREEN   Result Value Ref Range    Amphetamines Urine Positive (A) Negative    Barbiturates Negative Negative    Benzodiazepines Positive (A) Negative    Cocaine Metabolite Negative Negative    Methadone Negative Negative    Opiates Negative Negative    Oxycodone Negative Negative     Phencyclidine -Pcp Negative Negative    Propoxyphene Negative Negative    Cannabinoid Metab Negative Negative   CORRECTED CALCIUM   Result Value Ref Range    Correct Calcium 9.7 8.5 - 10.5 mg/dL   ESTIMATED GFR   Result Value Ref Range    GFR (CKD-EPI) 100 >60 mL/min/1.73 m 2   POCT glucose device results   Result Value Ref Range    POC Glucose, Blood 88 65 - 99 mg/dL      COURSE & MEDICAL DECISION MAKING    ED Observation Status? Yes; I am placing the patient in to an observation status due to a diagnostic uncertainty as well as therapeutic intensity. Patient placed in observation status at 10:55 AM, 2/21/2023.     Observation plan is as follows: Work-up and very close observation until her mental status improves    Upon Reevaluation, the patient's condition has: Improved; and will be discharged.    Patient discharged from ED Observation status at 3:30 PM (Time) 2/21/2023 (Date).     INITIAL ASSESSMENT, COURSE AND PLAN  Care Narrative: This is a 37-year-old known drug abuser with frequent presentations with suicidal ideation and attempts presenting altered, on exam she is sleepy but arousable, protecting her airway, not providing any history whatsoever.  No evidence of acute trauma.  Really no other focal findings on exam.  She had a male significant other who is with her who is absolutely useless in providing any sort of history, he is clearly under the influence of some intoxicant as well.  Alcohol level, drug screen, blood work will be obtained.  She will be watched very closely until the time that she is awake and alert and can be reevaluated.  She may require psychiatric evaluation as well    Blood work is unremarkable.  The drug screen comes back positive for amphetamines and benzodiazepines.  At this point the patient has improved mental status, she was up and walking and walked herself to the bathroom to urinate.  She will be discharged home in stable condition with strict return instructions  provided    DISPOSITION AND DISCUSSIONS    Escalation of care considered, and ultimately not performed:acute inpatient care management, however at this time, the patient is most appropriate for outpatient management    Barriers to care at this time, including but not limited to: Patient is homeless.     FINAL DIAGNOSIS  1. Methamphetamine abuse (HCC)    2. Benzodiazepine abuse (HCC)           Electronically signed by: Joshua Perez M.D., 2/21/2023 11:14 AM

## 2023-09-16 ENCOUNTER — HOSPITAL ENCOUNTER (EMERGENCY)
Facility: MEDICAL CENTER | Age: 38
End: 2023-09-16
Payer: COMMERCIAL

## 2023-09-16 VITALS
WEIGHT: 128.75 LBS | HEART RATE: 101 BPM | DIASTOLIC BLOOD PRESSURE: 110 MMHG | OXYGEN SATURATION: 98 % | RESPIRATION RATE: 18 BRPM | BODY MASS INDEX: 19.58 KG/M2 | TEMPERATURE: 96.6 F | SYSTOLIC BLOOD PRESSURE: 188 MMHG

## 2023-09-16 PROCEDURE — 302449 STATCHG TRIAGE ONLY (STATISTIC)

## 2023-09-16 ASSESSMENT — FIBROSIS 4 INDEX: FIB4 SCORE: 0.47

## 2023-09-17 NOTE — ED NOTES
Pt ambulated to triage desk with steady gait, reporting that she feels better and no longer wishes to be seen. Risks of leaving before being seen discussed and pt verbalized understanding. AMA paperwork signed and pt dismissed LWBS.

## 2023-09-17 NOTE — ED TRIAGE NOTES
Katerina Muryr [facility]  37 y.o. female  Chief Complaint   Patient presents with    Open Wound     Multiple sores       Pt amb to triage with steady gait for above complaint. Pt reports injecting meth and smoking fentanyl PTA.  Pt is alert and oriented, speaking in full sentences, follows commands and responds appropriately to questions. Not in any apparent distress. Respirations are even and unlabored.  Pt placed in lobby. Pt educated on triage process. Pt encouraged to alert staff for any changes.

## 2024-02-22 ENCOUNTER — HOSPITAL ENCOUNTER (EMERGENCY)
Facility: MEDICAL CENTER | Age: 39
End: 2024-02-22
Attending: EMERGENCY MEDICINE
Payer: COMMERCIAL

## 2024-02-22 ENCOUNTER — HOSPITAL ENCOUNTER (EMERGENCY)
Facility: MEDICAL CENTER | Age: 39
End: 2024-02-22
Payer: COMMERCIAL

## 2024-02-22 ENCOUNTER — PHARMACY VISIT (OUTPATIENT)
Dept: PHARMACY | Facility: MEDICAL CENTER | Age: 39
End: 2024-02-22
Payer: MEDICARE

## 2024-02-22 VITALS
DIASTOLIC BLOOD PRESSURE: 106 MMHG | RESPIRATION RATE: 24 BRPM | OXYGEN SATURATION: 92 % | BODY MASS INDEX: 20.15 KG/M2 | HEIGHT: 68 IN | SYSTOLIC BLOOD PRESSURE: 154 MMHG | HEART RATE: 110 BPM | WEIGHT: 132.94 LBS | TEMPERATURE: 96.5 F

## 2024-02-22 VITALS
OXYGEN SATURATION: 97 % | BODY MASS INDEX: 18.91 KG/M2 | DIASTOLIC BLOOD PRESSURE: 94 MMHG | SYSTOLIC BLOOD PRESSURE: 156 MMHG | RESPIRATION RATE: 14 BRPM | HEIGHT: 68 IN | TEMPERATURE: 97 F | HEART RATE: 84 BPM | WEIGHT: 124.78 LBS

## 2024-02-22 DIAGNOSIS — F15.10 METHAMPHETAMINE ABUSE (HCC): ICD-10-CM

## 2024-02-22 DIAGNOSIS — L02.419 CELLULITIS AND ABSCESS OF UPPER EXTREMITY: ICD-10-CM

## 2024-02-22 DIAGNOSIS — L03.119 CELLULITIS AND ABSCESS OF UPPER EXTREMITY: ICD-10-CM

## 2024-02-22 LAB
ALBUMIN SERPL BCP-MCNC: 4.3 G/DL (ref 3.2–4.9)
ALBUMIN/GLOB SERPL: 1 G/DL
ALP SERPL-CCNC: 130 U/L (ref 30–99)
ALT SERPL-CCNC: 21 U/L (ref 2–50)
AMPHET UR QL SCN: POSITIVE
ANION GAP SERPL CALC-SCNC: 15 MMOL/L (ref 7–16)
AST SERPL-CCNC: 23 U/L (ref 12–45)
BARBITURATES UR QL SCN: NEGATIVE
BASOPHILS # BLD AUTO: 0.6 % (ref 0–1.8)
BASOPHILS # BLD: 0.08 K/UL (ref 0–0.12)
BENZODIAZ UR QL SCN: NEGATIVE
BILIRUB SERPL-MCNC: 0.6 MG/DL (ref 0.1–1.5)
BUN SERPL-MCNC: 19 MG/DL (ref 8–22)
BZE UR QL SCN: POSITIVE
CALCIUM ALBUM COR SERPL-MCNC: 9.6 MG/DL (ref 8.5–10.5)
CALCIUM SERPL-MCNC: 9.8 MG/DL (ref 8.5–10.5)
CANNABINOIDS UR QL SCN: NEGATIVE
CHLORIDE SERPL-SCNC: 101 MMOL/L (ref 96–112)
CO2 SERPL-SCNC: 22 MMOL/L (ref 20–33)
CREAT SERPL-MCNC: 0.98 MG/DL (ref 0.5–1.4)
EOSINOPHIL # BLD AUTO: 0.03 K/UL (ref 0–0.51)
EOSINOPHIL NFR BLD: 0.2 % (ref 0–6.9)
ERYTHROCYTE [DISTWIDTH] IN BLOOD BY AUTOMATED COUNT: 46.2 FL (ref 35.9–50)
ETHANOL BLD-MCNC: <10.1 MG/DL
FENTANYL UR QL: POSITIVE
GFR SERPLBLD CREATININE-BSD FMLA CKD-EPI: 75 ML/MIN/1.73 M 2
GLOBULIN SER CALC-MCNC: 4.4 G/DL (ref 1.9–3.5)
GLUCOSE SERPL-MCNC: 89 MG/DL (ref 65–99)
GRAM STN SPEC: NORMAL
HCG SERPL QL: NEGATIVE
HCT VFR BLD AUTO: 34.8 % (ref 37–47)
HGB BLD-MCNC: 10.9 G/DL (ref 12–16)
IMM GRANULOCYTES # BLD AUTO: 0.05 K/UL (ref 0–0.11)
IMM GRANULOCYTES NFR BLD AUTO: 0.4 % (ref 0–0.9)
LACTATE SERPL-SCNC: 1.2 MMOL/L (ref 0.5–2)
LYMPHOCYTES # BLD AUTO: 2.45 K/UL (ref 1–4.8)
LYMPHOCYTES NFR BLD: 17.5 % (ref 22–41)
MCH RBC QN AUTO: 23.7 PG (ref 27–33)
MCHC RBC AUTO-ENTMCNC: 31.3 G/DL (ref 32.2–35.5)
MCV RBC AUTO: 75.8 FL (ref 81.4–97.8)
METHADONE UR QL SCN: NEGATIVE
MONOCYTES # BLD AUTO: 0.88 K/UL (ref 0–0.85)
MONOCYTES NFR BLD AUTO: 6.3 % (ref 0–13.4)
NEUTROPHILS # BLD AUTO: 10.49 K/UL (ref 1.82–7.42)
NEUTROPHILS NFR BLD: 75 % (ref 44–72)
NRBC # BLD AUTO: 0 K/UL
NRBC BLD-RTO: 0 /100 WBC (ref 0–0.2)
OPIATES UR QL SCN: NEGATIVE
OXYCODONE UR QL SCN: NEGATIVE
PCP UR QL SCN: NEGATIVE
PLATELET # BLD AUTO: 438 K/UL (ref 164–446)
PMV BLD AUTO: 9.3 FL (ref 9–12.9)
POTASSIUM SERPL-SCNC: 3.2 MMOL/L (ref 3.6–5.5)
PROCALCITONIN SERPL-MCNC: <0.05 NG/ML
PROPOXYPH UR QL SCN: NEGATIVE
PROT SERPL-MCNC: 8.7 G/DL (ref 6–8.2)
RBC # BLD AUTO: 4.59 M/UL (ref 4.2–5.4)
SCCMEC + MECA PNL NOSE NAA+PROBE: POSITIVE
SIGNIFICANT IND 70042: NORMAL
SITE SITE: NORMAL
SODIUM SERPL-SCNC: 138 MMOL/L (ref 135–145)
SOURCE SOURCE: NORMAL
WBC # BLD AUTO: 14 K/UL (ref 4.8–10.8)

## 2024-02-22 PROCEDURE — 87205 SMEAR GRAM STAIN: CPT

## 2024-02-22 PROCEDURE — 700102 HCHG RX REV CODE 250 W/ 637 OVERRIDE(OP): Mod: UD | Performed by: EMERGENCY MEDICINE

## 2024-02-22 PROCEDURE — 84703 CHORIONIC GONADOTROPIN ASSAY: CPT

## 2024-02-22 PROCEDURE — 87186 SC STD MICRODIL/AGAR DIL: CPT

## 2024-02-22 PROCEDURE — 87070 CULTURE OTHR SPECIMN AEROBIC: CPT | Mod: XU

## 2024-02-22 PROCEDURE — 700101 HCHG RX REV CODE 250: Mod: UD | Performed by: EMERGENCY MEDICINE

## 2024-02-22 PROCEDURE — 36415 COLL VENOUS BLD VENIPUNCTURE: CPT

## 2024-02-22 PROCEDURE — 96365 THER/PROPH/DIAG IV INF INIT: CPT | Mod: XU

## 2024-02-22 PROCEDURE — 700111 HCHG RX REV CODE 636 W/ 250 OVERRIDE (IP): Mod: JZ,UD | Performed by: EMERGENCY MEDICINE

## 2024-02-22 PROCEDURE — 96368 THER/DIAG CONCURRENT INF: CPT | Mod: XU

## 2024-02-22 PROCEDURE — 87077 CULTURE AEROBIC IDENTIFY: CPT

## 2024-02-22 PROCEDURE — 80307 DRUG TEST PRSMV CHEM ANLYZR: CPT

## 2024-02-22 PROCEDURE — 87040 BLOOD CULTURE FOR BACTERIA: CPT | Mod: 91,XU

## 2024-02-22 PROCEDURE — 80053 COMPREHEN METABOLIC PANEL: CPT

## 2024-02-22 PROCEDURE — 303977 HCHG I & D

## 2024-02-22 PROCEDURE — 83605 ASSAY OF LACTIC ACID: CPT

## 2024-02-22 PROCEDURE — 84145 PROCALCITONIN (PCT): CPT

## 2024-02-22 PROCEDURE — 85025 COMPLETE CBC W/AUTO DIFF WBC: CPT

## 2024-02-22 PROCEDURE — RXMED WILLOW AMBULATORY MEDICATION CHARGE: Performed by: EMERGENCY MEDICINE

## 2024-02-22 PROCEDURE — 700105 HCHG RX REV CODE 258: Mod: UD | Performed by: EMERGENCY MEDICINE

## 2024-02-22 PROCEDURE — 96375 TX/PRO/DX INJ NEW DRUG ADDON: CPT | Mod: XU

## 2024-02-22 PROCEDURE — 96376 TX/PRO/DX INJ SAME DRUG ADON: CPT | Mod: XU

## 2024-02-22 PROCEDURE — 82077 ASSAY SPEC XCP UR&BREATH IA: CPT

## 2024-02-22 PROCEDURE — A9270 NON-COVERED ITEM OR SERVICE: HCPCS | Mod: UD | Performed by: EMERGENCY MEDICINE

## 2024-02-22 PROCEDURE — 87641 MR-STAPH DNA AMP PROBE: CPT

## 2024-02-22 PROCEDURE — 99285 EMERGENCY DEPT VISIT HI MDM: CPT

## 2024-02-22 PROCEDURE — 96366 THER/PROPH/DIAG IV INF ADDON: CPT | Mod: XU

## 2024-02-22 PROCEDURE — 302449 STATCHG TRIAGE ONLY (STATISTIC)

## 2024-02-22 RX ORDER — POTASSIUM CHLORIDE 20 MEQ/1
40 TABLET, EXTENDED RELEASE ORAL ONCE
Status: COMPLETED | OUTPATIENT
Start: 2024-02-22 | End: 2024-02-22

## 2024-02-22 RX ORDER — SULFAMETHOXAZOLE AND TRIMETHOPRIM 800; 160 MG/1; MG/1
1 TABLET ORAL 2 TIMES DAILY
Qty: 14 TABLET | Refills: 0 | Status: ACTIVE | OUTPATIENT
Start: 2024-02-22 | End: 2024-02-29

## 2024-02-22 RX ORDER — CEPHALEXIN 500 MG/1
500 CAPSULE ORAL 4 TIMES DAILY
Qty: 28 CAPSULE | Refills: 0 | Status: ACTIVE | OUTPATIENT
Start: 2024-02-22

## 2024-02-22 RX ORDER — DIAZEPAM 5 MG/1
5 TABLET ORAL EVERY 6 HOURS PRN
Status: DISCONTINUED | OUTPATIENT
Start: 2024-02-22 | End: 2024-02-22

## 2024-02-22 RX ORDER — SODIUM CHLORIDE, SODIUM LACTATE, POTASSIUM CHLORIDE, AND CALCIUM CHLORIDE .6; .31; .03; .02 G/100ML; G/100ML; G/100ML; G/100ML
30 INJECTION, SOLUTION INTRAVENOUS ONCE
Status: COMPLETED | OUTPATIENT
Start: 2024-02-22 | End: 2024-02-22

## 2024-02-22 RX ORDER — KETOROLAC TROMETHAMINE 15 MG/ML
15 INJECTION, SOLUTION INTRAMUSCULAR; INTRAVENOUS ONCE
Status: COMPLETED | OUTPATIENT
Start: 2024-02-22 | End: 2024-02-22

## 2024-02-22 RX ORDER — DIAZEPAM 5 MG/1
5 TABLET ORAL EVERY 6 HOURS PRN
Status: COMPLETED | OUTPATIENT
Start: 2024-02-22 | End: 2024-02-22

## 2024-02-22 RX ORDER — LIDOCAINE HYDROCHLORIDE AND EPINEPHRINE 10; 10 MG/ML; UG/ML
10 INJECTION, SOLUTION INFILTRATION; PERINEURAL ONCE
Status: COMPLETED | OUTPATIENT
Start: 2024-02-22 | End: 2024-02-22

## 2024-02-22 RX ORDER — DIAZEPAM 5 MG/ML
5 INJECTION, SOLUTION INTRAMUSCULAR; INTRAVENOUS ONCE
Status: COMPLETED | OUTPATIENT
Start: 2024-02-22 | End: 2024-02-22

## 2024-02-22 RX ADMIN — FENTANYL CITRATE 100 MCG: 50 INJECTION, SOLUTION INTRAMUSCULAR; INTRAVENOUS at 10:15

## 2024-02-22 RX ADMIN — POTASSIUM CHLORIDE 40 MEQ: 1500 TABLET, EXTENDED RELEASE ORAL at 12:30

## 2024-02-22 RX ADMIN — KETOROLAC TROMETHAMINE 15 MG: 15 INJECTION, SOLUTION INTRAMUSCULAR; INTRAVENOUS at 13:13

## 2024-02-22 RX ADMIN — AMPICILLIN AND SULBACTAM 3 G: 1; 2 INJECTION, POWDER, FOR SOLUTION INTRAMUSCULAR; INTRAVENOUS at 12:30

## 2024-02-22 RX ADMIN — FENTANYL CITRATE 50 MCG: 50 INJECTION, SOLUTION INTRAMUSCULAR; INTRAVENOUS at 08:22

## 2024-02-22 RX ADMIN — VANCOMYCIN HYDROCHLORIDE 1500 MG: 5 INJECTION, POWDER, LYOPHILIZED, FOR SOLUTION INTRAVENOUS at 09:17

## 2024-02-22 RX ADMIN — DIAZEPAM 5 MG: 5 TABLET ORAL at 15:50

## 2024-02-22 RX ADMIN — DIAZEPAM 5 MG: 5 INJECTION, SOLUTION INTRAMUSCULAR; INTRAVENOUS at 07:43

## 2024-02-22 RX ADMIN — LIDOCAINE HYDROCHLORIDE AND EPINEPHRINE 10 ML: 10; 10 INJECTION, SOLUTION INFILTRATION; PERINEURAL at 08:30

## 2024-02-22 RX ADMIN — SODIUM CHLORIDE, POTASSIUM CHLORIDE, SODIUM LACTATE AND CALCIUM CHLORIDE 1698 ML: 600; 310; 30; 20 INJECTION, SOLUTION INTRAVENOUS at 07:54

## 2024-02-22 ASSESSMENT — FIBROSIS 4 INDEX
FIB4 SCORE: 0.46
FIB4 SCORE: 0.5

## 2024-02-22 NOTE — ED NOTES
.Bedside report received from off going RN/tech: ., assumed care of patient.  POC discussed with patient. Call light within reach, all needs addressed at this time.       Fall risk interventions in place: Place fall risk sign on patient's door and Keep floor surfaces clean and dry (all applicable per [place] Fall risk assessment)   Continuous monitoring: Cardiac Leads, Pulse Ox, or Blood Pressure  IVF/IV medications: Not Applicable   Oxygen: Room Air  Bedside sitter: Not Applicable   Isolation: Not Applicable

## 2024-02-22 NOTE — ED NOTES
With md at bedside to exam bilateral breasts. Pt requesting food and pain medication to doctor. Given food per md ok and will medicate as ordered

## 2024-02-22 NOTE — ED NOTES
Bedside report received from off going RN/tech: Darcy, assumed care of patient.  POC discussed with patient. Call light within reach, all needs addressed at this time.       Fall risk interventions in place: Not Applicable (all applicable per [place] Fall risk assessment)   Continuous monitoring: Cardiac Leads, Pulse Ox, or Blood Pressure  IVF/IV medications: Infusion per MAR (List Med(s)) Flagyl  Oxygen: Room Air  Bedside sitter: Not Applicable   Isolation: Not Applicable

## 2024-02-22 NOTE — DISCHARGE INSTRUCTIONS
You were seen in the ER after methamphetamine use. I drained an abscess in your left armpit. Please pull out a tiny amount of the packing daily.  We have given you antibiotics, please take them as directed.  We have also provided you with some outpatient resources for drug use.  Please follow-up with your primary care provider immediately and return to the ER with new or worsening symptoms.  I hope you feel better soon!

## 2024-02-22 NOTE — ED PROVIDER NOTES
ED Provider Note    CHIEF COMPLAINT  Chief Complaint   Patient presents with    Drug Abuse     States not feeling well and used meth 2 hrs ago. Also c/o pain on her incision site from a hernia repair. No swelling, no drainage noted.       EXTERNAL RECORDS REVIEWED  External ED Note most recently seen at [facility] emergency department 2/16/2024 for headache.  She has a history of methamphetamine abuse.  She was noted to be awake and alert, ambulatory, and nontoxic with a steady gait.  There were no lateralizing deficits and she had a nonfocal neurologic exam.  She was offered over-the-counter pain medication and was unkind to staff then immediately walked out of the emergency department.    HPI/ROS  LIMITATION TO HISTORY   Select: Intoxication  OUTSIDE HISTORIAN(S):  None    Katerina Owen is a 38 y.o. female with a history of methamphetamine abuse who presents with a complaint of feeling generally unwell after using methamphetamines several hours ago.  She also reports an abscess in her left armpit that she states is due to her history of breast cancer.  She is unable to provide any additional information as she subsequently became irritable and confrontational.    PAST MEDICAL HISTORY   has a past medical history of ADHD, Arthritis, Bowel habit changes, Chlamydia (5/11/2011), depression, Heart burn, HTN (hypertension), Migraine, Pain, Pre-eclampsia, PTSD (post-traumatic stress disorder), and Staph infection (2011).    SURGICAL HISTORY   has a past surgical history that includes septoturbinoplasty (4/21/2009); lumpectomy (2014); breast biopsy (Left, 2/26/2016); primary c section (Aug 7, 2007); repeat c section w tubal ligation (9/7/2011); ventral hernia repair (Right, 1/10/2018); appendectomy laparoscopic (6/5/2018); and incision and drainage general (Left, 7/25/2022).    FAMILY HISTORY  Family History   Problem Relation Age of Onset    Heart Disease Father         heart failure smoker     Hypertension  "Father     Cancer Maternal Grandfather         pancreastic    Heart Disease Paternal Grandfather         heart attack    Diabetes Brother        SOCIAL HISTORY  Social History     Tobacco Use    Smoking status: Former     Current packs/day: 0.00     Average packs/day: 0.5 packs/day for 6.0 years (3.0 ttl pk-yrs)     Types: Cigarettes     Start date: 2008     Quit date: 2014     Years since quittin.1    Smokeless tobacco: Never    Tobacco comments:     2017 currently Vape   Vaping Use    Vaping Use: Every day    Substances: Nicotine    Devices: Disposable   Substance and Sexual Activity    Alcohol use: No     Alcohol/week: 0.0 oz    Drug use: Yes     Comment: meth    Sexual activity: Not Currently     Partners: Male     Birth control/protection: Surgical     Comment: three kids       CURRENT MEDICATIONS  Home Medications       Reviewed by Josef Arango R.N. (Registered Nurse) on 24 at 0609  Med List Status: Partial     Medication Last Dose Status   amLODIPine (NORVASC) 5 MG Tab  Active   ARIPiprazole (ABILIFY) 5 MG tablet  Active   metoprolol tartrate (LOPRESSOR) 25 MG Tab  Active   polyethylene glycol/lytes (MIRALAX) 17 g Pack  Active                    ALLERGIES  Allergies   Allergen Reactions    Morphine Nausea and Unspecified     \"feel sick for a whole day after being given morphine\"  RXN=2016      Vicodin [Hydrocodone-Acetaminophen] Vomiting and Nausea     MYT=8780       PHYSICAL EXAM  VITAL SIGNS: /77   Pulse (!) 116   Temp 36.1 °C (97 °F) (Temporal)   Resp 20   Ht 1.727 m (5' 8\")   Wt 56.6 kg (124 lb 12.5 oz)   SpO2 99%   BMI 18.97 kg/m²    Physical Exam  Vitals and nursing note reviewed.   Constitutional:       Comments: Appears to be under the influence.  Multiple shallow ulcerative lesions over face and body.  Largely uncooperative with exam.   HENT:      Right Ear: External ear normal.      Left Ear: External ear normal.      Nose: Nose normal.      Mouth/Throat:     "  Mouth: Mucous membranes are dry.   Eyes:      Extraocular Movements: Extraocular movements intact.      Conjunctiva/sclera: Conjunctivae normal.      Pupils: Pupils are equal, round, and reactive to light.   Cardiovascular:      Rate and Rhythm: Regular rhythm. Tachycardia present.      Pulses: Normal pulses.   Pulmonary:      Effort: Pulmonary effort is normal.   Abdominal:      Palpations: Abdomen is soft.      Tenderness: There is no abdominal tenderness.   Musculoskeletal:         General: Normal range of motion.      Cervical back: Normal range of motion and neck supple.      Comments: Large, tender, indurated, erythematous mass in the left axilla.   Skin:     General: Skin is warm and dry.   Neurological:      General: No focal deficit present.      Mental Status: She is alert.   Psychiatric:      Comments: Irritable, appears to be intoxicated/under the influence.       DIAGNOSTIC STUDIES / PROCEDURES  LABS  Results for orders placed or performed during the hospital encounter of 02/22/24   Lactic Acid   Result Value Ref Range    Lactic Acid 1.2 0.5 - 2.0 mmol/L   CBC with Differential   Result Value Ref Range    WBC 14.0 (H) 4.8 - 10.8 K/uL    RBC 4.59 4.20 - 5.40 M/uL    Hemoglobin 10.9 (L) 12.0 - 16.0 g/dL    Hematocrit 34.8 (L) 37.0 - 47.0 %    MCV 75.8 (L) 81.4 - 97.8 fL    MCH 23.7 (L) 27.0 - 33.0 pg    MCHC 31.3 (L) 32.2 - 35.5 g/dL    RDW 46.2 35.9 - 50.0 fL    Platelet Count 438 164 - 446 K/uL    MPV 9.3 9.0 - 12.9 fL    Neutrophils-Polys 75.00 (H) 44.00 - 72.00 %    Lymphocytes 17.50 (L) 22.00 - 41.00 %    Monocytes 6.30 0.00 - 13.40 %    Eosinophils 0.20 0.00 - 6.90 %    Basophils 0.60 0.00 - 1.80 %    Immature Granulocytes 0.40 0.00 - 0.90 %    Nucleated RBC 0.00 0.00 - 0.20 /100 WBC    Neutrophils (Absolute) 10.49 (H) 1.82 - 7.42 K/uL    Lymphs (Absolute) 2.45 1.00 - 4.80 K/uL    Monos (Absolute) 0.88 (H) 0.00 - 0.85 K/uL    Eos (Absolute) 0.03 0.00 - 0.51 K/uL    Baso (Absolute) 0.08 0.00 - 0.12  K/uL    Immature Granulocytes (abs) 0.05 0.00 - 0.11 K/uL    NRBC (Absolute) 0.00 K/uL   Complete Metabolic Panel   Result Value Ref Range    Sodium 138 135 - 145 mmol/L    Potassium 3.2 (L) 3.6 - 5.5 mmol/L    Chloride 101 96 - 112 mmol/L    Co2 22 20 - 33 mmol/L    Anion Gap 15.0 7.0 - 16.0    Glucose 89 65 - 99 mg/dL    Bun 19 8 - 22 mg/dL    Creatinine 0.98 0.50 - 1.40 mg/dL    Calcium 9.8 8.5 - 10.5 mg/dL    Correct Calcium 9.6 8.5 - 10.5 mg/dL    AST(SGOT) 23 12 - 45 U/L    ALT(SGPT) 21 2 - 50 U/L    Alkaline Phosphatase 130 (H) 30 - 99 U/L    Total Bilirubin 0.6 0.1 - 1.5 mg/dL    Albumin 4.3 3.2 - 4.9 g/dL    Total Protein 8.7 (H) 6.0 - 8.2 g/dL    Globulin 4.4 (H) 1.9 - 3.5 g/dL    A-G Ratio 1.0 g/dL   DIAGNOSTIC ALCOHOL   Result Value Ref Range    Diagnostic Alcohol <10.1 <10.1 mg/dL   BETA-HCG QUALITATIVE SERUM   Result Value Ref Range    Beta-Hcg Qualitative Serum Negative Negative   ESTIMATED GFR   Result Value Ref Range    GFR (CKD-EPI) 75 >60 mL/min/1.73 m 2   MRSA By PCR (Amp)    Specimen: Nares; Respirate   Result Value Ref Range    MRSA by PCR POSITIVE Negative     RADIOLOGY  Radiologist interpretation:   No orders to display     COURSE & MEDICAL DECISION MAKING    ED Observation Status? Yes; I am placing the patient in to an observation status due to a diagnostic uncertainty as well as therapeutic intensity. Patient placed in observation status at 12:10 PM, 2/22/2024.     Observation plan is as follows: Antibiotics, reevaluation after metabolization    INCISION AND DRAINAGE  Verbal consent was obtained. Patient was placed in the appropriate position. The tender left axillary mass was cleansed with betadine. Approximately 2 ccs of 2% lidocaine with epinephrine was instilled into the center of the left axillary lesion using a 27 gauge needle. After appropriate anesthesia, an incision was made in the center of the mass using an 11-blade scalpel. Copious amounts of purulent material was expressed. The  cavity was entered with forceps and loculations were broken up with expression of additional purulent material. The cavity was irrigated with saline and packed with iodoform gauze. The patient tolerated the procedure well and there were no immediate complications.    INITIAL ASSESSMENT, COURSE AND PLAN  Care Narrative: This is a 38 year old female with a history of substance abuse who is here feeling unwell after using methamphetamines. She also reports an abscess in her left axilla that she states is due to her history of breast cancer. I cannot find any notes confirming neoplastic disease. Patient tells me that she also has a history of hidradenitis which is the most likely etiology of mass in the left axilla.  Bedside ultrasound revealed a discrete pocket of fluid which was drained as above.  She initially arrived tachycardic but after IV fluids and antibiotics her heart rate improved to normal.  She received both Unasyn and vancomycin.  The wound was cultured and with her history of MRSA she will require Bactrim and Keflex as an outpatient for complicated skin and soft tissue infection.  She has multiple superficial ulcerations over her face and body and is picking at her skin during the entire history and physical.  She admits to methamphetamine use and was given Valium in order to assist in calming the patient enough for me to perform both an appropriate physical and an I&D of the left axillary abscess.  CBC reveals leukocytosis with neutrophilic predominance but no bandemia.  She has mild anemia.  Metabolic panel reveals mild hypokalemia 3.2 which was repleted orally.  Lactate is normal.  hCG is negative.    Patient required several doses of Valium and at the end of my shift had not cleared/metabolized adequately for discharge.  I asked nursing staff to provide her with her antibiotic prescription prior to discharge through meds to beds.  At this point I believe that her behavior is due to substance abuse and  do not think that she meets criteria for legal hold.  She will require additional observation prior to discharge.  Patient care was transferred to my colleague, Dr. Bojorquez, to observe the patient until she is safe for discharge.    HYDRATION: Based on the patient's presentation of Dehydration and Tachycardia the patient was given IV fluids. IV Hydration was used because oral hydration was not adequate alone. Upon recheck following hydration, the patient was improved.    ADDITIONAL PROBLEM LIST  None  DISPOSITION AND DISCUSSIONS  I have discussed management of the patient with the following physicians and SHARMILA's: None    Discussion of management with other QHP or appropriate source(s): None     Escalation of care considered, and ultimately not performed: N/A.    Barriers to care at this time, including but not limited to:  Abuses substances and is unreliable .     Decision tools and prescription drugs considered including, but not limited to: Antibiotics Bactrim and Keflex .    FINAL DIAGNOSIS  1. Cellulitis and abscess of upper extremity    2. Methamphetamine abuse (HCC)      Electronically signed by: Garcia Black M.D., 2/22/2024 6:52 AM

## 2024-02-22 NOTE — LETTER
2/27/2024               Katerina Lovely [facility]  [address] [address]        Dear Katerina (MR#[id number])    This letter is sent in regards to your recent visit to the [facility] Emergency Department on 2/22/2024. During the visit, tests were performed to assist the physician in your medical diagnosis. A review of your tests requires that we notify you of the following:    Your wound culture was POSITIVE for a bacteria called Methicillin Resistant Staphylococcus aureus (MRSA). The antibiotic prescribed for you (sulfamethoxazole-trimethoprim and cephalexin) should be active to treat this bacteria. It is important that you continue taking your antibiotic until it is finished.     Please feel free to contact me at the number below if you have any questions or concerns. Thank you for your cooperation in the matter.    Sincerely,  ED Culture Follow-Up Staff  Carmela Dos Santos, PharmD    [facility], Emergency Department  [address] [place] [id number] [phone] (ED Culture Line)

## 2024-02-22 NOTE — ED NOTES
Patient roomed and demanding Food and water. Patient informed that she needed to wait for the doctor and did not like that answer.

## 2024-02-22 NOTE — ED NOTES
Pt pulling and yanked her monitoring equipment off, pt states she is having 10/10 all over body pain

## 2024-02-22 NOTE — ED NOTES
Med rec completed per patient at bedside.  Allergies reviewed with patient.  Patient's preferred pharmacy: none specified.    Patient denies using any prescription or over-the-counter medications at this time.    Outpatient antibiotics within the last 30 days: NONE.    ANTICOAGULATION: NONE.

## 2024-02-22 NOTE — ED TRIAGE NOTES
Katerina Murry [facility]  38 y.o.  female  Chief Complaint   Patient presents with    Drug Abuse     States not feeling well and used meth 2 hrs ago. Also c/o pain on her incision site from a hernia repair. No swelling, no drainage noted.

## 2024-02-23 ENCOUNTER — HOSPITAL ENCOUNTER (EMERGENCY)
Facility: MEDICAL CENTER | Age: 39
End: 2024-02-23
Attending: EMERGENCY MEDICINE
Payer: COMMERCIAL

## 2024-02-23 VITALS
RESPIRATION RATE: 18 BRPM | DIASTOLIC BLOOD PRESSURE: 81 MMHG | OXYGEN SATURATION: 94 % | BODY MASS INDEX: 19.18 KG/M2 | TEMPERATURE: 96.9 F | SYSTOLIC BLOOD PRESSURE: 158 MMHG | HEIGHT: 68 IN | WEIGHT: 126.54 LBS | HEART RATE: 97 BPM

## 2024-02-23 DIAGNOSIS — F15.10 METHAMPHETAMINE ABUSE (HCC): ICD-10-CM

## 2024-02-23 LAB
EKG IMPRESSION: NORMAL
POC BREATHALIZER: NORMAL (ref 0–0.01)

## 2024-02-23 PROCEDURE — A9270 NON-COVERED ITEM OR SERVICE: HCPCS | Mod: UD | Performed by: EMERGENCY MEDICINE

## 2024-02-23 PROCEDURE — 700102 HCHG RX REV CODE 250 W/ 637 OVERRIDE(OP): Mod: UD | Performed by: EMERGENCY MEDICINE

## 2024-02-23 PROCEDURE — 99285 EMERGENCY DEPT VISIT HI MDM: CPT

## 2024-02-23 PROCEDURE — 90791 PSYCH DIAGNOSTIC EVALUATION: CPT

## 2024-02-23 PROCEDURE — 93005 ELECTROCARDIOGRAM TRACING: CPT

## 2024-02-23 PROCEDURE — 302970 POC BREATHALIZER

## 2024-02-23 PROCEDURE — 93005 ELECTROCARDIOGRAM TRACING: CPT | Performed by: EMERGENCY MEDICINE

## 2024-02-23 RX ORDER — OLANZAPINE 5 MG/1
10 TABLET, ORALLY DISINTEGRATING ORAL ONCE
Status: COMPLETED | OUTPATIENT
Start: 2024-02-23 | End: 2024-02-23

## 2024-02-23 RX ADMIN — OLANZAPINE 10 MG: 5 TABLET, ORALLY DISINTEGRATING ORAL at 11:16

## 2024-02-23 ASSESSMENT — FIBROSIS 4 INDEX: FIB4 SCORE: 0.46

## 2024-02-23 NOTE — ED NOTES
Pt walked back from [facility] and placed into [facility].   Bedside report received from Seble OVERTON.   Skip at bedside helping to assist pt into paper scrubs and acquire urine sample.   Pt continues to have thoughts all over the place. Saying many grandiose statements

## 2024-02-23 NOTE — ED NOTES
Discharge paperwork given to pt, pt instructed on proper dosing of all her medications, all questions answered, all belongings accounted for, pt ambulated with steady gait to the ER exit

## 2024-02-23 NOTE — ED NOTES
Assist Rn:  Pt HIGH risk.  Room cleared, clothing bagged & checked by security.  Sitter at door.  Placed on legal 2000.

## 2024-02-23 NOTE — ED TRIAGE NOTES
"Chief Complaint   Patient presents with    Shoulder Pain     PT reports left shoulder pain which she has had since she was 7, additional she reports recently she had abscessed drained in left arm pit. PT is tangential in conversation, difficult historian.      PT is acting erratic and agitated, blurting out demands and muttering to herself.     BP (!) 154/106   Pulse (!) 110   Temp 35.8 °C (96.5 °F) (Temporal)   Resp (!) 24   Ht 1.727 m (5' 8\")   Wt 60.3 kg (132 lb 15 oz)   SpO2 92%   BMI 20.21 kg/m²     "

## 2024-02-23 NOTE — ED PROVIDER NOTES
"ED Provider Note    CHIEF COMPLAINT  Chief Complaint   Patient presents with    Chest Pain    Shoulder Pain    Anxiety    Suicidal Ideation    Toe Pain       HPI  Katerina Owen is a 38 y.o. female who presents for evaluation of erratic behavior and multiple complaintsIncluding chest pain shoulder pain, anxiety, toe pain, facial rash, and suicidal ideation.  Patient is very difficult to interview as she speaks very fast and is very tangential.  She is occasionally screaming \"do not touch me\" during the interview.  Patient is either unable, or being very evasive about why she is suicidal but claims she is going to \"jump off a building\" if she is discharged.    EXTERNAL RECORDS REVIEWED  Reviewed patient's visit from   ROS  Unable to obtain consistent review of systems due to methamphetamine intoxication        LIMITATION TO HISTORY   Methamphetamine intoxication  OUTSIDE HISTORIAN(S):          PAST FAM HISTORY  Family History   Problem Relation Age of Onset    Heart Disease Father         heart failure smoker     Hypertension Father     Cancer Maternal Grandfather         pancreastic    Heart Disease Paternal Grandfather         heart attack    Diabetes Brother        PAST MEDICAL HISTORY   has a past medical history of ADHD, Arthritis, Bowel habit changes, Chlamydia (2011), depression, Heart burn, HTN (hypertension), Migraine, Pain, Pre-eclampsia, PTSD (post-traumatic stress disorder), and Staph infection ().    SOCIAL HISTORY  Social History     Tobacco Use    Smoking status: Former     Current packs/day: 0.00     Average packs/day: 0.5 packs/day for 6.0 years (3.0 ttl pk-yrs)     Types: Cigarettes     Start date: 2008     Quit date: 2014     Years since quittin.1    Smokeless tobacco: Never    Tobacco comments:     2017 currently Vape   Vaping Use    Vaping Use: Every day    Substances: Nicotine    Devices: Disposable   Substance and Sexual Activity    Alcohol use: No     " "Alcohol/week: 0.0 oz    Drug use: Yes     Comment: meth yesterday    Sexual activity: Not Currently     Partners: Male     Birth control/protection: Surgical     Comment: three kids       SURGICAL HISTORY   has a past surgical history that includes septoturbinoplasty (4/21/2009); lumpectomy (2014); breast biopsy (Left, 2/26/2016); primary c section (Aug 7, 2007); repeat c section w tubal ligation (9/7/2011); ventral hernia repair (Right, 1/10/2018); appendectomy laparoscopic (6/5/2018); and incision and drainage general (Left, 7/25/2022).    CURRENT MEDICATIONS  Home Medications       Reviewed by Deion Blakely (Pharmacy Tech) on 02/23/24 at 1423  Med List Status: Complete     Medication Last Dose Status   cephALEXin (KEFLEX) 500 MG Cap unk Active   sulfamethoxazole-trimethoprim (BACTRIM DS) 800-160 MG tablet unk Active                     ALLERGIES  No Known Allergies    PHYSICAL EXAM  VITAL SIGNS: BP (!) 169/81   Pulse 93   Temp 36.1 °C (96.9 °F) (Temporal)   Resp 16   Ht 1.727 m (5' 8\")   Wt 57.4 kg (126 lb 8.7 oz)   SpO2 93%   BMI 19.24 kg/m²    Gen: Disheveled, poor hygiene, agitated  HEENT: Scabbed lesions suggestive of excoriations to face.  No signs of trauma, Bilateral external ears normal, Nose normal. Conjunctiva normal, Non-icteric.   Cardiovascular: Regular rate and rhythm. Capillary refill less than 3 seconds to all extremities, 2+ distal pulses.  Thorax & Lungs: No tachypnea or increased work of breathing  Abdomen: No distention  Skin: Warm, Dry  Extremities: Intact distal pulses, No edema  Neurologic: Alert , no facial droop, grossly normal coordination and strength  Psychiatric: Affect agitated, restless, tangential    INITIAL IMPRESSION  Patient arrives for evaluation of symptoms that are highly suggestive of stimulant intoxication.  Patient has known history of this.  She has excoriations to her face suggestive of picking but they do not appear infected.  Patient has a wide variety of " symptoms she is complaining of although not a reliable and it seems that her statement of suicidality is more likely due to the methamphetamine intoxication and not actual thoughts of suicide.  This is very unclear and the patient will need some medication to help calm her down so that she can rest and be evaluated by the alert team.  Zyprexa will be given.  At this point I do not suspect and I do not feel labs or imaging will benefit patient or .    ED observation? No    LABS  Results for orders placed or performed during the hospital encounter of 24   POC BREATHALIZER   Result Value Ref Range    POC Breathalizer     EKG (NOW)   Result Value Ref Range    Report       [facility] Emergency Dept.    Test Date:  2024  Pt Name:    MARS MACIAS                Department: ER  MRN:        [id number]                      Room:  Gender:     Female                       Technician: [id number]  :        [id number]                   Requested By:ER TRIAGE PROTOCOL  Order #:    [id number]                    Reading MD:    Measurements  Intervals                                Axis  Rate:       81                           P:          42  KY:         145                          QRS:        2  QRSD:       81                           T:          57  QT:         363  QTc:        422    Interpretive Statements  Sinus rhythm  Probable left atrial enlargement  Anteroseptal infarct, age indeterminate  Compared to ECG 2022 17:29:42  Myocardial infarct finding now present       I have independently interpreted this EKG  Sinus rhythm rate of 81.  Intervals appeared within normal limits.  There are no convincing ST or T wave changes to suggest ischemia.  Compared to EKG performed in , there are no convincing changes.        COURSE & MEDICAL DECISION MAKING  Pertinent Labs & Imaging studies reviewed. (See chart for details)  8 PM  Patient still a bit too groggy to discharge safely  according to nursing.  Patient will be discharged when she is able to ambulate.  She is not complaining of anything and has been tolerating p.o.'s.  She was seen and evaluated by the alert team and her hold was dropped as she was felt to be a substance abuse issue and not suicidal.  I agree with this and I feel the patient has some shelter seeking behavior as well.  She will be discharged when she is able to stand and ambulate.    I have discussed management of the patient with the following physicians and SHARMILA's: None    Escalation of care considered, and ultimately not performed:blood analysis and diagnostic imaging    Barriers to care at this time, including but not limited to: Patient does not have established PCP, Patient is homeless, Patient lacks transportation , Patient does not have insurance, Patient had difficult affording medications, and Patient lacks financial resources.     Decision tools and Rx drugs considered including, but not limited to : None    Discussion of management with other QHP or appropriate source(s): Alert team    The patient will return for worsening symptoms and is stable at the time of discharge. The patient verbalizes understanding and will comply.    FINAL IMPRESSION  1. Methamphetamine abuse (HCC)        Electronically signed by: Massimo Crawford M.D., 2/23/2024 10:20 AM

## 2024-02-23 NOTE — ED TRIAGE NOTES
".  Chief Complaint   Patient presents with    Chest Pain    Shoulder Pain    Anxiety    Suicidal Ideation    Toe Pain       Patient presents to triage anxious, restless, and bizarre. Endorses chest pain, back pain, shoulder pain, suicidal ideation and toe pain. She tells me she has a plan to jump off a building.     She is asking to go to [facility].     Patient to [facility]. EKG completed in triage. Reported off to Sabine OVERTON    BP (!) 154/101   Pulse 68   Temp 35.9 °C (96.7 °F) (Temporal)   Resp 16   Ht 1.727 m (5' 8\")   Wt 57.4 kg (126 lb 8.7 oz)   SpO2 97%   BMI 19.24 kg/m²     " Pt returned call.

## 2024-02-23 NOTE — ED NOTES
Pt swallowed pill without issues.   Placed blanket back over head and states wants to be left alone.

## 2024-02-23 NOTE — ED NOTES
Pt walked out of room stating she is in pain, pt advised to go back to room, pt advised erp is not giving more pain medication to pt.

## 2024-02-23 NOTE — ED NOTES
Security to bedside as pt screaming.   Pt asked if she would be willing to take pill to help calm her down. States ok.

## 2024-02-23 NOTE — ED NOTES
Medication history reviewed with patient at bedside.  Med rec is complete  Allergies reviewed.   Patient states she does not take any medications.  [facility] was given a 7 day supply of both keflex 500mg qid & bactrim ds bid on 2/22/24. Patient was not able to communicate if she started either of these medications.    Anticoagulants: No      Deion Blakely

## 2024-02-23 NOTE — ED NOTES
Patient is currently resting, she is much more cooperative at this time. She endorses that the medication helped her anxiety.

## 2024-02-23 NOTE — DISCHARGE SUMMARY
"  ED Observation Discharge Summary    Patient:Katerina [facility]  Patient : 1985  Patient MRN: [id number]  Patient PCP: DELMA Vines    Admit Date: 2024  Discharge Date and Time: 24 4:10 PM  Discharge Diagnosis: Cellulitis and abscess of the upper extremity, methamphetamine abuse  Discharge Attending: Lee Bojorquez M.D.  Discharge Service: ED Observation    ED Course  Katerina is a 38 y.o. female who was evaluated at [facility] emergency department in the setting of bizarre behavior as well as for an area of swelling and redness deemed to be an abscess in the left upper extremity.  This area was incised and drained, irrigated and dressed by my prior colleague and the patient was initiated on antibiotic therapy with Unasyn and vancomycin with a prescription written for oral agents that were brought to the patient at bedside.  Patient exhibiting mentation consistent with likely sympathomimetic intoxicant and urine drug screen ended up testing positive for both amphetamines and cocaine as well as fentanyl.  Patient was sedated while in the department with multiple doses of Valium and was signed out to me pending sober reevaluation for multiple intoxicants and sedatives.  At the time of my reevaluation patient is awake alert and ambulatory with no difficulty, tolerating p.o. and requesting to go home.  She states that she will continue to take her antibiotics and I reiterated dosage and duration with her.  I encouraged her to follow-up with her primary care doctor and information to do so was provided.  She was discharged in stable condition with return precautions discussed.    Discharge Exam:  BP (!) 173/94   Pulse 86   Temp 36.1 °C (97 °F) (Temporal)   Resp 15   Ht 1.727 m (5' 8\")   Wt 56.6 kg (124 lb 12.5 oz)   SpO2 98%   BMI 18.97 kg/m² .    Constitutional: Awake and alert. Nontoxic  HENT:  Grossly normal  Eyes: Grossly normal  Neck: Normal range of " motion  Cardiovascular: Normal heart rate   Thorax & Lungs: No respiratory distress  Abdomen: Nontender  Skin:  No pathologic rash.  Wound dressing in place in the left upper extremity  Extremities: Well perfused, ambulatory with a steady gait  Psychiatric: Somewhat hyperactive but linear and thought, easily redirectable    Labs  Results for orders placed or performed during the hospital encounter of 02/22/24   Lactic Acid   Result Value Ref Range    Lactic Acid 1.2 0.5 - 2.0 mmol/L   CBC with Differential   Result Value Ref Range    WBC 14.0 (H) 4.8 - 10.8 K/uL    RBC 4.59 4.20 - 5.40 M/uL    Hemoglobin 10.9 (L) 12.0 - 16.0 g/dL    Hematocrit 34.8 (L) 37.0 - 47.0 %    MCV 75.8 (L) 81.4 - 97.8 fL    MCH 23.7 (L) 27.0 - 33.0 pg    MCHC 31.3 (L) 32.2 - 35.5 g/dL    RDW 46.2 35.9 - 50.0 fL    Platelet Count 438 164 - 446 K/uL    MPV 9.3 9.0 - 12.9 fL    Neutrophils-Polys 75.00 (H) 44.00 - 72.00 %    Lymphocytes 17.50 (L) 22.00 - 41.00 %    Monocytes 6.30 0.00 - 13.40 %    Eosinophils 0.20 0.00 - 6.90 %    Basophils 0.60 0.00 - 1.80 %    Immature Granulocytes 0.40 0.00 - 0.90 %    Nucleated RBC 0.00 0.00 - 0.20 /100 WBC    Neutrophils (Absolute) 10.49 (H) 1.82 - 7.42 K/uL    Lymphs (Absolute) 2.45 1.00 - 4.80 K/uL    Monos (Absolute) 0.88 (H) 0.00 - 0.85 K/uL    Eos (Absolute) 0.03 0.00 - 0.51 K/uL    Baso (Absolute) 0.08 0.00 - 0.12 K/uL    Immature Granulocytes (abs) 0.05 0.00 - 0.11 K/uL    NRBC (Absolute) 0.00 K/uL   Complete Metabolic Panel   Result Value Ref Range    Sodium 138 135 - 145 mmol/L    Potassium 3.2 (L) 3.6 - 5.5 mmol/L    Chloride 101 96 - 112 mmol/L    Co2 22 20 - 33 mmol/L    Anion Gap 15.0 7.0 - 16.0    Glucose 89 65 - 99 mg/dL    Bun 19 8 - 22 mg/dL    Creatinine 0.98 0.50 - 1.40 mg/dL    Calcium 9.8 8.5 - 10.5 mg/dL    Correct Calcium 9.6 8.5 - 10.5 mg/dL    AST(SGOT) 23 12 - 45 U/L    ALT(SGPT) 21 2 - 50 U/L    Alkaline Phosphatase 130 (H) 30 - 99 U/L    Total Bilirubin 0.6 0.1 - 1.5 mg/dL     Albumin 4.3 3.2 - 4.9 g/dL    Total Protein 8.7 (H) 6.0 - 8.2 g/dL    Globulin 4.4 (H) 1.9 - 3.5 g/dL    A-G Ratio 1.0 g/dL   PROCALCITONIN   Result Value Ref Range    Procalcitonin <0.05 <0.25 ng/mL   DIAGNOSTIC ALCOHOL   Result Value Ref Range    Diagnostic Alcohol <10.1 <10.1 mg/dL   URINE DRUG SCREEN   Result Value Ref Range    Amphetamines Urine Positive (A) Negative    Barbiturates Negative Negative    Benzodiazepines Negative Negative    Cocaine Metabolite Positive (A) Negative    Fentanyl, Urine Positive (A) Negative    Methadone Negative Negative    Opiates Negative Negative    Oxycodone Negative Negative    Phencyclidine -Pcp Negative Negative    Propoxyphene Negative Negative    Cannabinoid Metab Negative Negative   BETA-HCG QUALITATIVE SERUM   Result Value Ref Range    Beta-Hcg Qualitative Serum Negative Negative   ESTIMATED GFR   Result Value Ref Range    GFR (CKD-EPI) 75 >60 mL/min/1.73 m 2   MRSA By PCR (Amp)    Specimen: Nares; Respirate   Result Value Ref Range    MRSA by PCR POSITIVE Negative   CULTURE WOUND W/ GRAM STAIN    Specimen: Abscess; Wound   Result Value Ref Range    Significant Indicator NEG     Source WND     Site Left Under Arm Abscess     Culture Result -     Gram Stain Result Moderate WBCs.  Rare Gram positive cocci.      GRAM STAIN    Specimen: Wound   Result Value Ref Range    Significant Indicator .     Source WND     Site Left Under Arm Abscess     Gram Stain Result Moderate WBCs.  Rare Gram positive cocci.          Radiology  No orders to display       Medications:   New Prescriptions    CEPHALEXIN (KEFLEX) 500 MG CAP    Take 1 Capsule by mouth 4 times a day.    SULFAMETHOXAZOLE-TRIMETHOPRIM (BACTRIM DS) 800-160 MG TABLET    Take 1 Tablet by mouth 2 times a day for 7 days.       My final assessment includes polysubstance abuse disorder, polysubstance intoxication, cellulitis and abscess    Upon Reevaluation, the patient's condition has: Improved; and will be  discharged.    Patient discharged from ED Observation status at 4:10 PM (Time) 2/22/2024 (Date).     Total time spent on this ED Observation discharge encounter is > 30 Minutes    Electronically signed by: Lee Bojorquez M.D., 2/22/2024 4:10 PM

## 2024-02-24 LAB
BACTERIA WND AEROBE CULT: ABNORMAL
BACTERIA WND AEROBE CULT: ABNORMAL
GRAM STN SPEC: ABNORMAL
SIGNIFICANT IND 70042: ABNORMAL
SITE SITE: ABNORMAL
SOURCE SOURCE: ABNORMAL

## 2024-02-24 NOTE — ED NOTES
Pt able to ambulate to the exit with steady gait. Pt refused to sign discharge instructions. All belongings with pt, food provided to pt in lobby.       Encouraged to return for any CP, SOB, or new/concerning symptoms. Pt encouraged to follow up with [facility] and PCP per AVS.

## 2024-02-24 NOTE — ED NOTES
"Tried to have conversation with patient about safe discharge. Pt states \"I'll be more awake if you get me admitted to [facility].\" Social work aware, security aware.   "

## 2024-02-24 NOTE — DISCHARGE PLANNING
[facility]  Crisis/Safety Plan    Name:  Katerina Owen  MRN:  [id number]  Date:  2024    Warning signs that a crisis may be developing for me or I may be at risk:  1) feeling much more depressed  2) developing much more anxiety  3) having any feelings of self harm or harm to others.    Coping strategies I can use on my own (relaxation, physical activity, etc):  1) try to exercise  2) read for pleasure  3) listen to soft music    Ways I can make my environment safe:  1) insure no access to a firearm  2) secure any meds  3) secure sharps and other means of self harm    Things I want to tell myself when I feel a crisis developin) remember there is help out there  2) try to remain calm  3) get help before a crisis develops    People I can contact for support or distraction (and their phone numbers):  1) Guillaume   2) Kirstin   3) Mom     If I’m not able to reach my support people, or the above strategies don’t help, I can contact the following professionals, agencies, or hotlines:  1) Crisis Call Center ():  [phone] -OR- [phone]  2) Crisis Text Line ():  Text CARE TO 929025  3)   4)     Baldomero Silva R.N.

## 2024-02-24 NOTE — ED NOTES
Pt provided with water, coffee, food, and diet soda. Bus pass provided to pt from  for possible self admit to [facility]. Pt is awake and alert, AAOx4, GCS 15. Pt no longer appears drowsy or slow to respond.

## 2024-02-24 NOTE — ED NOTES
Attempted to speak with pt. Pt wakes up to verbal stimuli briefly and then immediatly falls asleep.

## 2024-02-24 NOTE — DISCHARGE INSTRUCTIONS
Avoid methamphetamine.  Follow-up as previously directed by [facility].  Fill and take your medications as previously directed.

## 2024-02-24 NOTE — ED NOTES
Bedside report received from off going RN/tech: Seble OVERTON, assumed care of patient.  POC discussed with patient. Call light within reach, all needs addressed at this time.       Fall risk interventions in place: Move the patient closer to the nurse's station, Patient's personal possessions are with in their safe reach, Place socks on patient, Place fall risk sign on patient's door, Keep floor surfaces clean and dry, and Accompanied to restroom (all applicable per Almo Fall risk assessment)   Continuous monitoring: Pulse Ox or Blood Pressure  IVF/IV medications: Not Applicable   Oxygen: Room Air  Bedside sitter: Not Applicable   Isolation: Not Applicable      Pt with recent meth use and was administered Zyprexa on previous shift. POC to monitor pt until pt is able to safely be discharged. On bedside report pt is sleeping in bed but able to wake up to verbal stimuli. Blankets provided.

## 2024-02-24 NOTE — ED NOTES
Pt provided with food and all personal belongings. Pt states she does not want to leave. Discussed discharge with patient.

## 2024-02-28 NOTE — ED NOTES
ED Positive Culture Follow-up/Notification Note:    Date: 2/27/24     Patient seen in the ED on 2/22/2024 for bizarre behavior and area of swelling and redness of the upper left extremity.   1. Cellulitis and abscess of upper extremity    2. Methamphetamine abuse (HCC)       Discharge Medication List as of 2/22/2024  4:11 PM        START taking these medications    Details   sulfamethoxazole-trimethoprim (BACTRIM DS) 800-160 MG tablet Take 1 Tablet by mouth 2 times a day for 7 days., Disp-14 Tablet, R-0, Normal      cephALEXin (KEFLEX) 500 MG Cap Take 1 Capsule by mouth 4 times a day., Disp-28 Capsule, R-0, Normal             Allergies: Patient has no known allergies.     Vitals:    02/22/24 1212 02/22/24 1224 02/22/24 1556 02/22/24 1610   BP: (!) 175/90 (!) 173/94  (!) 156/94   Pulse: 88 (!) 52 86 84   Resp: 20 18 15 14   Temp:    36.1 °C (97 °F)   TempSrc:    Temporal   SpO2: 95% 92% 98% 97%   Weight:       Height:           Final cultures:   Results       Procedure Component Value Units Date/Time    Blood Culture - Draw one from central line and one from peripheral site [[id number]] Collected: 02/22/24 0844    Order Status: Completed Specimen: Blood from Line Updated: 02/27/24 1100     Significant Indicator NEG     Source BLD     Site Peripheral     Culture Result No growth after 5 days of incubation.    Narrative:      Blood Cultures X2. Blood Cultures X2. Draw one blood culture  from central line and one blood culture peripherally. If no  line present, draw blood cultures times two peripherally from  different sites.  Right AC    Blood Culture - Draw one from central line and one from peripheral site [[id number]] Collected: 02/22/24 0748    Order Status: Completed Specimen: Blood from Peripheral Updated: 02/27/24 0900     Significant Indicator NEG     Source BLD     Site PERIPHERAL     Culture Result No growth after 5 days of incubation.    Narrative:      Blood Cultures X2. Draw one blood culture from central  line  and one blood culture peripherally. If no line present, draw  blood cultures times two peripherally from different sites.  Right AC    CULTURE WOUND W/ GRAM STAIN [[id number]]  (Abnormal)  (Susceptibility) Collected: 02/22/24 1013    Order Status: Completed Specimen: Wound from Abscess Updated: 02/24/24 1050     Significant Indicator POS     Source WND     Site Left Under Arm Abscess     Culture Result -     Gram Stain Result Moderate WBCs.  Rare Gram positive cocci.       Culture Result Methicillin Resistant Staphylococcus aureus  Moderate growth      Susceptibility       Methicillin resistant staphylococcus aureus (1)       Antibiotic Interpretation Microscan   Method Status    Ampicillin  [*]  Resistant >8 mcg/mL EVERARDO Final    Amoxicillin/CA  [*]  Resistant <=4/2 mcg/mL EVERARDO Final    Azithromycin  [*]  Resistant >4 mcg/mL EVERARDO Final    Ceftriaxone  [*]  Resistant 8 mcg/mL EVERARDO Final    Clindamycin Sensitive 0.5 mcg/mL EVERARDO Final    Cephalothin  [*]  Resistant <=8 mcg/mL EVERARDO Final    Cefoxitin Screen  [*]  Positive >4 mcg/mL EVERARDO Final    Cefazolin Resistant <=8 mcg/mL EVERARDO Final    Ciprofloxacin  [*]  Sensitive <=1 mcg/mL EVERARDO Final    Cefepime Resistant 8 mcg/mL EVERARDO Final    Ceftaroline  [*]  Sensitive <=0.5 mcg/mL EVERARDO Final    Daptomycin Sensitive 1 mcg/mL EVERARDO Final    Ampicillin/sulbactam Resistant <=8/4 mcg/mL EVERARDO Final    Erythromycin Resistant >4 mcg/mL EVERARDO Final    Vancomycin Sensitive 2 mcg/mL EVERARDO Final    Gentamicin  [*]  Sensitive <=4 mcg/mL EVERARDO Final    Inducible Clindamycin Test  [*]  Negative <=4/0.5 mcg/mL EVERARDO Final    Imipenem  [*]  Resistant <=4 mcg/mL EVERARDO Final    Levofloxacin  [*]  Sensitive <=1 mcg/mL EVERARDO Final    Linezolid  [*]  Sensitive 4 mcg/mL EVERARDO Final    Meropenem  [*]  Resistant <=2 mcg/mL EVERARDO Final    Oxacillin Resistant >2 mcg/mL EVERARDO Final    Rifampin  [*]  Sensitive <=1 mcg/mL EVERARDO Final    Synercid  [*]  Sensitive <=0.5 mcg/mL EVERARDO Final    Trimeth/Sulfa Sensitive <=0.5/9.5 mcg/mL EVERARDO  Final    Tetracycline Intermediate 8 mcg/mL EVERARDO Final    Tigecycline  [*]  Sensitive <=0.25 mcg/mL EVERARDO Final               [*]  Suppressed Antibiotic                   GRAM STAIN [[id number]] Collected: 02/22/24 1013    Order Status: Completed Specimen: Wound Updated: 02/22/24 1315     Significant Indicator .     Source WND     Site Left Under Arm Abscess     Gram Stain Result Moderate WBCs.  Rare Gram positive cocci.      MRSA By PCR (Amp) [[id number]] Collected: 02/22/24 0926    Order Status: Completed Specimen: Respirate from Nares Updated: 02/22/24 1134     MRSA by PCR POSITIVE            Plan:   Appropriate antibiotic therapy prescribed. No changes required based upon culture result.  Sent letter to patient in my chart to notify of positive culture result and encourage compliance with prescribed antibiotics.     Carmela Dos Santos, PharmD

## 2024-03-22 NOTE — ED NOTES
Begin Betamethasone Valerate 0.1% ointment, Apply to affected rash on extremities twice daily x 3 weeks with 1 week break. Repeat as needed. Avoid face, armpits, and groin.    Apply OTC (over the counter) Urea cream on top of medication (refer to handout given in office).    Patient given discharge teaching and education. Verbalizes understanding. Given chance to ask questions, all questions answered. Educated patient of signs and symptoms to returned to ER, and educated patient they can return for any concerning symptoms. One prescriptions provided to patient. Education given to patient about medication. Patient states they have their belongings. Denies additional needs at this time. Reports pain is well controlled. Patient monitored every hour and PRN for safety and comfort. Patient ambulatory out of department.

## 2024-04-09 ENCOUNTER — HOSPITAL ENCOUNTER (EMERGENCY)
Facility: MEDICAL CENTER | Age: 39
End: 2024-04-09
Attending: EMERGENCY MEDICINE
Payer: COMMERCIAL

## 2024-04-09 VITALS
TEMPERATURE: 97.7 F | HEIGHT: 68 IN | HEART RATE: 98 BPM | RESPIRATION RATE: 20 BRPM | BODY MASS INDEX: 19.18 KG/M2 | DIASTOLIC BLOOD PRESSURE: 88 MMHG | OXYGEN SATURATION: 97 % | SYSTOLIC BLOOD PRESSURE: 151 MMHG | WEIGHT: 126.54 LBS

## 2024-04-09 DIAGNOSIS — T14.8XXA INFECTED WOUND: ICD-10-CM

## 2024-04-09 DIAGNOSIS — Z00.8 MEDICAL CLEARANCE FOR INCARCERATION: ICD-10-CM

## 2024-04-09 DIAGNOSIS — R03.0 ELEVATED BLOOD PRESSURE READING: ICD-10-CM

## 2024-04-09 DIAGNOSIS — L08.9 INFECTED WOUND: ICD-10-CM

## 2024-04-09 PROCEDURE — 99281 EMR DPT VST MAYX REQ PHY/QHP: CPT

## 2024-04-09 RX ORDER — CEPHALEXIN 500 MG/1
500 CAPSULE ORAL 4 TIMES DAILY
Qty: 20 CAPSULE | Refills: 0 | Status: ACTIVE | OUTPATIENT
Start: 2024-04-09 | End: 2024-04-14

## 2024-04-09 RX ORDER — LISINOPRIL 5 MG/1
5 TABLET ORAL DAILY
Qty: 30 TABLET | Refills: 0 | Status: SHIPPED | OUTPATIENT
Start: 2024-04-09

## 2024-04-09 ASSESSMENT — FIBROSIS 4 INDEX: FIB4 SCORE: 0.44

## 2024-04-09 NOTE — ED TRIAGE NOTES
"Chief Complaint   Patient presents with    Medical Clearance     BIB PD, Pt supposed to go to shelter but was refused because of her blood pressure of 220/132.     BP (!) 151/88   Pulse 98   Temp 36.5 °C (97.7 °F) (Temporal)   Resp 20   Ht 1.727 m (5' 8\")   Wt 57.4 kg (126 lb 8.7 oz)   SpO2 97%   BMI 19.24 kg/m²     "

## 2024-04-09 NOTE — ED NOTES
Patient given discharge instructions/prescriptions sent to pharmacy of choosing/home care instructions explained, patient verbalized understanding of instructions given/patient understands importance of follow up, patient ambulatory to ER lobby. Pt dc'd under police custody

## 2024-04-09 NOTE — DISCHARGE INSTRUCTIONS
You were seen in the emergency department for elevated blood pressure reading at the time of being booked into [facility].  Your blood pressure improved in the emergency department without requiring any interventions.  Looking at your history though, you do have elevated blood pressure readings frequently in the past several months.  You are being started on low-dose lisinopril for this.    You also complained of a wound on your back with pain associated with it.  There is a slight amount of redness surrounding this.  This may represent a very mild infection.  You are being prescribed a course of antibiotics.  Please take these as prescribed.    If you find that taking the medication 4 times a day is too difficult, you may take 2 tablets twice a day.  Keep your back wound clean with soap and water.  Antibiotic ointment or Vaseline can help it heal.    You have been medically cleared for incarceration.    Return to the emergency department or seek medical attention if you develop:  Difficulty breathing, chest pain, fevers, rapidly spreading redness from the back wound any other new or concerning findings

## 2024-04-09 NOTE — ED PROVIDER NOTES
ED Provider Note    Scribed for Dr. Cabrera by Aman Browne. 4/9/2024,  1:30 AM.      CHIEF COMPLAINT  Chief Complaint   Patient presents with    Medical Clearance     BIB PD, Pt supposed to go to shelter but was refused because of her blood pressure of 220/132.       EXTERNAL RECORDS REVIEWED  Review of external ED results reveals the patient was prescribed Lisinopril at [place].     HPI  LIMITATION TO HISTORY   Select: : None  OUTSIDE HISTORIAN(S):  Law Enforcement Present at bedside.    Katerina Owen is a 38 y.o. female who presents to the Emergency Department via Law Enforcement for evaluation of hypertension onset prior to arrival. Triage not reveals the patient had a blood pressure of 220/132 prior to arrival. The patient also reports a painful wound to her upper back which she believes is MRSA. She reports bleeding from her left ear onset tonight. She denies fever, recent trauma to the head. The patient denies a history of hypertension. She reports a history of arrhythmia with her most recent episode tonight. She denies taking any current medications. The patient reports she has been unable to fill her most recent Lisinopril prescription. She also notes a history of contorted arteries to her neck.  She denies any alcohol use, but reports recreational marijuana use.     REVIEW OF SYSTEMS  See HPI for further details. All other systems are negative.     PAST MEDICAL HISTORY     Past Medical History:   Diagnosis Date    ADHD     Arthritis     back    Bowel habit changes     constipation    Chlamydia 5/11/2011    depression     Heart burn     HTN (hypertension)     related to pregnancy    Migraine     Pain     incisional hernia pain; back (arthritis)    Pre-eclampsia     2007     PTSD (post-traumatic stress disorder)     Staph infection 2011    after C section     SURGICAL HISTORY  Past Surgical History:   Procedure Laterality Date    INCISION AND DRAINAGE GENERAL Left 7/25/2022    Procedure: INCISION AND  DRAINAGE-washout and exploration left wrist, tendon repair,  nerve repair, proceed as indicated;  Surgeon: Tia Elizabeth M.D.;  Location: SURGERY SAME DAY [facility];  Service: Orthopedics    APPENDECTOMY LAPAROSCOPIC  2018    Procedure: APPENDECTOMY LAPAROSCOPIC;  Surgeon: Izabel Salomon M.D.;  Location: SURGERY [facility];  Service: Gen Robotic    VENTRAL HERNIA REPAIR Right 1/10/2018    Procedure: VENTRAL HERNIA REPAIR- FOR INCISIONAL HERNIA REDUCIBLE  WITH MESH;  Surgeon: Gisele Delcid M.D.;  Location: SURGERY [facility];  Service: General    BREAST BIOPSY Left 2016    Procedure: BREAST BIOPSY Excisional;  Surgeon: Gisele Delcid M.D.;  Location: SURGERY [facility];  Service:     LUMPECTOMY  2014    both breasts - Dr. Garsia - [facility] 10/2014    REPEAT C SECTION W TUBAL LIGATION  2011    Performed by AYAAN MERCHANT at LABOR AND DELIVERY    SEPTOTURBINOPLASTY  2009    Performed by CHRISTIAN YEN at SURGERY SAME DAY [facility]S    PRIMARY C SECTION  Aug 7, 2007     preeclampsia     FAMILY HISTORY  Family History   Problem Relation Age of Onset    Heart Disease Father         heart failure smoker     Hypertension Father     Cancer Maternal Grandfather         pancreastic    Heart Disease Paternal Grandfather         heart attack    Diabetes Brother      SOCIAL HISTORY    reports that she quit smoking about 9 years ago. Her smoking use included cigarettes. She started smoking about 15 years ago. She has a 3 pack-year smoking history. She has never used smokeless tobacco. She reports current drug use. She reports that she does not drink alcohol.    CURRENT MEDICATIONS  Home Medications       Reviewed by Nicole Branch R.N. (Registered Nurse) on 24 at 0129  Med List Status: Partial     Medication Last Dose Status   cephALEXin (KEFLEX) 500 MG Cap  Active                  ALLERGIES  No Known Allergies    PHYSICAL EXAM  VITAL SIGNS: BP (!) 151/88   Pulse  "98   Temp 36.5 °C (97.7 °F) (Temporal)   Resp 20   Ht 1.727 m (5' 8\")   Wt 57.4 kg (126 lb 8.7 oz)   SpO2 97%   BMI 19.24 kg/m²   Gen: Alert, no acute distress  HEENT: Normocephalic, abrasion to the left jaw. TMs normal, external auditory canals naif  Eyes: PERRL, EOMI, normal conjunctiva  Neck: trachea midline  Resp: Lungs clear, no respiratory distress  CV: No JVD, regular rate and rhythm  Abd: soft, non-tender, non-distended  Back: 1.5 x 2 cm scab with surrounding erythema and report of tenderness  Ext: No deformities  Neuro: speech fluent    COURSE & MEDICAL DECISION MAKING  Pertinent Labs & Imaging studies were reviewed. (See chart for details)    ED Observation Status? No, the patient does not meet the criteria for ED observation.  -----------    1:30 AM Patient seen and examined at bedside. The patient is a 38 y.o. woman who presents to the ED via law enforcement for evaluation of hypertension. Informed the patient of my plan for prescription for Lisinopril. The patient is medically cleared for discharge for incarceration at this time. I then informed the patient of my plan for discharge, which includes strict return precautions for any new or worsening symptoms. Patient understands and verbalizes agreement to plan of care.       INITIAL ASSESSMENT AND PLAN  Medical Decision Making: Patient presents for medical clearance with an elevated blood pressure reading from [facility].  Here in the emergency department, her blood pressure has improved on its own.  Looking at her history though she does have a history of multiple elevated blood pressure readings, will refill the patient's lisinopril that was started from [facility].  Patient demonstrates no obvious toxidrome although somewhat fidgety possibly consistent with mild meth intoxication.  The patient reports dysrhythmia but I see only normal sinus rhythm and sinus tachycardia on prior EKGs within our system.  The patient is reporting pain " to her mid back.  She is concerned about MRSA infection but there is no evidence of purulence.  She does have a scab with surrounding erythema.  She states it is tender.  Hard to know whether this represents a mild or early infection.  Will prescribe antibiotics for this.  Given absence of purulence, will treat with cephalexin.  Patient is medically cleared for incarceration    ADDITIONAL PROBLEM LIST AND DISPOSITION      Escalation of care considered, and ultimately not performed: Laboratory analysis and diagnostic imaging.     Barriers to care at this time, including but not limited to:  None known at this time .     Decision tools and prescription drugs considered including, but not limited to: Antibiotics see above .      The patient will return for new or worsening symptoms and is stable at the time of discharge.    The patient is referred to a primary physician for blood pressure management, diabetic screening, and for all other preventative health concerns.    DISPOSITION:  Patient will be discharged to law enforcement in stable condition.    FOLLOW UP:  Lashonda Chow, A.P.N.  [address] [address] [id number] [phone]    Schedule an appointment as soon as possible for a visit       [facility], Emergency Dept  [address] [facility] [address] [phone]    If symptoms worsen    OUTPATIENT MEDICATIONS:  Discharge Medication List as of 4/9/2024  2:00 AM        START taking these medications    Details   lisinopril (PRINIVIL) 5 MG Tab Take 1 Tablet by mouth every day., Disp-30 Tablet, R-0, Normal            FINAL IMPRESSION  1. Medical clearance for incarceration    2. Elevated blood pressure reading    3. Infected wound       Aman UNGER), am scribing for, and in the presence of, Tristin Cabrera M.D..    Electronically signed by: Aman Vasquez), 4/9/2024    Tristin UNGER M.D. personally performed the services described in this  documentation, as scribed by Aman Browne in my presence, and it is both accurate and complete.    The note accurately reflects work and decisions made by me.  Tristin Cabrera M.D.  4/9/2024  3:32 AM      This dictation was created using voice recognition software. The accuracy of the dictation is limited to the abilities of the software. I expect there may be some errors of grammar and possibly content. The nursing notes were reviewed and certain aspects of this information were incorporated into this note.

## 2024-04-24 ENCOUNTER — HOSPITAL ENCOUNTER (EMERGENCY)
Facility: MEDICAL CENTER | Age: 39
End: 2024-04-24
Attending: EMERGENCY MEDICINE
Payer: COMMERCIAL

## 2024-04-24 VITALS
RESPIRATION RATE: 17 BRPM | DIASTOLIC BLOOD PRESSURE: 92 MMHG | OXYGEN SATURATION: 97 % | TEMPERATURE: 97.8 F | HEART RATE: 101 BPM | WEIGHT: 132.5 LBS | SYSTOLIC BLOOD PRESSURE: 161 MMHG | BODY MASS INDEX: 20.08 KG/M2 | HEIGHT: 68 IN

## 2024-04-24 DIAGNOSIS — R51.9 ACUTE NONINTRACTABLE HEADACHE, UNSPECIFIED HEADACHE TYPE: ICD-10-CM

## 2024-04-24 DIAGNOSIS — F19.10 SUBSTANCE ABUSE (HCC): ICD-10-CM

## 2024-04-24 LAB
ALBUMIN SERPL BCP-MCNC: 4 G/DL (ref 3.2–4.9)
ALBUMIN/GLOB SERPL: 1 G/DL
ALP SERPL-CCNC: 136 U/L (ref 30–99)
ALT SERPL-CCNC: 22 U/L (ref 2–50)
ANION GAP SERPL CALC-SCNC: 15 MMOL/L (ref 7–16)
AST SERPL-CCNC: 24 U/L (ref 12–45)
BASOPHILS # BLD AUTO: 1 % (ref 0–1.8)
BASOPHILS # BLD: 0.07 K/UL (ref 0–0.12)
BILIRUB SERPL-MCNC: 0.3 MG/DL (ref 0.1–1.5)
BUN SERPL-MCNC: 18 MG/DL (ref 8–22)
CALCIUM ALBUM COR SERPL-MCNC: 9 MG/DL (ref 8.5–10.5)
CALCIUM SERPL-MCNC: 9 MG/DL (ref 8.4–10.2)
CHLORIDE SERPL-SCNC: 101 MMOL/L (ref 96–112)
CO2 SERPL-SCNC: 23 MMOL/L (ref 20–33)
CREAT SERPL-MCNC: 0.84 MG/DL (ref 0.5–1.4)
EOSINOPHIL # BLD AUTO: 0.06 K/UL (ref 0–0.51)
EOSINOPHIL NFR BLD: 0.9 % (ref 0–6.9)
ERYTHROCYTE [DISTWIDTH] IN BLOOD BY AUTOMATED COUNT: 48.8 FL (ref 35.9–50)
GFR SERPLBLD CREATININE-BSD FMLA CKD-EPI: 90 ML/MIN/1.73 M 2
GLOBULIN SER CALC-MCNC: 3.9 G/DL (ref 1.9–3.5)
GLUCOSE SERPL-MCNC: 97 MG/DL (ref 65–99)
HCG SERPL QL: NEGATIVE
HCT VFR BLD AUTO: 35 % (ref 37–47)
HGB BLD-MCNC: 10.9 G/DL (ref 12–16)
IMM GRANULOCYTES # BLD AUTO: 0.01 K/UL (ref 0–0.11)
IMM GRANULOCYTES NFR BLD AUTO: 0.1 % (ref 0–0.9)
LYMPHOCYTES # BLD AUTO: 2.44 K/UL (ref 1–4.8)
LYMPHOCYTES NFR BLD: 35.6 % (ref 22–41)
MCH RBC QN AUTO: 23.8 PG (ref 27–33)
MCHC RBC AUTO-ENTMCNC: 31.1 G/DL (ref 32.2–35.5)
MCV RBC AUTO: 76.4 FL (ref 81.4–97.8)
MONOCYTES # BLD AUTO: 0.97 K/UL (ref 0–0.85)
MONOCYTES NFR BLD AUTO: 14.1 % (ref 0–13.4)
NEUTROPHILS # BLD AUTO: 3.31 K/UL (ref 1.82–7.42)
NEUTROPHILS NFR BLD: 48.3 % (ref 44–72)
NRBC # BLD AUTO: 0 K/UL
NRBC BLD-RTO: 0 /100 WBC (ref 0–0.2)
PLATELET # BLD AUTO: 433 K/UL (ref 164–446)
PMV BLD AUTO: 9.4 FL (ref 9–12.9)
POTASSIUM SERPL-SCNC: 3.6 MMOL/L (ref 3.6–5.5)
PROT SERPL-MCNC: 7.9 G/DL (ref 6–8.2)
RBC # BLD AUTO: 4.58 M/UL (ref 4.2–5.4)
SODIUM SERPL-SCNC: 139 MMOL/L (ref 135–145)
WBC # BLD AUTO: 6.9 K/UL (ref 4.8–10.8)

## 2024-04-24 PROCEDURE — 85025 COMPLETE CBC W/AUTO DIFF WBC: CPT

## 2024-04-24 PROCEDURE — 36415 COLL VENOUS BLD VENIPUNCTURE: CPT

## 2024-04-24 PROCEDURE — 99285 EMERGENCY DEPT VISIT HI MDM: CPT

## 2024-04-24 PROCEDURE — 96375 TX/PRO/DX INJ NEW DRUG ADDON: CPT

## 2024-04-24 PROCEDURE — 84703 CHORIONIC GONADOTROPIN ASSAY: CPT

## 2024-04-24 PROCEDURE — 700102 HCHG RX REV CODE 250 W/ 637 OVERRIDE(OP): Performed by: EMERGENCY MEDICINE

## 2024-04-24 PROCEDURE — 700105 HCHG RX REV CODE 258: Performed by: EMERGENCY MEDICINE

## 2024-04-24 PROCEDURE — A9270 NON-COVERED ITEM OR SERVICE: HCPCS | Performed by: EMERGENCY MEDICINE

## 2024-04-24 PROCEDURE — 96374 THER/PROPH/DIAG INJ IV PUSH: CPT

## 2024-04-24 PROCEDURE — 700111 HCHG RX REV CODE 636 W/ 250 OVERRIDE (IP): Performed by: EMERGENCY MEDICINE

## 2024-04-24 PROCEDURE — 80053 COMPREHEN METABOLIC PANEL: CPT

## 2024-04-24 RX ORDER — DIPHENHYDRAMINE HYDROCHLORIDE 50 MG/ML
25 INJECTION INTRAMUSCULAR; INTRAVENOUS ONCE
Status: COMPLETED | OUTPATIENT
Start: 2024-04-24 | End: 2024-04-24

## 2024-04-24 RX ORDER — PROCHLORPERAZINE EDISYLATE 5 MG/ML
10 INJECTION INTRAMUSCULAR; INTRAVENOUS ONCE
Status: COMPLETED | OUTPATIENT
Start: 2024-04-24 | End: 2024-04-24

## 2024-04-24 RX ORDER — LISINOPRIL 5 MG/1
5 TABLET ORAL ONCE
Status: COMPLETED | OUTPATIENT
Start: 2024-04-24 | End: 2024-04-24

## 2024-04-24 RX ORDER — SODIUM CHLORIDE, SODIUM LACTATE, POTASSIUM CHLORIDE, CALCIUM CHLORIDE 600; 310; 30; 20 MG/100ML; MG/100ML; MG/100ML; MG/100ML
1000 INJECTION, SOLUTION INTRAVENOUS ONCE
Status: COMPLETED | OUTPATIENT
Start: 2024-04-24 | End: 2024-04-24

## 2024-04-24 RX ORDER — ACETAMINOPHEN 500 MG
1000 TABLET ORAL ONCE
Status: COMPLETED | OUTPATIENT
Start: 2024-04-24 | End: 2024-04-24

## 2024-04-24 RX ORDER — KETOROLAC TROMETHAMINE 15 MG/ML
15 INJECTION, SOLUTION INTRAMUSCULAR; INTRAVENOUS ONCE
Status: COMPLETED | OUTPATIENT
Start: 2024-04-24 | End: 2024-04-24

## 2024-04-24 RX ADMIN — DIPHENHYDRAMINE HYDROCHLORIDE 25 MG: 50 INJECTION, SOLUTION INTRAMUSCULAR; INTRAVENOUS at 17:27

## 2024-04-24 RX ADMIN — LISINOPRIL 5 MG: 5 TABLET ORAL at 17:24

## 2024-04-24 RX ADMIN — SODIUM CHLORIDE, POTASSIUM CHLORIDE, SODIUM LACTATE AND CALCIUM CHLORIDE 1000 ML: 600; 310; 30; 20 INJECTION, SOLUTION INTRAVENOUS at 17:29

## 2024-04-24 RX ADMIN — ACETAMINOPHEN 1000 MG: 500 TABLET, FILM COATED ORAL at 17:24

## 2024-04-24 RX ADMIN — KETOROLAC TROMETHAMINE 15 MG: 15 INJECTION, SOLUTION INTRAMUSCULAR; INTRAVENOUS at 17:25

## 2024-04-24 RX ADMIN — PROCHLORPERAZINE EDISYLATE 10 MG: 5 INJECTION INTRAMUSCULAR; INTRAVENOUS at 17:29

## 2024-04-24 ASSESSMENT — FIBROSIS 4 INDEX: FIB4 SCORE: 0.7

## 2024-04-24 NOTE — ED NOTES
Medication history reviewed with pt. Med rec is complete.  Allergies reviewed, per pt    Pt reports that she did not fill her LISINOPRIL 5MG that was prescribed to her on 4/9/2024    Pt reports no prescription medications, OTC's, or vitamins in the last 30 days or longer.    Patient has not had any outpatient antibiotics in the last 30 days.    Pt is not on any anticoagulants

## 2024-04-24 NOTE — ED PROVIDER NOTES
ED Provider Note    CHIEF COMPLAINT  Chief Complaint   Patient presents with    Headache     EXTERNAL RECORDS REVIEWED  External ED notes reveal the patient has been prescribed lisinopril and [facility] for your elevation of blood pressure.  Patient had been seen on 4/9/2024 for medical clearance prior to going to [facility] because blood pressure was elevated at 220/132.  At that time she was having other areas of soft tissue abnormalities and wanted to be evaluated for possible infection.  History of intermittent recreational marijuana use.  Ankle exam as documented by the ER shows some cutaneous scabs with surrounding erythema the patient was treated for the possible early development of cellulitis.    HPI/ROS  LIMITATION TO HISTORY   Select: : None  OUTSIDE HISTORIAN(S):  none    Katerina Owen is a 38 y.o. female who presents emergency room for concerns regarding headache worsening onset of headache that developed yesterday.  Patient states that she has had frontal headaches in the past, she has been told that she had atypical migraines and is typically required some IV medications and fluids to kai them.  She does not have an abortive medication and says that when she got up this morning she is starting to have some of the more severe symptoms which include some light sensitivity.  She has not had any neck tenderness, no recent trauma, no measured fevers or chills and she is denying any chest pain, shortness of breath, recent illness or urinary symptoms.  She is unsure of her last menstrual period.  She is not taking any medications and says that she has had some issues with intermittent inhaled fentanyl but is denying IV drug use, says she occasionally smokes marijuana, says she is abstaining from alcohol.    PAST MEDICAL HISTORY   has a past medical history of ADHD, Arthritis, Bowel habit changes, Chlamydia (5/11/2011), depression, Heart burn, HTN (hypertension), Migraine, Pain, Pre-eclampsia, PTSD  "(post-traumatic stress disorder), and Staph infection ().    SURGICAL HISTORY   has a past surgical history that includes septoturbinoplasty (2009); lumpectomy (); breast biopsy (Left, 2016); primary c section (Aug 7, 2007); repeat c section w tubal ligation (2011); ventral hernia repair (Right, 1/10/2018); appendectomy laparoscopic (2018); and incision and drainage general (Left, 2022).    FAMILY HISTORY  Family History   Problem Relation Age of Onset    Heart Disease Father         heart failure smoker     Hypertension Father     Cancer Maternal Grandfather         pancreastic    Heart Disease Paternal Grandfather         heart attack    Diabetes Brother        SOCIAL HISTORY  Social History     Tobacco Use    Smoking status: Former     Current packs/day: 0.00     Average packs/day: 0.5 packs/day for 6.0 years (3.0 ttl pk-yrs)     Types: Cigarettes     Start date: 2008     Quit date: 2014     Years since quittin.3    Smokeless tobacco: Never    Tobacco comments:     2017 currently Vape   Vaping Use    Vaping Use: Every day    Substances: Nicotine    Devices: Disposable   Substance and Sexual Activity    Alcohol use: No     Alcohol/week: 0.0 oz    Drug use: Yes     Comment: meth yesterday    Sexual activity: Not Currently     Partners: Male     Birth control/protection: Surgical     Comment: three kids       CURRENT MEDICATIONS  Home Medications       Reviewed by Deion Ng (Pharmacy Tech) on 24 at 1640  Med List Status: Complete     Medication Last Dose Status   lisinopril (PRINIVIL) 5 MG Tab NEW RX Active                    ALLERGIES  No Known Allergies    PHYSICAL EXAM  VITAL SIGNS: BP (!) 161/92   Pulse (!) 101   Temp 36.6 °C (97.8 °F) (Temporal)   Resp 17   Ht 1.727 m (5' 8\")   Wt 60.1 kg (132 lb 7.9 oz)   LMP 2024   SpO2 97%   BMI 20.15 kg/m²    Genl: F sitting in gurney uncomfortably, speaking clearly, appears in mild distress "   Head: NC/AT, small healing scattered lesions noted on the face, base of the naris and chin.  No surrounding purulence or drainage  ENT: Mucous membranes dry, posterior pharynx clear, uvula midline, nares patent bilaterally   Eyes: Normal sclera, pupils equal round reactive to light  Neck: Supple, FROM, no LAD appreciated   Pulmonary: Lungs are clear to auscultation bilaterally, no wheezes  Chest: No TTP  CV:  tachycardia, no murmur appreciated, pulses 2+ in both upper and lower extremities,  Abdomen: soft, NT/ND; no rebound/guarding, no masses palpated, no HSM   Musculoskeletal: Pain free ROM of the neck. Moving upper and lower extremities in spontaneous and coordinated fashion  Neuro: A&Ox4 (person, place, time, situation), speech fluent, gait steady, no focal deficits appreciated, No cerebellar signs.  Skin: No rash or lesions.  No pallor or jaundice.  No cyanosis.  Warm and dry.     EKG/LABS  Labs Reviewed   CBC WITH DIFFERENTIAL - Abnormal; Notable for the following components:       Result Value    Hemoglobin 10.9 (*)     Hematocrit 35.0 (*)     MCV 76.4 (*)     MCH 23.8 (*)     MCHC 31.1 (*)     Monocytes 14.10 (*)     Monos (Absolute) 0.97 (*)     All other components within normal limits   COMP METABOLIC PANEL - Abnormal; Notable for the following components:    Alkaline Phosphatase 136 (*)     Globulin 3.9 (*)     All other components within normal limits   HCG QUAL SERUM   ESTIMATED GFR     RADIOLOGY/PROCEDURES   none    COURSE & MEDICAL DECISION MAKING    ASSESSMENT, COURSE AND PLAN  Care Narrative: Patient seen evaluated for symptoms as described above.  The patient has a history of some intermittent drug use and now presents with worsening headache.  This is not sudden onset, there is no thunderclap initiation, she has not had vision loss nor she had any weakness or strokelike symptomology.  She had had atypical migraines in the past and is somewhat difficult to ascertain if some of these could be  exacerbated by possible narcotic use or withdrawal though she does not have any piloerection and she is tachycardic and thankfully she is afebrile with no signs of acute infectious etiology.  Scabbed lesions on her face and back appear to be well-healed from prior encounter and the patient has some slight hypertension but has been noncompliant with her blood pressure medications as previously prescribed.  She does not have any evolving focal neurological findings on repeat bedside exams I do believe this is not representative of an increased intracranial pressure she describes no true infectious etiology or traumatic symptoms.    Following IV, fluids were given for control of her headaches in addition to headache cocktail including Compazine, Tylenol, Toradol once her hCG was obtained, Benadryl and Compazine.    Lab work shows stable and chronic anemia, no leukocytosis, monocyte predominance, no LFT changes, no anion gap abnormalities or electrolyte changes.  hCG is negative and there is no JI..    Evaluation shows patient appears clinically improved, patient has some slight improvement in her heart rate down to the mid 90s though when coming back to the bedside she gets very amped up with both her blood pressure and heart rate transiently elevated.  She does intermittently use methamphetamine and do believe that this is likely the underlying cause and with no development of febrile illness and no leukocytosis I do not believe that diffuse intracranial infectious etiology or sepsis is likely.  I have discussed my findings with the patient, she is feeling clinically improved, she will follow-up with Dr. Chow and is discharged home in stable condition    Hydration: Based on the patient's presentation of Dehydration and Tachycardia the patient was given IV fluids. IV Hydration was used because oral hydration was not as rapid as required. Upon recheck following hydration, the patient was improving.    ADDITIONAL  PROBLEMS MANAGED  Intermittent drug use, hypertension medication noncompliance, chronic migraines    DISPOSITION AND DISCUSSIONS  I have discussed management of the patient with the following physicians and SHARMILA's:  none    Discussion of management with other [facility] or appropriate source(s): None     Escalation of care considered, and ultimately not performed:diagnostic imaging and acute inpatient care management, however at this time, the patient is most appropriate for outpatient management    Barriers to care at this time, including but not limited to: none.     Decision tools and prescription drugs considered including, but not limited to:  none .    FINAL DIAGNOSIS  1. Acute nonintractable headache, unspecified headache type    2. Substance abuse (HCC)      Electronically signed by: Emanuel Dunn M.D., 4/24/2024 4:26 PM

## 2024-04-24 NOTE — ED TRIAGE NOTES
Pt presents by self from home for a migraine and feeling unwell that started yesterday. Photosensitivity started this morning. Hx non intractable migraines. Pt has multiple scabs on her face, she is fidgety in triage, and can't sit still. A&Ox4 and ambulatory.

## 2024-04-25 NOTE — ED NOTES
Patient verbalized understanding of discharge instructions including not to drive or drink etoh for 8 hrs following medications she received in er, no questions at this time. VS stable, patient will ambulate to exit with d/c instructions in hand.

## 2025-03-11 NOTE — ED NOTES
Assist RN  Estelle with  notified that erp is requesting meds to bed for this pt.   Normal vision: sees adequately in most situations; can see medication labels, newsprint

## (undated) DEVICE — SUTURE 3-0 VICRYL PLUS SH - 8X 18 INCH (12/BX)

## (undated) DEVICE — TRAY SRGPRP PVP IOD WT PRP - (20/CA)

## (undated) DEVICE — SET EXTENSION WITH 2 PORTS (48EA/CA) ***PART #2C8610 IS A SUBSTITUTE*****

## (undated) DEVICE — HEAD HOLDER JUNIOR/ADULT

## (undated) DEVICE — TUBE CONNECTING SUCTION - CLEAR PLASTIC STERILE 72 IN (50EA/CA)

## (undated) DEVICE — SENSOR OXIMETER ADULT SPO2 RD SET (20EA/BX)

## (undated) DEVICE — NEPTUNE 4 PORT MANIFOLD - (20/PK)

## (undated) DEVICE — GLOVE BIOGEL SZ 8 SURGICAL PF LTX - (50PR/BX 4BX/CA)

## (undated) DEVICE — BOVIE NEEDLE TIP 3CM COLORADO

## (undated) DEVICE — SODIUM CHL IRRIGATION 0.9% 1000ML (12EA/CA)

## (undated) DEVICE — TUBING CLEARLINK DUO-VENT - C-FLO (48EA/CA)

## (undated) DEVICE — TRAY CATHETER FOLEY URINE METER W/STATLOCK 350ML (10EA/CA)

## (undated) DEVICE — STAPLE 45MM BLUE 4.5MM (12EA/BX)

## (undated) DEVICE — GLOVE, LITE (PAIR)

## (undated) DEVICE — STERI STRIP COMPOUND BENZOIN - TINCTURE 0.6ML WITH APPLICATOR (40EA/BX)

## (undated) DEVICE — SUCTION INSTRUMENT YANKAUER BULBOUS TIP W/O VENT (50EA/CA)

## (undated) DEVICE — DETERGENT RENUZYME PLUS 10 OZ PACKET (50/BX)

## (undated) DEVICE — CLOSURE SKIN STRIP 1/2 X 4 IN - (STERI STRIP) (50/BX 4BX/CA)

## (undated) DEVICE — TUBE E-T HI-LO CUFF 7.0MM (10EA/PK)

## (undated) DEVICE — KIT ROOM DECONTAMINATION

## (undated) DEVICE — CHLORAPREP 26 ML APPLICATOR - ORANGE TINT(25/CA)

## (undated) DEVICE — BLADE SURGICAL #15 - (50/BX 3BX/CA)

## (undated) DEVICE — DRESSING TRANSPARENT FILM TEGADERM 4 X 4.75" (50EA/BX)"

## (undated) DEVICE — KIT  I.V. START (100EA/CA)

## (undated) DEVICE — BAG RETRIEVAL 10ML (10EA/BX)

## (undated) DEVICE — SUTURE 4-0 MONOCRYL PLUS PS-2 - 27 INCH (36/BX)

## (undated) DEVICE — DRESSING XEROFORM 1X8 - (50/BX 4BX/CA)

## (undated) DEVICE — MASK ANESTHESIA ADULT  - (100/CA)

## (undated) DEVICE — STAPLER 45MM ARTICULATING - ENDO (3EA/BX)

## (undated) DEVICE — GLOVE BIOGEL PI INDICATOR SZ 7.0 SURGICAL PF LF - (50/BX 4BX/CA)

## (undated) DEVICE — PROTECTOR ULNA NERVE - (36PR/CA)

## (undated) DEVICE — CANISTER SUCTION 3000ML MECHANICAL FILTER AUTO SHUTOFF MEDI-VAC NONSTERILE LF DISP  (40EA/CA)

## (undated) DEVICE — GLOVE BIOGEL PI INDICATOR SZ 6.5 SURGICAL PF LF - (50/BX 4BX/CA)

## (undated) DEVICE — TROCAR LAPSCP 100MM 12MM NTHRD - (6/BX)

## (undated) DEVICE — LACTATED RINGERS INJ 1000 ML - (14EA/CA 60CA/PF)

## (undated) DEVICE — ELECTRODE 850 FOAM ADHESIVE - HYDROGEL RADIOTRNSPRNT (50/PK)

## (undated) DEVICE — CATHETER IV 20 GA X 1-1/4 ---SURG.& SDS ONLY--- (50EA/BX)

## (undated) DEVICE — KIT ANESTHESIA W/CIRCUIT & 3/LT BAG W/FILTER (20EA/CA)

## (undated) DEVICE — SUTURE 0 VICRYL PLUS UR-6 - 27 INCH (36/BX)

## (undated) DEVICE — SUTURE 4-0 MONOCRYL PLUS PS-1 - 27 INCH (36/BX)

## (undated) DEVICE — SPONGE GAUZE STER 4X4 8-PL - (2/PK 50PK/BX 12BX/CS)

## (undated) DEVICE — SET LEADWIRE 5 LEAD BEDSIDE DISPOSABLE ECG (1SET OF 5/EA)

## (undated) DEVICE — GLOVE BIOGEL SZ 7.5 SURGICAL PF LTX - (50PR/BX 4BX/CA)

## (undated) DEVICE — NEEDLE NON SAFETY HYPO 22 GA X 1 1/2 IN (100/BX)

## (undated) DEVICE — DRESSING TRANSPARENT FILM TEGADERM 2.375 X 2.75"  (100EA/BX)"

## (undated) DEVICE — DRAPESURG STERI-DRAPE LONG - (10/BX 4BX/CA)

## (undated) DEVICE — SPONGE GAUZESTER 4 X 4 4PLY - (128PK/CA)

## (undated) DEVICE — GLOVE BIOGEL SZ 6.5 SURGICAL PF LTX (50PR/BX 4BX/CA)

## (undated) DEVICE — SENSOR SPO2 NEO LNCS ADHESIVE (20/BX) SEE USER NOTES

## (undated) DEVICE — SYRINGE 10 ML CONTROL LL (25EA/BX 4BX/CA)

## (undated) DEVICE — SUTURE 0 ETHIBOND MO6 C/R - (12/BX) 8-18 INCH ETHICON

## (undated) DEVICE — SUTURE GENERAL

## (undated) DEVICE — PACK MINOR BASIN - (2EA/CA)

## (undated) DEVICE — PACK LOWER EXTREMITY - (2/CA)

## (undated) DEVICE — DRAPE LAPAROTOMY T SHEET - (12EA/CA)

## (undated) DEVICE — TOWEL STOP TIMEOUT SAFETY FLAG (40EA/CA)

## (undated) DEVICE — SLEEVE, VASO, THIGH, MED

## (undated) DEVICE — SEALER VESSEL HARMONIC ACE PLUS WITH ADVANCED HEMOSTASIS 36CM (1/EA)

## (undated) DEVICE — ELECTRODE DUAL RETURN W/ CORD - (50/PK)

## (undated) DEVICE — SUTURE 8-0 NYLON BV 130-5 (12PK/BX)

## (undated) DEVICE — SET SUCTION/IRRIGATION WITH DISPOSABLE TIP (6/CA )PART #0250-070-520 IS A SUB

## (undated) DEVICE — PACK LAP CHOLE OR - (2EA/CA)

## (undated) DEVICE — GOWN WARMING STANDARD FLEX - (30/CA)

## (undated) DEVICE — TROCAR 5X100 BLADED Z-THREAD - KII (6/BX)

## (undated) DEVICE — CORDS BIPOLAR COAGULATION - 12FT STERILE DISP. (10EA/BX)

## (undated) DEVICE — SLEEVE VASO CALF MED - (10PR/CA)

## (undated) DEVICE — GLOVE SZ 6.5 BIOGEL PI MICRO - PF LF (50PR/BX)

## (undated) DEVICE — 3-0 SUPRAMID CP-30

## (undated) DEVICE — CANISTER SUCTION RIGID RED 1500CC (40EA/CA)

## (undated) DEVICE — WATER IRRIGATION STERILE 1000ML (12EA/CA)